# Patient Record
Sex: MALE | Race: BLACK OR AFRICAN AMERICAN | NOT HISPANIC OR LATINO | URBAN - METROPOLITAN AREA
[De-identification: names, ages, dates, MRNs, and addresses within clinical notes are randomized per-mention and may not be internally consistent; named-entity substitution may affect disease eponyms.]

---

## 2017-12-10 ENCOUNTER — EMERGENCY (EMERGENCY)
Facility: HOSPITAL | Age: 57
LOS: 1 days | Discharge: HOME | End: 2017-12-10
Attending: PERSONAL EMERGENCY RESPONSE ATTENDANT | Admitting: PERSONAL EMERGENCY RESPONSE ATTENDANT
Payer: COMMERCIAL

## 2017-12-10 VITALS
DIASTOLIC BLOOD PRESSURE: 89 MMHG | RESPIRATION RATE: 18 BRPM | OXYGEN SATURATION: 98 % | SYSTOLIC BLOOD PRESSURE: 147 MMHG | HEART RATE: 98 BPM

## 2017-12-10 VITALS
RESPIRATION RATE: 18 BRPM | DIASTOLIC BLOOD PRESSURE: 99 MMHG | TEMPERATURE: 100 F | HEART RATE: 116 BPM | SYSTOLIC BLOOD PRESSURE: 148 MMHG

## 2017-12-10 LAB
ALBUMIN SERPL ELPH-MCNC: 4.7 G/DL — SIGNIFICANT CHANGE UP (ref 3.3–5)
ALP SERPL-CCNC: 74 U/L — SIGNIFICANT CHANGE UP (ref 40–120)
ALT FLD-CCNC: 36 U/L RC — SIGNIFICANT CHANGE UP (ref 10–45)
ANION GAP SERPL CALC-SCNC: 16 MMOL/L — SIGNIFICANT CHANGE UP (ref 5–17)
AST SERPL-CCNC: 99 U/L — HIGH (ref 10–40)
BASE EXCESS BLDV CALC-SCNC: 3.5 MMOL/L — HIGH (ref -2–2)
BASOPHILS # BLD AUTO: 0.1 K/UL — SIGNIFICANT CHANGE UP (ref 0–0.2)
BASOPHILS NFR BLD AUTO: 1.6 % — SIGNIFICANT CHANGE UP (ref 0–2)
BILIRUB SERPL-MCNC: 2 MG/DL — HIGH (ref 0.2–1.2)
BUN SERPL-MCNC: 8 MG/DL — SIGNIFICANT CHANGE UP (ref 7–23)
CA-I SERPL-SCNC: 1.24 MMOL/L — SIGNIFICANT CHANGE UP (ref 1.12–1.3)
CALCIUM SERPL-MCNC: 10.4 MG/DL — SIGNIFICANT CHANGE UP (ref 8.4–10.5)
CHLORIDE BLDV-SCNC: 103 MMOL/L — SIGNIFICANT CHANGE UP (ref 96–108)
CHLORIDE SERPL-SCNC: 95 MMOL/L — LOW (ref 96–108)
CO2 BLDV-SCNC: 30 MMOL/L — SIGNIFICANT CHANGE UP (ref 22–30)
CO2 SERPL-SCNC: 24 MMOL/L — SIGNIFICANT CHANGE UP (ref 22–31)
CREAT SERPL-MCNC: 0.79 MG/DL — SIGNIFICANT CHANGE UP (ref 0.5–1.3)
EOSINOPHIL # BLD AUTO: 0.1 K/UL — SIGNIFICANT CHANGE UP (ref 0–0.5)
EOSINOPHIL NFR BLD AUTO: 2 % — SIGNIFICANT CHANGE UP (ref 0–6)
ETHANOL SERPL-MCNC: SIGNIFICANT CHANGE UP MG/DL (ref 0–10)
GAS PNL BLDV: 134 MMOL/L — LOW (ref 136–145)
GAS PNL BLDV: SIGNIFICANT CHANGE UP
GLUCOSE BLDV-MCNC: 124 MG/DL — HIGH (ref 70–99)
GLUCOSE SERPL-MCNC: 118 MG/DL — HIGH (ref 70–99)
HCO3 BLDV-SCNC: 28 MMOL/L — SIGNIFICANT CHANGE UP (ref 21–29)
HCT VFR BLD CALC: 42.8 % — SIGNIFICANT CHANGE UP (ref 39–50)
HCT VFR BLDA CALC: 46 % — SIGNIFICANT CHANGE UP (ref 39–50)
HGB BLD CALC-MCNC: 15.1 G/DL — SIGNIFICANT CHANGE UP (ref 13–17)
HGB BLD-MCNC: 15 G/DL — SIGNIFICANT CHANGE UP (ref 13–17)
LACTATE BLDV-MCNC: 2 MMOL/L — SIGNIFICANT CHANGE UP (ref 0.7–2)
LIDOCAIN IGE QN: 72 U/L — HIGH (ref 7–60)
LYMPHOCYTES # BLD AUTO: 1 K/UL — SIGNIFICANT CHANGE UP (ref 1–3.3)
LYMPHOCYTES # BLD AUTO: 27.8 % — SIGNIFICANT CHANGE UP (ref 13–44)
MCHC RBC-ENTMCNC: 33.6 PG — SIGNIFICANT CHANGE UP (ref 27–34)
MCHC RBC-ENTMCNC: 35 GM/DL — SIGNIFICANT CHANGE UP (ref 32–36)
MCV RBC AUTO: 96 FL — SIGNIFICANT CHANGE UP (ref 80–100)
MONOCYTES # BLD AUTO: 0.4 K/UL — SIGNIFICANT CHANGE UP (ref 0–0.9)
MONOCYTES NFR BLD AUTO: 9.9 % — SIGNIFICANT CHANGE UP (ref 2–14)
NEUTROPHILS # BLD AUTO: 2.1 K/UL — SIGNIFICANT CHANGE UP (ref 1.8–7.4)
NEUTROPHILS NFR BLD AUTO: 58.8 % — SIGNIFICANT CHANGE UP (ref 43–77)
PCO2 BLDV: 44 MMHG — SIGNIFICANT CHANGE UP (ref 35–50)
PH BLDV: 7.42 — SIGNIFICANT CHANGE UP (ref 7.35–7.45)
PLATELET # BLD AUTO: 163 K/UL — SIGNIFICANT CHANGE UP (ref 150–400)
PO2 BLDV: 25 MMHG — SIGNIFICANT CHANGE UP (ref 25–45)
POTASSIUM BLDV-SCNC: 3.1 MMOL/L — LOW (ref 3.5–5)
POTASSIUM SERPL-MCNC: 4.9 MMOL/L — SIGNIFICANT CHANGE UP (ref 3.5–5.3)
POTASSIUM SERPL-SCNC: 4.9 MMOL/L — SIGNIFICANT CHANGE UP (ref 3.5–5.3)
PROT SERPL-MCNC: 8.4 G/DL — HIGH (ref 6–8.3)
RBC # BLD: 4.46 M/UL — SIGNIFICANT CHANGE UP (ref 4.2–5.8)
RBC # FLD: 13.6 % — SIGNIFICANT CHANGE UP (ref 10.3–14.5)
SAO2 % BLDV: 36 % — LOW (ref 67–88)
SODIUM SERPL-SCNC: 135 MMOL/L — SIGNIFICANT CHANGE UP (ref 135–145)
WBC # BLD: 3.6 K/UL — LOW (ref 3.8–10.5)
WBC # FLD AUTO: 3.6 K/UL — LOW (ref 3.8–10.5)

## 2017-12-10 PROCEDURE — 96374 THER/PROPH/DIAG INJ IV PUSH: CPT

## 2017-12-10 PROCEDURE — 96376 TX/PRO/DX INJ SAME DRUG ADON: CPT

## 2017-12-10 PROCEDURE — 84132 ASSAY OF SERUM POTASSIUM: CPT

## 2017-12-10 PROCEDURE — 93005 ELECTROCARDIOGRAM TRACING: CPT

## 2017-12-10 PROCEDURE — 82962 GLUCOSE BLOOD TEST: CPT

## 2017-12-10 PROCEDURE — 80307 DRUG TEST PRSMV CHEM ANLYZR: CPT

## 2017-12-10 PROCEDURE — 99285 EMERGENCY DEPT VISIT HI MDM: CPT | Mod: 25

## 2017-12-10 PROCEDURE — 82947 ASSAY GLUCOSE BLOOD QUANT: CPT

## 2017-12-10 PROCEDURE — 84484 ASSAY OF TROPONIN QUANT: CPT

## 2017-12-10 PROCEDURE — 96375 TX/PRO/DX INJ NEW DRUG ADDON: CPT

## 2017-12-10 PROCEDURE — 76705 ECHO EXAM OF ABDOMEN: CPT

## 2017-12-10 PROCEDURE — 80053 COMPREHEN METABOLIC PANEL: CPT

## 2017-12-10 PROCEDURE — 71020: CPT | Mod: 26

## 2017-12-10 PROCEDURE — 85014 HEMATOCRIT: CPT

## 2017-12-10 PROCEDURE — 76705 ECHO EXAM OF ABDOMEN: CPT | Mod: 26,RT

## 2017-12-10 PROCEDURE — 99284 EMERGENCY DEPT VISIT MOD MDM: CPT | Mod: 25

## 2017-12-10 PROCEDURE — 82435 ASSAY OF BLOOD CHLORIDE: CPT

## 2017-12-10 PROCEDURE — 84295 ASSAY OF SERUM SODIUM: CPT

## 2017-12-10 PROCEDURE — 83690 ASSAY OF LIPASE: CPT

## 2017-12-10 PROCEDURE — 85027 COMPLETE CBC AUTOMATED: CPT

## 2017-12-10 PROCEDURE — 82330 ASSAY OF CALCIUM: CPT

## 2017-12-10 PROCEDURE — 71046 X-RAY EXAM CHEST 2 VIEWS: CPT

## 2017-12-10 PROCEDURE — 93010 ELECTROCARDIOGRAM REPORT: CPT

## 2017-12-10 PROCEDURE — 83605 ASSAY OF LACTIC ACID: CPT

## 2017-12-10 PROCEDURE — 82803 BLOOD GASES ANY COMBINATION: CPT

## 2017-12-10 RX ORDER — FAMOTIDINE 10 MG/ML
20 INJECTION INTRAVENOUS ONCE
Qty: 0 | Refills: 0 | Status: COMPLETED | OUTPATIENT
Start: 2017-12-10 | End: 2017-12-10

## 2017-12-10 RX ORDER — SODIUM CHLORIDE 9 MG/ML
2000 INJECTION INTRAMUSCULAR; INTRAVENOUS; SUBCUTANEOUS ONCE
Qty: 0 | Refills: 0 | Status: COMPLETED | OUTPATIENT
Start: 2017-12-10 | End: 2017-12-10

## 2017-12-10 RX ORDER — ONDANSETRON 8 MG/1
1 TABLET, FILM COATED ORAL
Qty: 9 | Refills: 0 | OUTPATIENT
Start: 2017-12-10 | End: 2017-12-12

## 2017-12-10 RX ORDER — ONDANSETRON 8 MG/1
4 TABLET, FILM COATED ORAL ONCE
Qty: 0 | Refills: 0 | Status: COMPLETED | OUTPATIENT
Start: 2017-12-10 | End: 2017-12-10

## 2017-12-10 RX ORDER — BENZOCAINE AND MENTHOL 5; 1 G/100ML; G/100ML
1 LIQUID ORAL ONCE
Qty: 0 | Refills: 0 | Status: COMPLETED | OUTPATIENT
Start: 2017-12-10 | End: 2017-12-10

## 2017-12-10 RX ADMIN — ONDANSETRON 4 MILLIGRAM(S): 8 TABLET, FILM COATED ORAL at 21:25

## 2017-12-10 RX ADMIN — SODIUM CHLORIDE 2000 MILLILITER(S): 9 INJECTION INTRAMUSCULAR; INTRAVENOUS; SUBCUTANEOUS at 16:48

## 2017-12-10 RX ADMIN — ONDANSETRON 4 MILLIGRAM(S): 8 TABLET, FILM COATED ORAL at 16:50

## 2017-12-10 RX ADMIN — FAMOTIDINE 20 MILLIGRAM(S): 10 INJECTION INTRAVENOUS at 18:17

## 2017-12-10 NOTE — ED PROVIDER NOTE - MEDICAL DECISION MAKING DETAILS
likely gastroenteritis. appears dehydrate. unlikely ACS without CP and HTN as only risk factor, will get trop x1. no concern for obstruction without h/o abd sx- normal stool caliber and no abd distension. could be related to hiatal hernia but unlikely as patient also has diarrhea/night sweats suggesting infectious etiology. will check labs

## 2017-12-10 NOTE — ED PROVIDER NOTE - NS ED ROS FT
ROS: no CP +SOB. no cough. no fever. +n/v/d. no abd pain. no rash. no bleeding. no urinary complaints. no weakness. no vision changes. no HA. no neck/back pain. no extremity swelling/deformity. No change in mental status.

## 2017-12-10 NOTE — ED PROVIDER NOTE - PLAN OF CARE
Take Zofran 1 tab dissolved in mouth every 8 hours as needed for nausea. Please follow-up with your Gastroenterologist within 1-2 days following discharge. Continue activity and diet as tolerated. If you becomes unable to tolerate liquids by mouth, uncontrollable pain, have new or worsening symptoms, or any other concern please return to the ED.

## 2017-12-10 NOTE — ED PROVIDER NOTE - PHYSICAL EXAMINATION
Gen: NAD, AOx3  Head: NCAT  HEENT: PERRL, oral mucosa dry, normal conjunctiva, neck supple  Lung: CTAB, no respiratory distress  CV: tachy, regular, no murmur, Normal perfusion  Abd: soft, NTND, obese, normal BS  MSK: No edema, no visible deformities  Neuro: No focal neurologic deficits  Skin: No rash   Psych: normal affect

## 2017-12-10 NOTE — ED PROVIDER NOTE - OBJECTIVE STATEMENT
58yo M with h/o hiatal hernia with vomiting x4 days, unable to tolerate any PO. +nausea. no abd pain. +diarrhea, nonbloody. has chronic BRBPR- told by PCP hemorrhoids, last episode was >8days ago. no recent travel/abx use/sick contacts. feels heart racing, +dry skin, +SOB. no CP. +night sweats. no fevers. no h/o abd sx     PCP-Rishi Bautista

## 2017-12-10 NOTE — ED ADULT NURSE NOTE - OBJECTIVE STATEMENT
56 y/o male a+ox3, pmhx acid reflux, hiatal hernia, HTN, ambulatory from home c/o N/V x 4 days. Pt reports N/V with anorexia; no fevers or chills. Pt reports 1 episode of bloody stool 1 week ago; no current bleeding via rectum as per pt. Pt denies hematuria, back or flank pain, painful or burning with urination. Pt states he sees a GI MD for acid reflux and monitoring of hiatal hernia. Pt denies abdominal pain, chest pain or discomfort, headache, feeling lightheaded, dizziness, difficulty breathing. VS documented, IV established, labs drawn. Pt left in position of comfort. Will reassess.

## 2017-12-10 NOTE — ED PROVIDER NOTE - CARE PLAN
Principal Discharge DX:	Epigastric pain  Instructions for follow-up, activity and diet:	Take Zofran 1 tab dissolved in mouth every 8 hours as needed for nausea. Please follow-up with your Gastroenterologist within 1-2 days following discharge. Continue activity and diet as tolerated. If you becomes unable to tolerate liquids by mouth, uncontrollable pain, have new or worsening symptoms, or any other concern please return to the ED. Principal Discharge DX:	Vomiting and diarrhea  Instructions for follow-up, activity and diet:	Take Zofran 1 tab dissolved in mouth every 8 hours as needed for nausea. Please follow-up with your Gastroenterologist within 1-2 days following discharge. Continue activity and diet as tolerated. If you becomes unable to tolerate liquids by mouth, uncontrollable pain, have new or worsening symptoms, or any other concern please return to the ED.  Secondary Diagnosis:	Nausea

## 2017-12-10 NOTE — ED ADULT NURSE REASSESSMENT NOTE - NS ED NURSE REASSESS COMMENT FT1
pt off unit for testing; will administer medications as per MD order upon return to unit.
Pt tolerating PO well, no c/o nausea or abd pain. VSS.

## 2017-12-10 NOTE — ED PROVIDER NOTE - ATTENDING CONTRIBUTION TO CARE
Attending MD Noriega.  Agree with above.  Pt is a 57 yr old male with hx of hypertension, hiatal hernia, chronic BRBPR told ‘hemorrhoids’ presenting with non-bilious, non-bloody vomiting with any PO intake for 4 days with ngith sweats and diarrhea.  No knonw sick contacts/recent travel.  Pt has never had abdominal surgeries.  Well appearing but tachycardia.  Normoactive bowel sounds.  Abdomen is soft, non-tender.  Pt is concerned his sxs are related to his hiatal hernia and was hoping it could be fixed today.  No epigastric/chest pain.  States he ‘cannot eat because he vomits’.  Does feel intermittently mildly SOB.

## 2017-12-10 NOTE — ED PROVIDER NOTE - PROGRESS NOTE DETAILS
[employer]: Pt's labs noted to have mildly elevated bilirubin/lipase/AST. Abdomen remains soft, nontender.  RUQ sono ordered. [employer]:  Pt reexamined and he is noted to be mildly tremulous.  He states that the abdominal discomfort, nausea, vomiting have been present for 1 yr.  He came today because he 'hasn't been sleeping or eating because of nausea, discomfort'.  Pt denies marijuana use, remote hx of tobacco use.  Endorses daily EtOH.  Noted to be tremulous and mildly tachycardic. remains without abd pain, no nausea, US no wall thickening, fluid or stones. will PO challenge. and reasses -Slowey DO Pt reevaluated: feels well. asking terrence eat. will po challenge and dispo home with gi fu if tolerating po. Pt and family agree with plan. - Maicol Vargas, Resident [employer]:  Pt's mild tremulousness has not progressed.  He remains calm, alert and oriented.  Abdominal discomfort improved.  Nausea/vomiting improved.  Pt PO challenged and able to tolerate.  He denied sig abdominal pain but endorsed mild discomfort in epigastrum LUQ x 1 yr.  He has no severe pain, no radiation to back.  Pt advised of all findings and comfortable with discharge with GI follow-up.  Return to ED for acutely new/worsening sxs.

## 2018-10-26 ENCOUNTER — EMERGENCY (EMERGENCY)
Facility: HOSPITAL | Age: 58
LOS: 1 days | Discharge: HOME | End: 2018-10-26
Attending: EMERGENCY MEDICINE
Payer: COMMERCIAL

## 2018-10-26 VITALS
SYSTOLIC BLOOD PRESSURE: 131 MMHG | OXYGEN SATURATION: 97 % | RESPIRATION RATE: 20 BRPM | TEMPERATURE: 98 F | HEIGHT: 69 IN | HEART RATE: 137 BPM | DIASTOLIC BLOOD PRESSURE: 93 MMHG | WEIGHT: 203.93 LBS

## 2018-10-26 PROBLEM — I10 ESSENTIAL (PRIMARY) HYPERTENSION: Chronic | Status: ACTIVE | Noted: 2017-12-10

## 2018-10-26 LAB
ALBUMIN SERPL ELPH-MCNC: 4.7 G/DL — SIGNIFICANT CHANGE UP (ref 3.3–5)
ALBUMIN SERPL ELPH-MCNC: 5.1 G/DL — HIGH (ref 3.3–5)
ALP SERPL-CCNC: 60 U/L — SIGNIFICANT CHANGE UP (ref 40–120)
ALP SERPL-CCNC: 69 U/L — SIGNIFICANT CHANGE UP (ref 40–120)
ALT FLD-CCNC: 29 U/L — SIGNIFICANT CHANGE UP (ref 10–45)
ALT FLD-CCNC: 35 U/L — SIGNIFICANT CHANGE UP (ref 10–45)
ANION GAP SERPL CALC-SCNC: 18 MMOL/L — HIGH (ref 5–17)
ANION GAP SERPL CALC-SCNC: 24 MMOL/L — HIGH (ref 5–17)
APPEARANCE UR: ABNORMAL
AST SERPL-CCNC: 59 U/L — HIGH (ref 10–40)
AST SERPL-CCNC: 71 U/L — HIGH (ref 10–40)
BASE EXCESS BLDV CALC-SCNC: -2.1 MMOL/L — LOW (ref -2–2)
BASOPHILS # BLD AUTO: 0 K/UL — SIGNIFICANT CHANGE UP (ref 0–0.2)
BASOPHILS NFR BLD AUTO: 0.4 % — SIGNIFICANT CHANGE UP (ref 0–2)
BILIRUB SERPL-MCNC: 2 MG/DL — HIGH (ref 0.2–1.2)
BILIRUB SERPL-MCNC: 2.5 MG/DL — HIGH (ref 0.2–1.2)
BILIRUB UR-MCNC: ABNORMAL
BUN SERPL-MCNC: 16 MG/DL — SIGNIFICANT CHANGE UP (ref 7–23)
BUN SERPL-MCNC: 21 MG/DL — SIGNIFICANT CHANGE UP (ref 7–23)
CA-I SERPL-SCNC: 1.22 MMOL/L — SIGNIFICANT CHANGE UP (ref 1.12–1.3)
CALCIUM SERPL-MCNC: 10.5 MG/DL — SIGNIFICANT CHANGE UP (ref 8.4–10.5)
CALCIUM SERPL-MCNC: 9.8 MG/DL — SIGNIFICANT CHANGE UP (ref 8.4–10.5)
CHLORIDE BLDV-SCNC: 99 MMOL/L — SIGNIFICANT CHANGE UP (ref 96–108)
CHLORIDE SERPL-SCNC: 93 MMOL/L — LOW (ref 96–108)
CHLORIDE SERPL-SCNC: 94 MMOL/L — LOW (ref 96–108)
CO2 BLDV-SCNC: 24 MMOL/L — SIGNIFICANT CHANGE UP (ref 22–30)
CO2 SERPL-SCNC: 16 MMOL/L — LOW (ref 22–31)
CO2 SERPL-SCNC: 20 MMOL/L — LOW (ref 22–31)
COLOR SPEC: ABNORMAL
CREAT SERPL-MCNC: 0.9 MG/DL — SIGNIFICANT CHANGE UP (ref 0.5–1.3)
CREAT SERPL-MCNC: 1.25 MG/DL — SIGNIFICANT CHANGE UP (ref 0.5–1.3)
DIFF PNL FLD: ABNORMAL
EOSINOPHIL # BLD AUTO: 0.1 K/UL — SIGNIFICANT CHANGE UP (ref 0–0.5)
EOSINOPHIL NFR BLD AUTO: 2 % — SIGNIFICANT CHANGE UP (ref 0–6)
ETHANOL SERPL-MCNC: SIGNIFICANT CHANGE UP MG/DL (ref 0–10)
GAS PNL BLDV: 131 MMOL/L — LOW (ref 136–145)
GAS PNL BLDV: SIGNIFICANT CHANGE UP
GAS PNL BLDV: SIGNIFICANT CHANGE UP
GLUCOSE BLDV-MCNC: 86 MG/DL — SIGNIFICANT CHANGE UP (ref 70–99)
GLUCOSE SERPL-MCNC: 124 MG/DL — HIGH (ref 70–99)
GLUCOSE SERPL-MCNC: 94 MG/DL — SIGNIFICANT CHANGE UP (ref 70–99)
GLUCOSE UR QL: NEGATIVE — SIGNIFICANT CHANGE UP
HCO3 BLDV-SCNC: 22 MMOL/L — SIGNIFICANT CHANGE UP (ref 21–29)
HCT VFR BLD CALC: 42.1 % — SIGNIFICANT CHANGE UP (ref 39–50)
HCT VFR BLDA CALC: 41 % — SIGNIFICANT CHANGE UP (ref 39–50)
HGB BLD CALC-MCNC: 13.4 G/DL — SIGNIFICANT CHANGE UP (ref 13–17)
HGB BLD-MCNC: 14.3 G/DL — SIGNIFICANT CHANGE UP (ref 13–17)
KETONES UR-MCNC: ABNORMAL
LACTATE BLDV-MCNC: 2.4 MMOL/L — HIGH (ref 0.7–2)
LEUKOCYTE ESTERASE UR-ACNC: NEGATIVE — SIGNIFICANT CHANGE UP
LIDOCAIN IGE QN: 89 U/L — HIGH (ref 7–60)
LYMPHOCYTES # BLD AUTO: 1 K/UL — SIGNIFICANT CHANGE UP (ref 1–3.3)
LYMPHOCYTES # BLD AUTO: 18 % — SIGNIFICANT CHANGE UP (ref 13–44)
MAGNESIUM SERPL-MCNC: 2 MG/DL — SIGNIFICANT CHANGE UP (ref 1.6–2.6)
MCHC RBC-ENTMCNC: 30.7 PG — SIGNIFICANT CHANGE UP (ref 27–34)
MCHC RBC-ENTMCNC: 34.1 GM/DL — SIGNIFICANT CHANGE UP (ref 32–36)
MCV RBC AUTO: 90 FL — SIGNIFICANT CHANGE UP (ref 80–100)
MONOCYTES # BLD AUTO: 0.6 K/UL — SIGNIFICANT CHANGE UP (ref 0–0.9)
MONOCYTES NFR BLD AUTO: 10.2 % — SIGNIFICANT CHANGE UP (ref 2–14)
NEUTROPHILS # BLD AUTO: 4 K/UL — SIGNIFICANT CHANGE UP (ref 1.8–7.4)
NEUTROPHILS NFR BLD AUTO: 69.3 % — SIGNIFICANT CHANGE UP (ref 43–77)
NITRITE UR-MCNC: NEGATIVE — SIGNIFICANT CHANGE UP
PCO2 BLDV: 40 MMHG — SIGNIFICANT CHANGE UP (ref 35–50)
PH BLDV: 7.37 — SIGNIFICANT CHANGE UP (ref 7.35–7.45)
PH UR: 6.5 — SIGNIFICANT CHANGE UP (ref 5–8)
PLATELET # BLD AUTO: 168 K/UL — SIGNIFICANT CHANGE UP (ref 150–400)
PO2 BLDV: 22 MMHG — LOW (ref 25–45)
POTASSIUM BLDV-SCNC: 3.4 MMOL/L — LOW (ref 3.5–5.3)
POTASSIUM SERPL-MCNC: 3.4 MMOL/L — LOW (ref 3.5–5.3)
POTASSIUM SERPL-MCNC: 3.4 MMOL/L — LOW (ref 3.5–5.3)
POTASSIUM SERPL-SCNC: 3.4 MMOL/L — LOW (ref 3.5–5.3)
POTASSIUM SERPL-SCNC: 3.4 MMOL/L — LOW (ref 3.5–5.3)
PROT SERPL-MCNC: 8.2 G/DL — SIGNIFICANT CHANGE UP (ref 6–8.3)
PROT SERPL-MCNC: 9.1 G/DL — HIGH (ref 6–8.3)
PROT UR-MCNC: ABNORMAL
RBC # BLD: 4.67 M/UL — SIGNIFICANT CHANGE UP (ref 4.2–5.8)
RBC # FLD: 14.3 % — SIGNIFICANT CHANGE UP (ref 10.3–14.5)
SAO2 % BLDV: 25 % — LOW (ref 67–88)
SODIUM SERPL-SCNC: 131 MMOL/L — LOW (ref 135–145)
SODIUM SERPL-SCNC: 134 MMOL/L — LOW (ref 135–145)
SP GR SPEC: 1.02 — SIGNIFICANT CHANGE UP (ref 1.01–1.02)
TSH SERPL-MCNC: 2.03 UIU/ML — SIGNIFICANT CHANGE UP (ref 0.27–4.2)
UROBILINOGEN FLD QL: ABNORMAL
WBC # BLD: 5.8 K/UL — SIGNIFICANT CHANGE UP (ref 3.8–10.5)
WBC # FLD AUTO: 5.8 K/UL — SIGNIFICANT CHANGE UP (ref 3.8–10.5)

## 2018-10-26 PROCEDURE — 93010 ELECTROCARDIOGRAM REPORT: CPT

## 2018-10-26 PROCEDURE — 74177 CT ABD & PELVIS W/CONTRAST: CPT | Mod: 26

## 2018-10-26 PROCEDURE — 76705 ECHO EXAM OF ABDOMEN: CPT | Mod: 26,RT

## 2018-10-26 PROCEDURE — 99218: CPT

## 2018-10-26 RX ORDER — AMLODIPINE BESYLATE 2.5 MG/1
0 TABLET ORAL
Qty: 0 | Refills: 0 | COMMUNITY

## 2018-10-26 RX ORDER — PANTOPRAZOLE SODIUM 20 MG/1
40 TABLET, DELAYED RELEASE ORAL
Qty: 0 | Refills: 0 | Status: DISCONTINUED | OUTPATIENT
Start: 2018-10-26 | End: 2018-10-30

## 2018-10-26 RX ORDER — OMEPRAZOLE 10 MG/1
0 CAPSULE, DELAYED RELEASE ORAL
Qty: 0 | Refills: 0 | COMMUNITY

## 2018-10-26 RX ORDER — POTASSIUM CHLORIDE 20 MEQ
40 PACKET (EA) ORAL ONCE
Qty: 0 | Refills: 0 | Status: COMPLETED | OUTPATIENT
Start: 2018-10-26 | End: 2018-10-26

## 2018-10-26 RX ORDER — SODIUM CHLORIDE 9 MG/ML
1000 INJECTION, SOLUTION INTRAVENOUS ONCE
Qty: 0 | Refills: 0 | Status: COMPLETED | OUTPATIENT
Start: 2018-10-26 | End: 2018-10-26

## 2018-10-26 RX ORDER — ONDANSETRON 8 MG/1
4 TABLET, FILM COATED ORAL ONCE
Qty: 0 | Refills: 0 | Status: COMPLETED | OUTPATIENT
Start: 2018-10-26 | End: 2018-10-26

## 2018-10-26 RX ORDER — SODIUM CHLORIDE 9 MG/ML
1000 INJECTION, SOLUTION INTRAVENOUS
Qty: 0 | Refills: 0 | Status: DISCONTINUED | OUTPATIENT
Start: 2018-10-26 | End: 2018-10-30

## 2018-10-26 RX ORDER — LOSARTAN POTASSIUM 100 MG/1
50 TABLET, FILM COATED ORAL DAILY
Qty: 0 | Refills: 0 | Status: DISCONTINUED | OUTPATIENT
Start: 2018-10-26 | End: 2018-10-30

## 2018-10-26 RX ORDER — FAMOTIDINE 10 MG/ML
20 INJECTION INTRAVENOUS
Qty: 0 | Refills: 0 | Status: DISCONTINUED | OUTPATIENT
Start: 2018-10-26 | End: 2018-10-30

## 2018-10-26 RX ADMIN — FAMOTIDINE 20 MILLIGRAM(S): 10 INJECTION INTRAVENOUS at 17:50

## 2018-10-26 RX ADMIN — Medication 40 MILLIEQUIVALENT(S): at 15:24

## 2018-10-26 RX ADMIN — SODIUM CHLORIDE 150 MILLILITER(S): 9 INJECTION, SOLUTION INTRAVENOUS at 19:08

## 2018-10-26 RX ADMIN — SODIUM CHLORIDE 4000 MILLILITER(S): 9 INJECTION, SOLUTION INTRAVENOUS at 11:41

## 2018-10-26 RX ADMIN — ONDANSETRON 4 MILLIGRAM(S): 8 TABLET, FILM COATED ORAL at 11:40

## 2018-10-26 RX ADMIN — SODIUM CHLORIDE 150 MILLILITER(S): 9 INJECTION, SOLUTION INTRAVENOUS at 15:25

## 2018-10-26 RX ADMIN — LOSARTAN POTASSIUM 50 MILLIGRAM(S): 100 TABLET, FILM COATED ORAL at 17:50

## 2018-10-26 NOTE — ED ADULT NURSE NOTE - CHPI ED NUR SYMPTOMS NEG
no dysuria/no abdominal distension/no fever/no hematuria/no blood in stool/no burning urination/no chills

## 2018-10-26 NOTE — ED PROVIDER NOTE - MEDICAL DECISION MAKING DETAILS
Intractable n/v with diarreha and mild epigastric pain.  possible gastroenteritis, will obtain lipase, tsh, electrolytes as well.  Will give IVF and antiemetic.

## 2018-10-26 NOTE — ED PROVIDER NOTE - PHYSICAL EXAMINATION
***GEN - NAD  ***HEAD - NC/AT  ***EYES/NOSE - PEERL, EOMI, dry mucous membranes  ***THROAT: Oral cavity and pharynx normal. No inflammation, swelling, exudate, or lesions.    ***NECK: supple, non-tender no lymphadenopathy  ***PULMONARY - CTA b/l, symmetric breath sounds.   ***CARDIAC- s1s2, RRR, no murmur  ***ABDOMEN - +BS, ND, tenderness to palpation epigastrum, soft, no guarding, no rebound, no organomegaly  ***BACK - no CVA tenderness, Normal  spine, no spinal TTP  ***EXTREMITIES - symmetric pulses, 2+ dp, capillary refill < 2 seconds, no clubbing, no cyanosis, no edema   ***SKIN - warm, dry, intact, no rash or bruising   ***NEUROLOGIC - a&o x3, CN 3-12 intact, sensation nl, motor 5/5 RUE/LUE/RLE/LLE gait nl,   ***PSYCH - insight and judgment nl, memory nl, affect nl, thought normal numerical 0-10

## 2018-10-26 NOTE — ED CDU PROVIDER SUBSEQUENT DAY NOTE - PROGRESS NOTE DETAILS
CDU NOTE WILDA ROB: Pt resting comfortably, feeling well without complaint. states he had another loose BM earlier, this one was soft, not watery. NAD, VSS. No events on telemetry. will continue monitoring. Pt resting comfortably. NAD. No complaints. VSS. no nausea/vomitng, diarrhea overnight, pt able to tolerate PO fluid, will  have breakfast and d/c with GI follow up. -Trini Paredes PA-C

## 2018-10-26 NOTE — ED CDU PROVIDER INITIAL DAY NOTE - ATTENDING CONTRIBUTION TO CARE
Attending MD Yanez:   I personally have seen and examined this patient.  Physician assistant note reviewed and agree on plan of care and except where noted.  See HPI, PE, and MDM for details.

## 2018-10-26 NOTE — ED CDU PROVIDER DISPOSITION NOTE - CLINICAL COURSE
58M with past medical history hiatal hernia, Hypertension, GERD, presents with 4d h/o nbnb n/v, watery diarrhea, decreased appetite and fatigue.  Able to drink water but having difficulty keeping anything else down. pt had similar symptoms 8 months ago, came to ED and sent home with anti emetic which helped.  Denies recent travel, recent antibiotics, fever, dysuria, chest pain, shortness of breath, lightheadedness, cough, sick contacts, testicle pain.  Has had 2 prior EGDs, last one was this year, which were normal.  In ED, Na 134, K 3.4, AG 24, lactate 2.4, CT a/p negative. pt sent to CDU for IVFs, antiemetics, PO challenge and repeat labs.   In CDU, vomiting resolved, pt tolerated PO intake. Repeat labs ____________. 58M with past medical history hiatal hernia, Hypertension, GERD, presents with 4d h/o nbnb n/v, watery diarrhea, decreased appetite and fatigue.  Able to drink water but having difficulty keeping anything else down. pt had similar symptoms 8 months ago, came to ED and sent home with anti emetic which helped.  Denies recent travel, recent antibiotics, fever, dysuria, chest pain, shortness of breath, lightheadedness, cough, sick contacts, testicle pain.  Has had 2 prior EGDs, last one was this year, which were normal.  In ED, Na 134, K 3.4, AG 24, lactate 2.4, CT a/p negative. pt sent to CDU for IVFs, antiemetics, PO challenge and repeat labs.   In CDU, vomiting resolved, pt tolerated PO intake. Repeat labs showed Na 135, K 3.5, AG 15, lactate 1.9  Pt resting comfortably. NAD. No complaints. VSS. no nausea/vomitng, diarrhea overnight, pt able to tolerate PO fluid, will  have breakfast and d/c with GI follow up, case d/w Dr. Harper. 58M with past medical history hiatal hernia, Hypertension, GERD, presents with 4d h/o nbnb n/v, watery diarrhea, decreased appetite and fatigue.  Able to drink water but having difficulty keeping anything else down. pt had similar symptoms 8 months ago, came to ED and sent home with anti emetic which helped.  Denies recent travel, recent antibiotics, fever, dysuria, chest pain, shortness of breath, lightheadedness, cough, sick contacts, testicle pain.  Has had 2 prior EGDs, last one was this year, which were normal.  In ED, Na 134, K 3.4, AG 24, lactate 2.4, CT a/p negative. pt sent to CDU for IVFs, antiemetics, PO challenge and repeat labs.   In CDU, vomiting resolved, pt tolerated PO intake. Repeat labs showed Na 135, K 3.5, AG 15, lactate 1.9  Pt resting comfortably. NAD. No complaints. VSS. no nausea/vomitng, diarrhea overnight, pt able to tolerate PO fluid, will  have breakfast and d/c with GI follow up, case d/w Dr. French.

## 2018-10-26 NOTE — ED ADULT NURSE NOTE - OBJECTIVE STATEMENT
59 yo M [id number]  arrived from triage c/o of nausea/ vomiting and diarrhea. Denies abd pain PMH HLD, hiatal hernia. Onset of symptoms 4 days ago. Reports having no appetite. No  distress. No CP dizziness weakness or SOB. BS sounds + All 4Q Abdomen soft, nontender, nondistended. Last BM today denies  Palpitations. Denies weight loss. IV line placed. Medication given as ordered. Labs drawn and sent. Pt awaiting Abd ct. Updated on plan of care.

## 2018-10-26 NOTE — ED PROVIDER NOTE - ATTENDING CONTRIBUTION TO CARE
Attending MD Yanez:  I personally have seen and examined this patient.  Resident note reviewed and agree on plan of care and except where noted.  See HPI, PE, and MDM for details.      58M presenting with 4 days of nausea/vomiting and diarrhea, reportedly patient states he has this happen to him multiple times in the past with uncertain etiology. Patient benign abdomen but is tachy, ddx includes infectious colitis, gastroenteritis, IBD/IBS. Plan for labs, ekg, IV fluids, antiemetics and reassessment      Attending MD Yanez: A & O x 3, NAD, EOMI b/l, PERRL b/l; lungs CTAB, heart tachy but reg rhythm without murmur; abdomen soft NTND; extremities with no edema; affect appropriate. neuro exam non focal with no motor or sensory deficits.

## 2018-10-26 NOTE — ED CDU PROVIDER SUBSEQUENT DAY NOTE - MUSCULOSKELETAL, MLM
independent/needs device
Spine appears normal, range of motion is not limited, no muscle or joint tenderness

## 2018-10-26 NOTE — ED PROVIDER NOTE - OBJECTIVE STATEMENT
89M with past medical history hiatal hernia, Hypertension, hl presents with 4d h/o nbnb n/v, watery diarrhea, decreased appetite and fatigue.  Able to drink water but having difficulty keeping anything else down.  Happened 8 mos ago with similar, came to ED and sent home with anti emetic which helped.  Denies recent travel, recent antibiotics, fever, dysuria, chest pain, shortness of breath, lightheadedness, cough, sick contacts, testicle pain.      Has had 2 prior EGDs, last one was this year, which were normal.      primary medical doctor/GI: Rishi Bautista 58M with past medical history hiatal hernia, Hypertension, hl presents with 4d h/o nbnb n/v, watery diarrhea, decreased appetite and fatigue.  Able to drink water but having difficulty keeping anything else down.  Happened 8 mos ago with similar, came to ED and sent home with anti emetic which helped.  Denies recent travel, recent antibiotics, fever, dysuria, chest pain, shortness of breath, lightheadedness, cough, sick contacts, testicle pain.      Has had 2 prior EGDs, last one was this year, which were normal.      primary medical doctor/GI: Rishi Bautista

## 2018-10-26 NOTE — ED CDU PROVIDER INITIAL DAY NOTE - OBJECTIVE STATEMENT
58M with past medical history hiatal hernia, Hypertension, hl presents with 4d h/o nbnb n/v, watery diarrhea, decreased appetite and fatigue.  Able to drink water but having difficulty keeping anything else down.  Happened 8 mos ago with similar, came to ED and sent home with anti emetic which helped.  Denies recent travel, recent antibiotics, fever, dysuria, chest pain, shortness of breath, lightheadedness, cough, sick contacts, testicle pain.      Has had 2 prior EGDs, last one was this year, which were normal.      primary medical doctor/GI: Rishi Bautista 58M with past medical history hiatal hernia, Hypertension, GERD, presents with 4d h/o nbnb n/v, watery diarrhea, decreased appetite and fatigue.  Able to drink water but having difficulty keeping anything else down.  Happened 8 mos ago with similar, came to ED and sent home with anti emetic which helped.  Denies recent travel, recent antibiotics, fever, dysuria, chest pain, shortness of breath, lightheadedness, cough, sick contacts, testicle pain.      Has had 2 prior EGDs, last one was this year, which were normal.      primary medical doctor/GI: Rishi Bautista 58M with past medical history hiatal hernia, Hypertension, GERD, presents with 4d h/o nbnb n/v, watery diarrhea, decreased appetite and fatigue.  Able to drink water but having difficulty keeping anything else down. pt had similar symptoms 8 months ago, came to ED and sent home with anti emetic which helped.  Denies recent travel, recent antibiotics, fever, dysuria, chest pain, shortness of breath, lightheadedness, cough, sick contacts, testicle pain.      Has had 2 prior EGDs, last one was this year, which were normal.      primary medical doctor/GI: Rishi Bautista

## 2018-10-26 NOTE — ED PROVIDER NOTE - NS ED ROS FT
Constitutional: no fever  Eyes: no conjunctivitis  Ears: no ear pain   Nose: no nasal congestion, Mouth/Throat: no throat pain, Neck: no stiffness  Cardiovascular: no chest pain  Chest: no cough  Gastrointestinal: As noted in hpi   MSK: no joint pain  : no dysuria  Skin: no rash  Neuro: no LOC  All other review of systems negative except as noted in HPI

## 2018-10-26 NOTE — ED CDU PROVIDER SUBSEQUENT DAY NOTE - ATTENDING CONTRIBUTION TO CARE
Private Physician Lily  58y male pmh Hypertension, comes to ed complains of nvd, not tolerating po 4d, No fever chills. no gi bleeding. No hx abd surg, Had similar episode 8m ago. Eithology unclear.Now in cdu pt reports feeling better. Tolerating po,.PE WDWN male nad normocephalic atraumatic chest clear anterior & posterior abd soft +bs lungs clear. cv no rubs, gallops or murmurs  Michael French MD, Facep

## 2018-10-26 NOTE — ED CDU PROVIDER DISPOSITION NOTE - PLAN OF CARE
1.  Drink plenty of fluids to stay hydrated. You can drink Gatorade/Powerade/coconut water to replenish your electrolytes and start with bland diet (Bananas, Rice, Applesauce, Toast), then advance as tolerated to work your system up to a healthy dietary routine.  2. Continue your home medications as directed.  3. Be sure to wash your hands often/thoroughly to prevent spread of infection  4. You will need to follow-up with your PMD and GI specialist in 1-2 days. A copy of your results were given to you to bring to your appt.  5. Return to ER for fever, chills, severe pain, unable to keep down fluids from vomiting, or any other concerns.

## 2018-10-26 NOTE — ED ADULT NURSE NOTE - NS ED NURSE DC INFO COMPLEXITY
Patient asked questions/Returned Demonstration/Straightforward: Basic instructions, no meds, no home treatment/Verbalized Understanding/Moderate: Comprehensive teaching

## 2018-10-26 NOTE — ED CDU PROVIDER SUBSEQUENT DAY NOTE - HISTORY
Pt feels well without complaint. nausea/vomiting resolved, no abd pain now, had 2 episodes of watery diarrhea after eating dinner. pt notes his urine was dark last time he urinated. denies any dysuria, frequency/urgency, flank pain. pt states he has had hematuria in the past, had a cystoscopy 5 years ago that he states was normal, does not currently f/u with a urologist. NAD, VSS. no events on tele. Abd: Flat/ND/NT/soft. case discussed with Dr. Claire, will get UC and pt should f/u outpt with urology for further workup. will continue monitoring. - WILDA Saab

## 2018-10-26 NOTE — ED CDU PROVIDER INITIAL DAY NOTE - PHYSICAL EXAMINATION
***GEN - NAD  ***HEAD - NC/AT  ***EYES/NOSE - PEERL, EOMI, dry mucous membranes  ***THROAT: Oral cavity and pharynx normal. No inflammation, swelling, exudate, or lesions.    ***NECK: supple, non-tender no lymphadenopathy  ***PULMONARY - CTA b/l, symmetric breath sounds.   ***CARDIAC- s1s2, RRR, no murmur  ***ABDOMEN - +BS, ND, tenderness to palpation epigastrum, soft, no guarding, no rebound, no organomegaly  ***BACK - no CVA tenderness, Normal  spine, no spinal TTP  ***EXTREMITIES - symmetric pulses, 2+ dp, capillary refill < 2 seconds, no clubbing, no cyanosis, no edema   ***SKIN - warm, dry, intact, no rash or bruising   ***NEUROLOGIC - a&o x3, CN 3-12 intact, sensation nl, motor 5/5 RUE/LUE/RLE/LLE gait nl,   ***PSYCH - insight and judgment nl, memory nl, affect nl, thought normal

## 2018-10-26 NOTE — ED CDU PROVIDER DISPOSITION NOTE - ATTENDING CONTRIBUTION TO CARE
I have personally performed a face to face diagnostic evaluation on this patient.  I have reviewed the ACP note and agree with the history, exam, and plan of care, except as noted.  History and Exam by me shows  See CDU Sub note  Michael French MD, Facep

## 2018-10-27 VITALS
HEART RATE: 76 BPM | SYSTOLIC BLOOD PRESSURE: 134 MMHG | OXYGEN SATURATION: 100 % | TEMPERATURE: 98 F | RESPIRATION RATE: 18 BRPM | DIASTOLIC BLOOD PRESSURE: 92 MMHG

## 2018-10-27 LAB
ALBUMIN SERPL ELPH-MCNC: 4.3 G/DL — SIGNIFICANT CHANGE UP (ref 3.3–5)
ALP SERPL-CCNC: 54 U/L — SIGNIFICANT CHANGE UP (ref 40–120)
ALT FLD-CCNC: 32 U/L — SIGNIFICANT CHANGE UP (ref 10–45)
ANION GAP SERPL CALC-SCNC: 15 MMOL/L — SIGNIFICANT CHANGE UP (ref 5–17)
AST SERPL-CCNC: 63 U/L — HIGH (ref 10–40)
BASE EXCESS BLDV CALC-SCNC: 2.1 MMOL/L — HIGH (ref -2–2)
BILIRUB SERPL-MCNC: 1.8 MG/DL — HIGH (ref 0.2–1.2)
BUN SERPL-MCNC: 12 MG/DL — SIGNIFICANT CHANGE UP (ref 7–23)
CA-I SERPL-SCNC: 1.21 MMOL/L — SIGNIFICANT CHANGE UP (ref 1.12–1.3)
CALCIUM SERPL-MCNC: 9.8 MG/DL — SIGNIFICANT CHANGE UP (ref 8.4–10.5)
CHLORIDE BLDV-SCNC: 100 MMOL/L — SIGNIFICANT CHANGE UP (ref 96–108)
CHLORIDE SERPL-SCNC: 97 MMOL/L — SIGNIFICANT CHANGE UP (ref 96–108)
CO2 BLDV-SCNC: 27 MMOL/L — SIGNIFICANT CHANGE UP (ref 22–30)
CO2 SERPL-SCNC: 23 MMOL/L — SIGNIFICANT CHANGE UP (ref 22–31)
CREAT SERPL-MCNC: 0.71 MG/DL — SIGNIFICANT CHANGE UP (ref 0.5–1.3)
GAS PNL BLDV: 132 MMOL/L — LOW (ref 136–145)
GAS PNL BLDV: SIGNIFICANT CHANGE UP
GAS PNL BLDV: SIGNIFICANT CHANGE UP
GLUCOSE BLDV-MCNC: 103 MG/DL — HIGH (ref 70–99)
GLUCOSE SERPL-MCNC: 107 MG/DL — HIGH (ref 70–99)
HCO3 BLDV-SCNC: 26 MMOL/L — SIGNIFICANT CHANGE UP (ref 21–29)
HCT VFR BLDA CALC: 38 % — LOW (ref 39–50)
HGB BLD CALC-MCNC: 12.4 G/DL — LOW (ref 13–17)
LACTATE BLDV-MCNC: 1.9 MMOL/L — SIGNIFICANT CHANGE UP (ref 0.7–2)
OTHER CELLS CSF MANUAL: 4 ML/DL — LOW (ref 18–22)
PCO2 BLDV: 39 MMHG — SIGNIFICANT CHANGE UP (ref 35–50)
PH BLDV: 7.44 — SIGNIFICANT CHANGE UP (ref 7.35–7.45)
PO2 BLDV: 19 MMHG — LOW (ref 25–45)
POTASSIUM BLDV-SCNC: 3.2 MMOL/L — LOW (ref 3.5–5.3)
POTASSIUM SERPL-MCNC: 3.5 MMOL/L — SIGNIFICANT CHANGE UP (ref 3.5–5.3)
POTASSIUM SERPL-SCNC: 3.5 MMOL/L — SIGNIFICANT CHANGE UP (ref 3.5–5.3)
PROT SERPL-MCNC: 7.4 G/DL — SIGNIFICANT CHANGE UP (ref 6–8.3)
SAO2 % BLDV: 25 % — LOW (ref 67–88)
SODIUM SERPL-SCNC: 135 MMOL/L — SIGNIFICANT CHANGE UP (ref 135–145)

## 2018-10-27 PROCEDURE — 99217: CPT

## 2018-10-27 PROCEDURE — 87086 URINE CULTURE/COLONY COUNT: CPT

## 2018-10-27 PROCEDURE — 84443 ASSAY THYROID STIM HORMONE: CPT

## 2018-10-27 PROCEDURE — 81001 URINALYSIS AUTO W/SCOPE: CPT

## 2018-10-27 PROCEDURE — 74177 CT ABD & PELVIS W/CONTRAST: CPT

## 2018-10-27 PROCEDURE — 96375 TX/PRO/DX INJ NEW DRUG ADDON: CPT

## 2018-10-27 PROCEDURE — 83735 ASSAY OF MAGNESIUM: CPT

## 2018-10-27 PROCEDURE — 93005 ELECTROCARDIOGRAM TRACING: CPT

## 2018-10-27 PROCEDURE — 82947 ASSAY GLUCOSE BLOOD QUANT: CPT

## 2018-10-27 PROCEDURE — 82330 ASSAY OF CALCIUM: CPT

## 2018-10-27 PROCEDURE — 82803 BLOOD GASES ANY COMBINATION: CPT

## 2018-10-27 PROCEDURE — 83605 ASSAY OF LACTIC ACID: CPT

## 2018-10-27 PROCEDURE — 82435 ASSAY OF BLOOD CHLORIDE: CPT

## 2018-10-27 PROCEDURE — 76705 ECHO EXAM OF ABDOMEN: CPT

## 2018-10-27 PROCEDURE — 80053 COMPREHEN METABOLIC PANEL: CPT

## 2018-10-27 PROCEDURE — 80307 DRUG TEST PRSMV CHEM ANLYZR: CPT

## 2018-10-27 PROCEDURE — 85027 COMPLETE CBC AUTOMATED: CPT

## 2018-10-27 PROCEDURE — 84132 ASSAY OF SERUM POTASSIUM: CPT

## 2018-10-27 PROCEDURE — G0378: CPT

## 2018-10-27 PROCEDURE — 96374 THER/PROPH/DIAG INJ IV PUSH: CPT | Mod: XU

## 2018-10-27 PROCEDURE — 85014 HEMATOCRIT: CPT

## 2018-10-27 PROCEDURE — 99284 EMERGENCY DEPT VISIT MOD MDM: CPT | Mod: 25

## 2018-10-27 PROCEDURE — 84295 ASSAY OF SERUM SODIUM: CPT

## 2018-10-27 PROCEDURE — 83690 ASSAY OF LIPASE: CPT

## 2018-10-27 RX ORDER — DIPHENHYDRAMINE HCL 50 MG
50 CAPSULE ORAL ONCE
Qty: 0 | Refills: 0 | Status: COMPLETED | OUTPATIENT
Start: 2018-10-27 | End: 2018-10-27

## 2018-10-27 RX ADMIN — FAMOTIDINE 20 MILLIGRAM(S): 10 INJECTION INTRAVENOUS at 05:26

## 2018-10-27 RX ADMIN — Medication 50 MILLIGRAM(S): at 03:20

## 2018-10-27 RX ADMIN — SODIUM CHLORIDE 150 MILLILITER(S): 9 INJECTION, SOLUTION INTRAVENOUS at 02:07

## 2018-10-27 NOTE — ED ADULT NURSE REASSESSMENT NOTE - NS ED NURSE REASSESS COMMENT FT1
Received pt from PARVE[facility] Erwin, received pt alert and responsive, oriented x4, denies any respiratory distress, SOB, or difficulty breathing. Pt transferred to CDU for nausea, vomiting, pt is asymptomatic at this time with no complaints of pain or nausea. IV in place, patent and free of signs of infiltration, ordered IV fluids infusing as ordered.  On continuous cardiac monitoring as ordered, sinus tachycardia hr low 100's, asymptomatic.   Pt denies chest pain or palpitations, V/S stable, pt afebrile, pt denies pain at this time. Pt educated on unit and unit rules, instructed patient to notify RN of any needed assistance, Pt verbalizes understanding, Call bell placed within reach. Safety maintained. Will continue to monitor. Meal provided.
07.00 Am Received Report from RN Samantha persaud  Pt is observed for N/V/D  08.00 Am Pt Reassessed  pt is alert and responsive, oriented x4, denies any respiratory distress, SOB, or difficulty breathing abdominal pain . N/V/D now has stable vitals     IV in place, patent and free of signs of infiltration, V/S stable, pt afebrile, Pt , instructed patient to notify RN of any needed assistance, Pt verbalizes understanding, Call bell placed within reach. Safety maintained. Will continue to monitor.  09.00 Pt was assessed by Dr Gillian Rodriguez  with all the results Pt feeling better  pt is discharged . ML out  WILDA Paredes   Explained the pt follow up care   & gave the discharge summary pt verbalized the understanding on follow up care pt stable to go home

## 2018-10-27 NOTE — ED ADULT NURSE REASSESSMENT NOTE - GENERAL PATIENT STATE
resting/sleeping/smiling/interactive/comfortable appearance/cooperative/improvement verbalized
comfortable appearance

## 2018-10-27 NOTE — ED ADULT NURSE REASSESSMENT NOTE - COMFORT CARE
darkened lights/meal provided/repositioned/warm blanket provided/plan of care explained/po fluids offered
ambulated to bathroom/plan of care explained

## 2018-10-28 LAB
CULTURE RESULTS: NO GROWTH — SIGNIFICANT CHANGE UP
SPECIMEN SOURCE: SIGNIFICANT CHANGE UP

## 2019-04-10 ENCOUNTER — INPATIENT (INPATIENT)
Facility: HOSPITAL | Age: 59
LOS: 2 days | Discharge: HOME | DRG: 391 | End: 2019-04-13
Attending: INTERNAL MEDICINE | Admitting: HOSPITALIST
Payer: COMMERCIAL

## 2019-04-10 VITALS
HEART RATE: 123 BPM | TEMPERATURE: 98 F | HEIGHT: 69 IN | SYSTOLIC BLOOD PRESSURE: 90 MMHG | RESPIRATION RATE: 22 BRPM | OXYGEN SATURATION: 99 % | WEIGHT: 220.02 LBS | DIASTOLIC BLOOD PRESSURE: 57 MMHG

## 2019-04-10 DIAGNOSIS — R11.2 NAUSEA WITH VOMITING, UNSPECIFIED: ICD-10-CM

## 2019-04-10 LAB
ALBUMIN SERPL ELPH-MCNC: 5 G/DL — SIGNIFICANT CHANGE UP (ref 3.3–5)
ALP SERPL-CCNC: 69 U/L — SIGNIFICANT CHANGE UP (ref 40–120)
ALT FLD-CCNC: 42 U/L — SIGNIFICANT CHANGE UP (ref 10–45)
ANION GAP SERPL CALC-SCNC: 22 MMOL/L — HIGH (ref 5–17)
ANION GAP SERPL CALC-SCNC: 24 MMOL/L — HIGH (ref 5–17)
APTT BLD: 26.3 SEC — LOW (ref 27.5–36.3)
AST SERPL-CCNC: 50 U/L — HIGH (ref 10–40)
BASE EXCESS BLDV CALC-SCNC: 10.2 MMOL/L — HIGH (ref -2–2)
BASOPHILS # BLD AUTO: 0.1 K/UL — SIGNIFICANT CHANGE UP (ref 0–0.2)
BASOPHILS NFR BLD AUTO: 0.6 % — SIGNIFICANT CHANGE UP (ref 0–2)
BILIRUB SERPL-MCNC: 1.8 MG/DL — HIGH (ref 0.2–1.2)
BUN SERPL-MCNC: 52 MG/DL — HIGH (ref 7–23)
BUN SERPL-MCNC: 54 MG/DL — HIGH (ref 7–23)
CA-I SERPL-SCNC: 1.06 MMOL/L — LOW (ref 1.12–1.3)
CALCIUM SERPL-MCNC: 10.2 MG/DL — SIGNIFICANT CHANGE UP (ref 8.4–10.5)
CALCIUM SERPL-MCNC: 9.2 MG/DL — SIGNIFICANT CHANGE UP (ref 8.4–10.5)
CHLORIDE BLDV-SCNC: 79 MMOL/L — LOW (ref 96–108)
CHLORIDE SERPL-SCNC: 72 MMOL/L — LOW (ref 96–108)
CHLORIDE SERPL-SCNC: 77 MMOL/L — LOW (ref 96–108)
CO2 BLDV-SCNC: 39 MMOL/L — HIGH (ref 22–30)
CO2 SERPL-SCNC: 27 MMOL/L — SIGNIFICANT CHANGE UP (ref 22–31)
CO2 SERPL-SCNC: 28 MMOL/L — SIGNIFICANT CHANGE UP (ref 22–31)
CREAT SERPL-MCNC: 2.7 MG/DL — HIGH (ref 0.5–1.3)
CREAT SERPL-MCNC: 3.24 MG/DL — HIGH (ref 0.5–1.3)
EOSINOPHIL # BLD AUTO: 0 K/UL — SIGNIFICANT CHANGE UP (ref 0–0.5)
EOSINOPHIL NFR BLD AUTO: 0.4 % — SIGNIFICANT CHANGE UP (ref 0–6)
GAS PNL BLDV: 122 MMOL/L — LOW (ref 136–145)
GAS PNL BLDV: SIGNIFICANT CHANGE UP
GLUCOSE BLDV-MCNC: 162 MG/DL — HIGH (ref 70–99)
GLUCOSE SERPL-MCNC: 108 MG/DL — HIGH (ref 70–99)
GLUCOSE SERPL-MCNC: 158 MG/DL — HIGH (ref 70–99)
HCO3 BLDV-SCNC: 37 MMOL/L — HIGH (ref 21–29)
HCT VFR BLD CALC: 48.4 % — SIGNIFICANT CHANGE UP (ref 39–50)
HCT VFR BLDA CALC: 50 % — SIGNIFICANT CHANGE UP (ref 39–50)
HGB BLD CALC-MCNC: 16.3 G/DL — SIGNIFICANT CHANGE UP (ref 13–17)
HGB BLD-MCNC: 15.6 G/DL — SIGNIFICANT CHANGE UP (ref 13–17)
HOROWITZ INDEX BLDV+IHG-RTO: SIGNIFICANT CHANGE UP
INR BLD: 1.09 RATIO — SIGNIFICANT CHANGE UP (ref 0.88–1.16)
LACTATE BLDV-MCNC: 3.6 MMOL/L — HIGH (ref 0.7–2)
LIDOCAIN IGE QN: 63 U/L — HIGH (ref 7–60)
LYMPHOCYTES # BLD AUTO: 1.3 K/UL — SIGNIFICANT CHANGE UP (ref 1–3.3)
LYMPHOCYTES # BLD AUTO: 14.6 % — SIGNIFICANT CHANGE UP (ref 13–44)
MAGNESIUM SERPL-MCNC: 1.4 MG/DL — LOW (ref 1.6–2.6)
MCHC RBC-ENTMCNC: 28.3 PG — SIGNIFICANT CHANGE UP (ref 27–34)
MCHC RBC-ENTMCNC: 32.1 GM/DL — SIGNIFICANT CHANGE UP (ref 32–36)
MCV RBC AUTO: 88.1 FL — SIGNIFICANT CHANGE UP (ref 80–100)
MONOCYTES # BLD AUTO: 1.1 K/UL — HIGH (ref 0–0.9)
MONOCYTES NFR BLD AUTO: 13.1 % — SIGNIFICANT CHANGE UP (ref 2–14)
NEUTROPHILS # BLD AUTO: 6.2 K/UL — SIGNIFICANT CHANGE UP (ref 1.8–7.4)
NEUTROPHILS NFR BLD AUTO: 71.2 % — SIGNIFICANT CHANGE UP (ref 43–77)
PCO2 BLDV: 58 MMHG — HIGH (ref 35–50)
PH BLDV: 7.42 — SIGNIFICANT CHANGE UP (ref 7.35–7.45)
PHOSPHATE SERPL-MCNC: 3.2 MG/DL — SIGNIFICANT CHANGE UP (ref 2.5–4.5)
PLATELET # BLD AUTO: 224 K/UL — SIGNIFICANT CHANGE UP (ref 150–400)
PO2 BLDV: 22 MMHG — LOW (ref 25–45)
POTASSIUM BLDV-SCNC: 2.4 MMOL/L — CRITICAL LOW (ref 3.5–5.3)
POTASSIUM SERPL-MCNC: 2.5 MMOL/L — CRITICAL LOW (ref 3.5–5.3)
POTASSIUM SERPL-MCNC: 2.7 MMOL/L — CRITICAL LOW (ref 3.5–5.3)
POTASSIUM SERPL-SCNC: 2.5 MMOL/L — CRITICAL LOW (ref 3.5–5.3)
POTASSIUM SERPL-SCNC: 2.7 MMOL/L — CRITICAL LOW (ref 3.5–5.3)
PROT SERPL-MCNC: 9 G/DL — HIGH (ref 6–8.3)
PROTHROM AB SERPL-ACNC: 12.6 SEC — SIGNIFICANT CHANGE UP (ref 10–12.9)
RBC # BLD: 5.5 M/UL — SIGNIFICANT CHANGE UP (ref 4.2–5.8)
RBC # FLD: 13.1 % — SIGNIFICANT CHANGE UP (ref 10.3–14.5)
SAO2 % BLDV: 26 % — LOW (ref 67–88)
SODIUM SERPL-SCNC: 124 MMOL/L — LOW (ref 135–145)
SODIUM SERPL-SCNC: 126 MMOL/L — LOW (ref 135–145)
TROPONIN T, HIGH SENSITIVITY RESULT: 28 NG/L — SIGNIFICANT CHANGE UP (ref 0–51)
WBC # BLD: 8.7 K/UL — SIGNIFICANT CHANGE UP (ref 3.8–10.5)
WBC # FLD AUTO: 8.7 K/UL — SIGNIFICANT CHANGE UP (ref 3.8–10.5)

## 2019-04-10 PROCEDURE — 74176 CT ABD & PELVIS W/O CONTRAST: CPT | Mod: 26

## 2019-04-10 PROCEDURE — 93010 ELECTROCARDIOGRAM REPORT: CPT

## 2019-04-10 PROCEDURE — 99291 CRITICAL CARE FIRST HOUR: CPT

## 2019-04-10 RX ORDER — SODIUM CHLORIDE 9 MG/ML
1000 INJECTION, SOLUTION INTRAVENOUS ONCE
Qty: 0 | Refills: 0 | Status: COMPLETED | OUTPATIENT
Start: 2019-04-10 | End: 2019-04-11

## 2019-04-10 RX ORDER — FAMOTIDINE 10 MG/ML
20 INJECTION INTRAVENOUS ONCE
Qty: 0 | Refills: 0 | Status: COMPLETED | OUTPATIENT
Start: 2019-04-10 | End: 2019-04-10

## 2019-04-10 RX ORDER — POTASSIUM CHLORIDE 20 MEQ
40 PACKET (EA) ORAL ONCE
Qty: 0 | Refills: 0 | Status: COMPLETED | OUTPATIENT
Start: 2019-04-10 | End: 2019-04-11

## 2019-04-10 RX ORDER — SODIUM CHLORIDE 9 MG/ML
1000 INJECTION, SOLUTION INTRAVENOUS
Qty: 0 | Refills: 0 | Status: DISCONTINUED | OUTPATIENT
Start: 2019-04-10 | End: 2019-04-11

## 2019-04-10 RX ORDER — ONDANSETRON 8 MG/1
4 TABLET, FILM COATED ORAL ONCE
Qty: 0 | Refills: 0 | Status: COMPLETED | OUTPATIENT
Start: 2019-04-10 | End: 2019-04-10

## 2019-04-10 RX ORDER — MAGNESIUM SULFATE 500 MG/ML
2 VIAL (ML) INJECTION ONCE
Qty: 0 | Refills: 0 | Status: COMPLETED | OUTPATIENT
Start: 2019-04-10 | End: 2019-04-11

## 2019-04-10 RX ORDER — SODIUM CHLORIDE 9 MG/ML
2000 INJECTION, SOLUTION INTRAVENOUS ONCE
Qty: 0 | Refills: 0 | Status: COMPLETED | OUTPATIENT
Start: 2019-04-10 | End: 2019-04-10

## 2019-04-10 RX ORDER — POTASSIUM CHLORIDE 20 MEQ
10 PACKET (EA) ORAL
Qty: 0 | Refills: 0 | Status: COMPLETED | OUTPATIENT
Start: 2019-04-10 | End: 2019-04-10

## 2019-04-10 RX ADMIN — SODIUM CHLORIDE 2000 MILLILITER(S): 9 INJECTION, SOLUTION INTRAVENOUS at 19:02

## 2019-04-10 RX ADMIN — Medication 100 MILLIEQUIVALENT(S): at 22:03

## 2019-04-10 RX ADMIN — Medication 100 MILLIEQUIVALENT(S): at 20:25

## 2019-04-10 RX ADMIN — ONDANSETRON 4 MILLIGRAM(S): 8 TABLET, FILM COATED ORAL at 19:03

## 2019-04-10 RX ADMIN — FAMOTIDINE 20 MILLIGRAM(S): 10 INJECTION INTRAVENOUS at 19:03

## 2019-04-10 RX ADMIN — Medication 100 MILLIEQUIVALENT(S): at 23:55

## 2019-04-10 NOTE — ED PROVIDER NOTE - CARE PLAN
Principal Discharge DX:	Intractable vomiting with nausea  Secondary Diagnosis:	OFELIA (acute kidney injury) Principal Discharge DX:	Intractable vomiting with nausea  Secondary Diagnosis:	OFELIA (acute kidney injury)  Secondary Diagnosis:	Hypokalemia

## 2019-04-10 NOTE — ED ADULT TRIAGE NOTE - CHIEF COMPLAINT QUOTE
"no sleep..no eating for 6 days", diarrhea x 4 days, c/o weakness, dizzy, vomiting, hx hiatal hernia

## 2019-04-10 NOTE — ED PROVIDER NOTE - OBJECTIVE STATEMENT
Dr. Hernadez Note: 58M here alone presents with 6 days of intractable vomiting assoc with epigastric pain and intolerance to oral intake.  No fever, chest pain, dyspnea reported.  Last BM few days ago.  No prior surgical history.  Reports prior episodes like this but only for 2-3 days, not ever this severe.  Now feels lightheaded and did fall once (no head injury, noted tachycardic and hypotensive on initial exam).

## 2019-04-10 NOTE — ED ADULT NURSE NOTE - OBJECTIVE STATEMENT
Pt is C/O N/V/ unable to sleep  X 1 week pt is dehydrated  pt Noemy CP SOB fever chills  S/B Dr calzada   Awaiting evaluation

## 2019-04-10 NOTE — ED ADULT NURSE NOTE - NSIMPLEMENTINTERV_GEN_ALL_ED
Implemented All Universal Safety Interventions:  [place] to call system. Call bell, personal items and telephone within reach. Instruct patient to call for assistance. Room bathroom lighting operational. Non-slip footwear when patient is off stretcher. Physically safe environment: no spills, clutter or unnecessary equipment. Stretcher in lowest position, wheels locked, appropriate side rails in place.

## 2019-04-10 NOTE — ED PROVIDER NOTE - CLINICAL SUMMARY MEDICAL DECISION MAKING FREE TEXT BOX
Dr. Hernadez Note: hypotensive, tachycardic with intractable vomiting and epigastric pain...eval for SBO (but no prior surgical hx) vs gstric volvulus vs cholecystitis vs nonsurgical causes.  Fluid resusc.

## 2019-04-10 NOTE — ED ADULT NURSE NOTE - ED STAT RN HANDOFF DETAILS
Handoff report provided to holding nurse Marianna SAINI. Understands pmh, medications given and plan of care for patient. Patient in stable condition, vital signs updated, has no complaints at this time and has been updated on care plan. Explained to patient that it is change of shift and new nurse is taking over, pt verbalized understanding.

## 2019-04-10 NOTE — ED PROVIDER NOTE - PROGRESS NOTE DETAILS
Dr. Hernadez Note: vitals improved after fluids, HR 90s, SBP 120s, pt feels better, ct scan nonsurgical, pt with OFELIA with intractable vomiting, will need admission for iv fluids, OFELIA fix, and possible endoscopy if not improved.

## 2019-04-11 DIAGNOSIS — N17.9 ACUTE KIDNEY FAILURE, UNSPECIFIED: ICD-10-CM

## 2019-04-11 DIAGNOSIS — R65.10 SYSTEMIC INFLAMMATORY RESPONSE SYNDROME (SIRS) OF NON-INFECTIOUS ORIGIN WITHOUT ACUTE ORGAN DYSFUNCTION: ICD-10-CM

## 2019-04-11 DIAGNOSIS — Z29.9 ENCOUNTER FOR PROPHYLACTIC MEASURES, UNSPECIFIED: ICD-10-CM

## 2019-04-11 DIAGNOSIS — R11.2 NAUSEA WITH VOMITING, UNSPECIFIED: ICD-10-CM

## 2019-04-11 DIAGNOSIS — E87.8 OTHER DISORDERS OF ELECTROLYTE AND FLUID BALANCE, NOT ELSEWHERE CLASSIFIED: ICD-10-CM

## 2019-04-11 DIAGNOSIS — R19.7 DIARRHEA, UNSPECIFIED: ICD-10-CM

## 2019-04-11 DIAGNOSIS — I10 ESSENTIAL (PRIMARY) HYPERTENSION: ICD-10-CM

## 2019-04-11 DIAGNOSIS — E87.6 HYPOKALEMIA: ICD-10-CM

## 2019-04-11 DIAGNOSIS — E87.1 HYPO-OSMOLALITY AND HYPONATREMIA: ICD-10-CM

## 2019-04-11 DIAGNOSIS — R10.13 EPIGASTRIC PAIN: ICD-10-CM

## 2019-04-11 LAB
ALBUMIN SERPL ELPH-MCNC: 1.5 G/DL — LOW (ref 3.3–5)
ALBUMIN SERPL ELPH-MCNC: 4.3 G/DL — SIGNIFICANT CHANGE UP (ref 3.3–5)
ALP SERPL-CCNC: 19 U/L — LOW (ref 40–120)
ALP SERPL-CCNC: 53 U/L — SIGNIFICANT CHANGE UP (ref 40–120)
ALT FLD-CCNC: 11 U/L — SIGNIFICANT CHANGE UP (ref 10–45)
ALT FLD-CCNC: 33 U/L — SIGNIFICANT CHANGE UP (ref 10–45)
ANION GAP SERPL CALC-SCNC: 15 MMOL/L — SIGNIFICANT CHANGE UP (ref 5–17)
ANION GAP SERPL CALC-SCNC: 17 MMOL/L — SIGNIFICANT CHANGE UP (ref 5–17)
ANION GAP SERPL CALC-SCNC: 17 MMOL/L — SIGNIFICANT CHANGE UP (ref 5–17)
ANION GAP SERPL CALC-SCNC: 3 MMOL/L — LOW (ref 5–17)
APPEARANCE UR: CLEAR — SIGNIFICANT CHANGE UP
AST SERPL-CCNC: 15 U/L — SIGNIFICANT CHANGE UP (ref 10–40)
AST SERPL-CCNC: 44 U/L — HIGH (ref 10–40)
BACTERIA # UR AUTO: 0 — SIGNIFICANT CHANGE UP
BILIRUB SERPL-MCNC: 0.5 MG/DL — SIGNIFICANT CHANGE UP (ref 0.2–1.2)
BILIRUB SERPL-MCNC: 1.5 MG/DL — HIGH (ref 0.2–1.2)
BILIRUB UR-MCNC: NEGATIVE — SIGNIFICANT CHANGE UP
BUN SERPL-MCNC: 22 MG/DL — SIGNIFICANT CHANGE UP (ref 7–23)
BUN SERPL-MCNC: 35 MG/DL — HIGH (ref 7–23)
BUN SERPL-MCNC: 42 MG/DL — HIGH (ref 7–23)
BUN SERPL-MCNC: 46 MG/DL — HIGH (ref 7–23)
CALCIUM SERPL-MCNC: 3.3 MG/DL — CRITICAL LOW (ref 8.4–10.5)
CALCIUM SERPL-MCNC: 9 MG/DL — SIGNIFICANT CHANGE UP (ref 8.4–10.5)
CALCIUM SERPL-MCNC: 9.1 MG/DL — SIGNIFICANT CHANGE UP (ref 8.4–10.5)
CALCIUM SERPL-MCNC: 9.2 MG/DL — SIGNIFICANT CHANGE UP (ref 8.4–10.5)
CHLORIDE SERPL-SCNC: 118 MMOL/L — HIGH (ref 96–108)
CHLORIDE SERPL-SCNC: 82 MMOL/L — LOW (ref 96–108)
CHLORIDE SERPL-SCNC: 89 MMOL/L — LOW (ref 96–108)
CHLORIDE SERPL-SCNC: 91 MMOL/L — LOW (ref 96–108)
CO2 SERPL-SCNC: 12 MMOL/L — LOW (ref 22–31)
CO2 SERPL-SCNC: 27 MMOL/L — SIGNIFICANT CHANGE UP (ref 22–31)
CO2 SERPL-SCNC: 27 MMOL/L — SIGNIFICANT CHANGE UP (ref 22–31)
CO2 SERPL-SCNC: 28 MMOL/L — SIGNIFICANT CHANGE UP (ref 22–31)
COLOR SPEC: SIGNIFICANT CHANGE UP
CREAT ?TM UR-MCNC: 84 MG/DL — SIGNIFICANT CHANGE UP
CREAT SERPL-MCNC: 0.94 MG/DL — SIGNIFICANT CHANGE UP (ref 0.5–1.3)
CREAT SERPL-MCNC: 1.88 MG/DL — HIGH (ref 0.5–1.3)
CREAT SERPL-MCNC: 2.3 MG/DL — HIGH (ref 0.5–1.3)
CREAT SERPL-MCNC: 2.35 MG/DL — HIGH (ref 0.5–1.3)
DIFF PNL FLD: ABNORMAL
EPI CELLS # UR: 1 /HPF — SIGNIFICANT CHANGE UP
GLUCOSE SERPL-MCNC: 104 MG/DL — HIGH (ref 70–99)
GLUCOSE SERPL-MCNC: 111 MG/DL — HIGH (ref 70–99)
GLUCOSE SERPL-MCNC: 119 MG/DL — HIGH (ref 70–99)
GLUCOSE SERPL-MCNC: 52 MG/DL — LOW (ref 70–99)
GLUCOSE UR QL: NEGATIVE — SIGNIFICANT CHANGE UP
HCT VFR BLD CALC: 39.3 % — SIGNIFICANT CHANGE UP (ref 39–50)
HCV AB S/CO SERPL IA: 0.06 S/CO — SIGNIFICANT CHANGE UP (ref 0–0.99)
HCV AB SERPL-IMP: SIGNIFICANT CHANGE UP
HGB BLD-MCNC: 13.8 G/DL — SIGNIFICANT CHANGE UP (ref 13–17)
HYALINE CASTS # UR AUTO: 0 /LPF — SIGNIFICANT CHANGE UP (ref 0–2)
KETONES UR-MCNC: SIGNIFICANT CHANGE UP
LEUKOCYTE ESTERASE UR-ACNC: NEGATIVE — SIGNIFICANT CHANGE UP
MAGNESIUM SERPL-MCNC: 0.8 MG/DL — CRITICAL LOW (ref 1.6–2.6)
MAGNESIUM SERPL-MCNC: 2.6 MG/DL — SIGNIFICANT CHANGE UP (ref 1.6–2.6)
MAGNESIUM SERPL-MCNC: 2.9 MG/DL — HIGH (ref 1.6–2.6)
MCHC RBC-ENTMCNC: 31 PG — SIGNIFICANT CHANGE UP (ref 27–34)
MCHC RBC-ENTMCNC: 35 GM/DL — SIGNIFICANT CHANGE UP (ref 32–36)
MCV RBC AUTO: 88.7 FL — SIGNIFICANT CHANGE UP (ref 80–100)
NITRITE UR-MCNC: NEGATIVE — SIGNIFICANT CHANGE UP
OSMOLALITY UR: 302 MOS/KG — SIGNIFICANT CHANGE UP (ref 300–900)
PH UR: 6 — SIGNIFICANT CHANGE UP (ref 5–8)
PLATELET # BLD AUTO: 184 K/UL — SIGNIFICANT CHANGE UP (ref 150–400)
POTASSIUM SERPL-MCNC: 2.7 MMOL/L — CRITICAL LOW (ref 3.5–5.3)
POTASSIUM SERPL-MCNC: 3 MMOL/L — LOW (ref 3.5–5.3)
POTASSIUM SERPL-MCNC: 3 MMOL/L — LOW (ref 3.5–5.3)
POTASSIUM SERPL-MCNC: 8.2 MMOL/L — CRITICAL HIGH (ref 3.5–5.3)
POTASSIUM SERPL-SCNC: 2.7 MMOL/L — CRITICAL LOW (ref 3.5–5.3)
POTASSIUM SERPL-SCNC: 3 MMOL/L — LOW (ref 3.5–5.3)
POTASSIUM SERPL-SCNC: 3 MMOL/L — LOW (ref 3.5–5.3)
POTASSIUM SERPL-SCNC: 8.2 MMOL/L — CRITICAL HIGH (ref 3.5–5.3)
PROT SERPL-MCNC: 2.6 G/DL — LOW (ref 6–8.3)
PROT SERPL-MCNC: 7 G/DL — SIGNIFICANT CHANGE UP (ref 6–8.3)
PROT UR-MCNC: ABNORMAL
RBC # BLD: 4.43 M/UL — SIGNIFICANT CHANGE UP (ref 4.2–5.8)
RBC # FLD: 12.9 % — SIGNIFICANT CHANGE UP (ref 10.3–14.5)
RBC CASTS # UR COMP ASSIST: 4 /HPF — SIGNIFICANT CHANGE UP (ref 0–4)
SODIUM SERPL-SCNC: 127 MMOL/L — LOW (ref 135–145)
SODIUM SERPL-SCNC: 133 MMOL/L — LOW (ref 135–145)
SODIUM UR-SCNC: 21 MMOL/L — SIGNIFICANT CHANGE UP
SP GR SPEC: 1.01 — SIGNIFICANT CHANGE UP (ref 1.01–1.02)
TSH SERPL-MCNC: 0.56 UIU/ML — SIGNIFICANT CHANGE UP (ref 0.27–4.2)
UROBILINOGEN FLD QL: NEGATIVE — SIGNIFICANT CHANGE UP
WBC # BLD: 6.1 K/UL — SIGNIFICANT CHANGE UP (ref 3.8–10.5)
WBC # FLD AUTO: 6.1 K/UL — SIGNIFICANT CHANGE UP (ref 3.8–10.5)
WBC UR QL: 3 /HPF — SIGNIFICANT CHANGE UP (ref 0–5)

## 2019-04-11 PROCEDURE — 99223 1ST HOSP IP/OBS HIGH 75: CPT | Mod: GC

## 2019-04-11 PROCEDURE — 12345: CPT | Mod: NC,GC

## 2019-04-11 PROCEDURE — 99254 IP/OBS CNSLTJ NEW/EST MOD 60: CPT | Mod: GC

## 2019-04-11 RX ORDER — SODIUM,POTASSIUM PHOSPHATES 278-250MG
1 POWDER IN PACKET (EA) ORAL
Qty: 0 | Refills: 0 | Status: DISCONTINUED | OUTPATIENT
Start: 2019-04-11 | End: 2019-04-11

## 2019-04-11 RX ORDER — AMLODIPINE BESYLATE 2.5 MG/1
1 TABLET ORAL
Qty: 0 | Refills: 0 | COMMUNITY

## 2019-04-11 RX ORDER — OMEPRAZOLE 10 MG/1
1 CAPSULE, DELAYED RELEASE ORAL
Qty: 0 | Refills: 0 | COMMUNITY

## 2019-04-11 RX ORDER — POTASSIUM CHLORIDE 20 MEQ
20 PACKET (EA) ORAL
Qty: 0 | Refills: 0 | Status: DISCONTINUED | OUTPATIENT
Start: 2019-04-11 | End: 2019-04-11

## 2019-04-11 RX ORDER — POTASSIUM CHLORIDE 20 MEQ
10 PACKET (EA) ORAL
Qty: 0 | Refills: 0 | Status: DISCONTINUED | OUTPATIENT
Start: 2019-04-11 | End: 2019-04-11

## 2019-04-11 RX ORDER — POTASSIUM CHLORIDE 20 MEQ
20 PACKET (EA) ORAL ONCE
Qty: 0 | Refills: 0 | Status: COMPLETED | OUTPATIENT
Start: 2019-04-11 | End: 2019-04-11

## 2019-04-11 RX ORDER — MAGNESIUM SULFATE 500 MG/ML
4 VIAL (ML) INJECTION ONCE
Qty: 0 | Refills: 0 | Status: COMPLETED | OUTPATIENT
Start: 2019-04-11 | End: 2019-04-11

## 2019-04-11 RX ORDER — SODIUM CHLORIDE 9 MG/ML
1000 INJECTION INTRAMUSCULAR; INTRAVENOUS; SUBCUTANEOUS
Qty: 0 | Refills: 0 | Status: DISCONTINUED | OUTPATIENT
Start: 2019-04-11 | End: 2019-04-12

## 2019-04-11 RX ORDER — FAMOTIDINE 10 MG/ML
0 INJECTION INTRAVENOUS
Qty: 0 | Refills: 0 | COMMUNITY

## 2019-04-11 RX ORDER — POTASSIUM CHLORIDE 20 MEQ
40 PACKET (EA) ORAL EVERY 4 HOURS
Qty: 0 | Refills: 0 | Status: COMPLETED | OUTPATIENT
Start: 2019-04-11 | End: 2019-04-11

## 2019-04-11 RX ORDER — PANTOPRAZOLE SODIUM 20 MG/1
40 TABLET, DELAYED RELEASE ORAL EVERY 12 HOURS
Qty: 0 | Refills: 0 | Status: DISCONTINUED | OUTPATIENT
Start: 2019-04-11 | End: 2019-04-13

## 2019-04-11 RX ADMIN — Medication 62.5 MILLIMOLE(S): at 11:46

## 2019-04-11 RX ADMIN — Medication 1 TABLET(S): at 18:38

## 2019-04-11 RX ADMIN — Medication 300 MILLIGRAM(S): at 20:22

## 2019-04-11 RX ADMIN — Medication 50 GRAM(S): at 00:35

## 2019-04-11 RX ADMIN — Medication 40 MILLIEQUIVALENT(S): at 03:43

## 2019-04-11 RX ADMIN — Medication 20 MILLIEQUIVALENT(S): at 21:08

## 2019-04-11 RX ADMIN — Medication 10 MILLIEQUIVALENT(S): at 00:00

## 2019-04-11 RX ADMIN — PANTOPRAZOLE SODIUM 40 MILLIGRAM(S): 20 TABLET, DELAYED RELEASE ORAL at 05:20

## 2019-04-11 RX ADMIN — SODIUM CHLORIDE 125 MILLILITER(S): 9 INJECTION, SOLUTION INTRAVENOUS at 03:12

## 2019-04-11 RX ADMIN — Medication 40 MILLIEQUIVALENT(S): at 10:29

## 2019-04-11 RX ADMIN — Medication 40 MILLIEQUIVALENT(S): at 00:35

## 2019-04-11 RX ADMIN — SODIUM CHLORIDE 100 MILLILITER(S): 9 INJECTION INTRAMUSCULAR; INTRAVENOUS; SUBCUTANEOUS at 05:01

## 2019-04-11 RX ADMIN — PANTOPRAZOLE SODIUM 40 MILLIGRAM(S): 20 TABLET, DELAYED RELEASE ORAL at 17:51

## 2019-04-11 RX ADMIN — Medication 100 GRAM(S): at 06:01

## 2019-04-11 RX ADMIN — Medication 40 MILLIEQUIVALENT(S): at 15:39

## 2019-04-11 RX ADMIN — Medication 100 MILLIEQUIVALENT(S): at 03:13

## 2019-04-11 RX ADMIN — SODIUM CHLORIDE 2000 MILLILITER(S): 9 INJECTION, SOLUTION INTRAVENOUS at 00:00

## 2019-04-11 RX ADMIN — SODIUM CHLORIDE 1000 MILLILITER(S): 9 INJECTION, SOLUTION INTRAVENOUS at 00:00

## 2019-04-11 RX ADMIN — Medication 50 MILLIEQUIVALENT(S): at 23:36

## 2019-04-11 NOTE — PROGRESS NOTE ADULT - PROBLEM SELECTOR PLAN 4
- acute on chronic vomiting, occurs every couple weeks   ms, unclear etiology differential includes hyponatremia vs transient SBO vs volvulus vs pancreatitis vs infectious process  CTAP with multiple nonobstructive calculi, largest 8 mm  T. Bili mildly elevated at 1.4, no transaminitis, no RUQ pain, no evidence of infection - also has poss Gilbert syndrome   No evidence of hydronephrosis   Plan as above  F/u GI consult, and poss EGD -Patient with OFELIA, SCR baseline 1.0   -CT negative for hydronephrosis or other kidney pathology  -FeNa demosntrating pre-renal (2/2 to N/V/diarrhea)- significantly improving with IVF  -Continue NS 100cc/hr   -Hold Losartan   -Hold home famotidine

## 2019-04-11 NOTE — PROVIDER CONTACT NOTE (CRITICAL VALUE NOTIFICATION) - ASSESSMENT
Pt AxOx4. Asymptomatic, no complaints of pain or distress at this moment. NSR on tele. will continue to monitor patient and maintain patient safety.

## 2019-04-11 NOTE — PROGRESS NOTE ADULT - PROBLEM SELECTOR PLAN 8
Holding home meds  BP currently at goal  Restart as BP tolerates DVT PPX: ambulate  Diet: Regular (patient tolerating)   DIspo: pending

## 2019-04-11 NOTE — PROGRESS NOTE ADULT - PROBLEM SELECTOR PLAN 5
Hypotensive and tachycardia likely 2/2 severe dehydration   lactate 2.5  F/u stool studies -Hypotensive and tachycardia likely 2/2 severe dehydration   -lactate 2.5  -F/u stool studies

## 2019-04-11 NOTE — PROGRESS NOTE ADULT - PROBLEM SELECTOR PLAN 2
CT negative for hydronephrosis or other kidney pathology  Patient with OFELIA, SCR baseline 1.0   Likely prerenal/intrarenal, improving significantly with IVF  Continue NS 100cc/hr   May be 2/2 ATN given hypotension and Losartan use, specific gravity and Karen not that impressive for severe dehydration  Hold Losartan   Hold home famotidine -acute on chronic vomiting, occurs every couple weeks for many years; has been worked up OP with no overt causes; Endoscopy and Colonoscopy 5 years ago without findings (OP GI is Dr. Swift)   -CTAP with multiple nonobstructive calculi, largest 8 mm; grossly unchanged from prior admission  -T. Bili mildly elevated at 1.4, no transaminitis, no RUQ pain, no evidence of infection; also has poss Gilbert syndrome   -GI recs appreciated- NPO for EGD tomorrow

## 2019-04-11 NOTE — H&P ADULT - PROBLEM SELECTOR PLAN 3
Send for stool cx, GI PCR, unlikely to be c.diff given hx and no risk factors  CTAP no evidence of colitis or SBO/ileus  F/u GI consult Improving with IVF likely 2/2 hypovolemia hyponatremia   Continue to trend   At baseline mental status  sp NS and LR  check urine lytes

## 2019-04-11 NOTE — H&P ADULT - NSHPLABSRESULTS_GEN_ALL_CORE
T(C): 37.1 (04-11-19 @ 02:01), Max: 37.1 (04-11-19 @ 02:01)  T(F): 98.7 (04-11-19 @ 02:01), Max: 98.7 (04-11-19 @ 02:01)  HR: 90 (04-11-19 @ 02:01) (90 - 123)  BP: 124/87 (04-11-19 @ 02:01) (90/57 - 135/83)  RR: 16 (04-11-19 @ 02:01) (16 - 22)  SpO2: 98% (04-11-19 @ 02:01) (95% - 100%)                          15.6   8.7   )-----------( 224      ( 10 Apr 2019 19:02 )             48.4       04-10    126<L>  |  77<L>  |  52<H>  ----------------------------<  108<H>  2.5<LL>   |  27  |  2.70<H>    Ca    9.2      10 Apr 2019 22:51  Phos  3.2     04-10  Mg     1.4     04-10    TPro  x   /  Alb  x   /  TBili  1.4<H>  /  DBili  0.3<H>  /  AST  x   /  ALT  x   /  AlkPhos  x   04-10      Blood Gas Venous - Lactate (04.10.19 @ 22:51)    Blood Gas Venous - Lactate: 2.5 mmoL/L T(C): 37.1 (04-11-19 @ 02:01), Max: 37.1 (04-11-19 @ 02:01)  T(F): 98.7 (04-11-19 @ 02:01), Max: 98.7 (04-11-19 @ 02:01)  HR: 90 (04-11-19 @ 02:01) (90 - 123)  BP: 124/87 (04-11-19 @ 02:01) (90/57 - 135/83)  RR: 16 (04-11-19 @ 02:01) (16 - 22)  SpO2: 98% (04-11-19 @ 02:01) (95% - 100%)                          15.6   8.7   )-----------( 224      ( 10 Apr 2019 19:02 )             48.4       04-10    126<L>  |  77<L>  |  52<H>  ----------------------------<  108<H>  2.5<LL>   |  27  |  2.70<H>    Ca    9.2      10 Apr 2019 22:51  Phos  3.2     04-10  Mg     1.4     04-10    TPro  x   /  Alb  x   /  TBili  1.4<H>  /  DBili  0.3<H>  /  AST  x   /  ALT  x   /  AlkPhos  x   04-10      Blood Gas Venous - Lactate (04.10.19 @ 22:51)    Blood Gas Venous - Lactate: 2.5 mmoL/L    < from: CT Abdomen and Pelvis w/ Oral Cont (04.10.19 @ 20:39) >    PROCEDURE:   CT of the Abdomen and Pelvis was performed without intravenous contrast.   Intravenous contrast: None.  Oral contrast: positive contrast was administered.  Sagittal and coronal reformats were performed.    FINDINGS:    LOWER CHEST:Clear lungs..    LIVER: Within normal limits. A 1.6 cm segment 6 cyst.  BILE DUCTS: Normal caliber.  GALLBLADDER: Within normal limits.  SPLEEN: Within normal limits.  PANCREAS: Within normal limits.  ADRENALS: Within normal limits.  KIDNEYS/URETERS:No hydronephrosis. Multiple nonobstructing left renal   calculi with the largest measuring 8 mm within the left lower pole.    BLADDER: Within normal limits.  REPRODUCTIVE ORGANS: Prostate within normal limits.    BOWEL: Small hiatal hernia. No bowelobstruction. Appendix is normal.  PERITONEUM: No ascites.  VESSELS:  Within normal limits.  RETROPERITONEUM: No lymphadenopathy.    ABDOMINAL WALL: Small fat-containing umbilical hernia.  BONES: Mild degenerative changes.    IMPRESSION:   No acute intra-abdominal pathology.  No gastric volvulus. Personally reviewed imaging, labs, ekg.    T(C): 37.1 (04-11-19 @ 02:01), Max: 37.1 (04-11-19 @ 02:01)  T(F): 98.7 (04-11-19 @ 02:01), Max: 98.7 (04-11-19 @ 02:01)  HR: 90 (04-11-19 @ 02:01) (90 - 123)  BP: 124/87 (04-11-19 @ 02:01) (90/57 - 135/83)  RR: 16 (04-11-19 @ 02:01) (16 - 22)  SpO2: 98% (04-11-19 @ 02:01) (95% - 100%)                          15.6   8.7   )-----------( 224      ( 10 Apr 2019 19:02 )             48.4       04-10    126<L>  |  77<L>  |  52<H>  ----------------------------<  108<H>  2.5<LL>   |  27  |  2.70<H>    Ca    9.2      10 Apr 2019 22:51  Phos  3.2     04-10  Mg     1.4     04-10    TPro  x   /  Alb  x   /  TBili  1.4<H>  /  DBili  0.3<H>  /  AST  x   /  ALT  x   /  AlkPhos  x   04-10      Blood Gas Venous - Lactate (04.10.19 @ 22:51)    Blood Gas Venous - Lactate: 2.5 mmoL/L    < from: CT Abdomen and Pelvis w/ Oral Cont (04.10.19 @ 20:39) >    PROCEDURE:   CT of the Abdomen and Pelvis was performed without intravenous contrast.   Intravenous contrast: None.  Oral contrast: positive contrast was administered.  Sagittal and coronal reformats were performed.    FINDINGS:    LOWER CHEST:Clear lungs..    LIVER: Within normal limits. A 1.6 cm segment 6 cyst.  BILE DUCTS: Normal caliber.  GALLBLADDER: Within normal limits.  SPLEEN: Within normal limits.  PANCREAS: Within normal limits.  ADRENALS: Within normal limits.  KIDNEYS/URETERS:No hydronephrosis. Multiple nonobstructing left renal   calculi with the largest measuring 8 mm within the left lower pole.    BLADDER: Within normal limits.  REPRODUCTIVE ORGANS: Prostate within normal limits.    BOWEL: Small hiatal hernia. No bowelobstruction. Appendix is normal.  PERITONEUM: No ascites.  VESSELS:  Within normal limits.  RETROPERITONEUM: No lymphadenopathy.    ABDOMINAL WALL: Small fat-containing umbilical hernia.  BONES: Mild degenerative changes.    IMPRESSION:   No acute intra-abdominal pathology.  No gastric volvulus.

## 2019-04-11 NOTE — H&P ADULT - NSHPPHYSICALEXAM_GEN_ALL_CORE
· Constitutional        Lying in bed comfortably. No acute distress  · HEENT	               PERRL; EOMI; conjunctiva clear  · Neck	               Supple; no JVD  · Back	                ROM intact  · Respiratory             good air movement; no rales; no rhonchi; no wheezes  · Cardiovascular       S1 & S2 with RRR; no murmurs, rales or JVD  · Gastrointestinal     soft; no distention; bowel sounds normal; no rigidity  · Extremities             no pedal edema  · Vascular	               Radial Pulse: right normal; left normal  · Neurological           alert and oriented x 3; responds to verbal commands; sensation intact; cranial nerves intact; strength normal  · Skin	               warm and dry  · Musculoskeletal    no calf tenderness; diminished strength  · Psychiatric              normal mood and affect · Constitutional        middle aged male, Lying in bed comfortably. No acute distress, hiccuping   · HEENT	               PERRL; EOMI; conjunctiva clear  · Respiratory             good air movement; no rales; no rhonchi; no wheezes  · Cardiovascular       S1 & S2 with RRR; no murmurs, rales or JVD  · Gastrointestinal     soft; no distention; bowel sounds normal; no rigidity, mild epigastric pain   · Extremities             no pedal edema  · Vascular	               Radial Pulse: right normal; left normal  · Neurological           alert and oriented x 3; responds to verbal commands; sensation intact; cranial nerves intact; strength normal  · Skin	               warm and dry  · Musculoskeletal    no calf tenderness; diminished strength  · Psychiatric              normal mood and affect

## 2019-04-11 NOTE — H&P ADULT - NSICDXFAMILYHX_GEN_ALL_CORE_FT
FAMILY HISTORY:  No pertinent family history in first degree relatives FAMILY HISTORY:  FH: hypertension

## 2019-04-11 NOTE — PROGRESS NOTE ADULT - PROBLEM SELECTOR PLAN 1
Persistently hypokalemic likely 2/2 GI losses  EKG - no U waves, prolonged QTc 530 ms   Aggressively replete electrolytes  BMP + Mg q8, goal K>4, Mg>2   Admit to tele  Denies chest pain, palpitations -hypokalemia, hyponatreima 2/2 to 6 days of N/V/diarrhea  -EKG without U waves, prolonged QTc 530 ms   -continue IVF with NS for hyponatremia  -Urine lytes demonstrating pre-renal FeNa  -Aggressively replete electrolytes  -repeat BMP + Mg q8, goal K>4, Mg>2   -Admit to tele  -patient without CP, palpitations

## 2019-04-11 NOTE — PROGRESS NOTE ADULT - ASSESSMENT
58M with PMHx of HTN and hiatal hernia presenting with intractable nausea and vomiting x 6 days, found to be hypotensive and tachycardic admitted for severe dehydration, unable to tolerate PO, electrolyte abnormalities. 58M with PMHx of HTN and hiatal hernia presenting with intractable nausea and vomiting x 6 days, found to be hypotensive and tachycardic admitted for severe dehydration and electrolyte abnormalities, now pending EGD tomorrow.

## 2019-04-11 NOTE — CONSULT NOTE ADULT - SUBJECTIVE AND OBJECTIVE BOX
Chief Complaint:  Patient is a 58y old  Male who presents with a chief complaint of nausea/vomiting (2019 07:40)    HPI:  58 year old man with hx of Hypertension, hiatal hernia and chronic episodes of nausea/vomiting/diarrhea presents with one week of nausea, vomiting and diarrhea. Patient reports for over 10 years he has been having episodes of nausea and vomiting that usually last 3-4 days and resolve upon the onset of diarrhea. He has been seen by his GI, Dr. Rishi Bautista, but states he has never been given a diagnosis. Previously these episodes would occur roughly every month, but notes over the past few months it has been occurring roughly every 8 days. He came to the hospital this time because his current episode lasted longer than others. He reports inability to tolerate solids/liquids without vomiting. He reports the end of his episodes are always associated with black stools and foamy urine. He denies fevers, chills, chest pain, shortness of breath, constipation, headache, hematochezia, hematemesis, cough, sick contacts and recent travel. He notes a decreased appetite and a 15 lb weight loss over the past year. His last endoscopy was 5 years ago, which he states was normal. His last colonoscopy was 5 years ago, but states he was told to follow up for a repeat in 5 years. He endorses alcohol use 2-3 times per week (3 drinks each session).     Allergies:  No Known Allergies    Home Medications:  Losartan  Omeprazole  Famotidine    Hospital Medications:  pantoprazole  Injectable 40 milliGRAM(s) IV Push every 12 hours  potassium chloride    Tablet ER 40 milliEquivalent(s) Oral every 4 hours  potassium chloride  10 mEq/100 mL IVPB 10 milliEquivalent(s) IV Intermittent every 1 hour  sodium chloride 0.9%. 1000 milliLiter(s) IV Continuous <Continuous>    PMHX/PSHX:  Acid reflux  HTN (hypertension)  No significant past surgical history    Family history:  FH: hypertension  No pertinent family history in first degree relatives  Denies family history of PUD, IBD, colon cancer/polyps, stomach cancer/polyps, pancreatic cancer/masses, liver cancer/disease, breast/ovarian cancer and endometrial cancer.     Social History:   No smoking or drug use  + EtOH use    ROS:   General:  No fevers, chills  ENT:  No sore throat or dysphagia  CV:  No pain or palpitations  Resp:  No dyspnea, cough, wheezing  GI:  + pain, + nausea, + vomiting, + diarrhea, + dark stools, + weight loss  Skin:  No rash or edema    PHYSICAL EXAM:   GENERAL:  NAD, Appears stated age  HEENT:  NC/AT,  conjunctivae clear and pink, sclera -anicteric  CHEST:  CTA B/L, Normal effort  HEART:  RRR S1/S2, No murmurs  ABDOMEN:  Soft, non-tender, non-distended, BS+  EXTREMITIES  No cyanosis or Edema  SKIN:  Warm & Dry.  NEURO:  Alert, oriented    Vital Signs:  Vital Signs Last 24 Hrs  T(C): 36.4 (2019 07:46), Max: 37.1 (2019 02:01)  T(F): 97.6 (2019 07:46), Max: 98.8 (2019 05:12)  HR: 89 (2019 07:46) (89 - 123)  BP: 132/91 (2019 07:46) (90/57 - 136/87)  BP(mean): 100 (2019 00:40) (94 - 100)  RR: 18 (2019 07:46) (16 - 22)  SpO2: 100% (2019 07:46) (95% - 100%)  Daily Height in cm: 175.26 (10 Apr 2019 16:55)    Daily     LABS:                        13.8   6.1   )-----------( 184      ( 2019 04:37 )             39.3     Mean Cell Volume: 88.7 fl (- @ 04:37)        133<L>  |  118<H>  |  22  ----------------------------<  52<L>  8.2<HH>   |  12<L>  |  0.94    Ca    3.3<LL>      2019 05:48  Phos  3.2     04-10  Mg     .8         TPro  2.6<L>  /  Alb  1.5<L>  /  TBili  0.5  /  DBili  x   /  AST  15  /  ALT  11  /  AlkPhos  19<L>  -11    LIVER FUNCTIONS - ( 2019 05:48 )  Alb: 1.5 g/dL / Pro: 2.6 g/dL / ALK PHOS: 19 U/L / ALT: 11 U/L / AST: 15 U/L / GGT: x           PT/INR - ( 10 Apr 2019 19:02 )   PT: 12.6 sec;   INR: 1.09 ratio         PTT - ( 10 Apr 2019 19:02 )  PTT:26.3 sec  Urinalysis Basic - ( 2019 00:17 )    Color: Light Yellow / Appearance: Clear / S.012 / pH: x  Gluc: x / Ketone: Trace  / Bili: Negative / Urobili: Negative   Blood: x / Protein: Trace / Nitrite: Negative   Leuk Esterase: Negative / RBC: 4 /hpf / WBC 3 /HPF   Sq Epi: x / Non Sq Epi: 1 /hpf / Bacteria: 0.0     Lipase serum63     Imaging:    < from: CT Abdomen and Pelvis w/ Oral Cont (04.10.19 @ 20:39) >  IMPRESSION:   No acute intra-abdominal pathology.  No gastric volvulus.    < end of copied text >

## 2019-04-11 NOTE — H&P ADULT - PROBLEM SELECTOR PLAN 2
Patient with OFELIA, SCR baseline 1.0   Likely prerenal/intrarenal, improving significantly with IVF  Continue LR 100cc/hr   May be 2/2 ATN given hypotension and Losartan use   Hold Losartan   Hold home famotidine Patient with OFELIA, SCR baseline 1.0   Likely prerenal/intrarenal, improving significantly with IVF  Continue NS 100cc/hr   May be 2/2 ATN given hypotension and Losartan use, specific gravity and Karen not that impressive for severe dehydration  Hold Losartan   Hold home famotidine CT negative for hydronephrosis or other kidney pathology  Patient with OFELIA, SCR baseline 1.0   Likely prerenal/intrarenal, improving significantly with IVF  Continue NS 100cc/hr   May be 2/2 ATN given hypotension and Losartan use, specific gravity and Karen not that impressive for severe dehydration  Hold Losartan   Hold home famotidine

## 2019-04-11 NOTE — PROGRESS NOTE ADULT - PROBLEM SELECTOR PLAN 6
Send for stool cx, GI PCR, unlikely to be c.diff given hx and no risk factors  CTAP no evidence of colitis or SBO/ileus  F/u GI consult -Lipase 63, no evidence of pancreatitis on imagining, however patient does reports band like distribution of pain and correlation of symptoms with ETOH use   NS 100c/hr

## 2019-04-11 NOTE — H&P ADULT - PROBLEM SELECTOR PLAN 1
Persistently hypokalemic likely 2/2 GI losses  EKG - no U waves, prolonged QTc 530 ms   Aggressively replete electrolytes  BMP + Mg q8, goal K>4, Mg>2   Admit to tele  Denies chest pain, palpitations

## 2019-04-11 NOTE — H&P ADULT - PROBLEM SELECTOR PLAN 7
Lipase 63, no evidence of pancreatitis on imagining, however patient does reports band like distribution of pain and correlation of symptoms with ETOH use   LR 100cc/hr Lipase 63, no evidence of pancreatitis on imagining, however patient does reports band like distribution of pain and correlation of symptoms with ETOH use   NS 100c/hr

## 2019-04-11 NOTE — CONSULT NOTE ADULT - ASSESSMENT
Impression:  # Nausea/Vomiting/Diarrhea: Unclear etiology. Less likely gastroenteritis given episodic nature of symptoms. Less likely pancreatitis given normal CT and only mildly elevated lipase. Differential includes gastroparesis and cyclic vomiting syndrome.   # Dark stools: Differential includes PUD, angioectasias, esophagitis,  # Hypertension    Recommendation:  - Diet as tolerated  - Correct electrolytes  - Zofran/Reglan PRN  - Check Stool PCR if diarrhea recurs  - Will plan for EGD once optimized  - PPI BID  - Supportive  per primary team    Yesi Brock MD  Gastroenterology Fellow  [phone]  Please page on call fellow on weekends and after 5pm on weekdays Impression:  # Nausea/Vomiting/Diarrhea: Unclear etiology. Less likely gastroenteritis given episodic nature of symptoms. Less likely pancreatitis given normal CT and only mildly elevated lipase. Differential includes gastroparesis and cyclic vomiting syndrome.   # Dark stools: Unclear if active GI bleed given intermittent nature of symptoms. Hb drop likely related to previous hemoconcentration and fluid resuscitation given decrease in all cell lines. Differential includes PUD, angioectasias, esophagitis,  # Hypertension    Recommendation:  - Diet as tolerated  - Correct electrolytes  - Zofran/Reglan PRN  - Check Stool PCR if diarrhea recurs  - Will plan for EGD once optimized  - PPI BID  - Supportive  per primary team    Yesi Brock MD  Gastroenterology Fellow  [phone]  Please page on call fellow on weekends and after 5pm on weekdays

## 2019-04-11 NOTE — PROGRESS NOTE ADULT - PROBLEM SELECTOR PLAN 7
Lipase 63, no evidence of pancreatitis on imagining, however patient does reports band like distribution of pain and correlation of symptoms with ETOH use   NS 100c/hr -Holding home meds  -BP currently at goal  -Restart as BP tolerates

## 2019-04-11 NOTE — H&P ADULT - HISTORY OF PRESENT ILLNESS
58M here alone presents with 6 days of intractable vomiting assoc with epigastric pain and intolerance to oral intake.  No fever, chest pain, dyspnea reported.  Last BM few days ago.  No prior surgical history.  Reports prior episodes like this but only for 2-3 days, not ever this severe.  Now feels lightheaded and did fall once (no head injury, noted tachycardic and hypotensive on initial exam).    In ED: BP90/57  RR22 T98.1   Patient received LR IV x 3L, famotidine 20 mg IV x 1, Magnesium 2 mg IV x 1, Zofran 4 mg IV x 2, KCL 70 meq 58M with PMHx of HTN and hiatal hernia presenting with intractable nausea and vomiting x 6 days. Patient endorses since Thursday he has had 10-15 episodes/day of vomiting, NBNB. Unable to tolerate PO solids and liquids. He reports vomiting with solids and liquids. He also reports since Saturday he has associated loose BMs, 5-6x total, described as "oatmeal". He reports black, denies bright blood. He denies fevers, shakes, chills. HE reports abdominal pain, epigastric intermittent, band like distribution, no radiation to the back, but endorses upward. He also endorses an episode of presyncope in the bathroom, no LOC, no head trauma. He also endorses "the feeling as if something is stuck in my throat". Otherwise, denies hx of dysphagia with solids or liquids at baseline. Patient does endorse multiple previous similar episodes. He states for the past year it was every month, with now the frequency increasing and now these episodes occur every 10 days, but usually are self-limiting and last 2-3 days. Patient also endorses this particular episode occurred after ETOH use on Wednesday, and is unsure of a correlation with ETOH use. He reports he drinks ETOH 2-3x/week, beer and liquor, never had withdrawal like symptoms. He has had an endoscopy in the past year that was reportedly normal. He has been adherent with his medications. He denies fevers, shakes, chills, chest pain, SOB. He notes limited appetite, with 15-20 lb weight loss in the past year. No travel hx. No sick contacts.     In ED: BP90/57  RR22 T98.1   Patient received LR IV x 3L, famotidine 20 mg IV x 1, Magnesium 2 mg IV x 1, Zofran 4 mg IV x 2, KCL 70 meq 58M with PMHx of HTN and hiatal hernia presenting with intractable nausea and vomiting x 6 days. Patient endorses since Thursday he has had 10-15 episodes/day of vomiting, NBNB. Unable to tolerate PO solids and liquids. He reports vomiting with solids and liquids. He also reports since Saturday he has associated loose BMs, 5-6x total, described as "oatmeal". He reports black, denies bright blood. He denies fevers, shakes, chills. HE reports abdominal pain, epigastric intermittent, band like distribution, no radiation to the back, but endorses upward. He also endorses an episode of presyncope in the bathroom, no LOC, no head trauma. He also endorses "the feeling as if something is stuck in my throat". He then reports vomiting within 30 seconds. Otherwise, denies hx of dysphagia with solids or liquids at baseline. Patient does endorse multiple previous similar episodes. He states for the past year it was every month, with now the frequency increasing and now these episodes occur every 10 days, but usually are self-limiting and last 2-3 days. Patient also endorses this particular episode occurred after ETOH use on Wednesday, and is unsure of a correlation with ETOH use. He reports he drinks ETOH 2-3x/week, beer and liquor, never had withdrawal like symptoms. He has had an endoscopy in the past year that was reportedly normal. He has been adherent with his medications. He denies fevers, shakes, chills, chest pain, SOB. He notes limited appetite, with 15-20 lb weight loss in the past year. No travel hx. No sick contacts.     In ED: BP90/57  RR22 T98.1   Patient received LR IV x 3L, famotidine 20 mg IV x 1, Magnesium 2 mg IV x 1, Zofran 4 mg IV x 2, KCL 70 meq 58M with PMHx of HTN, hiatal hernia, chronic intermittent vomiting of unclear etiology, presenting with intractable nausea, vomiting, diarrhea for x 6 days. Patient endorses since Thursday he has had 10-15 episodes/day of vomiting, NBNB. Unable to tolerate PO solids and liquids. He reports vomiting with solids and liquids. He also reports since Saturday he has associated loose BMs, 5-6x total, described as "oatmeal". He reports black, denies bright blood. He denies fevers, shakes, chills. HE reports abdominal pain, epigastric intermittent, band like distribution, no radiation to the back, but endorses upward. He also endorses an episode of presyncope in the bathroom, no LOC, no head trauma. He also endorses "the feeling as if something is stuck in my throat". He then reports vomiting within 30 seconds. Otherwise, denies hx of dysphagia with solids or liquids at baseline. Patient does endorse multiple previous similar episodes. He states for the past year it was every month, with now the frequency increasing and now these episodes occur every 10 days, but usually are self-limiting and last 2-3 days. Patient also endorses this particular episode occurred after ETOH use on Wednesday, and is unsure of a correlation with ETOH use. He reports he drinks ETOH 2-3x/week, beer and liquor, never had withdrawal like symptoms. He has had an endoscopy in the past year that was reportedly normal. He has been adherent with his medications. He denies fevers, shakes, chills, chest pain, SOB. He notes limited appetite, with 15-20 lb weight loss in the past year. No travel hx. No sick contacts.     In ED: BP90/57  RR22 T98.1   Patient received LR IV x 3L, famotidine 20 mg IV x 1, Magnesium 2 mg IV x 1, Zofran 4 mg IV x 2, KCL 70 meq

## 2019-04-11 NOTE — H&P ADULT - PROBLEM SELECTOR PROBLEM 5
Intractable vomiting with nausea, unspecified vomiting type SIRS (systemic inflammatory response syndrome)

## 2019-04-11 NOTE — CONSULT NOTE ADULT - ATTENDING COMMENTS
Episodic nausea and vomiting  suspect cyclic vomiting syndrome  ddx gastroparesis  Outlet obstruction  EGD tomorrow

## 2019-04-11 NOTE — H&P ADULT - ASSESSMENT
58M with PMHx of HTN and hiatal hernia presenting with intractable nausea and vomiting x 6 days, found to be hypotensive and tachycardic admitted for severe dehydration, unable to tolerate PO, electrolyte abnormalities differential includes transient SBO vs volvulus vs pancreatitis vs infectious process 58M with PMHx of HTN and hiatal hernia presenting with intractable nausea and vomiting x 6 days, found to be hypotensive and tachycardic admitted for severe dehydration, unable to tolerate PO, electrolyte abnormalities.

## 2019-04-11 NOTE — H&P ADULT - PROBLEM SELECTOR PLAN 5
ms, unclear etiology differential includes transient SBO vs volvulus vs pancreatitis vs infectious process  CTAP with multiple nonobstructive calculi, largest 8 mm  No evidence of hydronephrosis   Plan as above  F/u GI consult  ms, unclear etiology differential includes transient SBO vs volvulus vs pancreatitis vs infectious process  CTAP with multiple nonobstructive calculi, largest 8 mm  T. Bili mildly elevated at 1.4, no transaminitis, no RUQ pain, no evidence of infection on CTAP   No evidence of hydronephrosis   Plan as above  F/u GI consult  ms, unclear etiology differential includes transient SBO vs volvulus vs pancreatitis vs infectious process  CTAP with multiple nonobstructive calculi, largest 8 mm  T. Bili mildly elevated at 1.4, no transaminitis, no RUQ pain, no evidence of infection - possibly ?Gilbert syndrome   No evidence of hydronephrosis   Plan as above  F/u GI consult Hypotensive and tachycardia likely 2/2 severe dehydration   lactate 2.5  F/u stool studies

## 2019-04-11 NOTE — H&P ADULT - PROBLEM SELECTOR PLAN 6
Hypotensive and tachycardia likely 2/2 severe dehydration   lactate 2.5  F/u stool studies Send for stool cx, GI PCR, unlikely to be c.diff given hx and no risk factors  CTAP no evidence of colitis or SBO/ileus  F/u GI consult

## 2019-04-11 NOTE — ED ADULT NURSE REASSESSMENT NOTE - NS ED NURSE REASSESS COMMENT FT1
Repeat BMP and VBG obtained after receiving partial dosage of first IVPB potassium. BMP reveals low potasium of 2.5. AT handoff at 0105, PT receiving third of IVPB potassium. AS per MD Hernadez, pt to have repeat BMP one hour after PO potassium and bolus of IV LR. IVPB magnesium to be provided to patient prior to second 3 dose of IVPB potassium, as per MD Mayorga.

## 2019-04-11 NOTE — PROGRESS NOTE ADULT - SUBJECTIVE AND OBJECTIVE BOX
Gladys Whitehead, PGY1   Contact/Pager - [phone] / [phone]    SUBJECTIVE / OVERNIGHT EVENTS:  -no acute events overnight  -denies any complaints including CP, SOB, abdominal pain, N/V, diarrhea, constipation, headache, confusion, fever/chills      MEDICATIONS  (STANDING):  pantoprazole  Injectable 40 milliGRAM(s) IV Push every 12 hours  potassium chloride    Tablet ER 40 milliEquivalent(s) Oral every 4 hours  potassium chloride  10 mEq/100 mL IVPB 10 milliEquivalent(s) IV Intermittent every 1 hour  sodium chloride 0.9%. 1000 milliLiter(s) (100 mL/Hr) IV Continuous <Continuous>    MEDICATIONS  (PRN):      Allergies    No Known Allergies    Intolerances          Vital Signs Last 24 Hrs  T(C): 37.1 (2019 05:12), Max: 37.1 (2019 02:01)  T(F): 98.8 (2019 05:12), Max: 98.8 (2019 05:12)  HR: 93 (2019 05:12) (90 - 123)  BP: 136/87 (2019 05:12) (90/57 - 136/87)  BP(mean): 100 (2019 00:40) (94 - 100)  RR: 18 (2019 05:12) (16 - 22)  SpO2: 100% (2019 05:12) (95% - 100%)  CAPILLARY BLOOD GLUCOSE        I&O's Summary        PHYSICAL EXAM:  GENERAL: NAD, well-developed  HEAD:  AT, NC  EYES: EOMI, PERRLA, conjunctiva and sclera clear  ENMT: Airway patent. MMM. Good dentition, no lesions.  NECK: Supple, No JVD  CHEST/LUNG: CTABL; No wheezing, rhonci, or rales  HEART: RRR; Normal S1, S2. No murmurs, rubs, or gallops  ABDOMEN: Soft, NT, ND; Bowel sounds present. No organomegaly  EXTREMITIES:  2+ Peripheral Pulses, No clubbing, cyanosis, or edema  PSYCH: AAOx3  NEUROLOGY: non-focal  SKIN: Warm, dry, intact; No rashes or lesions      LABS:                        13.8   6.1   )-----------( 184      ( 2019 04:37 )             39.3     04-11    133<L>  |  118<H>  |  22  ----------------------------<  52<L>  8.2<HH>   |  12<L>  |  0.94    Ca    3.3<LL>      2019 05:48  Phos  3.2     04-10  Mg     .8         TPro  2.6<L>  /  Alb  1.5<L>  /  TBili  0.5  /  DBili  x   /  AST  15  /  ALT  11  /  AlkPhos  19<L>  04-11    LIVER FUNCTIONS - ( 2019 05:48 )  Alb: 1.5 g/dL / Pro: 2.6 g/dL / ALK PHOS: 19 U/L / ALT: 11 U/L / AST: 15 U/L / GGT: x           PT/INR - ( 10 Apr 2019 19:02 )   PT: 12.6 sec;   INR: 1.09 ratio         PTT - ( 10 Apr 2019 19:02 )  PTT:26.3 sec      Urinalysis Basic - ( 2019 00:17 )    Color: Light Yellow / Appearance: Clear / S.012 / pH: x  Gluc: x / Ketone: Trace  / Bili: Negative / Urobili: Negative   Blood: x / Protein: Trace / Nitrite: Negative   Leuk Esterase: Negative / RBC: 4 /hpf / WBC 3 /HPF   Sq Epi: x / Non Sq Epi: 1 /hpf / Bacteria: 0.0              RADIOLOGY & ADDITIONAL TESTS:    Imaging Personally Reviewed: YES    Consultant(s) Notes Reviewed: YES    Care Discussed with Consultants/Other Providers: YES Gladys Ventura, PGY1   Contact/Pager - [phone] / [phone]    SUBJECTIVE / OVERNIGHT EVENTS:  -no acute events overnight  -patient states he is having diarrhea still, but no longer having nausea and vomiting; he feels dehydrated; he would like to eat       MEDICATIONS  (STANDING):  pantoprazole  Injectable 40 milliGRAM(s) IV Push every 12 hours  potassium chloride    Tablet ER 40 milliEquivalent(s) Oral every 4 hours  potassium chloride  10 mEq/100 mL IVPB 10 milliEquivalent(s) IV Intermittent every 1 hour  sodium chloride 0.9%. 1000 milliLiter(s) (100 mL/Hr) IV Continuous <Continuous>    MEDICATIONS  (PRN):      Allergies    No Known Allergies    Intolerances          Vital Signs Last 24 Hrs  T(C): 37.1 (2019 05:12), Max: 37.1 (2019 02:01)  T(F): 98.8 (2019 05:12), Max: 98.8 (2019 05:12)  HR: 93 (2019 05:12) (90 - 123)  BP: 136/87 (2019 05:12) (90/57 - 136/87)  BP(mean): 100 (2019 00:40) (94 - 100)  RR: 18 (2019 05:12) (16 - 22)  SpO2: 100% (2019 05:12) (95% - 100%)  CAPILLARY BLOOD GLUCOSE        I&O's Summary        PHYSICAL EXAM:  GENERAL: NAD, well-developed  HEAD:  AT, NC  EYES: EOMI, PERRLA, conjunctiva and sclera clear  ENMT: Airway patent. MMM. Good dentition, no lesions.  NECK: Supple, No JVD  CHEST/LUNG: CTABL; No wheezing, rhonci, or rales  HEART: RRR; Normal S1, S2. No murmurs, rubs, or gallops  ABDOMEN: Soft, NT, ND; Bowel sounds present. No organomegaly  EXTREMITIES:  2+ Peripheral Pulses, No clubbing, cyanosis, or edema  PSYCH: AAOx3  NEUROLOGY: non-focal  SKIN: Warm, dry, intact; No rashes or lesions      LABS:                        13.8   6.1   )-----------( 184      ( 2019 04:37 )             39.3     04-11    133<L>  |  118<H>  |  22  ----------------------------<  52<L>  8.2<HH>   |  12<L>  |  0.94    Ca    3.3<LL>      2019 05:48  Phos  3.2     04-10  Mg     .8     -11    TPro  2.6<L>  /  Alb  1.5<L>  /  TBili  0.5  /  DBili  x   /  AST  15  /  ALT  11  /  AlkPhos  19<L>  04-11    LIVER FUNCTIONS - ( 2019 05:48 )  Alb: 1.5 g/dL / Pro: 2.6 g/dL / ALK PHOS: 19 U/L / ALT: 11 U/L / AST: 15 U/L / GGT: x           PT/INR - ( 10 Apr 2019 19:02 )   PT: 12.6 sec;   INR: 1.09 ratio         PTT - ( 10 Apr 2019 19:02 )  PTT:26.3 sec      Urinalysis Basic - ( 2019 00:17 )    Color: Light Yellow / Appearance: Clear / S.012 / pH: x  Gluc: x / Ketone: Trace  / Bili: Negative / Urobili: Negative   Blood: x / Protein: Trace / Nitrite: Negative   Leuk Esterase: Negative / RBC: 4 /hpf / WBC 3 /HPF   Sq Epi: x / Non Sq Epi: 1 /hpf / Bacteria: 0.0              RADIOLOGY & ADDITIONAL TESTS:    Imaging Personally Reviewed: YES  < from: CT Abdomen and Pelvis w/ Oral Cont (04.10.19 @ 20:39) >    IMPRESSION:   No acute intra-abdominal pathology.  No gastric volvulus.    < end of copied text >        Consultant(s) Notes Reviewed: YES    Care Discussed with Consultants/Other Providers: YES

## 2019-04-11 NOTE — H&P ADULT - PROBLEM SELECTOR PLAN 4
Improving with IVF likely 2/2 hypovolemia hyponatremia   Continue to trend   At baseline mental status - acute on chronic vomiting, occurs every couple weeks   ms, unclear etiology differential includes hyponatremia vs transient SBO vs volvulus vs pancreatitis vs infectious process  CTAP with multiple nonobstructive calculi, largest 8 mm  T. Bili mildly elevated at 1.4, no transaminitis, no RUQ pain, no evidence of infection - also has poss Gilbert syndrome   No evidence of hydronephrosis   Plan as above  F/u GI consult, and poss EGD

## 2019-04-12 ENCOUNTER — RESULT REVIEW (OUTPATIENT)
Age: 59
End: 2019-04-12

## 2019-04-12 LAB
ALBUMIN SERPL ELPH-MCNC: 4.2 G/DL — SIGNIFICANT CHANGE UP (ref 3.3–5)
ALBUMIN SERPL ELPH-MCNC: 4.2 G/DL — SIGNIFICANT CHANGE UP (ref 3.3–5)
ALBUMIN SERPL ELPH-MCNC: 4.5 G/DL — SIGNIFICANT CHANGE UP (ref 3.3–5)
ALP SERPL-CCNC: 50 U/L — SIGNIFICANT CHANGE UP (ref 40–120)
ALP SERPL-CCNC: 53 U/L — SIGNIFICANT CHANGE UP (ref 40–120)
ALP SERPL-CCNC: 58 U/L — SIGNIFICANT CHANGE UP (ref 40–120)
ALT FLD-CCNC: 42 U/L — SIGNIFICANT CHANGE UP (ref 10–45)
ALT FLD-CCNC: 43 U/L — SIGNIFICANT CHANGE UP (ref 10–45)
ALT FLD-CCNC: 68 U/L — HIGH (ref 10–45)
ANION GAP SERPL CALC-SCNC: 12 MMOL/L — SIGNIFICANT CHANGE UP (ref 5–17)
ANION GAP SERPL CALC-SCNC: 13 MMOL/L — SIGNIFICANT CHANGE UP (ref 5–17)
ANION GAP SERPL CALC-SCNC: 15 MMOL/L — SIGNIFICANT CHANGE UP (ref 5–17)
APTT BLD: 25.4 SEC — LOW (ref 27.5–36.3)
AST SERPL-CCNC: 56 U/L — HIGH (ref 10–40)
AST SERPL-CCNC: 59 U/L — HIGH (ref 10–40)
AST SERPL-CCNC: 97 U/L — HIGH (ref 10–40)
BASOPHILS # BLD AUTO: 0 K/UL — SIGNIFICANT CHANGE UP (ref 0–0.2)
BASOPHILS NFR BLD AUTO: 0.6 % — SIGNIFICANT CHANGE UP (ref 0–2)
BILIRUB SERPL-MCNC: 0.5 MG/DL — SIGNIFICANT CHANGE UP (ref 0.2–1.2)
BILIRUB SERPL-MCNC: 0.7 MG/DL — SIGNIFICANT CHANGE UP (ref 0.2–1.2)
BILIRUB SERPL-MCNC: 0.9 MG/DL — SIGNIFICANT CHANGE UP (ref 0.2–1.2)
BUN SERPL-MCNC: 22 MG/DL — SIGNIFICANT CHANGE UP (ref 7–23)
BUN SERPL-MCNC: 29 MG/DL — HIGH (ref 7–23)
BUN SERPL-MCNC: 36 MG/DL — HIGH (ref 7–23)
CALCIUM SERPL-MCNC: 8.5 MG/DL — SIGNIFICANT CHANGE UP (ref 8.4–10.5)
CALCIUM SERPL-MCNC: 8.7 MG/DL — SIGNIFICANT CHANGE UP (ref 8.4–10.5)
CALCIUM SERPL-MCNC: 9 MG/DL — SIGNIFICANT CHANGE UP (ref 8.4–10.5)
CHLORIDE SERPL-SCNC: 94 MMOL/L — LOW (ref 96–108)
CHLORIDE SERPL-SCNC: 99 MMOL/L — SIGNIFICANT CHANGE UP (ref 96–108)
CHLORIDE SERPL-SCNC: 99 MMOL/L — SIGNIFICANT CHANGE UP (ref 96–108)
CO2 SERPL-SCNC: 25 MMOL/L — SIGNIFICANT CHANGE UP (ref 22–31)
CO2 SERPL-SCNC: 26 MMOL/L — SIGNIFICANT CHANGE UP (ref 22–31)
CO2 SERPL-SCNC: 26 MMOL/L — SIGNIFICANT CHANGE UP (ref 22–31)
CREAT SERPL-MCNC: 1.35 MG/DL — HIGH (ref 0.5–1.3)
CREAT SERPL-MCNC: 1.54 MG/DL — HIGH (ref 0.5–1.3)
CREAT SERPL-MCNC: 1.84 MG/DL — HIGH (ref 0.5–1.3)
CULTURE RESULTS: SIGNIFICANT CHANGE UP
EOSINOPHIL # BLD AUTO: 0 K/UL — SIGNIFICANT CHANGE UP (ref 0–0.5)
EOSINOPHIL NFR BLD AUTO: 0 % — SIGNIFICANT CHANGE UP (ref 0–6)
GLUCOSE SERPL-MCNC: 104 MG/DL — HIGH (ref 70–99)
GLUCOSE SERPL-MCNC: 94 MG/DL — SIGNIFICANT CHANGE UP (ref 70–99)
GLUCOSE SERPL-MCNC: 96 MG/DL — SIGNIFICANT CHANGE UP (ref 70–99)
HCT VFR BLD CALC: 36.8 % — LOW (ref 39–50)
HGB BLD-MCNC: 12.8 G/DL — LOW (ref 13–17)
INR BLD: 1.13 RATIO — SIGNIFICANT CHANGE UP (ref 0.88–1.16)
LYMPHOCYTES # BLD AUTO: 0.8 K/UL — LOW (ref 1–3.3)
LYMPHOCYTES # BLD AUTO: 20.5 % — SIGNIFICANT CHANGE UP (ref 13–44)
MAGNESIUM SERPL-MCNC: 1.8 MG/DL — SIGNIFICANT CHANGE UP (ref 1.6–2.6)
MAGNESIUM SERPL-MCNC: 2.3 MG/DL — SIGNIFICANT CHANGE UP (ref 1.6–2.6)
MCHC RBC-ENTMCNC: 31.5 PG — SIGNIFICANT CHANGE UP (ref 27–34)
MCHC RBC-ENTMCNC: 34.8 GM/DL — SIGNIFICANT CHANGE UP (ref 32–36)
MCV RBC AUTO: 90.6 FL — SIGNIFICANT CHANGE UP (ref 80–100)
MONOCYTES # BLD AUTO: 0.8 K/UL — SIGNIFICANT CHANGE UP (ref 0–0.9)
MONOCYTES NFR BLD AUTO: 20.4 % — HIGH (ref 2–14)
NEUTROPHILS # BLD AUTO: 2.2 K/UL — SIGNIFICANT CHANGE UP (ref 1.8–7.4)
NEUTROPHILS NFR BLD AUTO: 58.4 % — SIGNIFICANT CHANGE UP (ref 43–77)
PHOSPHATE SERPL-MCNC: 2.1 MG/DL — LOW (ref 2.5–4.5)
PHOSPHATE SERPL-MCNC: 2.5 MG/DL — SIGNIFICANT CHANGE UP (ref 2.5–4.5)
PHOSPHATE SERPL-MCNC: 3.1 MG/DL — SIGNIFICANT CHANGE UP (ref 2.5–4.5)
PLATELET # BLD AUTO: 154 K/UL — SIGNIFICANT CHANGE UP (ref 150–400)
POTASSIUM SERPL-MCNC: 3.1 MMOL/L — LOW (ref 3.5–5.3)
POTASSIUM SERPL-MCNC: 3.2 MMOL/L — LOW (ref 3.5–5.3)
POTASSIUM SERPL-MCNC: 4 MMOL/L — SIGNIFICANT CHANGE UP (ref 3.5–5.3)
POTASSIUM SERPL-SCNC: 3.1 MMOL/L — LOW (ref 3.5–5.3)
POTASSIUM SERPL-SCNC: 3.2 MMOL/L — LOW (ref 3.5–5.3)
POTASSIUM SERPL-SCNC: 4 MMOL/L — SIGNIFICANT CHANGE UP (ref 3.5–5.3)
PROT SERPL-MCNC: 7.1 G/DL — SIGNIFICANT CHANGE UP (ref 6–8.3)
PROT SERPL-MCNC: 7.2 G/DL — SIGNIFICANT CHANGE UP (ref 6–8.3)
PROT SERPL-MCNC: 7.6 G/DL — SIGNIFICANT CHANGE UP (ref 6–8.3)
PROTHROM AB SERPL-ACNC: 12.9 SEC — SIGNIFICANT CHANGE UP (ref 10–12.9)
RBC # BLD: 4.06 M/UL — LOW (ref 4.2–5.8)
RBC # FLD: 13.2 % — SIGNIFICANT CHANGE UP (ref 10.3–14.5)
SODIUM SERPL-SCNC: 135 MMOL/L — SIGNIFICANT CHANGE UP (ref 135–145)
SODIUM SERPL-SCNC: 136 MMOL/L — SIGNIFICANT CHANGE UP (ref 135–145)
SODIUM SERPL-SCNC: 138 MMOL/L — SIGNIFICANT CHANGE UP (ref 135–145)
SPECIMEN SOURCE: SIGNIFICANT CHANGE UP
WBC # BLD: 3.7 K/UL — LOW (ref 3.8–10.5)
WBC # FLD AUTO: 3.7 K/UL — LOW (ref 3.8–10.5)

## 2019-04-12 PROCEDURE — 88305 TISSUE EXAM BY PATHOLOGIST: CPT | Mod: 26

## 2019-04-12 PROCEDURE — 43239 EGD BIOPSY SINGLE/MULTIPLE: CPT | Mod: GC

## 2019-04-12 PROCEDURE — 99233 SBSQ HOSP IP/OBS HIGH 50: CPT | Mod: GC

## 2019-04-12 PROCEDURE — 88312 SPECIAL STAINS GROUP 1: CPT | Mod: 26

## 2019-04-12 RX ORDER — POTASSIUM CHLORIDE 20 MEQ
10 PACKET (EA) ORAL
Qty: 0 | Refills: 0 | Status: DISCONTINUED | OUTPATIENT
Start: 2019-04-12 | End: 2019-04-12

## 2019-04-12 RX ORDER — POTASSIUM PHOSPHATE, MONOBASIC POTASSIUM PHOSPHATE, DIBASIC 236; 224 MG/ML; MG/ML
30 INJECTION, SOLUTION INTRAVENOUS ONCE
Qty: 0 | Refills: 0 | Status: COMPLETED | OUTPATIENT
Start: 2019-04-12 | End: 2019-04-12

## 2019-04-12 RX ORDER — SODIUM CHLORIDE 9 MG/ML
1000 INJECTION INTRAMUSCULAR; INTRAVENOUS; SUBCUTANEOUS
Qty: 0 | Refills: 0 | Status: DISCONTINUED | OUTPATIENT
Start: 2019-04-12 | End: 2019-04-13

## 2019-04-12 RX ORDER — POTASSIUM CHLORIDE 20 MEQ
20 PACKET (EA) ORAL ONCE
Qty: 0 | Refills: 0 | Status: COMPLETED | OUTPATIENT
Start: 2019-04-12 | End: 2019-04-12

## 2019-04-12 RX ORDER — UBIDECARENONE 100 MG
200 CAPSULE ORAL
Qty: 0 | Refills: 0 | Status: DISCONTINUED | OUTPATIENT
Start: 2019-04-12 | End: 2019-04-13

## 2019-04-12 RX ORDER — POTASSIUM CHLORIDE 20 MEQ
40 PACKET (EA) ORAL ONCE
Qty: 0 | Refills: 0 | Status: COMPLETED | OUTPATIENT
Start: 2019-04-12 | End: 2019-04-12

## 2019-04-12 RX ORDER — POTASSIUM PHOSPHATE, MONOBASIC POTASSIUM PHOSPHATE, DIBASIC 236; 224 MG/ML; MG/ML
40 INJECTION, SOLUTION INTRAVENOUS ONCE
Qty: 0 | Refills: 0 | Status: DISCONTINUED | OUTPATIENT
Start: 2019-04-12 | End: 2019-04-12

## 2019-04-12 RX ADMIN — Medication 40 MILLIEQUIVALENT(S): at 14:33

## 2019-04-12 RX ADMIN — PANTOPRAZOLE SODIUM 40 MILLIGRAM(S): 20 TABLET, DELAYED RELEASE ORAL at 06:05

## 2019-04-12 RX ADMIN — Medication 200 MILLIGRAM(S): at 17:01

## 2019-04-12 RX ADMIN — Medication 40 MILLIEQUIVALENT(S): at 12:29

## 2019-04-12 RX ADMIN — PANTOPRAZOLE SODIUM 40 MILLIGRAM(S): 20 TABLET, DELAYED RELEASE ORAL at 17:02

## 2019-04-12 RX ADMIN — SODIUM CHLORIDE 100 MILLILITER(S): 9 INJECTION INTRAMUSCULAR; INTRAVENOUS; SUBCUTANEOUS at 17:00

## 2019-04-12 RX ADMIN — POTASSIUM PHOSPHATE, MONOBASIC POTASSIUM PHOSPHATE, DIBASIC 83.33 MILLIMOLE(S): 236; 224 INJECTION, SOLUTION INTRAVENOUS at 02:32

## 2019-04-12 NOTE — PROGRESS NOTE ADULT - ASSESSMENT
58M with PMHx of HTN and hiatal hernia presenting with intractable nausea and vomiting x 6 days, found to be hypotensive and tachycardic admitted for severe dehydration and electrolyte abnormalities, now pending EGD tomorrow.

## 2019-04-12 NOTE — PROGRESS NOTE ADULT - SUBJECTIVE AND OBJECTIVE BOX
Pre-Endoscopy Evaluation      Referring Physician: dr. carter                                 Procedure:  upper gastrointestinal endoscopy     Indication for Procedure: nausea/vomiting    Pertinent History: 58 year old man with hx of Hypertension, hiatal hernia and chronic episodes of nausea/vomiting/diarrhea presents with one week of nausea, vomiting and diarrhea.    Sedation by Anesthesia [X]    PAST MEDICAL & SURGICAL HISTORY:  Acid reflux  HTN (hypertension)  No significant past surgical history      PMH of Gastroparesis [ ]  Gastric Surgery [ ]  Gastric Outlet Obstruction [ ]    Allergies    No Known Allergies    Intolerances:    Latex allergy: [ ] yes [X] no    Medications:MEDICATIONS  (STANDING):  pantoprazole  Injectable 40 milliGRAM(s) IV Push every 12 hours  potassium chloride    Tablet ER 40 milliEquivalent(s) Oral once  potassium chloride  20 mEq/100 mL IVPB 20 milliEquivalent(s) IV Intermittent once  sodium chloride 0.9%. 1000 milliLiter(s) (100 mL/Hr) IV Continuous <Continuous>    MEDICATIONS  (PRN):  trimethobenzamide 300 milliGRAM(s) Oral every 8 hours PRN Nausea and/or Vomiting      Smoking: [ ] yes  [X] no    AICD/PPM: [ ] yes   [X] no    Pertinent lab data:                        12.8   3.7   )-----------( 154      ( 12 Apr 2019 06:12 )             36.8     04-12    138  |  99  |  29<H>  ----------------------------<  104<H>  3.2<L>   |  26  |  1.54<H>    Ca    8.7      12 Apr 2019 06:11  Phos  3.1     04-12  Mg     2.3     04-12    TPro  7.1  /  Alb  4.2  /  TBili  0.9  /  DBili  x   /  AST  56<H>  /  ALT  42  /  AlkPhos  50  04-12    PT/INR - ( 12 Apr 2019 06:12 )   PT: 12.9 sec;   INR: 1.13 ratio      PTT - ( 12 Apr 2019 06:12 )  PTT:25.4 sec          Physical Examination:    Daily   Vital Signs Last 24 Hrs  T(C): 36.9 (12 Apr 2019 07:58), Max: 37 (11 Apr 2019 16:57)  T(F): 98.5 (12 Apr 2019 07:58), Max: 98.6 (11 Apr 2019 16:57)  HR: 84 (12 Apr 2019 07:58) (77 - 91)  BP: 145/98 (12 Apr 2019 07:58) (134/82 - 152/100)  BP(mean): --  RR: 18 (12 Apr 2019 07:58) (18 - 18)  SpO2: 100% (12 Apr 2019 07:58) (98% - 100%)    Drug Dosing Weight  Height (cm): 175.26 (10 Apr 2019 16:55)  Weight (kg): 91 (11 Apr 2019 00:10)  BMI (kg/m2): 29.6 (11 Apr 2019 00:10)  BSA (m2): 2.07 (11 Apr 2019 00:10)    Constitutional: NAD     Neck:  No JVD    Respiratory: CTAB/L    Cardiovascular: S1 and S2    Gastrointestinal: BS+, soft, NT/ND    Extremities: No peripheral edema    Neurological: A/O x 3    : No Juarez    Skin: No rashes    Comments:    ASA Class: I [ ]  II [x]  III [ ]  IV [ ]    The patient is a suitable candidate for the planned procedure unless box checked [ ]  No, explain:

## 2019-04-12 NOTE — PROGRESS NOTE ADULT - PROBLEM SELECTOR PLAN 5
-Hypotensive and tachycardia likely 2/2 severe dehydration   -lactate 2.5  -F/u stool studies -Lipase 63, no evidence of pancreatitis on imagining, however patient does reports band like distribution of pain and correlation of symptoms with ETOH use   -NS 100c/hr  -monitor, now improved

## 2019-04-12 NOTE — PROGRESS NOTE ADULT - PROBLEM SELECTOR PLAN 4
-Patient with OFELIA, SCR baseline 1.0   -CT negative for hydronephrosis or other kidney pathology  -FeNa demosntrating pre-renal (2/2 to N/V/diarrhea)- significantly improving with IVF  -Continue NS 100cc/hr   -Hold Losartan   -Hold home famotidine -pre-renal OFELIA, FeNa prerenal and improving with fluids  -continue hydration (baseline 1)

## 2019-04-12 NOTE — PROGRESS NOTE ADULT - PROBLEM SELECTOR PLAN 2
-acute on chronic vomiting, occurs every couple weeks for many years; has been worked up OP with no overt causes; Endoscopy and Colonoscopy 5 years ago without findings (OP GI is Dr. Swift)   -CTAP with multiple nonobstructive calculi, largest 8 mm; grossly unchanged from prior admission  -T. Bili mildly elevated at 1.4, no transaminitis, no RUQ pain, no evidence of infection; also has poss Gilbert syndrome   -GI recs appreciated- NPO for EGD tomorrow -acute on chronic vomiting, occurs every couple weeks for many years; has been worked up OP with no overt causes; Endoscopy and Colonoscopy 5 years ago without findings (OP GI is Dr. Swift)   -CTAP with multiple nonobstructive calculi, largest 8 mm; grossly unchanged from prior admission  -T. Bili mildly elevated at 1.4, no transaminitis, no RUQ pain, no evidence of infection; also has poss Gilbert syndrome   -GI recs appreciated- NPO for EGD today; ddx includes gastroparesis vs. outlet obstruction vs. cyclical vomiting syndrome -numerous episodes in the past, OP work up negative  -CT A/P unremarkable, labs with slightly elevated Tbili  -gastroparesis vs. cyclical vomiting  -GI recs appreciated- EGD today unremarkable, biopsies taken; ready for d/c with OP follow up

## 2019-04-12 NOTE — PROGRESS NOTE ADULT - PROBLEM SELECTOR PLAN 3
-Send for stool cx, GI PCR, unlikely to be c.diff given hx and no risk factors  -CTAP no evidence of colitis or SBO/ileus  -patient states stools are black to green; hgb steady  -GI to do EGD in the AM -Send for stool cx, GI PCR, unlikely to be c.diff given hx and no risk factors  -CTAP no evidence of colitis or SBO/ileus  -patient states stools are black to green; hgb steady  -GI to do EGD -GI PCR negative, now no longer having diarrhea  -patient states diarrhea was black to green; hgb steady  -outpatient follow up

## 2019-04-12 NOTE — PROGRESS NOTE ADULT - PROBLEM SELECTOR PLAN 7
-Holding home meds  -BP currently at goal  -Restart as BP tolerates -Holding home meds  -BP currently at goal  -Restart as BP tolerates - after EGD DVT PPX: ambulate  Diet: Regular (patient tolerating)  DIspo: home

## 2019-04-12 NOTE — PROGRESS NOTE ADULT - PROBLEM SELECTOR PLAN 8
DVT PPX: ambulate  Diet: Regular (patient tolerating)   DIspo: pending DVT PPX: ambulate  Diet: Regular (patient tolerating) - NPO for procedure today  DIspo: pending

## 2019-04-12 NOTE — PROGRESS NOTE ADULT - PROBLEM SELECTOR PLAN 6
-Lipase 63, no evidence of pancreatitis on imagining, however patient does reports band like distribution of pain and correlation of symptoms with ETOH use   NS 100c/hr -Lipase 63, no evidence of pancreatitis on imagining, however patient does reports band like distribution of pain and correlation of symptoms with ETOH use   -NS 100c/hr  -monitor, EGD today -Holding home meds  -BP currently at goal

## 2019-04-13 ENCOUNTER — TRANSCRIPTION ENCOUNTER (OUTPATIENT)
Age: 59
End: 2019-04-13

## 2019-04-13 VITALS
RESPIRATION RATE: 18 BRPM | SYSTOLIC BLOOD PRESSURE: 130 MMHG | OXYGEN SATURATION: 100 % | TEMPERATURE: 98 F | DIASTOLIC BLOOD PRESSURE: 80 MMHG | HEART RATE: 78 BPM

## 2019-04-13 LAB
ALBUMIN SERPL ELPH-MCNC: 3.9 G/DL — SIGNIFICANT CHANGE UP (ref 3.3–5)
ALP SERPL-CCNC: 51 U/L — SIGNIFICANT CHANGE UP (ref 40–120)
ALT FLD-CCNC: 67 U/L — HIGH (ref 10–45)
ANION GAP SERPL CALC-SCNC: 16 MMOL/L — SIGNIFICANT CHANGE UP (ref 5–17)
APTT BLD: 27.4 SEC — LOW (ref 27.5–36.3)
AST SERPL-CCNC: 83 U/L — HIGH (ref 10–40)
BILIRUB SERPL-MCNC: 0.5 MG/DL — SIGNIFICANT CHANGE UP (ref 0.2–1.2)
BUN SERPL-MCNC: 18 MG/DL — SIGNIFICANT CHANGE UP (ref 7–23)
CALCIUM SERPL-MCNC: 8.3 MG/DL — LOW (ref 8.4–10.5)
CHLORIDE SERPL-SCNC: 102 MMOL/L — SIGNIFICANT CHANGE UP (ref 96–108)
CO2 SERPL-SCNC: 23 MMOL/L — SIGNIFICANT CHANGE UP (ref 22–31)
CREAT SERPL-MCNC: 1.11 MG/DL — SIGNIFICANT CHANGE UP (ref 0.5–1.3)
CULTURE RESULTS: SIGNIFICANT CHANGE UP
GLUCOSE SERPL-MCNC: 96 MG/DL — SIGNIFICANT CHANGE UP (ref 70–99)
HCT VFR BLD CALC: 38.1 % — LOW (ref 39–50)
HGB BLD-MCNC: 12.8 G/DL — LOW (ref 13–17)
INR BLD: 1.15 RATIO — SIGNIFICANT CHANGE UP (ref 0.88–1.16)
MCHC RBC-ENTMCNC: 30.7 PG — SIGNIFICANT CHANGE UP (ref 27–34)
MCHC RBC-ENTMCNC: 33.6 GM/DL — SIGNIFICANT CHANGE UP (ref 32–36)
MCV RBC AUTO: 91.5 FL — SIGNIFICANT CHANGE UP (ref 80–100)
PHOSPHATE SERPL-MCNC: 1.9 MG/DL — LOW (ref 2.5–4.5)
PLATELET # BLD AUTO: 183 K/UL — SIGNIFICANT CHANGE UP (ref 150–400)
POTASSIUM SERPL-MCNC: 3.7 MMOL/L — SIGNIFICANT CHANGE UP (ref 3.5–5.3)
POTASSIUM SERPL-SCNC: 3.7 MMOL/L — SIGNIFICANT CHANGE UP (ref 3.5–5.3)
PROT SERPL-MCNC: 6.9 G/DL — SIGNIFICANT CHANGE UP (ref 6–8.3)
PROTHROM AB SERPL-ACNC: 13.3 SEC — HIGH (ref 10–12.9)
RBC # BLD: 4.16 M/UL — LOW (ref 4.2–5.8)
RBC # FLD: 13.6 % — SIGNIFICANT CHANGE UP (ref 10.3–14.5)
SODIUM SERPL-SCNC: 141 MMOL/L — SIGNIFICANT CHANGE UP (ref 135–145)
SPECIMEN SOURCE: SIGNIFICANT CHANGE UP
WBC # BLD: 4.1 K/UL — SIGNIFICANT CHANGE UP (ref 3.8–10.5)
WBC # FLD AUTO: 4.1 K/UL — SIGNIFICANT CHANGE UP (ref 3.8–10.5)

## 2019-04-13 PROCEDURE — 83690 ASSAY OF LIPASE: CPT

## 2019-04-13 PROCEDURE — 85014 HEMATOCRIT: CPT

## 2019-04-13 PROCEDURE — 82570 ASSAY OF URINE CREATININE: CPT

## 2019-04-13 PROCEDURE — 87046 STOOL CULTR AEROBIC BACT EA: CPT

## 2019-04-13 PROCEDURE — 80048 BASIC METABOLIC PNL TOTAL CA: CPT

## 2019-04-13 PROCEDURE — 74176 CT ABD & PELVIS W/O CONTRAST: CPT

## 2019-04-13 PROCEDURE — 82330 ASSAY OF CALCIUM: CPT

## 2019-04-13 PROCEDURE — 84295 ASSAY OF SERUM SODIUM: CPT

## 2019-04-13 PROCEDURE — 96374 THER/PROPH/DIAG INJ IV PUSH: CPT

## 2019-04-13 PROCEDURE — 83935 ASSAY OF URINE OSMOLALITY: CPT

## 2019-04-13 PROCEDURE — 81001 URINALYSIS AUTO W/SCOPE: CPT

## 2019-04-13 PROCEDURE — 84100 ASSAY OF PHOSPHORUS: CPT

## 2019-04-13 PROCEDURE — 84132 ASSAY OF SERUM POTASSIUM: CPT

## 2019-04-13 PROCEDURE — 85610 PROTHROMBIN TIME: CPT

## 2019-04-13 PROCEDURE — 96375 TX/PRO/DX INJ NEW DRUG ADDON: CPT

## 2019-04-13 PROCEDURE — 82247 BILIRUBIN TOTAL: CPT

## 2019-04-13 PROCEDURE — 83735 ASSAY OF MAGNESIUM: CPT

## 2019-04-13 PROCEDURE — 93005 ELECTROCARDIOGRAM TRACING: CPT

## 2019-04-13 PROCEDURE — 88312 SPECIAL STAINS GROUP 1: CPT

## 2019-04-13 PROCEDURE — 83605 ASSAY OF LACTIC ACID: CPT

## 2019-04-13 PROCEDURE — 99239 HOSP IP/OBS DSCHRG MGMT >30: CPT

## 2019-04-13 PROCEDURE — 84443 ASSAY THYROID STIM HORMONE: CPT

## 2019-04-13 PROCEDURE — 87045 FECES CULTURE AEROBIC BACT: CPT

## 2019-04-13 PROCEDURE — 99285 EMERGENCY DEPT VISIT HI MDM: CPT | Mod: 25

## 2019-04-13 PROCEDURE — 80053 COMPREHEN METABOLIC PANEL: CPT

## 2019-04-13 PROCEDURE — 88305 TISSUE EXAM BY PATHOLOGIST: CPT

## 2019-04-13 PROCEDURE — 85730 THROMBOPLASTIN TIME PARTIAL: CPT

## 2019-04-13 PROCEDURE — 82803 BLOOD GASES ANY COMBINATION: CPT

## 2019-04-13 PROCEDURE — 82435 ASSAY OF BLOOD CHLORIDE: CPT

## 2019-04-13 PROCEDURE — 87507 IADNA-DNA/RNA PROBE TQ 12-25: CPT

## 2019-04-13 PROCEDURE — 85027 COMPLETE CBC AUTOMATED: CPT

## 2019-04-13 PROCEDURE — 96376 TX/PRO/DX INJ SAME DRUG ADON: CPT

## 2019-04-13 PROCEDURE — 86803 HEPATITIS C AB TEST: CPT

## 2019-04-13 PROCEDURE — 84300 ASSAY OF URINE SODIUM: CPT

## 2019-04-13 PROCEDURE — 82947 ASSAY GLUCOSE BLOOD QUANT: CPT

## 2019-04-13 PROCEDURE — 82248 BILIRUBIN DIRECT: CPT

## 2019-04-13 PROCEDURE — 84484 ASSAY OF TROPONIN QUANT: CPT

## 2019-04-13 RX ORDER — UBIDECARENONE 100 MG
1 CAPSULE ORAL
Qty: 60 | Refills: 0
Start: 2019-04-13 | End: 2019-05-12

## 2019-04-13 RX ADMIN — PANTOPRAZOLE SODIUM 40 MILLIGRAM(S): 20 TABLET, DELAYED RELEASE ORAL at 05:11

## 2019-04-13 RX ADMIN — SODIUM CHLORIDE 100 MILLILITER(S): 9 INJECTION INTRAMUSCULAR; INTRAVENOUS; SUBCUTANEOUS at 05:11

## 2019-04-13 RX ADMIN — Medication 200 MILLIGRAM(S): at 05:10

## 2019-04-13 NOTE — PROGRESS NOTE ADULT - PROBLEM SELECTOR PLAN 5
-Lipase 63, no evidence of pancreatitis on imagining, however patient does reports band like distribution of pain and correlation of symptoms with ETOH use   -NS 100c/hr  -monitor, now improved

## 2019-04-13 NOTE — PROGRESS NOTE ADULT - PROBLEM SELECTOR PLAN 2
-numerous episodes in the past, OP work up negative  -CT A/P unremarkable, labs with slightly elevated Tbili  -gastroparesis vs. cyclical vomiting  -GI recs appreciated- EGD unremarkable, biopsies taken; ready for d/c with OP follow up

## 2019-04-13 NOTE — DISCHARGE NOTE PROVIDER - NSDCCPCAREPLAN_GEN_ALL_CORE_FT
PRINCIPAL DISCHARGE DIAGNOSIS  Diagnosis: Intractable vomiting with nausea  Assessment and Plan of Treatment: This was probably due to gastroparesis or cyclical vomiting. You EGD did not show any causes of this nausea or vomiting. Please follow up with Dr. Swift as an outpatient.      SECONDARY DISCHARGE DIAGNOSES  Diagnosis: Hypokalemia  Assessment and Plan of Treatment: You potassium was very low because of your Nausea, vomiting, and diarrhea. Your potassium was repleted and it normalized    Diagnosis: OFELIA (acute kidney injury)  Assessment and Plan of Treatment: Your kidneys were injured temporarily from lack of fluid hydration. With the IV fluids and you restarting your diet, your kidney function improved to normal.

## 2019-04-13 NOTE — DISCHARGE NOTE NURSING/CASE MANAGEMENT/SOCIAL WORK - NSDCDPATPORTLINK_GEN_ALL_CORE
You can access the [facility] Patient Portal, offered by [facility], by registering with the following website: [email or web address]

## 2019-04-13 NOTE — DISCHARGE NOTE PROVIDER - PROVIDER TOKENS
FREE:[LAST:[Lily],FIRST:[Rishi],PHONE:[phone]],FAX:[phone]   [phone]    -],ADDRESS:[[address] [place], [place] [address]  [place] [address]]]

## 2019-04-13 NOTE — PROGRESS NOTE ADULT - SUBJECTIVE AND OBJECTIVE BOX
Gladys Whitehead, PGY1   Contact/Pager - [phone] / [phone]    SUBJECTIVE / OVERNIGHT EVENTS:  -no acute events overnight  -denies any complaints including CP, SOB, abdominal pain, N/V, diarrhea, constipation, headache, confusion, fever/chills      MEDICATIONS  (STANDING):  pantoprazole  Injectable 40 milliGRAM(s) IV Push every 12 hours  sodium chloride 0.9%. 1000 milliLiter(s) (100 mL/Hr) IV Continuous <Continuous>  ubiquinone 200 milliGRAM(s) Oral two times a day    MEDICATIONS  (PRN):  trimethobenzamide 300 milliGRAM(s) Oral every 8 hours PRN Nausea and/or Vomiting      Allergies    No Known Allergies    Intolerances          Vital Signs Last 24 Hrs  T(C): 36.6 (13 Apr 2019 04:19), Max: 36.9 (12 Apr 2019 07:58)  T(F): 97.9 (13 Apr 2019 04:19), Max: 98.5 (12 Apr 2019 07:58)  HR: 78 (13 Apr 2019 04:19) (78 - 85)  BP: 133/82 (13 Apr 2019 04:19) (132/87 - 157/95)  BP(mean): --  RR: 18 (13 Apr 2019 04:19) (18 - 18)  SpO2: 100% (13 Apr 2019 04:19) (98% - 100%)  CAPILLARY BLOOD GLUCOSE        I&O's Summary    12 Apr 2019 07:01  -  13 Apr 2019 07:00  --------------------------------------------------------  IN: 3190 mL / OUT: 0 mL / NET: 3190 mL          PHYSICAL EXAM:  GENERAL: NAD, well-developed  HEAD:  AT, NC  EYES: EOMI, PERRLA, conjunctiva and sclera clear  ENMT: Airway patent. MMM. Good dentition, no lesions.  NECK: Supple, No JVD  CHEST/LUNG: CTABL; No wheezing, rhonci, or rales  HEART: RRR; Normal S1, S2. No murmurs, rubs, or gallops  ABDOMEN: Soft, NT, ND; Bowel sounds present. No organomegaly  EXTREMITIES:  2+ Peripheral Pulses, No clubbing, cyanosis, or edema  PSYCH: AAOx3  NEUROLOGY: non-focal  SKIN: Warm, dry, intact; No rashes or lesions      LABS:                        12.8   3.7   )-----------( 154      ( 12 Apr 2019 06:12 )             36.8     04-12    136  |  99  |  22  ----------------------------<  94  4.0   |  25  |  1.35<H>    Ca    8.5      12 Apr 2019 16:44  Phos  2.5     04-12  Mg     1.8     04-12    TPro  7.2  /  Alb  4.2  /  TBili  0.5  /  DBili  x   /  AST  97<H>  /  ALT  68<H>  /  AlkPhos  53  04-12    LIVER FUNCTIONS - ( 12 Apr 2019 16:44 )  Alb: 4.2 g/dL / Pro: 7.2 g/dL / ALK PHOS: 53 U/L / ALT: 68 U/L / AST: 97 U/L / GGT: x           PT/INR - ( 12 Apr 2019 06:12 )   PT: 12.9 sec;   INR: 1.13 ratio         PTT - ( 12 Apr 2019 06:12 )  PTT:25.4 sec            GI PCR Panel, Stool (collected 12 Apr 2019 07:20)  Source: .Stool  Final Report (12 Apr 2019 14:05):    GI PCR Results: NOT detected    *******Please Note:*******    GI panel PCR evaluates for:    Campylobacter, Plesiomonas shigelloides, Salmonella,    Vibrio, Yersinia enterocolitica, Enteroaggregative    Escherichia coli (EAEC), Enteropathogenic E.coli (EPEC),    Enterotoxigenic E. coli (ETEC) lt/st, Shiga-like    toxin-producing E. coli (STEC) stx1/stx2,    Shigella/ Enteroinvasive E. coli (EIEC), Cryptosporidium,    Cyclospora cayetanensis, Entamoeba histolytica,    Giardia lamblia, Adenovirus F 40/41, Astrovirus,    Norovirus GI/GII, Rotavirus A, Sapovirus    Culture - Stool (collected 11 Apr 2019 18:58)  Source: .Stool  Preliminary Report (12 Apr 2019 17:42):    No enteric pathogens to date: Final culture pending          RADIOLOGY & ADDITIONAL TESTS:    Imaging Personally Reviewed: YES    Consultant(s) Notes Reviewed: YES    Care Discussed with Consultants/Other Providers: YES

## 2019-04-13 NOTE — DISCHARGE NOTE PROVIDER - CARE PROVIDER_API CALL
Rishi Bautista  [address]  [place] [address]  Phone: [phone]  Fax: (   )    -  Follow Up Time:

## 2019-04-13 NOTE — PROGRESS NOTE ADULT - PROBLEM SELECTOR PLAN 3
-GI PCR negative, now no longer having diarrhea  -patient states diarrhea was black to green; hgb steady  -outpatient follow up

## 2019-04-13 NOTE — PROGRESS NOTE ADULT - ASSESSMENT
58M with PMHx of HTN and hiatal hernia presenting with intractable nausea and vomiting x 6 days, found to be hypotensive and tachycardic admitted for severe dehydration and electrolyte abnormalities, now s/p EGD without findings and biopsies taken.

## 2019-04-13 NOTE — DISCHARGE NOTE PROVIDER - HOSPITAL COURSE
58M with PMHx of HTN and hiatal hernia presenting with intractable nausea, vomiting, and diarrhea, found to be hypotensive and tachycardic admitted for severe dehydration and electrolyte abnormalities and OFELIA to 4. Patient has had numerous episodes similar to this in the past, with extensive OP workup without discernable etiology.         This admission, patient was given fluids with excessive electrolyte repletion, with resolution in electrolyte abnormalities and OFELIA back to baseline of 1. Patient also had an EGD with unremarkable findings and biopsies were taken. The patient was stable to discharged with outpatient follow up with his GI doctor Dr. Rishi Swift 58M with PMHx of HTN, hiatal hernia, chronic intermittent vomiting of unclear etiology pw intractable nausea, vomiting, diarrhea for x 6 days. Unable to tolerate PO solids and liquids. Loose BMs, 5-6x total, described as "oatmeal", abdominal pain, epigastric intermittent, band like distribution. Episode of presyncope in the bathroom.         Multiple previous similar episodes, for the past year it was every month, now occur every 10 days, but usually are self-limiting and last 2-3 days. This particular episode occurred after ETOH use on Wednesday, drinks ETOH 2-3x/week, beer and liquor, never had withdrawal lsymptoms. Limited appetite, with 15-20 lb weight loss in the past year.         Pt found to be severely hypokalemic, hyponatremic and in OFELIA, cr 3.2. Started on aggressive hydration and electrolyte replacement. OFELIA gradually improved.         EGD revealed reflux esophagitis, biopsies were taken. Symptoms resolved, able to tolerate diet. Electrolytes normalized.        Discharged w f/u.         Diagnoses: OFELIA; Intractable vomiting; Esophagitis; Hypokalemia; Hypophosphatemia; Hypomagnesemia; Hypotension; Hyponatremia; Leukopenia; Lactic acidosis; Abdominal pain; Diarrhea

## 2019-04-13 NOTE — PROGRESS NOTE ADULT - ATTENDING COMMENTS
D/c time 35 mins.
EGD tomorrow.
EGD with reflux esophagitis, otherwise unremarkable. OFELIA improving w hydration.     D/c planning for tomorrow.

## 2019-04-13 NOTE — PROGRESS NOTE ADULT - PROBLEM SELECTOR PLAN 1
-all 2/2 to dehydration from N/V/Diarrhea  -now resolved  -continue IVF with NS  -BMP goal K>4, Mg>2   -tele with NSR, however episodes to 120s

## 2019-04-16 LAB — SURGICAL PATHOLOGY STUDY: SIGNIFICANT CHANGE UP

## 2019-11-09 NOTE — ED PROVIDER NOTE - DATE/TIME 4
Pt returned from IR. Placed pt on ventilator at documented settings. Pt ETT is secure and patent. No respiratory distress noted. Will continue to monitor.    10-Dec-2017 20:36

## 2021-05-16 NOTE — PROVIDER CONTACT NOTE (CRITICAL VALUE NOTIFICATION) - PERSON GIVING RESULT:
David Desai
David Urrutia- Sondra
Willie Dueñas)  Pediatric Surgery; Surgery  [address] [facility], [address]  [place] [address]  Phone: [phone]  Fax: [phone]  Follow Up Time: 2 weeks

## 2022-01-25 ENCOUNTER — INPATIENT (INPATIENT)
Facility: HOSPITAL | Age: 62
LOS: 2 days | Discharge: HOME | DRG: 683 | End: 2022-01-28
Attending: STUDENT IN AN ORGANIZED HEALTH CARE EDUCATION/TRAINING PROGRAM | Admitting: HOSPITALIST
Payer: COMMERCIAL

## 2022-01-25 VITALS
TEMPERATURE: 98 F | SYSTOLIC BLOOD PRESSURE: 109 MMHG | RESPIRATION RATE: 18 BRPM | HEIGHT: 69 IN | WEIGHT: 194.01 LBS | HEART RATE: 80 BPM | DIASTOLIC BLOOD PRESSURE: 68 MMHG | OXYGEN SATURATION: 98 %

## 2022-01-25 DIAGNOSIS — N17.9 ACUTE KIDNEY FAILURE, UNSPECIFIED: ICD-10-CM

## 2022-01-25 LAB
ALBUMIN SERPL ELPH-MCNC: 4.9 G/DL — SIGNIFICANT CHANGE UP (ref 3.3–5)
ALP SERPL-CCNC: 75 U/L — SIGNIFICANT CHANGE UP (ref 40–120)
ALT FLD-CCNC: 127 U/L — HIGH (ref 10–45)
ANION GAP SERPL CALC-SCNC: 20 MMOL/L — HIGH (ref 5–17)
APPEARANCE UR: ABNORMAL
APTT BLD: 26.9 SEC — LOW (ref 27.5–35.5)
AST SERPL-CCNC: 154 U/L — HIGH (ref 10–40)
B-OH-BUTYR SERPL-SCNC: 0.6 MMOL/L — HIGH
BACTERIA # UR AUTO: NEGATIVE — SIGNIFICANT CHANGE UP
BASE EXCESS BLDV CALC-SCNC: 0.3 MMOL/L — SIGNIFICANT CHANGE UP (ref -2–2)
BASOPHILS # BLD AUTO: 0.03 K/UL — SIGNIFICANT CHANGE UP (ref 0–0.2)
BASOPHILS NFR BLD AUTO: 0.5 % — SIGNIFICANT CHANGE UP (ref 0–2)
BILIRUB SERPL-MCNC: 1.4 MG/DL — HIGH (ref 0.2–1.2)
BILIRUB UR-MCNC: NEGATIVE — SIGNIFICANT CHANGE UP
BLD GP AB SCN SERPL QL: NEGATIVE — SIGNIFICANT CHANGE UP
BUN SERPL-MCNC: 51 MG/DL — HIGH (ref 7–23)
CA-I SERPL-SCNC: 1.25 MMOL/L — SIGNIFICANT CHANGE UP (ref 1.15–1.33)
CALCIUM SERPL-MCNC: 10.3 MG/DL — SIGNIFICANT CHANGE UP (ref 8.4–10.5)
CHLORIDE BLDV-SCNC: 90 MMOL/L — LOW (ref 96–108)
CHLORIDE SERPL-SCNC: 88 MMOL/L — LOW (ref 96–108)
CO2 BLDV-SCNC: 27 MMOL/L — HIGH (ref 22–26)
CO2 SERPL-SCNC: 22 MMOL/L — SIGNIFICANT CHANGE UP (ref 22–31)
COLOR SPEC: YELLOW — SIGNIFICANT CHANGE UP
CREAT SERPL-MCNC: 3.22 MG/DL — HIGH (ref 0.5–1.3)
DIFF PNL FLD: ABNORMAL
EOSINOPHIL # BLD AUTO: 0.11 K/UL — SIGNIFICANT CHANGE UP (ref 0–0.5)
EOSINOPHIL NFR BLD AUTO: 2 % — SIGNIFICANT CHANGE UP (ref 0–6)
EPI CELLS # UR: 2 /HPF — SIGNIFICANT CHANGE UP
GAS PNL BLDV: 128 MMOL/L — LOW (ref 136–145)
GAS PNL BLDV: SIGNIFICANT CHANGE UP
GAS PNL BLDV: SIGNIFICANT CHANGE UP
GLUCOSE BLDV-MCNC: 114 MG/DL — HIGH (ref 70–99)
GLUCOSE SERPL-MCNC: 112 MG/DL — HIGH (ref 70–99)
GLUCOSE UR QL: NEGATIVE — SIGNIFICANT CHANGE UP
HCO3 BLDV-SCNC: 26 MMOL/L — SIGNIFICANT CHANGE UP (ref 22–29)
HCT VFR BLD CALC: 42.8 % — SIGNIFICANT CHANGE UP (ref 39–50)
HCT VFR BLDA CALC: 41 % — SIGNIFICANT CHANGE UP (ref 39–51)
HGB BLD CALC-MCNC: 13.6 G/DL — SIGNIFICANT CHANGE UP (ref 12.6–17.4)
HGB BLD-MCNC: 14.7 G/DL — SIGNIFICANT CHANGE UP (ref 13–17)
HYALINE CASTS # UR AUTO: 4 /LPF — HIGH (ref 0–2)
IMM GRANULOCYTES NFR BLD AUTO: 0.4 % — SIGNIFICANT CHANGE UP (ref 0–1.5)
INR BLD: 1.06 RATIO — SIGNIFICANT CHANGE UP (ref 0.88–1.16)
KETONES UR-MCNC: NEGATIVE — SIGNIFICANT CHANGE UP
LACTATE BLDV-MCNC: 1.9 MMOL/L — SIGNIFICANT CHANGE UP (ref 0.7–2)
LEUKOCYTE ESTERASE UR-ACNC: ABNORMAL
LIDOCAIN IGE QN: 109 U/L — HIGH (ref 7–60)
LYMPHOCYTES # BLD AUTO: 0.98 K/UL — LOW (ref 1–3.3)
LYMPHOCYTES # BLD AUTO: 17.8 % — SIGNIFICANT CHANGE UP (ref 13–44)
MAGNESIUM SERPL-MCNC: 2 MG/DL — SIGNIFICANT CHANGE UP (ref 1.6–2.6)
MCHC RBC-ENTMCNC: 31.1 PG — SIGNIFICANT CHANGE UP (ref 27–34)
MCHC RBC-ENTMCNC: 34.3 GM/DL — SIGNIFICANT CHANGE UP (ref 32–36)
MCV RBC AUTO: 90.7 FL — SIGNIFICANT CHANGE UP (ref 80–100)
MONOCYTES # BLD AUTO: 0.9 K/UL — SIGNIFICANT CHANGE UP (ref 0–0.9)
MONOCYTES NFR BLD AUTO: 16.4 % — HIGH (ref 2–14)
NEUTROPHILS # BLD AUTO: 3.46 K/UL — SIGNIFICANT CHANGE UP (ref 1.8–7.4)
NEUTROPHILS NFR BLD AUTO: 62.9 % — SIGNIFICANT CHANGE UP (ref 43–77)
NITRITE UR-MCNC: NEGATIVE — SIGNIFICANT CHANGE UP
NRBC # BLD: 0 /100 WBCS — SIGNIFICANT CHANGE UP (ref 0–0)
OSMOLALITY SERPL: 283 MOSMOL/KG — SIGNIFICANT CHANGE UP (ref 280–301)
PCO2 BLDV: 45 MMHG — SIGNIFICANT CHANGE UP (ref 42–55)
PH BLDV: 7.37 — SIGNIFICANT CHANGE UP (ref 7.32–7.43)
PH UR: 6 — SIGNIFICANT CHANGE UP (ref 5–8)
PHOSPHATE SERPL-MCNC: 2.7 MG/DL — SIGNIFICANT CHANGE UP (ref 2.5–4.5)
PLATELET # BLD AUTO: 186 K/UL — SIGNIFICANT CHANGE UP (ref 150–400)
PO2 BLDV: 27 MMHG — SIGNIFICANT CHANGE UP (ref 25–45)
POTASSIUM BLDV-SCNC: 2.6 MMOL/L — CRITICAL LOW (ref 3.5–5.1)
POTASSIUM SERPL-MCNC: 2.9 MMOL/L — CRITICAL LOW (ref 3.5–5.3)
POTASSIUM SERPL-SCNC: 2.9 MMOL/L — CRITICAL LOW (ref 3.5–5.3)
PROT SERPL-MCNC: 8.9 G/DL — HIGH (ref 6–8.3)
PROT UR-MCNC: 100 — SIGNIFICANT CHANGE UP
PROTHROM AB SERPL-ACNC: 12.7 SEC — SIGNIFICANT CHANGE UP (ref 10.6–13.6)
RBC # BLD: 4.72 M/UL — SIGNIFICANT CHANGE UP (ref 4.2–5.8)
RBC # FLD: 13.9 % — SIGNIFICANT CHANGE UP (ref 10.3–14.5)
RBC CASTS # UR COMP ASSIST: 72 /HPF — HIGH (ref 0–4)
RH IG SCN BLD-IMP: POSITIVE — SIGNIFICANT CHANGE UP
SAO2 % BLDV: 32.8 % — LOW (ref 67–88)
SARS-COV-2 RNA SPEC QL NAA+PROBE: SIGNIFICANT CHANGE UP
SODIUM SERPL-SCNC: 130 MMOL/L — LOW (ref 135–145)
SP GR SPEC: 1.02 — SIGNIFICANT CHANGE UP (ref 1.01–1.02)
TROPONIN T, HIGH SENSITIVITY RESULT: 20 NG/L — SIGNIFICANT CHANGE UP (ref 0–51)
UROBILINOGEN FLD QL: NEGATIVE — SIGNIFICANT CHANGE UP
WBC # BLD: 5.5 K/UL — SIGNIFICANT CHANGE UP (ref 3.8–10.5)
WBC # FLD AUTO: 5.5 K/UL — SIGNIFICANT CHANGE UP (ref 3.8–10.5)
WBC UR QL: 23 /HPF — HIGH (ref 0–5)

## 2022-01-25 PROCEDURE — 93010 ELECTROCARDIOGRAM REPORT: CPT

## 2022-01-25 PROCEDURE — 99285 EMERGENCY DEPT VISIT HI MDM: CPT | Mod: 25

## 2022-01-25 PROCEDURE — 71250 CT THORAX DX C-: CPT | Mod: 26,MA

## 2022-01-25 PROCEDURE — 74176 CT ABD & PELVIS W/O CONTRAST: CPT | Mod: 26,MA

## 2022-01-25 PROCEDURE — 99223 1ST HOSP IP/OBS HIGH 75: CPT

## 2022-01-25 RX ORDER — SODIUM CHLORIDE 9 MG/ML
1000 INJECTION, SOLUTION INTRAVENOUS
Refills: 0 | Status: DISCONTINUED | OUTPATIENT
Start: 2022-01-25 | End: 2022-01-26

## 2022-01-25 RX ORDER — POTASSIUM CHLORIDE 20 MEQ
40 PACKET (EA) ORAL DAILY
Refills: 0 | Status: DISCONTINUED | OUTPATIENT
Start: 2022-01-25 | End: 2022-01-27

## 2022-01-25 RX ORDER — PANTOPRAZOLE SODIUM 20 MG/1
40 TABLET, DELAYED RELEASE ORAL ONCE
Refills: 0 | Status: COMPLETED | OUTPATIENT
Start: 2022-01-25 | End: 2022-01-25

## 2022-01-25 RX ORDER — FAMOTIDINE 10 MG/ML
20 INJECTION INTRAVENOUS ONCE
Refills: 0 | Status: COMPLETED | OUTPATIENT
Start: 2022-01-25 | End: 2022-01-25

## 2022-01-25 RX ORDER — POTASSIUM CHLORIDE 20 MEQ
10 PACKET (EA) ORAL
Refills: 0 | Status: COMPLETED | OUTPATIENT
Start: 2022-01-25 | End: 2022-01-25

## 2022-01-25 RX ORDER — CEFTRIAXONE 500 MG/1
1000 INJECTION, POWDER, FOR SOLUTION INTRAMUSCULAR; INTRAVENOUS ONCE
Refills: 0 | Status: COMPLETED | OUTPATIENT
Start: 2022-01-25 | End: 2022-01-25

## 2022-01-25 RX ORDER — ONDANSETRON 8 MG/1
4 TABLET, FILM COATED ORAL ONCE
Refills: 0 | Status: COMPLETED | OUTPATIENT
Start: 2022-01-25 | End: 2022-01-25

## 2022-01-25 RX ORDER — SODIUM CHLORIDE 9 MG/ML
1000 INJECTION INTRAMUSCULAR; INTRAVENOUS; SUBCUTANEOUS ONCE
Refills: 0 | Status: COMPLETED | OUTPATIENT
Start: 2022-01-25 | End: 2022-01-25

## 2022-01-25 RX ADMIN — SODIUM CHLORIDE 1000 MILLILITER(S): 9 INJECTION INTRAMUSCULAR; INTRAVENOUS; SUBCUTANEOUS at 19:53

## 2022-01-25 RX ADMIN — Medication 40 MILLIEQUIVALENT(S): at 23:09

## 2022-01-25 RX ADMIN — Medication 100 MILLIEQUIVALENT(S): at 20:57

## 2022-01-25 RX ADMIN — Medication 100 MILLIEQUIVALENT(S): at 23:09

## 2022-01-25 RX ADMIN — Medication 100 MILLIEQUIVALENT(S): at 19:52

## 2022-01-25 RX ADMIN — Medication 30 MILLILITER(S): at 16:11

## 2022-01-25 RX ADMIN — ONDANSETRON 4 MILLIGRAM(S): 8 TABLET, FILM COATED ORAL at 16:11

## 2022-01-25 RX ADMIN — FAMOTIDINE 20 MILLIGRAM(S): 10 INJECTION INTRAVENOUS at 16:11

## 2022-01-25 NOTE — H&P ADULT - ASSESSMENT
61M w/ hx of HTN, fatty liver, hiatal hernia, hx of possible cyclical vomiting p/w nausea, vomiting, diarrhea and dizziness found to have tachycardia, OFELIA, hypokalemia and possible UTI

## 2022-01-25 NOTE — ED PROVIDER NOTE - OBJECTIVE STATEMENT
60y/o male PMHx HTN, hiatal hernia, fatty liver, chronic intermittent vomiting unclear etiology (obtained per chart review) presents to the ED N/V/D, urinary frequency, polydipsia, lightheadedness x4-5 days, epigastric discomfort, mild BRBPR earlier today. Endorses feeling warm and cool denies known fever. Denies sick contact, lives alone at home. Is fully vaccinated for covid x3. Endorses intermittent ETOH use 3-4x/week, 3-4 drinks 1.5 shots fo liquor and gingerale. Of note, pt had a similar episode in 2019, where pt had hypokalemia, hyponatremia, OFELIA, creatinine of 3.25 (Creatinine of 1.11 at time of DC), endoscopy at that time that showed esophagitis. Pt also notes dizziness, denies SOB but has a note from his PCP office from today noting dyspnea and pt was referred to ED. Pt also reports that he had one episode of BRBPR this morning, had dark stool yesterday and one episode of emesis yesterday that appeared dark possibly like coffee grounds. Pt denies abdominal surgeries, h/o gallstone. Pt denies CP.

## 2022-01-25 NOTE — ED PROVIDER NOTE - PHYSICAL EXAMINATION
GEN: Pt in NAD, A&O x3.  PSYCH: Anxious.  EYES: Sclera white w/o injection.   ENT: Head NCAT. Mucous membranes dry. Neck supple FROM.  RESP: CTA b/l, no wheezes, rales, or rhonchi.   CARDIAC: RRR, clear distinct S1, S2, no appreciable murmurs.  ABD: Abdomen soft, epigastric ttp. No CVAT b/l.  VASC: 2+ radial and dorsalis pedis pulses b/l. No edema or calf tenderness.  SKIN: No rashes on the trunk.

## 2022-01-25 NOTE — H&P ADULT - NSHPSOCIALHISTORY_GEN_ALL_CORE
Denies smoking. Drinks 4-5 days ago week varying amounts each day. Maybe 1.5 shots of liquor a day.  but in process of getting

## 2022-01-25 NOTE — H&P ADULT - HISTORY OF PRESENT ILLNESS
61M w/ hx of HTN, fatty liver, hiatal hernia, hx of possible cyclical vomiting p/w nausea, vomiting, diarrhea and dizziness. Pt has history of similar incidents in the past. Starting 4-5 days ago pt started to develop nausea, vomiting and some episodes of diarrhea. When patient started to get lightheaded he went to see his PMD who recommended he come in for further evaluation. Pt denies any rafaela fevers, chest pain or palpitations. Not clear if he has had any BRBPR or melena.     In ER: Given IV ceftriaxone, NS 1L, pepcid 20mg IV, maalox 30m, Zofran 4mg IV, ppi 40 IV, KCl 40 PO 10IV x3

## 2022-01-25 NOTE — ED ADULT NURSE REASSESSMENT NOTE - NS ED NURSE REASSESS COMMENT FT1
per MD Michelle clement for patient to eat and drink. patient provided with turkey sandwich and ginger ale at this time

## 2022-01-25 NOTE — H&P ADULT - PROBLEM SELECTOR PLAN 4
Note AST/ALT in 100s. Note AST/ALT in 100s. Possible history of fatty liver?  -Trend CMPs  -Fu RUQ US UA could be considered positive  -Cont. IV ceftriaxone  -F/u UCx

## 2022-01-25 NOTE — ED PROVIDER NOTE - PROGRESS NOTE DETAILS
pt with hyponatremia, hypokalemia, QTc not prolonged no U waves, OFELIA compared with prior labs likely prerenal, additional labs added. pt A&Ox3 hemodynamically stable, will obtain RUQ US given transaminitis and elected lipase. Pt TBA. -Maxx Alberto PA-C pt with hyponatremia, hypokalemia, QTc not prolonged no U waves, OFELIA compared with prior labs likely prerenal, additional labs added. pt A&Ox3 hemodynamically stable, will obtain RUQ US given transaminitis and elevated lipase. Pt TBA. -Maxx Alberto PA-C Pt reports urinary sxs- frequency and occasional dysuria, dark urine with occasional sediment, CT ordered to eval for post renal process which shows bladder wall thickening c/w UTI, pt treated. will cancel order for US given negative CT, no RUQ ttp on exam. pt pain controlled understands plan, asking for food. -Maxx Alberto PA-C

## 2022-01-25 NOTE — H&P ADULT - PROBLEM SELECTOR PLAN 1
Unclear etiology. Pt with hx of cyclical vomiting in the past. Lipase elevated but difficult to interpret in current setting. Advised greater alcohol moderation. Pt concerned female friend is contributing to symptoms... Seen by GI in past with endoscopy as well.  -Diet as tolerated  -IVF  -Cont. anti-emetics and GI prophylaxis PRN  -GI consult in AM (Tila?)  -Supportive care  -F/u RUQ US

## 2022-01-25 NOTE — ED PROVIDER NOTE - ATTENDING CONTRIBUTION TO CARE
60y/o male PMHx HTN, hiatal hernia, chronic intermittent vomiting unclear etiology (obtained per chart review) presents to the ED N/V/D, urinary frequency, lightheadedness x4-5 days sent from pmd with mild transaminitis and lipase, admits to etoh use, abd soft, no r/g, check labs, ivf, replace k, kirt likely admission.

## 2022-01-25 NOTE — H&P ADULT - PROBLEM SELECTOR PLAN 2
Suspect pre-renal given GI symptoms and normal bladder US  -Cont. gentle IVF overnight  -Hold losartan  -Renal dose medications  -Limit nephrotoxic meds

## 2022-01-25 NOTE — H&P ADULT - NSHPPHYSICALEXAM_GEN_ALL_CORE
Vital Signs Last 24 Hrs  T(C): 37 (01-25-22 @ 22:55), Max: 37 (01-25-22 @ 22:55)  T(F): 98.6 (01-25-22 @ 22:55), Max: 98.6 (01-25-22 @ 22:55)  HR: 71 (01-25-22 @ 22:55) (71 - 80)  BP: 121/74 (01-25-22 @ 22:55) (109/68 - 121/74)  BP(mean): --  RR: 16 (01-25-22 @ 22:55) (16 - 18)  SpO2: 97% (01-25-22 @ 22:55) (97% - 98%)

## 2022-01-25 NOTE — H&P ADULT - PROBLEM SELECTOR PLAN 5
-Trend vital signs  -Holding losartan given OFELIA Note AST/ALT in 100s. Possible history of fatty liver?  -Trend CMPs  -Fu RUQ US

## 2022-01-25 NOTE — ED PROVIDER NOTE - RAPID ASSESSMENT
61y presents to the ED N/V/D, urinary frequency, lightheadedness x4-5 days, epigastric discomfort, mild BRBPR earlier today. Endorses feeling warm. Denies sick contact, lives alone at home. Is fully vaccinated for covid x3. Endorses intermittent ETOH use (2-3 times weekly). Of note, pt had a similar ep in 2019, where pt had hypokalemia, hyponatremia, OFELIA, creatinine of 3.2, endoscopy at that time that showed esophagitis.    Milton WEAVER (Scribe) have documented this rapid assessment note under the dictation of Rosibel Morales) which has been reviewed and affirmed to be accurate. Patient was seen as a QPA patient. The patient will be seen and further worked up in the main emergency department and their care will be completed by the main emergency department team along with a thorough physical exam. Receiving team will follow up on labs, analgesia, any clinical imaging, reassess and disposition as clinically indicated, all decisions regarding the progression of care will be made at their discretion. 60y/o male PMHx HTN, hiatal hernia, chronic intermittent vomiting unclear etiology (obtained per chart review) presents to the ED N/V/D, urinary frequency, lightheadedness x4-5 days, epigastric discomfort, mild BRBPR earlier today. Endorses feeling warm. Denies sick contact, lives alone at home. Is fully vaccinated for covid x3. Endorses intermittent ETOH use (2-3 times weekly). Of note, pt had a similar episode in 2019, where pt had hypokalemia, hyponatremia, OFELIA, creatinine of 3.2, endoscopy at that time that showed esophagitis.    Milton WEAVER (Mannie) have documented this rapid assessment note under the dictation of Rosibel Morales) which has been reviewed and affirmed to be accurate. Patient was seen as a QPA patient. The patient will be seen and further worked up in the main emergency department and their care will be completed by the main emergency department team along with a thorough physical exam. Receiving team will follow up on labs, analgesia, any clinical imaging, reassess and disposition as clinically indicated, all decisions regarding the progression of care will be made at their discretion.    WILDA WEAVER, personally performed the service described in the documentation recorded by the scribe in my presence, and it accurately and completely records my words and actions.

## 2022-01-25 NOTE — ED PROCEDURE NOTE - PROCEDURE ADDITIONAL DETAILS
Emergency Department Focused Ultrasound performed at patient's bedside for educational purposes. An appropriate follow up study is ordered. bladder wall thickening, enlarged prostate. -Maxx Alberto PA-C

## 2022-01-25 NOTE — ED PROVIDER NOTE - CARE PLAN
Principal Discharge DX:	OFELIA (acute kidney injury)  Secondary Diagnosis:	Pancreatitis  Secondary Diagnosis:	Hypokalemia  Secondary Diagnosis:	Hyponatremia   1

## 2022-01-26 DIAGNOSIS — N39.0 URINARY TRACT INFECTION, SITE NOT SPECIFIED: ICD-10-CM

## 2022-01-26 DIAGNOSIS — I10 ESSENTIAL (PRIMARY) HYPERTENSION: ICD-10-CM

## 2022-01-26 DIAGNOSIS — R09.89 OTHER SPECIFIED SYMPTOMS AND SIGNS INVOLVING THE CIRCULATORY AND RESPIRATORY SYSTEMS: ICD-10-CM

## 2022-01-26 DIAGNOSIS — R11.2 NAUSEA WITH VOMITING, UNSPECIFIED: ICD-10-CM

## 2022-01-26 DIAGNOSIS — E87.6 HYPOKALEMIA: ICD-10-CM

## 2022-01-26 DIAGNOSIS — Z29.9 ENCOUNTER FOR PROPHYLACTIC MEASURES, UNSPECIFIED: ICD-10-CM

## 2022-01-26 DIAGNOSIS — R74.01 ELEVATION OF LEVELS OF LIVER TRANSAMINASE LEVELS: ICD-10-CM

## 2022-01-26 DIAGNOSIS — N17.9 ACUTE KIDNEY FAILURE, UNSPECIFIED: ICD-10-CM

## 2022-01-26 LAB
A1C WITH ESTIMATED AVERAGE GLUCOSE RESULT: 5.3 % — SIGNIFICANT CHANGE UP (ref 4–5.6)
ALBUMIN SERPL ELPH-MCNC: 4.1 G/DL — SIGNIFICANT CHANGE UP (ref 3.3–5)
ALP SERPL-CCNC: 56 U/L — SIGNIFICANT CHANGE UP (ref 40–120)
ALT FLD-CCNC: 108 U/L — HIGH (ref 10–45)
ANION GAP SERPL CALC-SCNC: 15 MMOL/L — SIGNIFICANT CHANGE UP (ref 5–17)
ANION GAP SERPL CALC-SCNC: 15 MMOL/L — SIGNIFICANT CHANGE UP (ref 5–17)
AST SERPL-CCNC: 138 U/L — HIGH (ref 10–40)
BASOPHILS # BLD AUTO: 0.03 K/UL — SIGNIFICANT CHANGE UP (ref 0–0.2)
BASOPHILS NFR BLD AUTO: 0.7 % — SIGNIFICANT CHANGE UP (ref 0–2)
BILIRUB DIRECT SERPL-MCNC: 0.2 MG/DL — SIGNIFICANT CHANGE UP (ref 0–0.3)
BILIRUB INDIRECT FLD-MCNC: 0.9 MG/DL — SIGNIFICANT CHANGE UP (ref 0.2–1)
BILIRUB SERPL-MCNC: 1.1 MG/DL — SIGNIFICANT CHANGE UP (ref 0.2–1.2)
BUN SERPL-MCNC: 40 MG/DL — HIGH (ref 7–23)
BUN SERPL-MCNC: 50 MG/DL — HIGH (ref 7–23)
CALCIUM SERPL-MCNC: 9.1 MG/DL — SIGNIFICANT CHANGE UP (ref 8.4–10.5)
CALCIUM SERPL-MCNC: 9.8 MG/DL — SIGNIFICANT CHANGE UP (ref 8.4–10.5)
CHLORIDE SERPL-SCNC: 97 MMOL/L — SIGNIFICANT CHANGE UP (ref 96–108)
CHLORIDE SERPL-SCNC: 98 MMOL/L — SIGNIFICANT CHANGE UP (ref 96–108)
CO2 SERPL-SCNC: 17 MMOL/L — LOW (ref 22–31)
CO2 SERPL-SCNC: 20 MMOL/L — LOW (ref 22–31)
CREAT SERPL-MCNC: 1.89 MG/DL — HIGH (ref 0.5–1.3)
CREAT SERPL-MCNC: 2.6 MG/DL — HIGH (ref 0.5–1.3)
CULTURE RESULTS: SIGNIFICANT CHANGE UP
EOSINOPHIL # BLD AUTO: 0.15 K/UL — SIGNIFICANT CHANGE UP (ref 0–0.5)
EOSINOPHIL NFR BLD AUTO: 3.5 % — SIGNIFICANT CHANGE UP (ref 0–6)
ESTIMATED AVERAGE GLUCOSE: 105 MG/DL — SIGNIFICANT CHANGE UP (ref 68–114)
GLUCOSE SERPL-MCNC: 112 MG/DL — HIGH (ref 70–99)
GLUCOSE SERPL-MCNC: 119 MG/DL — HIGH (ref 70–99)
HCT VFR BLD CALC: 38.7 % — LOW (ref 39–50)
HGB BLD-MCNC: 13.2 G/DL — SIGNIFICANT CHANGE UP (ref 13–17)
IMM GRANULOCYTES NFR BLD AUTO: 0.5 % — SIGNIFICANT CHANGE UP (ref 0–1.5)
LYMPHOCYTES # BLD AUTO: 0.73 K/UL — LOW (ref 1–3.3)
LYMPHOCYTES # BLD AUTO: 16.9 % — SIGNIFICANT CHANGE UP (ref 13–44)
MAGNESIUM SERPL-MCNC: 1.7 MG/DL — SIGNIFICANT CHANGE UP (ref 1.6–2.6)
MAGNESIUM SERPL-MCNC: 1.9 MG/DL — SIGNIFICANT CHANGE UP (ref 1.6–2.6)
MCHC RBC-ENTMCNC: 31.4 PG — SIGNIFICANT CHANGE UP (ref 27–34)
MCHC RBC-ENTMCNC: 34.1 GM/DL — SIGNIFICANT CHANGE UP (ref 32–36)
MCV RBC AUTO: 92.1 FL — SIGNIFICANT CHANGE UP (ref 80–100)
MONOCYTES # BLD AUTO: 0.8 K/UL — SIGNIFICANT CHANGE UP (ref 0–0.9)
MONOCYTES NFR BLD AUTO: 18.6 % — HIGH (ref 2–14)
NEUTROPHILS # BLD AUTO: 2.58 K/UL — SIGNIFICANT CHANGE UP (ref 1.8–7.4)
NEUTROPHILS NFR BLD AUTO: 59.8 % — SIGNIFICANT CHANGE UP (ref 43–77)
NRBC # BLD: 0 /100 WBCS — SIGNIFICANT CHANGE UP (ref 0–0)
PHOSPHATE SERPL-MCNC: 1.7 MG/DL — LOW (ref 2.5–4.5)
PHOSPHATE SERPL-MCNC: 2.4 MG/DL — LOW (ref 2.5–4.5)
PLATELET # BLD AUTO: 145 K/UL — LOW (ref 150–400)
POTASSIUM SERPL-MCNC: 3.4 MMOL/L — LOW (ref 3.5–5.3)
POTASSIUM SERPL-MCNC: 4.2 MMOL/L — SIGNIFICANT CHANGE UP (ref 3.5–5.3)
POTASSIUM SERPL-SCNC: 3.4 MMOL/L — LOW (ref 3.5–5.3)
POTASSIUM SERPL-SCNC: 4.2 MMOL/L — SIGNIFICANT CHANGE UP (ref 3.5–5.3)
PROT SERPL-MCNC: 7.4 G/DL — SIGNIFICANT CHANGE UP (ref 6–8.3)
RBC # BLD: 4.2 M/UL — SIGNIFICANT CHANGE UP (ref 4.2–5.8)
RBC # FLD: 14 % — SIGNIFICANT CHANGE UP (ref 10.3–14.5)
SODIUM SERPL-SCNC: 130 MMOL/L — LOW (ref 135–145)
SODIUM SERPL-SCNC: 132 MMOL/L — LOW (ref 135–145)
SPECIMEN SOURCE: SIGNIFICANT CHANGE UP
WBC # BLD: 4.31 K/UL — SIGNIFICANT CHANGE UP (ref 3.8–10.5)
WBC # FLD AUTO: 4.31 K/UL — SIGNIFICANT CHANGE UP (ref 3.8–10.5)

## 2022-01-26 PROCEDURE — 99233 SBSQ HOSP IP/OBS HIGH 50: CPT

## 2022-01-26 PROCEDURE — 76700 US EXAM ABDOM COMPLETE: CPT | Mod: 26

## 2022-01-26 RX ORDER — LANOLIN ALCOHOL/MO/W.PET/CERES
2 CREAM (GRAM) TOPICAL ONCE
Refills: 0 | Status: COMPLETED | OUTPATIENT
Start: 2022-01-26 | End: 2022-01-26

## 2022-01-26 RX ORDER — PANTOPRAZOLE SODIUM 20 MG/1
40 TABLET, DELAYED RELEASE ORAL
Refills: 0 | Status: DISCONTINUED | OUTPATIENT
Start: 2022-01-26 | End: 2022-01-28

## 2022-01-26 RX ORDER — POTASSIUM PHOSPHATE, MONOBASIC POTASSIUM PHOSPHATE, DIBASIC 236; 224 MG/ML; MG/ML
15 INJECTION, SOLUTION INTRAVENOUS ONCE
Refills: 0 | Status: COMPLETED | OUTPATIENT
Start: 2022-01-26 | End: 2022-01-26

## 2022-01-26 RX ORDER — LANOLIN ALCOHOL/MO/W.PET/CERES
3 CREAM (GRAM) TOPICAL AT BEDTIME
Refills: 0 | Status: DISCONTINUED | OUTPATIENT
Start: 2022-01-26 | End: 2022-01-28

## 2022-01-26 RX ORDER — ONDANSETRON 8 MG/1
4 TABLET, FILM COATED ORAL EVERY 8 HOURS
Refills: 0 | Status: DISCONTINUED | OUTPATIENT
Start: 2022-01-26 | End: 2022-01-28

## 2022-01-26 RX ORDER — SODIUM CHLORIDE 9 MG/ML
1000 INJECTION, SOLUTION INTRAVENOUS
Refills: 0 | Status: DISCONTINUED | OUTPATIENT
Start: 2022-01-26 | End: 2022-01-28

## 2022-01-26 RX ORDER — ACETAMINOPHEN 500 MG
650 TABLET ORAL EVERY 6 HOURS
Refills: 0 | Status: DISCONTINUED | OUTPATIENT
Start: 2022-01-26 | End: 2022-01-28

## 2022-01-26 RX ORDER — CEFTRIAXONE 500 MG/1
1000 INJECTION, POWDER, FOR SOLUTION INTRAMUSCULAR; INTRAVENOUS EVERY 24 HOURS
Refills: 0 | Status: DISCONTINUED | OUTPATIENT
Start: 2022-01-26 | End: 2022-01-27

## 2022-01-26 RX ADMIN — SODIUM CHLORIDE 100 MILLILITER(S): 9 INJECTION, SOLUTION INTRAVENOUS at 05:46

## 2022-01-26 RX ADMIN — PANTOPRAZOLE SODIUM 40 MILLIGRAM(S): 20 TABLET, DELAYED RELEASE ORAL at 05:46

## 2022-01-26 RX ADMIN — PANTOPRAZOLE SODIUM 40 MILLIGRAM(S): 20 TABLET, DELAYED RELEASE ORAL at 01:08

## 2022-01-26 RX ADMIN — PANTOPRAZOLE SODIUM 40 MILLIGRAM(S): 20 TABLET, DELAYED RELEASE ORAL at 18:23

## 2022-01-26 RX ADMIN — POTASSIUM PHOSPHATE, MONOBASIC POTASSIUM PHOSPHATE, DIBASIC 62.5 MILLIMOLE(S): 236; 224 INJECTION, SOLUTION INTRAVENOUS at 23:31

## 2022-01-26 RX ADMIN — CEFTRIAXONE 100 MILLIGRAM(S): 500 INJECTION, POWDER, FOR SOLUTION INTRAMUSCULAR; INTRAVENOUS at 01:08

## 2022-01-26 RX ADMIN — CEFTRIAXONE 100 MILLIGRAM(S): 500 INJECTION, POWDER, FOR SOLUTION INTRAMUSCULAR; INTRAVENOUS at 22:09

## 2022-01-26 RX ADMIN — Medication 2 MILLIGRAM(S): at 23:31

## 2022-01-26 RX ADMIN — Medication 40 MILLIEQUIVALENT(S): at 17:15

## 2022-01-26 RX ADMIN — Medication 3 MILLIGRAM(S): at 23:31

## 2022-01-26 NOTE — ED ADULT NURSE NOTE - ALCOHOL PRE SCREEN (AUDIT - C)
LOV 01/03/2020    LAST LAB 10/21/2020    LAST RX 06/28/2021 30 tablets 0 refills    Next OV   Future Appointments   Date Time Provider Roselyn Vasques   8/31/2021  8:00 AM Adriana PENNINGTON, DO [facility] [facility]   8/31/2021  8:45 AM REF [facility] Statement Selected

## 2022-01-26 NOTE — ED ADULT NURSE NOTE - OBJECTIVE STATEMENT
62y/o male PMHx HTN, hiatal hernia, fatty liver, chronic intermittent vomiting, presents to the ED N/V/D, urinary frequency, polydipsia, lightheadedness x4-5 days, epigastric discomfort, mild BRBPR earlier today. Endorses feeling warm and cool denies known fever. Denies sick contact, lives alone at home. Is fully vaccinated for covid x3. Endorses intermittent ETOH use 3-4x/week, 3-4 drinks 1.5 shots fo liquor and gingerale. Of note, pt had a similar episode in 2019, where pt had hypokalemia, hyponatremia, OFELIA, creatinine of 3.25 (Creatinine of 1.11 at time of DC), endoscopy at that time that showed esophagitis. Pt also notes dizziness, denies SOB but has a note from his PCP office from today noting dyspnea and pt was referred to ED. Pt also reports that he had one episode of BRBPR this morning, had dark stool yesterday and one episode of emesis yesterday that appeared dark possibly like coffee grounds. Pt denies abdominal surgeries, h/o gallstone. Pt denies CP.

## 2022-01-27 LAB
ALBUMIN SERPL ELPH-MCNC: 3.9 G/DL — SIGNIFICANT CHANGE UP (ref 3.3–5)
ALP SERPL-CCNC: 61 U/L — SIGNIFICANT CHANGE UP (ref 40–120)
ALT FLD-CCNC: 115 U/L — HIGH (ref 10–45)
ANION GAP SERPL CALC-SCNC: 15 MMOL/L — SIGNIFICANT CHANGE UP (ref 5–17)
AST SERPL-CCNC: 112 U/L — HIGH (ref 10–40)
BILIRUB SERPL-MCNC: 0.7 MG/DL — SIGNIFICANT CHANGE UP (ref 0.2–1.2)
BUN SERPL-MCNC: 33 MG/DL — HIGH (ref 7–23)
CALCIUM SERPL-MCNC: 9.6 MG/DL — SIGNIFICANT CHANGE UP (ref 8.4–10.5)
CHLORIDE SERPL-SCNC: 101 MMOL/L — SIGNIFICANT CHANGE UP (ref 96–108)
CO2 SERPL-SCNC: 19 MMOL/L — LOW (ref 22–31)
CREAT ?TM UR-MCNC: 55 MG/DL — SIGNIFICANT CHANGE UP
CREAT SERPL-MCNC: 1.54 MG/DL — HIGH (ref 0.5–1.3)
GLUCOSE BLDC GLUCOMTR-MCNC: 96 MG/DL — SIGNIFICANT CHANGE UP (ref 70–99)
GLUCOSE SERPL-MCNC: 92 MG/DL — SIGNIFICANT CHANGE UP (ref 70–99)
HCT VFR BLD CALC: 36.1 % — LOW (ref 39–50)
HGB BLD-MCNC: 12.1 G/DL — LOW (ref 13–17)
MAGNESIUM SERPL-MCNC: 1.6 MG/DL — SIGNIFICANT CHANGE UP (ref 1.6–2.6)
MCHC RBC-ENTMCNC: 31.3 PG — SIGNIFICANT CHANGE UP (ref 27–34)
MCHC RBC-ENTMCNC: 33.5 GM/DL — SIGNIFICANT CHANGE UP (ref 32–36)
MCV RBC AUTO: 93.5 FL — SIGNIFICANT CHANGE UP (ref 80–100)
NRBC # BLD: 0 /100 WBCS — SIGNIFICANT CHANGE UP (ref 0–0)
OSMOLALITY UR: 328 MOS/KG — SIGNIFICANT CHANGE UP (ref 300–900)
PHOSPHATE SERPL-MCNC: 7.1 MG/DL — HIGH (ref 2.5–4.5)
PLATELET # BLD AUTO: 130 K/UL — LOW (ref 150–400)
POTASSIUM SERPL-MCNC: 5.5 MMOL/L — HIGH (ref 3.5–5.3)
POTASSIUM SERPL-SCNC: 5.5 MMOL/L — HIGH (ref 3.5–5.3)
PROT SERPL-MCNC: 7 G/DL — SIGNIFICANT CHANGE UP (ref 6–8.3)
RBC # BLD: 3.86 M/UL — LOW (ref 4.2–5.8)
RBC # FLD: 14.3 % — SIGNIFICANT CHANGE UP (ref 10.3–14.5)
SODIUM SERPL-SCNC: 135 MMOL/L — SIGNIFICANT CHANGE UP (ref 135–145)
SODIUM UR-SCNC: 41 MMOL/L — SIGNIFICANT CHANGE UP
WBC # BLD: 3.53 K/UL — LOW (ref 3.8–10.5)
WBC # FLD AUTO: 3.53 K/UL — LOW (ref 3.8–10.5)

## 2022-01-27 PROCEDURE — 99232 SBSQ HOSP IP/OBS MODERATE 35: CPT

## 2022-01-27 PROCEDURE — 78264 GASTRIC EMPTYING IMG STUDY: CPT | Mod: 26

## 2022-01-27 RX ADMIN — PANTOPRAZOLE SODIUM 40 MILLIGRAM(S): 20 TABLET, DELAYED RELEASE ORAL at 17:40

## 2022-01-27 RX ADMIN — PANTOPRAZOLE SODIUM 40 MILLIGRAM(S): 20 TABLET, DELAYED RELEASE ORAL at 05:29

## 2022-01-28 ENCOUNTER — TRANSCRIPTION ENCOUNTER (OUTPATIENT)
Age: 62
End: 2022-01-28

## 2022-01-28 VITALS
TEMPERATURE: 98 F | DIASTOLIC BLOOD PRESSURE: 95 MMHG | HEART RATE: 74 BPM | RESPIRATION RATE: 16 BRPM | OXYGEN SATURATION: 99 % | SYSTOLIC BLOOD PRESSURE: 154 MMHG

## 2022-01-28 LAB
ANION GAP SERPL CALC-SCNC: 16 MMOL/L — SIGNIFICANT CHANGE UP (ref 5–17)
BUN SERPL-MCNC: 24 MG/DL — HIGH (ref 7–23)
CALCIUM SERPL-MCNC: 10 MG/DL — SIGNIFICANT CHANGE UP (ref 8.4–10.5)
CHLORIDE SERPL-SCNC: 99 MMOL/L — SIGNIFICANT CHANGE UP (ref 96–108)
CO2 SERPL-SCNC: 21 MMOL/L — LOW (ref 22–31)
CREAT SERPL-MCNC: 1.44 MG/DL — HIGH (ref 0.5–1.3)
GLUCOSE BLDC GLUCOMTR-MCNC: 104 MG/DL — HIGH (ref 70–99)
GLUCOSE SERPL-MCNC: 103 MG/DL — HIGH (ref 70–99)
HCT VFR BLD CALC: 38.3 % — LOW (ref 39–50)
HGB BLD-MCNC: 13 G/DL — SIGNIFICANT CHANGE UP (ref 13–17)
MAGNESIUM SERPL-MCNC: 1.5 MG/DL — LOW (ref 1.6–2.6)
MCHC RBC-ENTMCNC: 31.3 PG — SIGNIFICANT CHANGE UP (ref 27–34)
MCHC RBC-ENTMCNC: 33.9 GM/DL — SIGNIFICANT CHANGE UP (ref 32–36)
MCV RBC AUTO: 92.1 FL — SIGNIFICANT CHANGE UP (ref 80–100)
NRBC # BLD: 0 /100 WBCS — SIGNIFICANT CHANGE UP (ref 0–0)
PHOSPHATE SERPL-MCNC: 3.7 MG/DL — SIGNIFICANT CHANGE UP (ref 2.5–4.5)
PLATELET # BLD AUTO: 157 K/UL — SIGNIFICANT CHANGE UP (ref 150–400)
POTASSIUM SERPL-MCNC: 3.7 MMOL/L — SIGNIFICANT CHANGE UP (ref 3.5–5.3)
POTASSIUM SERPL-SCNC: 3.7 MMOL/L — SIGNIFICANT CHANGE UP (ref 3.5–5.3)
RBC # BLD: 4.16 M/UL — LOW (ref 4.2–5.8)
RBC # FLD: 14 % — SIGNIFICANT CHANGE UP (ref 10.3–14.5)
SODIUM SERPL-SCNC: 136 MMOL/L — SIGNIFICANT CHANGE UP (ref 135–145)
WBC # BLD: 3.59 K/UL — LOW (ref 3.8–10.5)
WBC # FLD AUTO: 3.59 K/UL — LOW (ref 3.8–10.5)

## 2022-01-28 PROCEDURE — 85610 PROTHROMBIN TIME: CPT

## 2022-01-28 PROCEDURE — 83605 ASSAY OF LACTIC ACID: CPT

## 2022-01-28 PROCEDURE — 99232 SBSQ HOSP IP/OBS MODERATE 35: CPT

## 2022-01-28 PROCEDURE — U0003: CPT

## 2022-01-28 PROCEDURE — 84300 ASSAY OF URINE SODIUM: CPT

## 2022-01-28 PROCEDURE — 74176 CT ABD & PELVIS W/O CONTRAST: CPT | Mod: MA

## 2022-01-28 PROCEDURE — 86850 RBC ANTIBODY SCREEN: CPT

## 2022-01-28 PROCEDURE — 36415 COLL VENOUS BLD VENIPUNCTURE: CPT

## 2022-01-28 PROCEDURE — A9541: CPT

## 2022-01-28 PROCEDURE — 82010 KETONE BODYS QUAN: CPT

## 2022-01-28 PROCEDURE — 85014 HEMATOCRIT: CPT

## 2022-01-28 PROCEDURE — 83935 ASSAY OF URINE OSMOLALITY: CPT

## 2022-01-28 PROCEDURE — 84295 ASSAY OF SERUM SODIUM: CPT

## 2022-01-28 PROCEDURE — 85025 COMPLETE CBC W/AUTO DIFF WBC: CPT

## 2022-01-28 PROCEDURE — 82947 ASSAY GLUCOSE BLOOD QUANT: CPT

## 2022-01-28 PROCEDURE — 85027 COMPLETE CBC AUTOMATED: CPT

## 2022-01-28 PROCEDURE — 84484 ASSAY OF TROPONIN QUANT: CPT

## 2022-01-28 PROCEDURE — 93005 ELECTROCARDIOGRAM TRACING: CPT

## 2022-01-28 PROCEDURE — 84100 ASSAY OF PHOSPHORUS: CPT

## 2022-01-28 PROCEDURE — 71250 CT THORAX DX C-: CPT | Mod: MA

## 2022-01-28 PROCEDURE — 82962 GLUCOSE BLOOD TEST: CPT

## 2022-01-28 PROCEDURE — 78264 GASTRIC EMPTYING IMG STUDY: CPT | Mod: 26,GC

## 2022-01-28 PROCEDURE — 85018 HEMOGLOBIN: CPT

## 2022-01-28 PROCEDURE — 87177 OVA AND PARASITES SMEARS: CPT

## 2022-01-28 PROCEDURE — 83930 ASSAY OF BLOOD OSMOLALITY: CPT

## 2022-01-28 PROCEDURE — 86900 BLOOD TYPING SEROLOGIC ABO: CPT

## 2022-01-28 PROCEDURE — 84132 ASSAY OF SERUM POTASSIUM: CPT

## 2022-01-28 PROCEDURE — 96374 THER/PROPH/DIAG INJ IV PUSH: CPT

## 2022-01-28 PROCEDURE — 83690 ASSAY OF LIPASE: CPT

## 2022-01-28 PROCEDURE — 76700 US EXAM ABDOM COMPLETE: CPT

## 2022-01-28 PROCEDURE — 82435 ASSAY OF BLOOD CHLORIDE: CPT

## 2022-01-28 PROCEDURE — 80053 COMPREHEN METABOLIC PANEL: CPT

## 2022-01-28 PROCEDURE — 81001 URINALYSIS AUTO W/SCOPE: CPT

## 2022-01-28 PROCEDURE — 83735 ASSAY OF MAGNESIUM: CPT

## 2022-01-28 PROCEDURE — 86901 BLOOD TYPING SEROLOGIC RH(D): CPT

## 2022-01-28 PROCEDURE — 85730 THROMBOPLASTIN TIME PARTIAL: CPT

## 2022-01-28 PROCEDURE — 96376 TX/PRO/DX INJ SAME DRUG ADON: CPT

## 2022-01-28 PROCEDURE — 82330 ASSAY OF CALCIUM: CPT

## 2022-01-28 PROCEDURE — 82570 ASSAY OF URINE CREATININE: CPT

## 2022-01-28 PROCEDURE — 87046 STOOL CULTR AEROBIC BACT EA: CPT

## 2022-01-28 PROCEDURE — 83036 HEMOGLOBIN GLYCOSYLATED A1C: CPT

## 2022-01-28 PROCEDURE — 80048 BASIC METABOLIC PNL TOTAL CA: CPT

## 2022-01-28 PROCEDURE — 99285 EMERGENCY DEPT VISIT HI MDM: CPT | Mod: 25

## 2022-01-28 PROCEDURE — U0005: CPT

## 2022-01-28 PROCEDURE — 80076 HEPATIC FUNCTION PANEL: CPT

## 2022-01-28 PROCEDURE — 87086 URINE CULTURE/COLONY COUNT: CPT

## 2022-01-28 PROCEDURE — G0378: CPT

## 2022-01-28 PROCEDURE — 87045 FECES CULTURE AEROBIC BACT: CPT

## 2022-01-28 PROCEDURE — 82803 BLOOD GASES ANY COMBINATION: CPT

## 2022-01-28 PROCEDURE — 78264 GASTRIC EMPTYING IMG STUDY: CPT

## 2022-01-28 RX ORDER — LOSARTAN POTASSIUM 100 MG/1
50 TABLET, FILM COATED ORAL DAILY
Refills: 0 | Status: DISCONTINUED | OUTPATIENT
Start: 2022-01-28 | End: 2022-01-28

## 2022-01-28 RX ORDER — MAGNESIUM SULFATE 500 MG/ML
2 VIAL (ML) INJECTION ONCE
Refills: 0 | Status: COMPLETED | OUTPATIENT
Start: 2022-01-28 | End: 2022-01-28

## 2022-01-28 RX ADMIN — PANTOPRAZOLE SODIUM 40 MILLIGRAM(S): 20 TABLET, DELAYED RELEASE ORAL at 06:15

## 2022-01-28 RX ADMIN — Medication 150 GRAM(S): at 13:38

## 2022-01-28 NOTE — PROGRESS NOTE ADULT - PROBLEM SELECTOR PLAN 5
Note AST/ALT in 100s.   -Trend CMPs  -RUQ shows fatty liver- rec weight loss and ETOh reduction

## 2022-01-28 NOTE — PROGRESS NOTE ADULT - ASSESSMENT
61M w/ hx of HTN, fatty liver, hiatal hernia, hx of possible cyclical vomiting p/w nausea, vomiting, diarrhea and dizziness found to have tachycardia, OFELIA, hypokalemia. 
61M w/ hx of HTN, fatty liver, hiatal hernia, hx of possible cyclical vomiting p/w nausea, vomiting, diarrhea and dizziness found to have tachycardia, OFELIA, hypokalemia. 
61M w/ hx of HTN, fatty liver, hiatal hernia, hx of possible cyclical vomiting p/w nausea, vomiting, diarrhea and dizziness found to have tachycardia, OFELIA, hypokalemia and possible UTI

## 2022-01-28 NOTE — PROGRESS NOTE ADULT - PROBLEM SELECTOR PLAN 4
UA could be considered positive but urine cx negative will Dc abx
UA could be considered positive but urine cx negative will Dc abx
UA could be considered positive  -Cont. IV ceftriaxone  -F/u UCx

## 2022-01-28 NOTE — PROGRESS NOTE ADULT - PROBLEM SELECTOR PLAN 3
K 2.9 likely from GI losses. S/p aggressive repletion in ER now slightly hyperK will check BMP this afternoon.  Can DC tele when electrolytes stable
Resolved  DC tele
K 2.9 likely from GI losses. S/p aggressive repletion in ER  -Check BMP stat as this one is hemolysed   -Replete as needed  -Check lytes in am

## 2022-01-28 NOTE — DISCHARGE NOTE PROVIDER - HOSPITAL COURSE
61M w/ hx of HTN, fatty liver, hiatal hernia, hx of possible cyclical vomiting p/w nausea, vomiting, diarrhea and dizziness. Pt has history of similar incidents in the past. Starting 4-5 days ago pt started to develop nausea, vomiting and some episodes of diarrhea. When patient started to get lightheaded he went to see his PMD who recommended he come in for further evaluation. Pt denies any rafaela fevers, chest pain or palpitations. Not clear if he has had any BRBPR or melena.     In ER: Given IV ceftriaxone, NS 1L, pepcid 20mg IV, maalox 30m, Zofran 4mg IV, ppi 40 IV, KCl 40 PO 10IV x3    Patient given ceftriaxone, urine culture no bacteria, ABX d/c'd ,  Negative gastric emptying study .   Patient hypokalemia , repleted.   Stool culture sent ___________________________________incomplete discharge 61M w/ hx of HTN, fatty liver, hiatal hernia, hx of possible cyclical vomiting p/w nausea, vomiting, diarrhea and dizziness. Pt has history of similar incidents in the past. Starting 4-5 days ago pt started to develop nausea, vomiting and some episodes of diarrhea. When patient started to get lightheaded he went to see his PMD who recommended he come in for further evaluation. Pt denies any rafaela fevers, chest pain or palpitations. Not clear if he has had any BRBPR or melena.     In ER: Given IV ceftriaxone, NS 1L, pepcid 20mg IV, maalox 30m, Zofran 4mg IV, ppi 40 IV, KCl 40 PO 10IV x3    Patient given ceftriaxone, urine culture no bacteria, ABX d/c'd ,  Negative gastric emptying study .   Patient hypokalemia , repleted.   Stool culture sent but diarrhea resolved on its own.

## 2022-01-28 NOTE — DISCHARGE NOTE PROVIDER - NSDCMRMEDTOKEN_GEN_ALL_CORE_FT
famotidine 40 mg oral tablet: 1 tab(s) orally once a day (at bedtime)  losartan 50 mg oral tablet: 1 tab(s) orally once a day  omeprazole 40 mg oral delayed release capsule: 1 cap(s) orally 2 times a day  Vitamin D2 1.25 mg (50,000 intl units) oral capsule: 1 cap(s) orally once a week

## 2022-01-28 NOTE — DISCHARGE NOTE PROVIDER - CARE PROVIDER_API CALL
JO ANN MOSELEY  Internal Medicine  Samantha WILSON,    Phone: ([phone]  Fax: ()-  Follow Up Time:     Refugio Hancock)  Gastroenterology  [address] [place] [address], [facility] [address]  [place] [address]  Phone: [phone]  Fax: [phone]  Follow Up Time:

## 2022-01-28 NOTE — PROGRESS NOTE ADULT - REASON FOR ADMISSION
nausea, vomiting, diarrhea, lightheadedness

## 2022-01-28 NOTE — DISCHARGE NOTE NURSING/CASE MANAGEMENT/SOCIAL WORK - PATIENT PORTAL LINK FT
You can access the FollowMyHealth Patient Portal offered by [facility] by registering at the following website: [email or web address]. By joining [employer]’s FollowMyHealth portal, you will also be able to view your health information using other applications (apps) compatible with our system.

## 2022-01-28 NOTE — PROGRESS NOTE ADULT - PROBLEM SELECTOR PLAN 2
Suspect pre-renal given GI symptoms and normal bladder US  -Cont. hydration  -Holding losartan  -Renal dose medications  -Limit nephrotoxic meds
Suspect pre-renal given GI symptoms and normal bladder US  -Cont. hydration  -Hold losartan  -Renal dose medications  -Limit nephrotoxic meds
Suspect pre-renal given GI symptoms and normal bladder US  -Cont. hydration  -Hold losartan  -Renal dose medications  -Limit nephrotoxic meds

## 2022-01-28 NOTE — DISCHARGE NOTE PROVIDER - CARE PROVIDERS DIRECT ADDRESSES
,DirectAddress_Unknown,[email or web address]

## 2022-01-28 NOTE — PROGRESS NOTE ADULT - SUBJECTIVE AND OBJECTIVE BOX
PROGRESS NOTE:   Yaima Louie DO  Hospitalist  Pager [phone]  After 5pm/weekends or if no answer ext: [phone]      Patient is a 61y old  Male who presents with a chief complaint of nausea, vomiting, diarrhea, lightheadedness (2022 14:44)      SUBJECTIVE / OVERNIGHT EVENTS:  Still tolerating food, having some diarhea about 5 episodes in last 24hrs.     ADDITIONAL REVIEW OF SYSTEMS:  no fever or chills  no n/v + diarrhea    MEDICATIONS  (STANDING):  lactated ringers. 1000 milliLiter(s) (100 mL/Hr) IV Continuous <Continuous>  pantoprazole  Injectable 40 milliGRAM(s) IV Push two times a day    MEDICATIONS  (PRN):  acetaminophen     Tablet .. 650 milliGRAM(s) Oral every 6 hours PRN Temp greater or equal to 38C (100.4F), Mild Pain (1 - 3)  melatonin 3 milliGRAM(s) Oral at bedtime PRN Insomnia  ondansetron Injectable 4 milliGRAM(s) IV Push every 8 hours PRN Nausea and/or Vomiting      CAPILLARY BLOOD GLUCOSE      POCT Blood Glucose.: 96 mg/dL (2022 08:20)    I&O's Summary    2022 07:01  -  2022 07:00  --------------------------------------------------------  IN: 0 mL / OUT: 800 mL / NET: -800 mL        PHYSICAL EXAM:  Vital Signs Last 24 Hrs  T(C): 36.6 (2022 11:53), Max: 36.6 (2022 12:47)  T(F): 97.9 (2022 11:53), Max: 97.9 (2022 12:47)  HR: 89 (2022 11:53) (70 - 91)  BP: 143/96 (2022 11:53) (123/87 - 155/99)  BP(mean): --  RR: 18 (2022 11:53) (18 - 18)  SpO2: 100% (2022 11:53) (99% - 100%)    CONSTITUTIONAL: NAD, well-developed, non toxic appearing  RESPIRATORY: Normal respiratory effort; lungs are clear to auscultation bilaterally  CARDIOVASCULAR: Regular rate and rhythm, normal S1 and S2, no murmur/rub/gallop; No lower extremity edema; Peripheral pulses are 2+ bilaterally  ABDOMEN: Nontender to palpation, normoactive bowel sounds, no rebound/guarding; No hepatosplenomegaly  MUSCLOSKELETAL: no clubbing or cyanosis of digits; no joint swelling or tenderness to palpation  PSYCH: A+O to person, place, and time; affect appropriate    LABS:                        12.1   3.53  )-----------( 130      ( 2022 06:26 )             36.1         135  |  101  |  33<H>  ----------------------------<  92  5.5<H>   |  19<L>  |  1.54<H>    Ca    9.6      2022 06:25  Phos  7.1       Mg     1.6         TPro  7.0  /  Alb  3.9  /  TBili  0.7  /  DBili  x   /  AST  112<H>  /  ALT  115<H>  /  AlkPhos  61      PT/INR - ( 2022 16:31 )   PT: 12.7 sec;   INR: 1.06 ratio         PTT - ( 2022 16:31 )  PTT:26.9 sec      Urinalysis Basic - ( 2022 21:50 )    Color: Yellow / Appearance: Slightly Turbid / S.019 / pH: x  Gluc: x / Ketone: Negative  / Bili: Negative / Urobili: Negative   Blood: x / Protein: 100 / Nitrite: Negative   Leuk Esterase: Large / RBC: 72 /hpf / WBC 23 /HPF   Sq Epi: x / Non Sq Epi: 2 /hpf / Bacteria: Negative        Culture - Urine (collected 2022 00:44)  Source: Clean Catch Clean Catch (Midstream)  Final Report (2022 21:22):    <10,000 CFU/mL Normal Urogenital Connie        RADIOLOGY & ADDITIONAL TESTS:  Results Reviewed:   Imaging Personally Reviewed:  Electrocardiogram Personally Reviewed:    COORDINATION OF CARE:  Care Discussed with Consultants/Other Providers [Y/N]:  Prior or Outpatient Records Reviewed [Y/N]:  
PROGRESS NOTE:   Yaima Louie DO  Hospitalist  Pager [phone]  After 5pm/weekends or if no answer ext: [phone]      Patient is a 61y old  Male who presents with a chief complaint of nausea, vomiting, diarrhea, lightheadedness (27 Jan 2022 12:34)      SUBJECTIVE / OVERNIGHT EVENTS: TRESA    ADDITIONAL REVIEW OF SYSTEMS:  no fever or chills  no n/v/d    MEDICATIONS  (STANDING):  lactated ringers. 1000 milliLiter(s) (100 mL/Hr) IV Continuous <Continuous>  magnesium sulfate  IVPB 2 Gram(s) IV Intermittent once  pantoprazole  Injectable 40 milliGRAM(s) IV Push two times a day    MEDICATIONS  (PRN):  acetaminophen     Tablet .. 650 milliGRAM(s) Oral every 6 hours PRN Temp greater or equal to 38C (100.4F), Mild Pain (1 - 3)  melatonin 3 milliGRAM(s) Oral at bedtime PRN Insomnia  ondansetron Injectable 4 milliGRAM(s) IV Push every 8 hours PRN Nausea and/or Vomiting      CAPILLARY BLOOD GLUCOSE      POCT Blood Glucose.: 104 mg/dL (28 Jan 2022 07:52)    I&O's Summary    27 Jan 2022 07:01  -  28 Jan 2022 07:00  --------------------------------------------------------  IN: 120 mL / OUT: 1000 mL / NET: -880 mL        PHYSICAL EXAM:  Vital Signs Last 24 Hrs  T(C): 36.9 (28 Jan 2022 01:28), Max: 36.9 (28 Jan 2022 01:28)  T(F): 98.4 (28 Jan 2022 01:28), Max: 98.4 (28 Jan 2022 01:28)  HR: 94 (28 Jan 2022 05:22) (75 - 94)  BP: 158/98 (28 Jan 2022 06:00) (158/98 - 160/96)  BP(mean): --  RR: 18 (28 Jan 2022 05:22) (18 - 18)  SpO2: 97% (28 Jan 2022 05:22) (97% - 100%)    CONSTITUTIONAL: NAD, well-developed, non toxic appearing  RESPIRATORY: Normal respiratory effort; lungs are clear to auscultation bilaterally  CARDIOVASCULAR: Regular rate and rhythm, normal S1 and S2, no murmur/rub/gallop; No lower extremity edema; Peripheral pulses are 2+ bilaterally  ABDOMEN: Nontender to palpation, normoactive bowel sounds, no rebound/guarding; No hepatosplenomegaly  MUSCLOSKELETAL: no clubbing or cyanosis of digits; no joint swelling or tenderness to palpation  PSYCH: A+O to person, place, and time; affect appropriate    LABS:                        13.0   3.59  )-----------( 157      ( 28 Jan 2022 06:20 )             38.3     01-28    136  |  99  |  24<H>  ----------------------------<  103<H>  3.7   |  21<L>  |  1.44<H>    Ca    10.0      28 Jan 2022 06:20  Phos  3.7     01-28  Mg     1.5     01-28    TPro  7.0  /  Alb  3.9  /  TBili  0.7  /  DBili  x   /  AST  112<H>  /  ALT  115<H>  /  AlkPhos  61  01-27              Culture - Urine (collected 26 Jan 2022 00:44)  Source: Clean Catch Clean Catch (Midstream)  Final Report (26 Jan 2022 21:22):    <10,000 CFU/mL Normal Urogenital Connie        RADIOLOGY & ADDITIONAL TESTS:  Results Reviewed:   Imaging Personally Reviewed:  Electrocardiogram Personally Reviewed:    COORDINATION OF CARE:  Care Discussed with Consultants/Other Providers [Y/N]:  Prior or Outpatient Records Reviewed [Y/N]:  
PROGRESS NOTE:   Yaima Louie DO  Hospitalist  Pager [phone]  After 5pm/weekends or if no answer ext: [phone]      Patient is a 61y old  Male who presents with a chief complaint of nausea, vomiting, diarrhea, lightheadedness (2022 23:50)      SUBJECTIVE / OVERNIGHT EVENTS: No more nausea or diarrhea today.  Eating breakfast during my interview which patient doesnt like but is tolerating.     ADDITIONAL REVIEW OF SYSTEMS:  no fever or chills  no more nausea or diarrhea since coming to hospital.     MEDICATIONS  (STANDING):  cefTRIAXone   IVPB 1000 milliGRAM(s) IV Intermittent every 24 hours  lactated ringers. 1000 milliLiter(s) (100 mL/Hr) IV Continuous <Continuous>  pantoprazole  Injectable 40 milliGRAM(s) IV Push two times a day  potassium chloride    Tablet ER 40 milliEquivalent(s) Oral daily    MEDICATIONS  (PRN):  acetaminophen     Tablet .. 650 milliGRAM(s) Oral every 6 hours PRN Temp greater or equal to 38C (100.4F), Mild Pain (1 - 3)  melatonin 3 milliGRAM(s) Oral at bedtime PRN Insomnia  ondansetron Injectable 4 milliGRAM(s) IV Push every 8 hours PRN Nausea and/or Vomiting      CAPILLARY BLOOD GLUCOSE        I&O's Summary    2022 07:01  -  2022 07:00  --------------------------------------------------------  IN: 0 mL / OUT: 300 mL / NET: -300 mL        PHYSICAL EXAM:  Vital Signs Last 24 Hrs  T(C): 36.6 (2022 12:47), Max: 37 (2022 22:55)  T(F): 97.9 (2022 12:47), Max: 98.6 (2022 22:55)  HR: 85 (2022 12:47) (64 - 98)  BP: 139/87 (2022 12:47) (100/68 - 139/87)  BP(mean): --  RR: 18 (2022 12:47) (16 - 18)  SpO2: 100% (2022 12:47) (97% - 100%)    CONSTITUTIONAL: NAD, well-developed, non toxic  RESPIRATORY: Normal respiratory effort; lungs are clear to auscultation bilaterally  CARDIOVASCULAR: Regular rate and rhythm, normal S1 and S2, no murmur/rub/gallop; No lower extremity edema; Peripheral pulses are 2+ bilaterally  ABDOMEN: Nontender to palpation, normoactive bowel sounds, no rebound/guarding; No hepatosplenomegaly  MUSCLOSKELETAL: no clubbing or cyanosis of digits; no joint swelling or tenderness to palpation  PSYCH: A+O to person, place, and time; affect agitated    LABS:                        13.2   4.31  )-----------( 145      ( 2022 02:11 )             38.7         130<L>  |  98  |  50<H>  ----------------------------<  119<H>  4.2   |  17<L>  |  2.60<H>    Ca    9.1      2022 02:06  Phos  2.4       Mg     1.9         TPro  7.4  /  Alb  4.1  /  TBili  1.1  /  DBili  0.2  /  AST  138<H>  /  ALT  108<H>  /  AlkPhos  56      PT/INR - ( 2022 16:31 )   PT: 12.7 sec;   INR: 1.06 ratio         PTT - ( 2022 16:31 )  PTT:26.9 sec      Urinalysis Basic - ( 2022 21:50 )    Color: Yellow / Appearance: Slightly Turbid / S.019 / pH: x  Gluc: x / Ketone: Negative  / Bili: Negative / Urobili: Negative   Blood: x / Protein: 100 / Nitrite: Negative   Leuk Esterase: Large / RBC: 72 /hpf / WBC 23 /HPF   Sq Epi: x / Non Sq Epi: 2 /hpf / Bacteria: Negative          RADIOLOGY & ADDITIONAL TESTS:  Results Reviewed:   Imaging Personally Reviewed:  Electrocardiogram Personally Reviewed:    COORDINATION OF CARE:  Care Discussed with Consultants/Other Providers [Y/N]:  Prior or Outpatient Records Reviewed [Y/N]:

## 2022-01-28 NOTE — DISCHARGE NOTE PROVIDER - NSDCCPCAREPLAN_GEN_ALL_CORE_FT
PRINCIPAL DISCHARGE DIAGNOSIS  Diagnosis: OFELIA (acute kidney injury)  Assessment and Plan of Treatment: Resolved with hydration. OK to resume your Losartan      SECONDARY DISCHARGE DIAGNOSES  Diagnosis: Nausea and vomiting  Assessment and Plan of Treatment: Your previous EGD did not show any obstruction, gastric emptying study here is negative. Follow up with your PMD and GI doctor for ongoing investigation of these symptoms.    Diagnosis: Pancreatitis  Assessment and Plan of Treatment:     Diagnosis: Hypokalemia  Assessment and Plan of Treatment: Resolved    Diagnosis: Transaminitis  Assessment and Plan of Treatment:     Diagnosis: Hyponatremia  Assessment and Plan of Treatment: Resolved

## 2022-01-28 NOTE — PROGRESS NOTE ADULT - PROBLEM SELECTOR PLAN 7
DVT PPx  -SCDs    Dispo: advancing diet, correcting electorlytes, gastric empty study if still here otherwise outpt GI follow up.
DVT PPx  -SCDs    Dispo: pending gastric emptying results and stool studies. Anticipate DC w/in next 24 hrs unless unable to tolerate PO.
DVT PPx  -SCDs    Dispo: advancing diet, correcting electorlytes, gastric empty study pending for tomorrow then outpt GI follow up.

## 2022-01-28 NOTE — PROGRESS NOTE ADULT - PROBLEM SELECTOR PLAN 1
Has hx of vomiting episodes- could be ETOH related vs. gastroparesis? vs. gastritis vs. viral gastroenteritis but less likely given this has been ongoing for over 10 years with similar presentations of n/v x 4 days followed by diarrhea  -Followed prior with GI Dr. Rishi Bautista but hasn't seen in several years  -Was 206lbs last admission 4/2019 and is 194lbs here 1/25  -Diet as tolerated so far able to eat  -IVF for dehydration   -Cont. anti-emetics and GI prophylaxis PRN  -GI consult if vomiting symptoms recur otherwise outpt f/u   -RUQ shows fatty liver no gall bladder etiology   -A1c only 6 so not diabetic gastroparesis, F/U gastric emptyping study - if positive can start Reglan 10 Q8hrs to see if this improves and will need outpt GI f/u for etiology (not diabetic, not able to discern if had viral syndrome at beginning since many years ago, not on any meds that would cause it, could be autoimmune vs. neuro) Rest of work up should be outpt  -F/U stool culture and O/P sent 1/27
Has hx of vomiting episodes- could be ETOH related vs. gastroparesis? vs. gastritis vs. viral gastroenteritis but less likely given this has been ongoing for over 10 years with similar presentations of n/v x 4 days followed by diarrhea  -Followed prior with GI Dr. Rishi Bautista but hasn't seen in several years  -Was 206lbs last admission 4/2019 and is 194lbs here 1/25  -Diet as tolerated so far able to eat  -IVF for dehydration   -Cont. anti-emetics and GI prophylaxis PRN  -GI consult if vomiting symptoms recur otherwise outpt f/u   -RUQ shows fatty liver no gall bladder etiology   -A1c only 6 so not diabetic gastroparesis, can get gastric emptying study   -F/U stool culture and O/P
Has hx of vomiting episodes- could be ETOH related vs. gastroparesis? vs. gastritis vs. viral gastroenteritis but less likely given this has been ongoing for over 10 years with similar presentations of n/v x 4 days followed by diarrhea  -Followed prior with GI Dr. Rishi Bautista but hasn't seen in several years  -Was 206lbs last admission 4/2019 and is 194lbs here 1/25  -Diet as tolerated so far able to eat  -IVF for dehydration   -Cont. anti-emetics and GI prophylaxis PRN  -GI consult if symptoms recur otherwise outpt f/u   -RUQ shows fatty liver no gall bladder etiology   -A1c only 6 so not diabetic gastroparesis, can get gastric emptying study as we correct electrolyes if becomes DC ready in this respect could f/u as outpt.   -No diarrhea here to send stool cultures but if recurs would order stool culture and O/P

## 2022-01-28 NOTE — PROGRESS NOTE ADULT - PROBLEM SELECTOR PLAN 6
-Trend vital signs  -Holding losartan given OFELIA

## 2022-01-28 NOTE — DISCHARGE NOTE NURSING/CASE MANAGEMENT/SOCIAL WORK - NSDCPEFALRISK_GEN_ALL_CORE
For information on Fall & Injury Prevention, visit: [email or web address] OR  [email or web address] OR  https://www.cdc.gov/steadi/patient.html

## 2022-01-30 LAB
CULTURE RESULTS: SIGNIFICANT CHANGE UP
SPECIMEN SOURCE: SIGNIFICANT CHANGE UP

## 2022-01-31 LAB
CULTURE RESULTS: SIGNIFICANT CHANGE UP
SPECIMEN SOURCE: SIGNIFICANT CHANGE UP

## 2022-03-09 PROBLEM — Z00.00 ENCOUNTER FOR PREVENTIVE HEALTH EXAMINATION: Status: ACTIVE | Noted: 2022-03-09

## 2022-10-31 ENCOUNTER — INPATIENT (INPATIENT)
Facility: HOSPITAL | Age: 62
LOS: 9 days | Discharge: HOME | DRG: 439 | End: 2022-11-10
Attending: STUDENT IN AN ORGANIZED HEALTH CARE EDUCATION/TRAINING PROGRAM | Admitting: SURGERY
Payer: COMMERCIAL

## 2022-10-31 VITALS
WEIGHT: 179.9 LBS | TEMPERATURE: 98 F | OXYGEN SATURATION: 100 % | HEART RATE: 141 BPM | RESPIRATION RATE: 17 BRPM | DIASTOLIC BLOOD PRESSURE: 76 MMHG | HEIGHT: 67 IN | SYSTOLIC BLOOD PRESSURE: 106 MMHG

## 2022-10-31 DIAGNOSIS — N17.9 ACUTE KIDNEY FAILURE, UNSPECIFIED: ICD-10-CM

## 2022-10-31 DIAGNOSIS — K85.90 ACUTE PANCREATITIS WITHOUT NECROSIS OR INFECTION, UNSPECIFIED: ICD-10-CM

## 2022-10-31 LAB
ALBUMIN SERPL ELPH-MCNC: 4 G/DL — SIGNIFICANT CHANGE UP (ref 3.3–5)
ALBUMIN SERPL ELPH-MCNC: 4.5 G/DL — SIGNIFICANT CHANGE UP (ref 3.3–5)
ALP SERPL-CCNC: 116 U/L — SIGNIFICANT CHANGE UP (ref 40–120)
ALP SERPL-CCNC: 136 U/L — HIGH (ref 40–120)
ALT FLD-CCNC: 30 U/L — SIGNIFICANT CHANGE UP (ref 10–45)
ALT FLD-CCNC: 37 U/L — SIGNIFICANT CHANGE UP (ref 10–45)
ANION GAP SERPL CALC-SCNC: 22 MMOL/L — HIGH (ref 5–17)
ANION GAP SERPL CALC-SCNC: 24 MMOL/L — HIGH (ref 5–17)
APPEARANCE UR: ABNORMAL
AST SERPL-CCNC: 102 U/L — HIGH (ref 10–40)
AST SERPL-CCNC: 87 U/L — HIGH (ref 10–40)
BACTERIA # UR AUTO: NEGATIVE — SIGNIFICANT CHANGE UP
BASE EXCESS BLDV CALC-SCNC: -0.4 MMOL/L — SIGNIFICANT CHANGE UP (ref -2–3)
BASOPHILS # BLD AUTO: 0.02 K/UL — SIGNIFICANT CHANGE UP (ref 0–0.2)
BASOPHILS NFR BLD AUTO: 0.2 % — SIGNIFICANT CHANGE UP (ref 0–2)
BILIRUB DIRECT SERPL-MCNC: 1.9 MG/DL — HIGH (ref 0–0.3)
BILIRUB INDIRECT FLD-MCNC: 1.3 MG/DL — HIGH (ref 0.2–1)
BILIRUB SERPL-MCNC: 3.2 MG/DL — HIGH (ref 0.2–1.2)
BILIRUB SERPL-MCNC: 3.7 MG/DL — HIGH (ref 0.2–1.2)
BILIRUB UR-MCNC: ABNORMAL
BUN SERPL-MCNC: 48 MG/DL — HIGH (ref 7–23)
BUN SERPL-MCNC: 49 MG/DL — HIGH (ref 7–23)
CA-I SERPL-SCNC: 1.29 MMOL/L — SIGNIFICANT CHANGE UP (ref 1.15–1.33)
CALCIUM SERPL-MCNC: 10.9 MG/DL — HIGH (ref 8.4–10.5)
CALCIUM SERPL-MCNC: 9.8 MG/DL — SIGNIFICANT CHANGE UP (ref 8.4–10.5)
CHLORIDE BLDV-SCNC: 91 MMOL/L — LOW (ref 96–108)
CHLORIDE SERPL-SCNC: 88 MMOL/L — LOW (ref 96–108)
CHLORIDE SERPL-SCNC: 92 MMOL/L — LOW (ref 96–108)
CO2 BLDV-SCNC: 27 MMOL/L — HIGH (ref 22–26)
CO2 SERPL-SCNC: 18 MMOL/L — LOW (ref 22–31)
CO2 SERPL-SCNC: 19 MMOL/L — LOW (ref 22–31)
COLOR SPEC: ABNORMAL
COMMENT - URINE: SIGNIFICANT CHANGE UP
CREAT ?TM UR-MCNC: 314 MG/DL — SIGNIFICANT CHANGE UP
CREAT SERPL-MCNC: 5.35 MG/DL — HIGH (ref 0.5–1.3)
CREAT SERPL-MCNC: 5.41 MG/DL — HIGH (ref 0.5–1.3)
DIFF PNL FLD: ABNORMAL
EGFR: 11 ML/MIN/1.73M2 — LOW
EGFR: 11 ML/MIN/1.73M2 — LOW
EOSINOPHIL # BLD AUTO: 0.01 K/UL — SIGNIFICANT CHANGE UP (ref 0–0.5)
EOSINOPHIL NFR BLD AUTO: 0.1 % — SIGNIFICANT CHANGE UP (ref 0–6)
EPI CELLS # UR: 3 /HPF — SIGNIFICANT CHANGE UP
GAS PNL BLDV: 130 MMOL/L — LOW (ref 136–145)
GAS PNL BLDV: SIGNIFICANT CHANGE UP
GAS PNL BLDV: SIGNIFICANT CHANGE UP
GLUCOSE BLDV-MCNC: 120 MG/DL — HIGH (ref 70–99)
GLUCOSE SERPL-MCNC: 110 MG/DL — HIGH (ref 70–99)
GLUCOSE SERPL-MCNC: 113 MG/DL — HIGH (ref 70–99)
GLUCOSE UR QL: NEGATIVE — SIGNIFICANT CHANGE UP
HCO3 BLDV-SCNC: 25 MMOL/L — SIGNIFICANT CHANGE UP (ref 22–29)
HCT VFR BLD CALC: 40.8 % — SIGNIFICANT CHANGE UP (ref 39–50)
HCT VFR BLDA CALC: 44 % — SIGNIFICANT CHANGE UP (ref 39–51)
HGB BLD CALC-MCNC: 14.6 G/DL — SIGNIFICANT CHANGE UP (ref 12.6–17.4)
HGB BLD-MCNC: 14 G/DL — SIGNIFICANT CHANGE UP (ref 13–17)
HYALINE CASTS # UR AUTO: 13 /LPF — HIGH (ref 0–2)
IMM GRANULOCYTES NFR BLD AUTO: 0.6 % — SIGNIFICANT CHANGE UP (ref 0–0.9)
KETONES UR-MCNC: ABNORMAL
LACTATE BLDV-MCNC: 2.5 MMOL/L — HIGH (ref 0.5–2)
LEUKOCYTE ESTERASE UR-ACNC: NEGATIVE — SIGNIFICANT CHANGE UP
LIDOCAIN IGE QN: >3000 U/L — HIGH (ref 7–60)
LYMPHOCYTES # BLD AUTO: 0.69 K/UL — LOW (ref 1–3.3)
LYMPHOCYTES # BLD AUTO: 5.8 % — LOW (ref 13–44)
MAGNESIUM SERPL-MCNC: 1.3 MG/DL — LOW (ref 1.6–2.6)
MCHC RBC-ENTMCNC: 31.5 PG — SIGNIFICANT CHANGE UP (ref 27–34)
MCHC RBC-ENTMCNC: 34.3 GM/DL — SIGNIFICANT CHANGE UP (ref 32–36)
MCV RBC AUTO: 91.7 FL — SIGNIFICANT CHANGE UP (ref 80–100)
MONOCYTES # BLD AUTO: 1.39 K/UL — HIGH (ref 0–0.9)
MONOCYTES NFR BLD AUTO: 11.6 % — SIGNIFICANT CHANGE UP (ref 2–14)
NEUTROPHILS # BLD AUTO: 9.76 K/UL — HIGH (ref 1.8–7.4)
NEUTROPHILS NFR BLD AUTO: 81.7 % — HIGH (ref 43–77)
NITRITE UR-MCNC: NEGATIVE — SIGNIFICANT CHANGE UP
NRBC # BLD: 0 /100 WBCS — SIGNIFICANT CHANGE UP (ref 0–0)
OSMOLALITY UR: 347 MOS/KG — SIGNIFICANT CHANGE UP (ref 300–900)
PCO2 BLDV: 45 MMHG — SIGNIFICANT CHANGE UP (ref 42–55)
PH BLDV: 7.36 — SIGNIFICANT CHANGE UP (ref 7.32–7.43)
PH UR: 6 — SIGNIFICANT CHANGE UP (ref 5–8)
PHOSPHATE SERPL-MCNC: 1 MG/DL — CRITICAL LOW (ref 2.5–4.5)
PLATELET # BLD AUTO: 143 K/UL — LOW (ref 150–400)
PO2 BLDV: 19 MMHG — LOW (ref 25–45)
POTASSIUM BLDV-SCNC: 3.7 MMOL/L — SIGNIFICANT CHANGE UP (ref 3.5–5.1)
POTASSIUM SERPL-MCNC: 3.1 MMOL/L — LOW (ref 3.5–5.3)
POTASSIUM SERPL-MCNC: 3.8 MMOL/L — SIGNIFICANT CHANGE UP (ref 3.5–5.3)
POTASSIUM SERPL-SCNC: 3.1 MMOL/L — LOW (ref 3.5–5.3)
POTASSIUM SERPL-SCNC: 3.8 MMOL/L — SIGNIFICANT CHANGE UP (ref 3.5–5.3)
PROT SERPL-MCNC: 8.2 G/DL — SIGNIFICANT CHANGE UP (ref 6–8.3)
PROT SERPL-MCNC: 9.6 G/DL — HIGH (ref 6–8.3)
PROT UR-MCNC: ABNORMAL
RAPID RVP RESULT: SIGNIFICANT CHANGE UP
RBC # BLD: 4.45 M/UL — SIGNIFICANT CHANGE UP (ref 4.2–5.8)
RBC # FLD: 13.3 % — SIGNIFICANT CHANGE UP (ref 10.3–14.5)
RBC CASTS # UR COMP ASSIST: 15 /HPF — HIGH (ref 0–4)
SAO2 % BLDV: 14.1 % — LOW (ref 67–88)
SARS-COV-2 RNA SPEC QL NAA+PROBE: SIGNIFICANT CHANGE UP
SODIUM SERPL-SCNC: 131 MMOL/L — LOW (ref 135–145)
SODIUM SERPL-SCNC: 132 MMOL/L — LOW (ref 135–145)
SODIUM UR-SCNC: 27 MMOL/L — SIGNIFICANT CHANGE UP
SP GR SPEC: 1.02 — SIGNIFICANT CHANGE UP (ref 1.01–1.02)
UROBILINOGEN FLD QL: ABNORMAL
WBC # BLD: 11.94 K/UL — HIGH (ref 3.8–10.5)
WBC # FLD AUTO: 11.94 K/UL — HIGH (ref 3.8–10.5)
WBC UR QL: 13 /HPF — HIGH (ref 0–5)

## 2022-10-31 PROCEDURE — 71045 X-RAY EXAM CHEST 1 VIEW: CPT | Mod: 26

## 2022-10-31 PROCEDURE — 93010 ELECTROCARDIOGRAM REPORT: CPT

## 2022-10-31 PROCEDURE — 99291 CRITICAL CARE FIRST HOUR: CPT | Mod: GC

## 2022-10-31 PROCEDURE — 74176 CT ABD & PELVIS W/O CONTRAST: CPT | Mod: 26,MA

## 2022-10-31 PROCEDURE — 76705 ECHO EXAM OF ABDOMEN: CPT | Mod: 26

## 2022-10-31 PROCEDURE — 99223 1ST HOSP IP/OBS HIGH 75: CPT | Mod: GC

## 2022-10-31 PROCEDURE — 99285 EMERGENCY DEPT VISIT HI MDM: CPT | Mod: 25

## 2022-10-31 RX ORDER — PANTOPRAZOLE SODIUM 20 MG/1
40 TABLET, DELAYED RELEASE ORAL ONCE
Refills: 0 | Status: COMPLETED | OUTPATIENT
Start: 2022-10-31 | End: 2022-10-31

## 2022-10-31 RX ORDER — SODIUM CHLORIDE 9 MG/ML
1000 INJECTION INTRAMUSCULAR; INTRAVENOUS; SUBCUTANEOUS ONCE
Refills: 0 | Status: COMPLETED | OUTPATIENT
Start: 2022-10-31 | End: 2022-10-31

## 2022-10-31 RX ORDER — MAGNESIUM SULFATE 500 MG/ML
2 VIAL (ML) INJECTION ONCE
Refills: 0 | Status: COMPLETED | OUTPATIENT
Start: 2022-10-31 | End: 2022-10-31

## 2022-10-31 RX ORDER — SODIUM CHLORIDE 9 MG/ML
1000 INJECTION, SOLUTION INTRAVENOUS ONCE
Refills: 0 | Status: COMPLETED | OUTPATIENT
Start: 2022-10-31 | End: 2022-10-31

## 2022-10-31 RX ORDER — PANTOPRAZOLE SODIUM 20 MG/1
40 TABLET, DELAYED RELEASE ORAL EVERY 24 HOURS
Refills: 0 | Status: DISCONTINUED | OUTPATIENT
Start: 2022-10-31 | End: 2022-11-02

## 2022-10-31 RX ORDER — HYDROMORPHONE HYDROCHLORIDE 2 MG/ML
0.25 INJECTION INTRAMUSCULAR; INTRAVENOUS; SUBCUTANEOUS
Refills: 0 | Status: DISCONTINUED | OUTPATIENT
Start: 2022-10-31 | End: 2022-11-02

## 2022-10-31 RX ORDER — SODIUM CHLORIDE 9 MG/ML
500 INJECTION INTRAMUSCULAR; INTRAVENOUS; SUBCUTANEOUS
Refills: 0 | Status: DISCONTINUED | OUTPATIENT
Start: 2022-10-31 | End: 2022-11-01

## 2022-10-31 RX ORDER — ACETAMINOPHEN 500 MG
1000 TABLET ORAL ONCE
Refills: 0 | Status: COMPLETED | OUTPATIENT
Start: 2022-10-31 | End: 2022-10-31

## 2022-10-31 RX ORDER — HEPARIN SODIUM 5000 [USP'U]/ML
5000 INJECTION INTRAVENOUS; SUBCUTANEOUS EVERY 8 HOURS
Refills: 0 | Status: DISCONTINUED | OUTPATIENT
Start: 2022-10-31 | End: 2022-11-02

## 2022-10-31 RX ORDER — CEFEPIME 1 G/1
1000 INJECTION, POWDER, FOR SOLUTION INTRAMUSCULAR; INTRAVENOUS ONCE
Refills: 0 | Status: COMPLETED | OUTPATIENT
Start: 2022-10-31 | End: 2022-10-31

## 2022-10-31 RX ORDER — HYDROMORPHONE HYDROCHLORIDE 2 MG/ML
0.5 INJECTION INTRAMUSCULAR; INTRAVENOUS; SUBCUTANEOUS
Refills: 0 | Status: DISCONTINUED | OUTPATIENT
Start: 2022-10-31 | End: 2022-11-02

## 2022-10-31 RX ORDER — ONDANSETRON 8 MG/1
4 TABLET, FILM COATED ORAL ONCE
Refills: 0 | Status: COMPLETED | OUTPATIENT
Start: 2022-10-31 | End: 2022-10-31

## 2022-10-31 RX ORDER — SODIUM CHLORIDE 9 MG/ML
500 INJECTION INTRAMUSCULAR; INTRAVENOUS; SUBCUTANEOUS ONCE
Refills: 0 | Status: DISCONTINUED | OUTPATIENT
Start: 2022-10-31 | End: 2022-10-31

## 2022-10-31 RX ADMIN — SODIUM CHLORIDE 75 MILLILITER(S): 9 INJECTION INTRAMUSCULAR; INTRAVENOUS; SUBCUTANEOUS at 17:44

## 2022-10-31 RX ADMIN — Medication 400 MILLIGRAM(S): at 16:04

## 2022-10-31 RX ADMIN — ONDANSETRON 4 MILLIGRAM(S): 8 TABLET, FILM COATED ORAL at 13:56

## 2022-10-31 RX ADMIN — PANTOPRAZOLE SODIUM 40 MILLIGRAM(S): 20 TABLET, DELAYED RELEASE ORAL at 13:56

## 2022-10-31 RX ADMIN — SODIUM CHLORIDE 150 MILLILITER(S): 9 INJECTION INTRAMUSCULAR; INTRAVENOUS; SUBCUTANEOUS at 22:41

## 2022-10-31 RX ADMIN — Medication 63.75 MILLIMOLE(S): at 20:25

## 2022-10-31 RX ADMIN — SODIUM CHLORIDE 1000 MILLILITER(S): 9 INJECTION INTRAMUSCULAR; INTRAVENOUS; SUBCUTANEOUS at 13:56

## 2022-10-31 RX ADMIN — Medication 1000 MILLIGRAM(S): at 18:28

## 2022-10-31 RX ADMIN — Medication 1 MILLIGRAM(S): at 14:40

## 2022-10-31 RX ADMIN — SODIUM CHLORIDE 1000 MILLILITER(S): 9 INJECTION, SOLUTION INTRAVENOUS at 19:17

## 2022-10-31 RX ADMIN — Medication 25 GRAM(S): at 20:12

## 2022-10-31 RX ADMIN — CEFEPIME 100 MILLIGRAM(S): 1 INJECTION, POWDER, FOR SOLUTION INTRAMUSCULAR; INTRAVENOUS at 16:09

## 2022-10-31 NOTE — H&P ADULT - HISTORY OF PRESENT ILLNESS
62M PMHx HTN, GERD, hiatal hernia, ?cyclical vomiting syndrome who pw nausea/vomiting for 5 days associated with epigastric pain, found to have acute interstitial pancreatitis on labs/imaging. Patient reports onset of nausea + non bloody, bilious emesis last Wednesday aw epigastric pain. Reports 3-4 episodes of emesis/day, PO intolerance, which has not improved prompting presentation to [facility] ED. Denies fevers/chills at home, diarrhea, hematochezia, hematemesis, CP, SOB, lightheadedness, dizziness. Notably, patient reports active daily ETOH use, "3-4 drinks/night". Also of note, patient has had prior admissions for similar sxs, last admitted January 2022 for nausea/vomiting aw epigastric pain x4 days. W/u for gastroparesis and GOO was negative at that time. CT A/P w/o acute intra abdominal findings at that time. No evidence of pancreatitis either.     In ED, patient is tachycardic to 140s, borderline hypotensive 90s/60s, w low grade fever 100.4. Labs remarkable for mild leukocytosis (WBC 12), Na 131, Crt 5.4, T bili:3.7, AlkP: 136, AST:102, Lipase: >3000. CT A/P w IV contrast demonstrates acute interstitial pancreatitis without peripancreatic fluid collections. RUQ US, w/o evidence of cholecystitis, cholelithiasis, choledocholithiasis, or biliary ductal dilation.     Received 2.5L bolus in ED. 
No

## 2022-10-31 NOTE — CONSULT NOTE ADULT - SUBJECTIVE AND OBJECTIVE BOX
HISTORY OF PRESENT ILLNESS:  SARAHY DIAZ is a 62y Male 62M PMHx HTN, GERD, hiatal hernia, ?cyclical vomiting syndrome who pw nausea/vomiting for 5 days associated with epigastric pain, found to have acute interstitial pancreatitis on labs/imaging. Patient reports onset of nausea + non bloody, bilious emesis last Wednesday with epigastric pain. Reports 3-4 episodes of emesis/day, PO intolerance, which has not improved prompting presentation to [facility] ED. Denies fevers/chills at home, diarrhea, hematochezia, hematemesis, CP, SOB, lightheadedness, dizziness. Notably, patient reports active daily ETOH use, "3-4 drinks/night". Also of note, patient has had prior admissions for similar sxs, last admitted January 2022 for nausea/vomiting aw epigastric pain x4 days. W/u for gastroparesis and GOO was negative at that time. CT A/P w/o acute intra abdominal findings at that time. No evidence of pancreatitis either.     In ED, patient is tachycardic to 140s, borderline hypotensive 90s/60s, w low grade fever 100.4. Labs remarkable for mild leukocytosis (WBC 12), Na 131, Crt 5.4, T bili:3.7, AlkP: 136, AST:102, Lipase: >3000. CT A/P w IV contrast demonstrates acute interstitial pancreatitis without peripancreatic fluid collections. RUQ US, w/o evidence of cholecystitis, cholelithiasis, choledocholithiasis, or biliary ductal dilation.     Received 2.5L bolus in ED.     PAST MEDICAL HISTORY: HTN (hypertension)    Acid reflux        PAST SURGICAL HISTORY: No significant past surgical history        FAMILY HISTORY: No pertinent family history in first degree relatives    FH: hypertension    SOCIAL HISTORY:  · Substance use	Yes  · Alcohol use	3-4drinks/day  · Substance use	denies other recreational drug use  · Tobacco use	denies tobacco use      CODE STATUS: Full    HOME MEDICATIONS:  · 	losartan 50 mg oral tablet: 1 tab(s) orally once a day  · 	famotidine 40 mg oral tablet: 1 tab(s) orally once a day (at bedtime)  · 	omeprazole 40 mg oral delayed release capsule: 1 cap(s) orally 2 times a day  · 	Vitamin D2 1.25 mg (50,000 intl units) oral capsule: 1 cap(s) orally once a week      ALLERGIES: No Known Allergies      VITAL SIGNS:  ICU Vital Signs Last 24 Hrs  T(C): 37.1 (31 Oct 2022 23:45), Max: 38 (31 Oct 2022 16:00)  T(F): 98.7 (31 Oct 2022 23:45), Max: 100.4 (31 Oct 2022 16:00)  HR: 133 (31 Oct 2022 23:45) (124 - 141)  BP: 110/70 (31 Oct 2022 23:45) (93/61 - 115/74)  BP(mean): 85 (31 Oct 2022 23:45) (85 - 85)  ABP: --  ABP(mean): --  RR: 20 (31 Oct 2022 23:45) (16 - 20)  SpO2: 100% (31 Oct 2022 23:45) (98% - 100%)    O2 Parameters below as of 31 Oct 2022 23:45  Patient On (Oxygen Delivery Method): room air            NEURO  Exam: AOx3, NAD. Follows commands, able to move all extremities.   Meds:HYDROmorphone  Injectable 0.25 milliGRAM(s) IV Push every 3 hours PRN Moderate Pain (4 - 6)  HYDROmorphone  Injectable 0.5 milliGRAM(s) IV Push every 3 hours PRN Severe Pain (7 - 10)      RESPIRATORY  Exam: CTA b/l  Meds:diphenhydrAMINE Injectable 25 milliGRAM(s) IV Push once      CARDIOVASCULAR  VBG - ( 31 Oct 2022 13:45 )  pH: 7.36  /  pCO2: 45    /  pO2: 19    / HCO3: 25    / Base Excess: -0.4  /  SaO2: 14.1   Lactate: 2.5      Exam: S1S2, rate 120s, regular rhythm. No murmurs, rubs gallops appreciated.   Cardiac Rhythm: Sinus tachycardia  Meds:    GI/NUTRITION  Exam: Soft, non-disdended, tender in epigastric region  Diet: NPO  Meds: pantoprazole  Injectable 40 milliGRAM(s) IV Push every 24 hours      GENITOURINARY/RENAL  Meds:magnesium sulfate  IVPB 2 Gram(s) IV Intermittent once  potassium chloride  10 mEq/100 mL IVPB 10 milliEquivalent(s) IV Intermittent every 1 hour  sodium chloride 0.9% 1000 milliLiter(s) IV Continuous <Continuous>  sodium phosphate IVPB 15 milliMole(s) IV Intermittent every 2 hours      Weight (kg): 81.6 (10-31 @ 11:57)  10-31    132<L>  |  92<L>  |  49<H>  ----------------------------<  110<H>  3.1<L>   |  18<L>  |  5.35<H>    Ca    9.8      31 Oct 2022 18:53  Phos  1.0     10-31  Mg     1.3     10-31    TPro  8.2  /  Alb  4.0  /  TBili  3.2<H>  /  DBili  1.9<H>  /  AST  87<H>  /  ALT  30  /  AlkPhos  116  10-31    [ X ] Juarez catheter, indication: urine output monitoring in critically ill patient    HEMATOLOGIC  [ X ] VTE Prophylaxis:  heparin   Injectable 5000 Unit(s) SubCutaneous every 8 hours                          14.0   11.94 )-----------( 143      ( 31 Oct 2022 14:15 )             40.8       Transfusion: [ ] PRBC	[ ] Platelets	[ ] FFP	[ ] Cryoprecipitate      INFECTIOUS DISEASES  Meds:  RECENT CULTURES:      ENDOCRINE  Meds:  CAPILLARY BLOOD GLUCOSE          PATIENT CARE ACCESS DEVICES:  [ X ] Peripheral IV  [ ] Central Venous Line	[ ] R	[ ] L	[ ] IJ	[ ] Fem	[ ] SC	Placed:   [ ] Arterial Line		[ ] R	[ ] L	[ ] Fem	[ ] Rad	[ ] Ax	Placed:   [ ] PICC:					[ ] Mediport  [ X ] Urinary Catheter, Date Placed: 10/31/2022  [x] Necessity of urinary, arterial, and venous catheters discussed    OTHER MEDICATIONS:     IMAGING STUDIES: < from: CT Abdomen and Pelvis No Cont (10.31.22 @ 17:08) >    IMPRESSION:  Acute interstitial pancreatitis. No peripancreatic fluid collections.    Nonobstructive left renal calculi. No right urolithiasis. No   hydronephrosis in either kidney.    Hepatic steatosis.    < end of copied text >

## 2022-10-31 NOTE — CONSULT NOTE ADULT - ASSESSMENT
Pt. is a 62 y.o. M w/ PMHx of cyclical vomiting, HTN, and GERD presents with nausea, vomiting and abdominal pain for the last few days and now with pancreatitis. Nephrology consulted for OFELIA.

## 2022-10-31 NOTE — ED ADULT NURSE NOTE - NSIMPLEMENTINTERV_GEN_ALL_ED
Implemented All Fall Risk Interventions:  [place] to call system. Call bell, personal items and telephone within reach. Instruct patient to call for assistance. Room bathroom lighting operational. Non-slip footwear when patient is off stretcher. Physically safe environment: no spills, clutter or unnecessary equipment. Stretcher in lowest position, wheels locked, appropriate side rails in place. Provide visual cue, wrist band, yellow gown, etc. Monitor gait and stability. Monitor for mental status changes and reorient to person, place, and time. Review medications for side effects contributing to fall risk. Reinforce activity limits and safety measures with patient and family.

## 2022-10-31 NOTE — ED PROVIDER NOTE - NS ED ROS FT
Constitutional: no fevers, chills  HEENT: no cough, rhinorrhea  Cardiac: no chest pain, palpitations  Respiratory: no SOB  GI: RUQ and epigastric abd pain, n/v. no bloody or dark stools  : dysuria. no frequency, or hematuria  MSK: no joint pain  Skin: no rashes  Neuro: no headache, change in vision, focal weakness  Psych: negative

## 2022-10-31 NOTE — ED ADULT NURSE REASSESSMENT NOTE - NS ED NURSE REASSESS COMMENT FT1
Juarez catheter placed using sterile technique. Second RN present to confirm sterility. Draining to gravity. Secured w/ stat lock. Pt tolerated procedure well. Will cont to monitor.

## 2022-10-31 NOTE — CONSULT NOTE ADULT - NS PANP COMMENT GEN_ALL_CORE FT
62 yr M hx of htn, gerd, etoh use a pancreatitis and severe hypophosphatemia and hypomagnesemia as well as acute renal dysfunction    alert and awake, pain management multi modal  on room air, monitor for signs of resp failure and hypoxia due to pancreatitis  tachycardia and low grade fever likely inflammatory  will continue to monitor for life threatening dysrrythmias due to electrolyte imbalance  will keep strict NPO For now  IV maintenance  replation of electrlyites and monitor for urine output  VTE PPX  off antibiotics, monitor for signs of infetion. CTa/p neg.  glycemic control, at risk for hyperglycemia

## 2022-10-31 NOTE — ED PROVIDER NOTE - ATTENDING CONTRIBUTION TO CARE
RGUJRAL 61yo male hx listed presents with n/v/d since Thursday. States he only has abd pain with retching. No fever, chills, recent travel. No chest pain, palp, sob. Pt has had similar episodes in past. Endorses drinking alcohol 4x/week, no hx withdrawal.   Pt noted to be tachycardic on arrival.   On exam, Patient is awake, alert and oriented x 3.  Patient is mild distress due to nausea.   NCAT  Neck is supple  Lungs are CTA B/L,+S1S2   Abdomen:Soft nd/+RUQ and epigastric ttp +bs no rebound or guarding.  Extremity no edema or calf tender.  Skin with no rash.  Neuro CN3-12 intact. Strength 5/5 in upper and lower extremities. Nml Sensation.  Pt noted to  be tachycardic. Check labs, US/CT to eval for symptoms. Pain control, anti emetics, IVF.

## 2022-10-31 NOTE — H&P ADULT - NSHPPHYSICALEXAM_GEN_ALL_CORE
PAP due  
General: NAD  Neuro: A/Ox4  HEENT: NC/AT  Respiratory: RA, no increased work of breathing  CV: sinus tachycardia   Abdomen: Soft, Non distended, tender to palpation in epigastrium without rebound tenderness/guarding/rigidity  Extremities: WWP, w/o gross deformity, FRITZ  Vascular: b/l radial pulses palpable   Skin: intact, w/o breakdown

## 2022-10-31 NOTE — H&P ADULT - ASSESSMENT
62M PMHx HTN, GERD, hiatal hernia, ?cyclical vomiting syndrome who pw acute pancreatitis (Lipase>3000) associated with hemodynamic instability and OFELIA (Crt 5.4).     Plan:  -Admit to [facility], Dr. Meyer   -SICU Consult i/s/o hemodynamic instability and severe OFELIA  -Aggressive fluid resuscitation-IVF @150-200cc/hr  -NPO  -Cw Home GERD meds, hold Anti-HTN while borderline hypotensive  -Juarez for close I/O  -Check Blood Alcohol Level  -[facility] protocol   -Chem VTE ppx  -AM Labs    Discussed with Attending Surgeon Dr. Betsy Awan MD PGY2   [facility]   [id number] 62M PMHx HTN, GERD, hiatal hernia, ?cyclical vomiting syndrome who pw acute pancreatitis (Lipase>3000) associated with hemodynamic instability and OFELIA (Crt 5.4).     Plan:  -Admit to [facility], Dr. Meyer   -SICU Consult i/s/o hemodynamic instability and severe OFELIA  -Aggressive fluid resuscitation-IVF @150-200cc/hr  -Appreciate nephro recs-pt may need HD, although refusing at this time  -Trend Crt   -NPO  -Cw Home GERD meds, hold Anti-HTN while borderline hypotensive  -Juarez for close I/O  -Check Blood Alcohol Level  -[facility] protocol   -Chem VTE ppx  -AM Labs    Discussed with Attending Surgeon Dr. Betsy Awan MD PGY2   Green Team Surgery   [id number]

## 2022-10-31 NOTE — PATIENT PROFILE ADULT - FALL HARM RISK - HARM RISK INTERVENTIONS

## 2022-10-31 NOTE — ED ADULT NURSE NOTE - OBJECTIVE STATEMENT
Pt is here for nausea vomiting since last Thursday. Pt endorsed alcohol use daily (2 drinks per day, 4-5 days a week.) Hx GERD, HTN, Hernia.

## 2022-10-31 NOTE — CONSULT NOTE ADULT - ASSESSMENT
ASSESSMENT: 62yMale PMHx HTN, GERD, hiatal hernia, ?cyclical vomiting syndrome who pw acute pancreatitis (Lipase>3000) associated with hypotension and severe electrolyte disfunction.     PLAN:   Neurologic:  AOx3  Pain control with Dilaudid    Respiratory:  Encourage incentive spirometry    Cardiovascular:  Tachycardia, likely multifactorial. Continue IVF, pain control.  Trend LA.  Hold home Losartan    Gastrointestinal/Nutrition:  Strict NPO  IVF  Continue home PPI  Trend Lipase    Genitourinary/Renal:  Aggressive electrolyte supplementation  K>4, Phos > 3, Mag >2  Home Losartan held in setting of OFELIA    Hematologic:  Heparin SQ for DVT ppx    Infectious Disease:   Monitor WBC curve    Endocrine: Euglycemic    Disposition: SICU  [id number]

## 2022-10-31 NOTE — ED PROVIDER NOTE - PHYSICAL EXAMINATION
General: NAD, intermittent wretching in room  HEENT: NCAT, PERRL  Cardiac: RRR, no murmurs, 2+ peripheral pulses  Chest: CTAB  Abdomen: epigastric ttp and RUQ ttp, no rebound or guarding. soft, non-distended, bowel sounds present.   Extremities: no peripheral edema, calf tenderness, or leg size discrepancies  Skin: no rashes  Neuro: AAOx4, 5+motor, sensory grossly intact  Psych: mood and affect appropriate

## 2022-10-31 NOTE — H&P ADULT - NSHPLABSRESULTS_GEN_ALL_CORE
14.0   11.94 )-----------( 143      ( 31 Oct 2022 14:15 )             40.8   10-31-22 @ 18:53    132<L>  |  92<L>  |  49<H>             --------------------------< 110<H>     3.1<L>  |  18<L>  | 5.35<H>    eGFR AA: --  eGFR N-AA: --    Calcium: 9.8  Phosphorus: 1.0<LL>  Magnesium: 1.3<L>    AST: 87<H>    ALT: 30  AlkPhos: 116  Protein: 8.2  Albumin: 4.0  TBili: 3.2<H>  D-Bili: 1.9<H>  10-31-22 @ 14:15    131<L>  |  88<L>  |  48<H>             --------------------------< 113<H>     3.8  |  19<L>  | 5.41<H>    eGFR AA: --  eGFR N-AA: --    Calcium: 10.9<H>  Phosphorus: --  Magnesium: 1.6    AST: 102<H>    ALT: 37  AlkPhos: 136<H>  Protein: 9.6<H>  Albumin: 4.5  TBili: 3.7<H>  D-Bili: --  Lipase, Serum (10.31.22 @ 14:15)    Lipase, Serum: >3000 U/L    < from: CT Abdomen and Pelvis No Cont (10.31.22 @ 17:08) >    ACC: [id number] EXAM:  CT ABDOMEN AND PELVIS                          PROCEDURE DATE:  10/31/2022      < end of copied text >    < from: CT Abdomen and Pelvis No Cont (10.31.22 @ 17:08) >    IMPRESSION:  Acute interstitial pancreatitis. No peripancreatic fluid collections.    Nonobstructive left renal calculi. No right urolithiasis. No   hydronephrosis in either kidney.    Hepatic steatosis.        --- End of Report ---            RIDDHI BUENO MD; Attending Radiologist  This document has been electronically signed. Oct 31 2022  5:14PM    < end of copied text >

## 2022-10-31 NOTE — ED PROVIDER NOTE - PROGRESS NOTE DETAILS
Nephrology consulted for OFELIA. Lipase noted. IVF and continue to monitor. RGUJRAL Nephrology consulted for OFELIA. Lipase noted, pending CT/US. Bladder scan ordered. IVF and continue to monitor. RGUJRAL Pt reassessed, states he still has RUQ abd pain, CT called to expedite scan. Spoke to US to change to portable study. Patient remains tachycardic, hydration in ED, episodes of vomiting improved. Will administer 1 dose of cefepime renally dosed for possible cholangitis or cholecystitis vs intraabd path. Continue to monitor closely. [facility] Surgery consulted. ANSLEY

## 2022-10-31 NOTE — ED PROVIDER NOTE - OBJECTIVE STATEMENT
61yo M pmhx of cyclical vomiting syndrome, HTN, GERD comes to ED w/ nausea and vomiting (non-bloddy, greenish). Started randomly 3 days prior.  Their pain/symptom is moderate to severe, constant, non mediating with rest, associated with RUQ and epigastric abd pain. Denies chest pain, sob, cough, rhinorrhea, falls, trauma.

## 2022-10-31 NOTE — PATIENT PROFILE ADULT - FUNCTIONAL ASSESSMENT - DAILY ACTIVITY 5.
Patient called to check on her refill request for Xanax. Explained per the Doctor it was denied due to her last being seen 08/2017. Patient states this is ridiculous and that she will find a new doctor. Explained the medication Xanax requires patient to meet with the doctor regularly and patient states she'll find a new doctor. 4 = No assist / stand by assistance

## 2022-10-31 NOTE — ED PROVIDER NOTE - CLINICAL SUMMARY MEDICAL DECISION MAKING FREE TEXT BOX
Impression: 63yo M pmhx of cyclical vomiting syndrome, HTN, GERD comes to ED w/ nausea and vomiting (non-bloddy, greenish). Their symptoms of RUQ and epigastric abd pain, n/v, exam findings of epigastric ttp and RUQ ttp are concerning for gallbladder pathology, pancreatitis, viral illness, exacerbation of cyclical vomiting syndrome.    Ordered labs, imaging, medications for diagnosis, management, and treatment.

## 2022-10-31 NOTE — CONSULT NOTE ADULT - SUBJECTIVE AND OBJECTIVE BOX
[facility] DIVISION OF KIDNEY DISEASES AND HYPERTENSION -- [phone]  -- INITIAL CONSULT NOTE  --------------------------------------------------------------------------------  HPI:  Cruzito. is a 62 y.o. M w/ PMHx of cyclical vomiting, HTN, and GERD presents with nausea, vomiting and abdominal pain for the last few days. Nephrology consulted for OFELIA. Pt. states that he was told he had borderline renal function inhte past by his PCP for the last few years but has never seen a nephrologist. Pt. was admitted in January 2022 for a similar episode inslucing OFELIA which resolved with IVF. Pt. states he never followed up wany speicalist afterwards. He dies endorse dysuria for the last 2 months feels like he has been passing stones but never formerly diagnosed. He endorses some sweating and shakes with the vomiting over the last few days. He denies any difficulty passing urine but has to sit down to feel like he completely voids. He endorses drinking 4-5x per week and has about 2 drinks per time. He is admitted with a SC.r of 5.4. He was last admitted in [facility] with a SCr. in the 3 range which trended to 1.4 by the time       PAST HISTORY  --------------------------------------------------------------------------------  PAST MEDICAL & SURGICAL HISTORY:  HTN (hypertension)      Acid reflux      No significant past surgical history        FAMILY HISTORY:  FH: hypertension      PAST SOCIAL HISTORY:    ALLERGIES & MEDICATIONS  --------------------------------------------------------------------------------  Allergies    No Known Allergies    Intolerances      Standing Inpatient Medications  cefepime   IVPB 1000 milliGRAM(s) IV Intermittent once  sodium chloride 0.9%. 500 milliLiter(s) IV Continuous <Continuous>    PRN Inpatient Medications      REVIEW OF SYSTEMS  --------------------------------------------------------------------------------  Gen: No fevers/chills  Skin: No rashes  Head/Eyes/Ears: Normal hearing,   Respiratory: No dyspnea, cough  CV: No chest pain  GI: No abdominal pain, diarrhea  : No dysuria, hematuria  MSK: No  edema  Heme: No easy bruising or bleeding  Psych: No significant depression    All other systems were reviewed and are negative, except as noted.    VITALS/PHYSICAL EXAM  --------------------------------------------------------------------------------  T(C): 36.7 (10-31-22 @ 12:37), Max: 36.9 (10-31-22 @ 11:57)  HR: 138 (10-31-22 @ 12:37) (138 - 141)  BP: 103/87 (10-31-22 @ 12:37) (103/87 - 106/76)  RR: 16 (10-31-22 @ 12:37) (16 - 17)  SpO2: 100% (10-31-22 @ 12:37) (100% - 100%)  Wt(kg): --  Height (cm): 170.2 (10-31-22 @ 11:57)  Weight (kg): 81.6 (10-31-22 @ 11:57)  BMI (kg/m2): 28.2 (10-31-22 @ 11:57)  BSA (m2): 1.93 (10-31-22 @ 11:57)      Physical Exam:  	Gen: NAD  	HEENT: MMM  	Pulm: CTA B/L  	CV: S1S2  	Abd: Soft, +BS   	Ext: No LE edema B/L  	Neuro: Awake  	Skin: Warm and dry  	Vascular access:    LABS/STUDIES  --------------------------------------------------------------------------------              14.0   11.94 >-----------<  143      [10-31-22 @ 14:15]              40.8     131  |  88  |  48  ----------------------------<  113      [10-31-22 @ 14:15]  3.8   |  19  |  5.41        Ca     10.9     [10-31-22 @ 14:15]      Mg     1.6     [10-31-22 @ 14:15]    TPro  9.6  /  Alb  4.5  /  TBili  3.7  /  DBili  x   /  AST  102  /  ALT  37  /  AlkPhos  136  [10-31-22 @ 14:15]          Creatinine Trend:  SCr 5.41 [10-31 @ 14:15]    Urinalysis - [01-25-22 @ 21:50]      Color Yellow / Appearance Slightly Turbid / SG 1.019 / pH 6.0      Gluc Negative / Ketone Negative  / Bili Negative / Urobili Negative       Blood Large / Protein 100 / Leuk Est Large / Nitrite Negative      RBC 72 / WBC 23 / Hyaline 4 / Gran  / Sq Epi  / Non Sq Epi 2 / Bacteria Negative        HCV 0.06, Nonreact      [04-11-19 @ 09:35]     [facility] DIVISION OF KIDNEY DISEASES AND HYPERTENSION -- [phone]  -- INITIAL CONSULT NOTE  --------------------------------------------------------------------------------  HPI:  Pt. is a 62 y.o. M w/ PMHx of cyclical vomiting, HTN, and GERD presents with nausea, vomiting and abdominal pain for the last few days. Nephrology consulted for OFELIA. Pt. states that he was told he had borderline renal function inhte past by his PCP for the last few years but has never seen a nephrologist. Pt. was admitted in January 2022 for a similar episode inslucing OFELIA which resolved with IVF. Pt. states he never followed up wany speicalist afterwards. He dies endorse dysuria for the last 2 months feels like he has been passing stones but never formerly diagnosed. He endorses some sweating and shakes with the vomiting over the last few days. He denies any difficulty passing urine but has to sit down to feel like he completely voids. He endorses drinking 4-5x per week and has about 2 drinks per time. He is admitted with a SC.r of 5.4. He was last admitted in [facility] with a SCr. in the 3 range which trended to 1.4 by the time he was discharged. Denies any NSAID use. States he fell on Saturday and then on Sunday, denies hitting his head. States he takes Losartan and nother BP medication but is unable to remember the name      PAST HISTORY  --------------------------------------------------------------------------------  PAST MEDICAL & SURGICAL HISTORY:  HTN (hypertension)      Acid reflux      No significant past surgical history        FAMILY HISTORY:  FH: hypertension      PAST SOCIAL HISTORY:    ALLERGIES & MEDICATIONS  --------------------------------------------------------------------------------  Allergies    No Known Allergies    Intolerances      Standing Inpatient Medications  cefepime   IVPB 1000 milliGRAM(s) IV Intermittent once  sodium chloride 0.9%. 500 milliLiter(s) IV Continuous <Continuous>    PRN Inpatient Medications      REVIEW OF SYSTEMS  --------------------------------------------------------------------------------  As per HPI    VITALS/PHYSICAL EXAM  --------------------------------------------------------------------------------  T(C): 36.7 (10-31-22 @ 12:37), Max: 36.9 (10-31-22 @ 11:57)  HR: 138 (10-31-22 @ 12:37) (138 - 141)  BP: 103/87 (10-31-22 @ 12:37) (103/87 - 106/76)  RR: 16 (10-31-22 @ 12:37) (16 - 17)  SpO2: 100% (10-31-22 @ 12:37) (100% - 100%)  Wt(kg): --  Height (cm): 170.2 (10-31-22 @ 11:57)  Weight (kg): 81.6 (10-31-22 @ 11:57)  BMI (kg/m2): 28.2 (10-31-22 @ 11:57)  BSA (m2): 1.93 (10-31-22 @ 11:57)      Physical Exam:  	Gen: ill-appearing  	HEENT: MMM, Hiccups+  	Pulm: CTA B/L  	CV: S1S2  	Abd: Soft, +BS, epigastric and RUQ abdominal pain  	Ext: No LE edema B/L  	Neuro: Awake, minor tremors   	Skin: Warm and dry  	Vascular access:    LABS/STUDIES  --------------------------------------------------------------------------------              14.0   11.94 >-----------<  143      [10-31-22 @ 14:15]              40.8     131  |  88  |  48  ----------------------------<  113      [10-31-22 @ 14:15]  3.8   |  19  |  5.41        Ca     10.9     [10-31-22 @ 14:15]      Mg     1.6     [10-31-22 @ 14:15]    TPro  9.6  /  Alb  4.5  /  TBili  3.7  /  DBili  x   /  AST  102  /  ALT  37  /  AlkPhos  136  [10-31-22 @ 14:15]          Creatinine Trend:  SCr 5.41 [10-31 @ 14:15]    Urinalysis - [01-25-22 @ 21:50]      Color Yellow / Appearance Slightly Turbid / SG 1.019 / pH 6.0      Gluc Negative / Ketone Negative  / Bili Negative / Urobili Negative       Blood Large / Protein 100 / Leuk Est Large / Nitrite Negative      RBC 72 / WBC 23 / Hyaline 4 / Gran  / Sq Epi  / Non Sq Epi 2 / Bacteria Negative        HCV 0.06, Nonreact      [04-11-19 @ 09:35]

## 2022-11-01 DIAGNOSIS — E83.52 HYPERCALCEMIA: ICD-10-CM

## 2022-11-01 LAB
ALBUMIN SERPL ELPH-MCNC: 3.7 G/DL — SIGNIFICANT CHANGE UP (ref 3.3–5)
ALP SERPL-CCNC: 107 U/L — SIGNIFICANT CHANGE UP (ref 40–120)
ALT FLD-CCNC: 28 U/L — SIGNIFICANT CHANGE UP (ref 10–45)
ANION GAP SERPL CALC-SCNC: 14 MMOL/L — SIGNIFICANT CHANGE UP (ref 5–17)
ANION GAP SERPL CALC-SCNC: 21 MMOL/L — HIGH (ref 5–17)
APTT BLD: 28.5 SEC — SIGNIFICANT CHANGE UP (ref 27.5–35.5)
AST SERPL-CCNC: 65 U/L — HIGH (ref 10–40)
BASE EXCESS BLDV CALC-SCNC: -3.7 MMOL/L — LOW (ref -2–3)
BASE EXCESS BLDV CALC-SCNC: -4.7 MMOL/L — LOW (ref -2–3)
BILIRUB SERPL-MCNC: 3 MG/DL — HIGH (ref 0.2–1.2)
BUN SERPL-MCNC: 42 MG/DL — HIGH (ref 7–23)
BUN SERPL-MCNC: 48 MG/DL — HIGH (ref 7–23)
CA-I SERPL-SCNC: 1.17 MMOL/L — SIGNIFICANT CHANGE UP (ref 1.15–1.33)
CA-I SERPL-SCNC: 1.2 MMOL/L — SIGNIFICANT CHANGE UP (ref 1.15–1.33)
CALCIUM SERPL-MCNC: 9 MG/DL — SIGNIFICANT CHANGE UP (ref 8.4–10.5)
CALCIUM SERPL-MCNC: 9.1 MG/DL — SIGNIFICANT CHANGE UP (ref 8.4–10.5)
CHLORIDE BLDV-SCNC: 101 MMOL/L — SIGNIFICANT CHANGE UP (ref 96–108)
CHLORIDE BLDV-SCNC: 96 MMOL/L — SIGNIFICANT CHANGE UP (ref 96–108)
CHLORIDE SERPL-SCNC: 100 MMOL/L — SIGNIFICANT CHANGE UP (ref 96–108)
CHLORIDE SERPL-SCNC: 95 MMOL/L — LOW (ref 96–108)
CO2 BLDV-SCNC: 22 MMOL/L — SIGNIFICANT CHANGE UP (ref 22–26)
CO2 BLDV-SCNC: 23 MMOL/L — SIGNIFICANT CHANGE UP (ref 22–26)
CO2 SERPL-SCNC: 19 MMOL/L — LOW (ref 22–31)
CO2 SERPL-SCNC: 20 MMOL/L — LOW (ref 22–31)
CREAT SERPL-MCNC: 2.93 MG/DL — HIGH (ref 0.5–1.3)
CREAT SERPL-MCNC: 4.25 MG/DL — HIGH (ref 0.5–1.3)
EGFR: 15 ML/MIN/1.73M2 — LOW
EGFR: 24 ML/MIN/1.73M2 — LOW
GAS PNL BLDV: 130 MMOL/L — LOW (ref 136–145)
GAS PNL BLDV: 132 MMOL/L — LOW (ref 136–145)
GAS PNL BLDV: SIGNIFICANT CHANGE UP
GLUCOSE BLDV-MCNC: 130 MG/DL — HIGH (ref 70–99)
GLUCOSE BLDV-MCNC: 196 MG/DL — HIGH (ref 70–99)
GLUCOSE SERPL-MCNC: 122 MG/DL — HIGH (ref 70–99)
GLUCOSE SERPL-MCNC: 178 MG/DL — HIGH (ref 70–99)
HCO3 BLDV-SCNC: 20 MMOL/L — LOW (ref 22–29)
HCO3 BLDV-SCNC: 22 MMOL/L — SIGNIFICANT CHANGE UP (ref 22–29)
HCT VFR BLD CALC: 32.6 % — LOW (ref 39–50)
HCT VFR BLDA CALC: 36 % — LOW (ref 39–51)
HCT VFR BLDA CALC: 38 % — LOW (ref 39–51)
HGB BLD CALC-MCNC: 12 G/DL — LOW (ref 12.6–17.4)
HGB BLD CALC-MCNC: 12.5 G/DL — LOW (ref 12.6–17.4)
HGB BLD-MCNC: 11.4 G/DL — LOW (ref 13–17)
HOROWITZ INDEX BLDV+IHG-RTO: 21 — SIGNIFICANT CHANGE UP
HOROWITZ INDEX BLDV+IHG-RTO: 21 — SIGNIFICANT CHANGE UP
INR BLD: 1.6 RATIO — HIGH (ref 0.88–1.16)
LACTATE BLDV-MCNC: 1.4 MMOL/L — SIGNIFICANT CHANGE UP (ref 0.5–2)
LACTATE BLDV-MCNC: 1.8 MMOL/L — SIGNIFICANT CHANGE UP (ref 0.5–2)
LIDOCAIN IGE QN: 1308 U/L — HIGH (ref 7–60)
LIDOCAIN IGE QN: 904 U/L — HIGH (ref 7–60)
MAGNESIUM SERPL-MCNC: 1.8 MG/DL — SIGNIFICANT CHANGE UP (ref 1.6–2.6)
MAGNESIUM SERPL-MCNC: 2.3 MG/DL — SIGNIFICANT CHANGE UP (ref 1.6–2.6)
MCHC RBC-ENTMCNC: 31.5 PG — SIGNIFICANT CHANGE UP (ref 27–34)
MCHC RBC-ENTMCNC: 35 GM/DL — SIGNIFICANT CHANGE UP (ref 32–36)
MCV RBC AUTO: 90.1 FL — SIGNIFICANT CHANGE UP (ref 80–100)
NRBC # BLD: 0 /100 WBCS — SIGNIFICANT CHANGE UP (ref 0–0)
OTHER CELLS CSF MANUAL: 5.5 ML/DL — LOW (ref 18–22)
PCO2 BLDV: 37 MMHG — LOW (ref 42–55)
PCO2 BLDV: 42 MMHG — SIGNIFICANT CHANGE UP (ref 42–55)
PH BLDV: 7.33 — SIGNIFICANT CHANGE UP (ref 7.32–7.43)
PH BLDV: 7.35 — SIGNIFICANT CHANGE UP (ref 7.32–7.43)
PHOSPHATE SERPL-MCNC: 2 MG/DL — LOW (ref 2.5–4.5)
PHOSPHATE SERPL-MCNC: 4.5 MG/DL — SIGNIFICANT CHANGE UP (ref 2.5–4.5)
PLATELET # BLD AUTO: 90 K/UL — LOW (ref 150–400)
PO2 BLDV: 22 MMHG — LOW (ref 25–45)
PO2 BLDV: 26 MMHG — SIGNIFICANT CHANGE UP (ref 25–45)
POTASSIUM BLDV-SCNC: 3.2 MMOL/L — LOW (ref 3.5–5.1)
POTASSIUM BLDV-SCNC: 3.3 MMOL/L — LOW (ref 3.5–5.1)
POTASSIUM SERPL-MCNC: 3.2 MMOL/L — LOW (ref 3.5–5.3)
POTASSIUM SERPL-MCNC: 3.4 MMOL/L — LOW (ref 3.5–5.3)
POTASSIUM SERPL-SCNC: 3.2 MMOL/L — LOW (ref 3.5–5.3)
POTASSIUM SERPL-SCNC: 3.4 MMOL/L — LOW (ref 3.5–5.3)
PROT SERPL-MCNC: 7.6 G/DL — SIGNIFICANT CHANGE UP (ref 6–8.3)
PROTHROM AB SERPL-ACNC: 18.7 SEC — HIGH (ref 10.5–13.4)
RBC # BLD: 3.62 M/UL — LOW (ref 4.2–5.8)
RBC # FLD: 13.5 % — SIGNIFICANT CHANGE UP (ref 10.3–14.5)
SAO2 % BLDV: 21.4 % — LOW (ref 67–88)
SAO2 % BLDV: 33.1 % — LOW (ref 67–88)
SODIUM SERPL-SCNC: 133 MMOL/L — LOW (ref 135–145)
SODIUM SERPL-SCNC: 136 MMOL/L — SIGNIFICANT CHANGE UP (ref 135–145)
TRIGL SERPL-MCNC: 137 MG/DL — SIGNIFICANT CHANGE UP
WBC # BLD: 9.28 K/UL — SIGNIFICANT CHANGE UP (ref 3.8–10.5)
WBC # FLD AUTO: 9.28 K/UL — SIGNIFICANT CHANGE UP (ref 3.8–10.5)

## 2022-11-01 PROCEDURE — 99233 SBSQ HOSP IP/OBS HIGH 50: CPT

## 2022-11-01 PROCEDURE — 99232 SBSQ HOSP IP/OBS MODERATE 35: CPT | Mod: GC

## 2022-11-01 PROCEDURE — 93970 EXTREMITY STUDY: CPT | Mod: 26

## 2022-11-01 RX ORDER — LIPASE/PROTEASE/AMYLASE 16-48-48K
1 CAPSULE,DELAYED RELEASE (ENTERIC COATED) ORAL
Refills: 0 | Status: DISCONTINUED | OUTPATIENT
Start: 2022-11-01 | End: 2022-11-02

## 2022-11-01 RX ORDER — POTASSIUM CHLORIDE 20 MEQ
10 PACKET (EA) ORAL
Refills: 0 | Status: COMPLETED | OUTPATIENT
Start: 2022-11-01 | End: 2022-11-01

## 2022-11-01 RX ORDER — MAGNESIUM SULFATE 500 MG/ML
2 VIAL (ML) INJECTION ONCE
Refills: 0 | Status: COMPLETED | OUTPATIENT
Start: 2022-11-01 | End: 2022-11-01

## 2022-11-01 RX ORDER — ONDANSETRON 8 MG/1
4 TABLET, FILM COATED ORAL EVERY 6 HOURS
Refills: 0 | Status: DISCONTINUED | OUTPATIENT
Start: 2022-11-01 | End: 2022-11-02

## 2022-11-01 RX ORDER — SODIUM CHLORIDE 9 MG/ML
1000 INJECTION, SOLUTION INTRAVENOUS
Refills: 0 | Status: DISCONTINUED | OUTPATIENT
Start: 2022-11-01 | End: 2022-11-01

## 2022-11-01 RX ORDER — POTASSIUM PHOSPHATE, MONOBASIC POTASSIUM PHOSPHATE, DIBASIC 236; 224 MG/ML; MG/ML
30 INJECTION, SOLUTION INTRAVENOUS ONCE
Refills: 0 | Status: COMPLETED | OUTPATIENT
Start: 2022-11-01 | End: 2022-11-01

## 2022-11-01 RX ORDER — DIPHENHYDRAMINE HCL 50 MG
25 CAPSULE ORAL ONCE
Refills: 0 | Status: COMPLETED | OUTPATIENT
Start: 2022-11-01 | End: 2022-11-01

## 2022-11-01 RX ORDER — POTASSIUM CHLORIDE 20 MEQ
40 PACKET (EA) ORAL ONCE
Refills: 0 | Status: COMPLETED | OUTPATIENT
Start: 2022-11-01 | End: 2022-11-01

## 2022-11-01 RX ORDER — DEXTROSE MONOHYDRATE, SODIUM CHLORIDE, AND POTASSIUM CHLORIDE 50; .745; 4.5 G/1000ML; G/1000ML; G/1000ML
1000 INJECTION, SOLUTION INTRAVENOUS
Refills: 0 | Status: DISCONTINUED | OUTPATIENT
Start: 2022-11-01 | End: 2022-11-02

## 2022-11-01 RX ADMIN — Medication 100 MILLIEQUIVALENT(S): at 12:30

## 2022-11-01 RX ADMIN — Medication 100 MILLIEQUIVALENT(S): at 09:12

## 2022-11-01 RX ADMIN — Medication 100 MILLIEQUIVALENT(S): at 00:25

## 2022-11-01 RX ADMIN — HYDROMORPHONE HYDROCHLORIDE 0.5 MILLIGRAM(S): 2 INJECTION INTRAMUSCULAR; INTRAVENOUS; SUBCUTANEOUS at 00:12

## 2022-11-01 RX ADMIN — Medication 255 MILLIMOLE(S): at 00:25

## 2022-11-01 RX ADMIN — Medication 40 MILLIEQUIVALENT(S): at 19:24

## 2022-11-01 RX ADMIN — Medication 100 MILLIEQUIVALENT(S): at 11:36

## 2022-11-01 RX ADMIN — SODIUM CHLORIDE 150 MILLILITER(S): 9 INJECTION, SOLUTION INTRAVENOUS at 20:23

## 2022-11-01 RX ADMIN — SODIUM CHLORIDE 150 MILLILITER(S): 9 INJECTION, SOLUTION INTRAVENOUS at 00:26

## 2022-11-01 RX ADMIN — HEPARIN SODIUM 5000 UNIT(S): 5000 INJECTION INTRAVENOUS; SUBCUTANEOUS at 00:11

## 2022-11-01 RX ADMIN — Medication 255 MILLIMOLE(S): at 02:05

## 2022-11-01 RX ADMIN — HEPARIN SODIUM 5000 UNIT(S): 5000 INJECTION INTRAVENOUS; SUBCUTANEOUS at 09:00

## 2022-11-01 RX ADMIN — Medication 1 CAPSULE(S): at 12:40

## 2022-11-01 RX ADMIN — Medication 25 MILLIGRAM(S): at 00:24

## 2022-11-01 RX ADMIN — POTASSIUM PHOSPHATE, MONOBASIC POTASSIUM PHOSPHATE, DIBASIC 83.33 MILLIMOLE(S): 236; 224 INJECTION, SOLUTION INTRAVENOUS at 22:29

## 2022-11-01 RX ADMIN — Medication 100 MILLIEQUIVALENT(S): at 02:05

## 2022-11-01 RX ADMIN — Medication 25 GRAM(S): at 00:25

## 2022-11-01 RX ADMIN — Medication 100 MILLIEQUIVALENT(S): at 05:13

## 2022-11-01 RX ADMIN — Medication 1 CAPSULE(S): at 17:12

## 2022-11-01 RX ADMIN — Medication 25 GRAM(S): at 19:23

## 2022-11-01 RX ADMIN — Medication 100 MILLIEQUIVALENT(S): at 03:17

## 2022-11-01 RX ADMIN — DEXTROSE MONOHYDRATE, SODIUM CHLORIDE, AND POTASSIUM CHLORIDE 150 MILLILITER(S): 50; .745; 4.5 INJECTION, SOLUTION INTRAVENOUS at 21:30

## 2022-11-01 RX ADMIN — HEPARIN SODIUM 5000 UNIT(S): 5000 INJECTION INTRAVENOUS; SUBCUTANEOUS at 17:12

## 2022-11-01 RX ADMIN — PANTOPRAZOLE SODIUM 40 MILLIGRAM(S): 20 TABLET, DELAYED RELEASE ORAL at 00:11

## 2022-11-01 RX ADMIN — HYDROMORPHONE HYDROCHLORIDE 0.5 MILLIGRAM(S): 2 INJECTION INTRAMUSCULAR; INTRAVENOUS; SUBCUTANEOUS at 00:45

## 2022-11-01 NOTE — PROGRESS NOTE ADULT - SUBJECTIVE AND OBJECTIVE BOX
Overnight events:   - No acute events    SUBJECTIVE: Patient seen and examined      OBJECTIVE:  Vital Signs Last 24 Hrs  T(C): 37.1 (31 Oct 2022 23:45), Max: 38 (31 Oct 2022 16:00)  T(F): 98.7 (31 Oct 2022 23:45), Max: 100.4 (31 Oct 2022 16:00)  HR: 122 (01 Nov 2022 00:00) (122 - 141)  BP: 114/73 (01 Nov 2022 00:00) (93/61 - 115/74)  BP(mean): 88 (01 Nov 2022 00:00) (85 - 88)  RR: 15 (01 Nov 2022 00:00) (15 - 20)  SpO2: 100% (01 Nov 2022 00:00) (98% - 100%)    Parameters below as of 31 Oct 2022 23:45  Patient On (Oxygen Delivery Method): room air          10-31-22 @ 07:01  -  11-01-22 @ 00:59  --------------------------------------------------------  IN: 400 mL / OUT: 120 mL / NET: 280 mL        Physical Examination:  Neuro: A/Ox4  HEENT: NC/AT  Respiratory: RA, no increased work of breathing  CV: sinus tachycardia   Abdomen: Soft, Non distended, tender to palpation in epigastrium without rebound tenderness/guarding/rigidity  Extremities: WWP, w/o gross deformity, FRITZ  Vascular: b/l radial pulses palpable   Skin: intact, w/o breakdown      LABS:                        14.0   11.94 )-----------( 143      ( 31 Oct 2022 14:15 )             40.8       10-31    132<L>  |  92<L>  |  49<H>  ----------------------------<  110<H>  3.1<L>   |  18<L>  |  5.35<H>    Ca    9.8      31 Oct 2022 18:53  Phos  1.0     10-31  Mg     1.3     10-31    TPro  8.2  /  Alb  4.0  /  TBili  3.2<H>  /  DBili  1.9<H>  /  AST  87<H>  /  ALT  30  /  AlkPhos  116  10-31

## 2022-11-01 NOTE — PROGRESS NOTE ADULT - PROBLEM SELECTOR PLAN 1
Pt with OFELIA in the setting of vomiting, Etoh pancreatitis, ACE/ARB use superimposed on CKD. Exact duration of OFELIA however unknown. He is admitted with a SC.r of 5.4. He was last admitted in January with a SCr. in the 3 range which trended to 1.4 by the time he was discharged.  Pt started on IVF. SCr today is down to 4.2. Pt is non oliguric. UA with 300 mg/dl proteinuria, hematuria, pyuria, no bacturia but budding yeast cells seen. Send Ucx. Urine electrolytes suggesting pre-renal etiology. Check spot urine TP/CR. VBG noted. Replace K. CT A/P:  Multiple nonobstructive left renal calculi measuring up to 8 mm.  No hydronephrosis in either kidney. Bladder underdistended.  Optimize Hemodynamics. Monitor labs and strict urine output. Avoid NSAIDs, ACEI/ARBS, RCA and nephrotoxins. Dose medications as per eGFR.

## 2022-11-01 NOTE — PROGRESS NOTE ADULT - PROBLEM SELECTOR PLAN 2
Mild hypercalcemia on arrival. Now resolved after IVF. No hx of hypercalcemia. Check iPTH, PTHrP, 25 vitamin D, 1-25 vitamin D, phos, SEPEP, SIFE, Serum free light chains. Watch for hypocalcemia from acute pancreatitis.         If you have any questions, please feel free to contact me  Yessica Faustin  Nephrology Fellow  Pager [place]: [phone]/ [facility]: [phone]    (After 5 pm or on weekends please page the on-call fellow, can check [email or web address] for schedule. Login is alycia diallo, schedule under [facility] medicine, psych, derm)

## 2022-11-01 NOTE — PROGRESS NOTE ADULT - SUBJECTIVE AND OBJECTIVE BOX
24 HOUR EVENTS:  Remains NPO, on IVF.  Electrolyte supplementation in process, will f/u repeat chemistry once completed.     PAST MEDICAL HISTORY: HTN (hypertension)    Acid reflux        PAST SURGICAL HISTORY: No significant past surgical history        FAMILY HISTORY: No pertinent family history in first degree relatives    FH: hypertension    SOCIAL HISTORY:  · Substance use	Yes  · Alcohol use	3-4drinks/day  · Substance use	denies other recreational drug use  · Tobacco use	denies tobacco use      CODE STATUS: Full    HOME MEDICATIONS:  · 	losartan 50 mg oral tablet: 1 tab(s) orally once a day  · 	famotidine 40 mg oral tablet: 1 tab(s) orally once a day (at bedtime)  · 	omeprazole 40 mg oral delayed release capsule: 1 cap(s) orally 2 times a day  · 	Vitamin D2 1.25 mg (50,000 intl units) oral capsule: 1 cap(s) orally once a week      ALLERGIES: No Known Allergies      VITAL SIGNS:  ICU Vital Signs Last 24 Hrs  T(C): 37.1 (31 Oct 2022 23:45), Max: 38 (31 Oct 2022 16:00)  T(F): 98.7 (31 Oct 2022 23:45), Max: 100.4 (31 Oct 2022 16:00)  HR: 133 (31 Oct 2022 23:45) (124 - 141)  BP: 110/70 (31 Oct 2022 23:45) (93/61 - 115/74)  BP(mean): 85 (31 Oct 2022 23:45) (85 - 85)  ABP: --  ABP(mean): --  RR: 20 (31 Oct 2022 23:45) (16 - 20)  SpO2: 100% (31 Oct 2022 23:45) (98% - 100%)    O2 Parameters below as of 31 Oct 2022 23:45  Patient On (Oxygen Delivery Method): room air            NEURO  Exam: AOx3, NAD. Follows commands, able to move all extremities.   Meds:HYDROmorphone  Injectable 0.25 milliGRAM(s) IV Push every 3 hours PRN Moderate Pain (4 - 6)  HYDROmorphone  Injectable 0.5 milliGRAM(s) IV Push every 3 hours PRN Severe Pain (7 - 10)      RESPIRATORY  Exam: CTA b/l  Meds:diphenhydrAMINE Injectable 25 milliGRAM(s) IV Push once      CARDIOVASCULAR  VBG - ( 31 Oct 2022 13:45 )  pH: 7.36  /  pCO2: 45    /  pO2: 19    / HCO3: 25    / Base Excess: -0.4  /  SaO2: 14.1   Lactate: 2.5      Exam: S1S2, rate 120s, regular rhythm. No murmurs, rubs gallops appreciated.   Cardiac Rhythm: Sinus tachycardia  Meds:    GI/NUTRITION  Exam: Soft, non-disdended, tender in epigastric region  Diet: NPO  Meds: pantoprazole  Injectable 40 milliGRAM(s) IV Push every 24 hours      GENITOURINARY/RENAL  Meds:magnesium sulfate  IVPB 2 Gram(s) IV Intermittent once  potassium chloride  10 mEq/100 mL IVPB 10 milliEquivalent(s) IV Intermittent every 1 hour  sodium chloride 0.9% 1000 milliLiter(s) IV Continuous <Continuous>  sodium phosphate IVPB 15 milliMole(s) IV Intermittent every 2 hours      Weight (kg): 81.6 (10-31 @ 11:57)  10-31    132<L>  |  92<L>  |  49<H>  ----------------------------<  110<H>  3.1<L>   |  18<L>  |  5.35<H>    Ca    9.8      31 Oct 2022 18:53  Phos  1.0     10-31  Mg     1.3     10-31    TPro  8.2  /  Alb  4.0  /  TBili  3.2<H>  /  DBili  1.9<H>  /  AST  87<H>  /  ALT  30  /  AlkPhos  116  10-31    [ X ] Juarez catheter, indication: urine output monitoring in critically ill patient    HEMATOLOGIC  [ X ] VTE Prophylaxis:  heparin   Injectable 5000 Unit(s) SubCutaneous every 8 hours                          14.0   11.94 )-----------( 143      ( 31 Oct 2022 14:15 )             40.8       Transfusion: [ ] PRBC	[ ] Platelets	[ ] FFP	[ ] Cryoprecipitate      INFECTIOUS DISEASES  Meds:  RECENT CULTURES:      ENDOCRINE  Meds:  CAPILLARY BLOOD GLUCOSE          PATIENT CARE ACCESS DEVICES:  [ X ] Peripheral IV  [ ] Central Venous Line	[ ] R	[ ] L	[ ] IJ	[ ] Fem	[ ] SC	Placed:   [ ] Arterial Line		[ ] R	[ ] L	[ ] Fem	[ ] Rad	[ ] Ax	Placed:   [ ] PICC:					[ ] Mediport  [ X ] Urinary Catheter, Date Placed: 10/31/2022  [x] Necessity of urinary, arterial, and venous catheters discussed    OTHER MEDICATIONS:     IMAGING STUDIES: < from: CT Abdomen and Pelvis No Cont (10.31.22 @ 17:08) >    IMPRESSION:  Acute interstitial pancreatitis. No peripancreatic fluid collections.    Nonobstructive left renal calculi. No right urolithiasis. No   hydronephrosis in either kidney.    Hepatic steatosis.    < end of copied text >

## 2022-11-01 NOTE — PROGRESS NOTE ADULT - ATTENDING COMMENTS
OFELIA superimposed on CKD: In the setting of decreased PO intake, vomiting, ongoing use of ARB  Baseline creatinine close to 1.4 mg/dl    -Kidney function improving slowly   -Maintain on IVF  -K repletion   -Hold ARB for now  -Left renal stones noted. Will need outpatient w/u      Rest per Dr. Ramin Robles MD  O: [phone]  Contact me on teams

## 2022-11-01 NOTE — PROGRESS NOTE ADULT - SUBJECTIVE AND OBJECTIVE BOX
[facility] Division of Kidney Diseases & Hypertension  FOLLOW UP NOTE  [phone]--------------------------------------------------------------------------------  Chief Complaint:Acute pancreatitis without infection or necrosis    HPI: Pt. is a 62 y.o. M w/ PMHx of cyclical vomiting, HTN, and GERD presents with nausea, vomiting and abdominal pain for the last few days and now with pancreatitis. Nephrology consulted for OFELIA. He is admitted with a SC.r of 5.4. He was last admitted in January with a SCr. in the 3 range which trended to 1.4 by the time he was discharged.      24 hour events/subjective: Patient seen & examined. Labs & vitals reviewed. Complains of constipation. Abdominal pain, N/V resolved. Denies chest pain, fever, chills, dysuria, hematuria, pus in urine, frothy urine, SOB, leg edema, N/V/D. UO in the last 24 hours 600cc        PAST HISTORY  --------------------------------------------------------------------------------  No significant changes to PMH, PSH, FHx, SHx, unless otherwise noted    ALLERGIES & MEDICATIONS  --------------------------------------------------------------------------------  Allergies    No Known Allergies    Intolerances      Standing Inpatient Medications  heparin   Injectable 5000 Unit(s) SubCutaneous every 8 hours  pantoprazole  Injectable 40 milliGRAM(s) IV Push every 24 hours  sodium chloride 0.9% 1000 milliLiter(s) IV Continuous <Continuous>    PRN Inpatient Medications  HYDROmorphone  Injectable 0.25 milliGRAM(s) IV Push every 3 hours PRN  HYDROmorphone  Injectable 0.5 milliGRAM(s) IV Push every 3 hours PRN      REVIEW OF SYSTEMS  --------------------------------------------------------------------------------  Gen: No chills  Respiratory: No dyspnea, cough  CV: No chest pain  GI: No abdominal pain, diarrhea,  nausea, vomiting  : No increased frequency, dysuria, hematuria  MSK:  no edema  Neuro: No dizziness/lightheadedness      All other systems were reviewed and are negative, except as noted.    VITALS/PHYSICAL EXAM  --------------------------------------------------------------------------------  T(C): 36.9 (11-01-22 @ 07:00), Max: 38 (10-31-22 @ 16:00)  HR: 108 (11-01-22 @ 07:00) (104 - 141)  BP: 146/95 (11-01-22 @ 07:00) (93/61 - 146/95)  RR: 20 (11-01-22 @ 07:00) (12 - 21)  SpO2: 99% (11-01-22 @ 07:00) (96% - 100%)  Wt(kg): --  Height (cm): 180.3 (11-01-22 @ 00:00)  Weight (kg): 89.9 (11-01-22 @ 00:00)  BMI (kg/m2): 27.7 (11-01-22 @ 00:00)  BSA (m2): 2.1 (11-01-22 @ 00:00)      10-31-22 @ 07:01  -  11-01-22 @ 07:00  --------------------------------------------------------  IN: 2250 mL / OUT: 595 mL / NET: 1655 mL      Physical Exam:  	Gen: NAD  	HEENT: MMM, Hiccups+  	Pulm: CTA B/L  	CV: S1S2  	Abd: Soft, +BS, +epigastric tenderness  	Ext: No LE edema B/L  	Neuro: Awake,  	Skin: Warm and dry  	Vascular access:      LABS/STUDIES  --------------------------------------------------------------------------------              11.4   9.28  >-----------<  90       [11-01-22 @ 06:15]              32.6     136  |  95  |  48  ----------------------------<  122      [11-01-22 @ 06:20]  3.2   |  20  |  4.25        Ca     9.0     [11-01-22 @ 06:20]      Mg     1.3     [10-31-22 @ 18:53]      Phos  1.0     [10-31-22 @ 18:53]    TPro  7.6  /  Alb  3.7  /  TBili  3.0  /  DBili  x   /  AST  65  /  ALT  28  /  AlkPhos  107  [11-01-22 @ 06:20]    PT/INR: PT 18.7 , INR 1.60       [11-01-22 @ 06:15]  PTT: 28.5       [11-01-22 @ 06:15]      Creatinine Trend:  SCr 4.25 [11-01 @ 06:20]  SCr 5.35 [10-31 @ 18:53]  SCr 5.41 [10-31 @ 14:15]    Urinalysis - [10-31-22 @ 23:04]      Color Dark Yellow / Appearance Slightly Turbid / SG 1.022 / pH 6.0      Gluc Negative / Ketone Small  / Bili Moderate / Urobili 4 mg/dL       Blood Large / Protein 300 mg/dL / Leuk Est Negative / Nitrite Negative      RBC 15 / WBC 13 / Hyaline 13 / Gran  / Sq Epi  / Non Sq Epi 3 / Bacteria Negative    Urine Creatinine 314      [10-31-22 @ 23:04]  Urine Sodium 27      [10-31-22 @ 23:04]  Urine Osmolality 347      [10-31-22 @ 23:04]    Lipid: chol --, , HDL --, LDL --      [10-31-22 @ 18:53]

## 2022-11-01 NOTE — PROGRESS NOTE ADULT - ASSESSMENT
62M PMHx HTN, GERD, hiatal hernia, ?cyclical vomiting syndrome who pw acute pancreatitis (Lipase>3000) associated with hemodynamic instability and OFELIA (Crt 5.4).     Plan:  -Aggressive fluid resuscitation-IVF @150-200cc/hr  -Appreciate nephro recs-pt may need HD, although refusing at this time  -Trend Crt   -NPO  -Cw Home GERD meds, hold Anti-HTN while borderline hypotensive  -Juarez for close I/O  -Check Blood Alcohol Level  -[facility] protocol   -Chem VTE ppx  -AM Labs  -Appreciate excellent care per SICU    Green Team Surgery   [phone]

## 2022-11-01 NOTE — PROGRESS NOTE ADULT - ASSESSMENT
ASSESSMENT: 62yMale PMHx HTN, GERD, hiatal hernia, ?cyclical vomiting syndrome who pw acute pancreatitis (Lipase>3000) associated with hypotension and severe electrolyte disfunction.     PLAN:   Neurologic:  AOx3  Pain control with Dilaudid    Respiratory:  Encourage incentive spirometry    Cardiovascular:  Tachycardia, likely multifactorial. Continue IVF, pain control.  Trend LA.  Hold home Losartan    Gastrointestinal/Nutrition:  Strict NPO  IVF  Continue home PPI  Trend Lipase    Genitourinary/Renal:  Aggressive electrolyte supplementation  K>4, Phos > 3, Mag >2  Home Losartan held in setting of OFELIA    Hematologic:  Heparin SQ for DVT ppx    Infectious Disease:   Monitor WBC curve    Endocrine: Euglycemic    Disposition: SICU  [id number]

## 2022-11-02 DIAGNOSIS — F10.10 ALCOHOL ABUSE, UNCOMPLICATED: ICD-10-CM

## 2022-11-02 DIAGNOSIS — K92.1 MELENA: ICD-10-CM

## 2022-11-02 DIAGNOSIS — R00.0 TACHYCARDIA, UNSPECIFIED: ICD-10-CM

## 2022-11-02 DIAGNOSIS — I10 ESSENTIAL (PRIMARY) HYPERTENSION: ICD-10-CM

## 2022-11-02 DIAGNOSIS — K85.90 ACUTE PANCREATITIS WITHOUT NECROSIS OR INFECTION, UNSPECIFIED: ICD-10-CM

## 2022-11-02 LAB
ANION GAP SERPL CALC-SCNC: 16 MMOL/L — SIGNIFICANT CHANGE UP (ref 5–17)
APTT BLD: 27.6 SEC — SIGNIFICANT CHANGE UP (ref 27.5–35.5)
BUN SERPL-MCNC: 32 MG/DL — HIGH (ref 7–23)
CALCIUM SERPL-MCNC: 8.5 MG/DL — SIGNIFICANT CHANGE UP (ref 8.4–10.5)
CHLORIDE SERPL-SCNC: 104 MMOL/L — SIGNIFICANT CHANGE UP (ref 96–108)
CO2 SERPL-SCNC: 16 MMOL/L — LOW (ref 22–31)
CREAT SERPL-MCNC: 1.69 MG/DL — HIGH (ref 0.5–1.3)
CULTURE RESULTS: NO GROWTH — SIGNIFICANT CHANGE UP
EGFR: 46 ML/MIN/1.73M2 — LOW
GLUCOSE SERPL-MCNC: 89 MG/DL — SIGNIFICANT CHANGE UP (ref 70–99)
HCT VFR BLD CALC: 30.4 % — LOW (ref 39–50)
HGB BLD-MCNC: 10.6 G/DL — LOW (ref 13–17)
INR BLD: 1.64 RATIO — HIGH (ref 0.88–1.16)
LIDOCAIN IGE QN: 568 U/L — HIGH (ref 7–60)
MAGNESIUM SERPL-MCNC: 2 MG/DL — SIGNIFICANT CHANGE UP (ref 1.6–2.6)
MCHC RBC-ENTMCNC: 31.8 PG — SIGNIFICANT CHANGE UP (ref 27–34)
MCHC RBC-ENTMCNC: 34.9 GM/DL — SIGNIFICANT CHANGE UP (ref 32–36)
MCV RBC AUTO: 91.3 FL — SIGNIFICANT CHANGE UP (ref 80–100)
NRBC # BLD: 0 /100 WBCS — SIGNIFICANT CHANGE UP (ref 0–0)
PHOSPHATE SERPL-MCNC: 2.9 MG/DL — SIGNIFICANT CHANGE UP (ref 2.5–4.5)
PLATELET # BLD AUTO: 86 K/UL — LOW (ref 150–400)
POTASSIUM SERPL-MCNC: 4 MMOL/L — SIGNIFICANT CHANGE UP (ref 3.5–5.3)
POTASSIUM SERPL-SCNC: 4 MMOL/L — SIGNIFICANT CHANGE UP (ref 3.5–5.3)
PROTHROM AB SERPL-ACNC: 18.9 SEC — HIGH (ref 10.5–13.4)
RBC # BLD: 3.33 M/UL — LOW (ref 4.2–5.8)
RBC # FLD: 14.3 % — SIGNIFICANT CHANGE UP (ref 10.3–14.5)
SODIUM SERPL-SCNC: 136 MMOL/L — SIGNIFICANT CHANGE UP (ref 135–145)
SPECIMEN SOURCE: SIGNIFICANT CHANGE UP
WBC # BLD: 8.24 K/UL — SIGNIFICANT CHANGE UP (ref 3.8–10.5)
WBC # FLD AUTO: 8.24 K/UL — SIGNIFICANT CHANGE UP (ref 3.8–10.5)

## 2022-11-02 PROCEDURE — 99232 SBSQ HOSP IP/OBS MODERATE 35: CPT | Mod: GC

## 2022-11-02 PROCEDURE — 99223 1ST HOSP IP/OBS HIGH 75: CPT

## 2022-11-02 PROCEDURE — 93010 ELECTROCARDIOGRAM REPORT: CPT

## 2022-11-02 RX ORDER — FOLIC ACID 0.8 MG
1 TABLET ORAL DAILY
Refills: 0 | Status: DISCONTINUED | OUTPATIENT
Start: 2022-11-02 | End: 2022-11-10

## 2022-11-02 RX ORDER — ACETAMINOPHEN 500 MG
975 TABLET ORAL EVERY 6 HOURS
Refills: 0 | Status: DISCONTINUED | OUTPATIENT
Start: 2022-11-02 | End: 2022-11-10

## 2022-11-02 RX ORDER — OXYCODONE HYDROCHLORIDE 5 MG/1
5 TABLET ORAL EVERY 4 HOURS
Refills: 0 | Status: DISCONTINUED | OUTPATIENT
Start: 2022-11-02 | End: 2022-11-02

## 2022-11-02 RX ORDER — SENNA PLUS 8.6 MG/1
2 TABLET ORAL AT BEDTIME
Refills: 0 | Status: DISCONTINUED | OUTPATIENT
Start: 2022-11-02 | End: 2022-11-02

## 2022-11-02 RX ORDER — POLYETHYLENE GLYCOL 3350 17 G/17G
17 POWDER, FOR SOLUTION ORAL DAILY
Refills: 0 | Status: DISCONTINUED | OUTPATIENT
Start: 2022-11-02 | End: 2022-11-02

## 2022-11-02 RX ORDER — LANOLIN ALCOHOL/MO/W.PET/CERES
5 CREAM (GRAM) TOPICAL AT BEDTIME
Refills: 0 | Status: DISCONTINUED | OUTPATIENT
Start: 2022-11-02 | End: 2022-11-02

## 2022-11-02 RX ORDER — PANTOPRAZOLE SODIUM 20 MG/1
40 TABLET, DELAYED RELEASE ORAL EVERY 12 HOURS
Refills: 0 | Status: DISCONTINUED | OUTPATIENT
Start: 2022-11-02 | End: 2022-11-10

## 2022-11-02 RX ORDER — ERGOCALCIFEROL 1.25 MG/1
50000 CAPSULE ORAL
Refills: 0 | Status: DISCONTINUED | OUTPATIENT
Start: 2022-11-02 | End: 2022-11-10

## 2022-11-02 RX ORDER — OXYCODONE HYDROCHLORIDE 5 MG/1
5 TABLET ORAL EVERY 4 HOURS
Refills: 0 | Status: DISCONTINUED | OUTPATIENT
Start: 2022-11-02 | End: 2022-11-07

## 2022-11-02 RX ORDER — THIAMINE MONONITRATE (VIT B1) 100 MG
100 TABLET ORAL DAILY
Refills: 0 | Status: DISCONTINUED | OUTPATIENT
Start: 2022-11-02 | End: 2022-11-10

## 2022-11-02 RX ORDER — LOSARTAN POTASSIUM 100 MG/1
50 TABLET, FILM COATED ORAL DAILY
Refills: 0 | Status: DISCONTINUED | OUTPATIENT
Start: 2022-11-02 | End: 2022-11-02

## 2022-11-02 RX ORDER — LANOLIN ALCOHOL/MO/W.PET/CERES
5 CREAM (GRAM) TOPICAL ONCE
Refills: 0 | Status: COMPLETED | OUTPATIENT
Start: 2022-11-02 | End: 2022-11-02

## 2022-11-02 RX ORDER — SODIUM CHLORIDE 9 MG/ML
1000 INJECTION, SOLUTION INTRAVENOUS
Refills: 0 | Status: DISCONTINUED | OUTPATIENT
Start: 2022-11-02 | End: 2022-11-04

## 2022-11-02 RX ADMIN — PANTOPRAZOLE SODIUM 40 MILLIGRAM(S): 20 TABLET, DELAYED RELEASE ORAL at 17:04

## 2022-11-02 RX ADMIN — Medication 5 MILLIGRAM(S): at 03:52

## 2022-11-02 RX ADMIN — PANTOPRAZOLE SODIUM 40 MILLIGRAM(S): 20 TABLET, DELAYED RELEASE ORAL at 00:29

## 2022-11-02 RX ADMIN — LOSARTAN POTASSIUM 50 MILLIGRAM(S): 100 TABLET, FILM COATED ORAL at 06:21

## 2022-11-02 RX ADMIN — Medication 100 MILLIGRAM(S): at 11:16

## 2022-11-02 RX ADMIN — HEPARIN SODIUM 5000 UNIT(S): 5000 INJECTION INTRAVENOUS; SUBCUTANEOUS at 07:13

## 2022-11-02 RX ADMIN — ERGOCALCIFEROL 50000 UNIT(S): 1.25 CAPSULE ORAL at 11:17

## 2022-11-02 RX ADMIN — DEXTROSE MONOHYDRATE, SODIUM CHLORIDE, AND POTASSIUM CHLORIDE 150 MILLILITER(S): 50; .745; 4.5 INJECTION, SOLUTION INTRAVENOUS at 07:11

## 2022-11-02 RX ADMIN — Medication 1 MILLIGRAM(S): at 11:16

## 2022-11-02 RX ADMIN — PANTOPRAZOLE SODIUM 40 MILLIGRAM(S): 20 TABLET, DELAYED RELEASE ORAL at 05:02

## 2022-11-02 RX ADMIN — Medication 1 CAPSULE(S): at 07:13

## 2022-11-02 RX ADMIN — DEXTROSE MONOHYDRATE, SODIUM CHLORIDE, AND POTASSIUM CHLORIDE 150 MILLILITER(S): 50; .745; 4.5 INJECTION, SOLUTION INTRAVENOUS at 05:03

## 2022-11-02 RX ADMIN — SODIUM CHLORIDE 125 MILLILITER(S): 9 INJECTION, SOLUTION INTRAVENOUS at 17:04

## 2022-11-02 RX ADMIN — HEPARIN SODIUM 5000 UNIT(S): 5000 INJECTION INTRAVENOUS; SUBCUTANEOUS at 00:29

## 2022-11-02 NOTE — PROGRESS NOTE ADULT - ATTENDING COMMENTS
OFELIA superimposed on CKD: In the setting of decreased PO intake, vomiting, ongoing use of ARB  Baseline creatinine close to 1.4 mg/dl    -Kidney function improved significantly and almost close to baseline    -Please check urine P/Creat  -Maintain on IVF, can decrease rate  -Please Hold ARB for now  -Left renal stones noted. Will need outpatient w/u      Rest per Dr. Ramin Robles MD  O: [phone]  Contact me on teams OFELIA superimposed on CKD: In the setting of decreased PO intake, vomiting, ongoing use of ARB  Baseline creatinine close to 1.4 mg/dl    -Kidney function improved significantly and almost close to baseline    -Please check urine Protein /Creat  -Maintain on IVF, can decrease rate  -Start sodium bicarbonate 650 TID  -Please Hold ARB for now  -Left renal stones noted. Will need outpatient w/u      Rest per Dr. Ramin Robles MD  O: [phone]  Contact me on teams

## 2022-11-02 NOTE — CONSULT NOTE ADULT - PROBLEM SELECTOR RECOMMENDATION 2
Recently developed on 11/2    -Monitor Hg  -Monitor for recurrence  -If recurs I will consider GI consult

## 2022-11-02 NOTE — PROGRESS NOTE ADULT - ASSESSMENT
62M PMHx HTN, GERD, hiatal hernia, ?cyclical vomiting syndrome who pw acute pancreatitis (Lipase>3000) associated with hemodynamic instability and OFELIA (Crt 5.4).     Plan:  -Trend Crt   -NPO  -Cw Home GERD meds, hold Anti-HTN while borderline hypotensive  -Juarez for close I/O  -CIWA protocol   -Chem VTE ppx  -AM Labs  -Appreciate excellent care per SICU    Green Team Surgery   [id number] 62M PMHx HTN, GERD, hiatal hernia, ?cyclical vomiting syndrome who pw acute pancreatitis (Lipase>3000) associated with hemodynamic instability and OFELIA (Crt 5.4). downgraded from SICU on 11/2    Plan:  -CLD  -Creon  -Cw Home GERD meds, hold Anti-HTN while borderline hypotensive  -Juarez for close I/O  -CIWA protocol   -Chem VTE ppx  -AM Labs    [place] Team Surgery   [id number] 62M PMHx HTN, GERD, hiatal hernia, ?cyclical vomiting syndrome who pw acute pancreatitis (Lipase>3000) associated with hemodynamic instability and OFELIA (Crt 5.4). downgraded from SICU on 11/2    Plan:  -cont CLD c beer at meals  -Creon  -continue home meds  -Chem VTE ppx  -AM Labs  - possible tx to medicine service    Green Team Surgery   [phone]

## 2022-11-02 NOTE — CONSULT NOTE ADULT - PROBLEM SELECTOR RECOMMENDATION 9
-Start full liquid diet and advance ad lion  -LR at 150 cc/hr for today  -Pain control PRN  -Per surgery likely etiology is alcohol abuse
Pt with OFELIA in the setting of vomiting, ACE/ARB use, ETOH use/abuse superimposed on CKD. Exact duration of OFELIA however unknown. Send UA, urine electrolytes, spot urine TP/CR. He is admitted with a SC.r of 5.4. He was last admitted in January with a SCr. in the 3 range which trended to 1.4 by the time he was discharged. Will need to consider HD if renal failure continues to worsen, however currently refusing as his mother was on ESRD for unknown reasons. Please send SPEP. R/o obstruction. Agree with giving IVF. Optimize Hemodynamics. Monitor labs and strict urine output. Avoid NSAIDs, ACEI/ARBS, RCA and nephrotoxins. Dose medications as per eGFR.    Upon discharge, for appointment scheduling please email Nephrology at [email or web address]    If you have any questions, please feel free to contact me  Rik Amaya  Nephrology Fellow  [phone]; Prefer [employer] TEAMS  (After 5pm or on weekends please page the on-call fellow)

## 2022-11-02 NOTE — CONSULT NOTE ADULT - SUBJECTIVE AND OBJECTIVE BOX
Patient is a 62y old  Male who presents with a chief complaint of     HPI:  "62M PMHx HTN, GERD, hiatal hernia, ?cyclical vomiting syndrome who pw nausea/vomiting for 5 days associated with epigastric pain, found to have acute interstitial pancreatitis on labs/imaging. Patient reports onset of nausea + non bloody, bilious emesis last Wednesday aw epigastric pain. Reports 3-4 episodes of emesis/day, PO intolerance, which has not improved prompting presentation to [facility] ED. Denies fevers/chills at home, diarrhea, hematochezia, hematemesis, CP, SOB, lightheadedness, dizziness. Notably, patient reports active daily ETOH use, "3-4 drinks/night". Also of note, patient has had prior admissions for similar sxs, last admitted 2022 for nausea/vomiting aw epigastric pain x4 days. W/u for gastroparesis and GOO was negative at that time. CT A/P w/o acute intra abdominal findings at that time. No evidence of pancreatitis either.     In ED, patient is tachycardic to 140s, borderline hypotensive 90s/60s, w low grade fever 100.4. Labs remarkable for mild leukocytosis (WBC 12), Na 131, Crt 5.4, T bili:3.7, AlkP: 136, AST:102, Lipase: >3000. CT A/P w IV contrast demonstrates acute interstitial pancreatitis without peripancreatic fluid collections. RUQ US, w/o evidence of cholecystitis, cholelithiasis, choledocholithiasis, or biliary ductal dilation.     Received 2.5L bolus in ED.  (31 Oct 2022 19:51)"    HOSPITAL COURSE: Patient found to have elevated lipase with CT A&P showing acute interstitial pancreatitis. Patient with OFELIA-on-CKD3 concerning for severe pancreatitis and was seen by surgery and admitted to SICU for further management. He had his electrolytes repleted and was aggressively hydrated. He had Juarez palced for his OFELIA-on-CKD3 and seen by nephrology--Cr eventually improved. Patient downgraded to med-surg on . He also developed three bouts of BRBPR which he describes as "a lot." Medicine consulted for further management. Last alcoholic drink >3 days prior. Tolerating clear liquid diet, but states it gives him diarrhea. Denies nausea/vomiting. Improving abdominal pain.     SUBJECTIVE / OVERNIGHT EVENTS: Patient seen and examined at bedside. No acute events overnight. Mild epigastric abdominal pain. Denies n/v    REVIEW OF SYSTEMS:   GEN: no night sweats or change in appetite  EYES: no changes in vision or diplopia   ENT: no epistaxis, sinus pain, gingival bleeding, odynophagia or dysphagia  CV: no CP, PND or palpitations  RESP: no cough, wheezing, or hemoptysis  GI: no hematemesis or melena; +hematochezia   : no dysuria, polyuria, or hematuria  MSK: no arthralgias or joint swelling   NEURO: no gross sensory changes, numbness, focal deficits  PSYCH: no depression or changes in concentration  HEME/ONC: no purpura, petechiae or night sweats  SKIN: no pruritus, hair loss or skin lesions  ALL: no photosensitivity, no complaints of anaphylaxis (SOB, throat swelling)      PAST MEDICAL & SURGICAL HISTORY:  HTN (hypertension)  Acid reflux    No significant past surgical history    FAMILY HISTORY:  FH: hypertension    Social History: 4 alcohol drinks per day    MEDICATIONS  (STANDING):  ergocalciferol 12891 Unit(s) Oral every week  folic acid 1 milliGRAM(s) Oral daily  lactated ringers. 1000 milliLiter(s) (125 mL/Hr) IV Continuous <Continuous>  pantoprazole    Tablet 40 milliGRAM(s) Oral every 12 hours  thiamine 100 milliGRAM(s) Oral daily    MEDICATIONS  (PRN):  acetaminophen     Tablet .. 975 milliGRAM(s) Oral every 6 hours PRN Mild Pain (1 - 3)  oxyCODONE    IR 5 milliGRAM(s) Oral every 4 hours PRN Severe Pain (7 - 10)      CAPILLARY BLOOD GLUCOSE        I&O's Summary    2022 07:01  -  2022 07:00  --------------------------------------------------------  IN: 4841.4 mL / OUT: 3240 mL / NET: 1601.4 mL    2022 07:  -  2022 13:24  --------------------------------------------------------  IN: 765 mL / OUT: 400 mL / NET: 365 mL    PHYSICAL EXAM:  Vital Signs Last 24 Hrs  T(C): 37 (2022 12:39), Max: 37.4 (2022 15:00)  T(F): 98.6 (:39), Max: 99.3 (2022 15:00)  HR: 110 (:39) (107 - 123)  BP: 141/91 (:39) (124/95 - 165/89)  BP(mean): 108 (2022 01:00) (93 - 120)  RR: 18 (:39) (17 - 29)  SpO2: 99% (:39) (96% - 100%)    Parameters below as of 2022 12:39  Patient On (Oxygen Delivery Method): room air    GEN: male in NAD, appears comfortable, no diaphoresis  EYES: No scleral injection, PERRL, EOMI  ENTM: neck supple & symmetric without tracheal deviation, moist membranes, no gross hearing impairment, thyroid gland not enlarged  CV: +S1/S2, no m/r/g, no abdominal bruit, no LE edema  RESP: breathing comfortably, no respiratory accessory muscle use, CTAB, no w/r/r  GI: normoactive BS, soft, ND, no rebounding/guarding, no palpable masses, +tender to deep palpation in epigastric region  LYMPHATICS: no LAD or tenderness to palpation  NEURO: AOx3, no focal deficits, CNII-XII grossly intact  PSYCH: No SI/HI/AVH, appropriate affect, appropriate insight/judgment   SKIN: no petechiae, ecchymosis or maculopapular rash noted    LABS:                        10.6   8.24  )-----------( 86       ( 2022 09:43 )             30.4     11-02    136  |  104  |  32<H>  ----------------------------<  89  4.0   |  16<L>  |  1.69<H>    Ca    8.5      2022 09:43  Phos  2.9     11-  Mg     2.0     11-    TPro  7.6  /  Alb  3.7  /  TBili  3.0<H>  /  DBili  x   /  AST  65<H>  /  ALT  28  /  AlkPhos  107  11-01    PT/INR - ( 2022 09:43 )   PT: 18.9 sec;   INR: 1.64 ratio         PTT - ( 2022 09:43 )  PTT:27.6 sec      Urinalysis Basic - ( 31 Oct 2022 23:04 )    Color: Dark Yellow / Appearance: Slightly Turbid / S.022 / pH: x  Gluc: x / Ketone: Small  / Bili: Moderate / Urobili: 4 mg/dL   Blood: x / Protein: 300 mg/dL / Nitrite: Negative   Leuk Esterase: Negative / RBC: 15 /hpf / WBC 13 /HPF   Sq Epi: x / Non Sq Epi: 3 /hpf / Bacteria: Negative        Culture - Urine (collected 31 Oct 2022 23:04)  Source: Clean Catch Clean Catch (Midstream)  Final Report (2022 07:49):    No growth    Culture - Blood (collected 31 Oct 2022 15:40)  Source: .Blood Blood-Venous  Preliminary Report (2022 22:02):    No growth to date.    Culture - Blood (collected 31 Oct 2022 15:40)  Source: .Blood Blood-Peripheral  Preliminary Report (2022 22:02):    No growth to date.        RADIOLOGY & ADDITIONAL TESTS:  Results Reviewed:   Imaging Personally Reviewed:  Electrocardiogram Personally Reviewed:    COORDINATION OF CARE:  Care Discussed with Consultants/Other Providers [Y/N]:  Prior or Outpatient Records Reviewed [Y/N]:

## 2022-11-02 NOTE — CHART NOTE - NSCHARTNOTEFT_GEN_A_CORE
SICU Transfer Note    Transfer from: SICU  Transfer to:   Accepting physican:    SICU COURSE:  62yMale PMHx HTN, GERD, hiatal hernia, ?cyclical vomiting syndrome who pw acute pancreatitis (Lipase>3000) associated with hypotension and severe electrolyte dysfunction. patient was fluid resuscitated, electrolyte repleted, given Creon, and tolerated clear liquid diet. He was downgraded to the floor on 11/2.      ASSESSMENT & PLAN: 62yMale PMHx HTN, GERD, hiatal hernia, ?cyclical vomiting syndrome who pw acute pancreatitis (Lipase>3000) associated with hypotension and severe electrolyte dysfunction. After fluid resuscitation, phos repletions, he was downgraded to the floor on 11/2.      For Follow-Up:  -Am labs  -Diet tolerance    Vital Signs Last 24 Hrs  T(C): 36.9 (02 Nov 2022 02:23), Max: 37.4 (01 Nov 2022 15:00)  T(F): 98.4 (02 Nov 2022 02:23), Max: 99.3 (01 Nov 2022 15:00)  HR: 111 (02 Nov 2022 02:23) (104 - 123)  BP: 142/100 (02 Nov 2022 02:23) (114/66 - 165/89)  BP(mean): 108 (02 Nov 2022 01:00) (85 - 120)  RR: 18 (02 Nov 2022 02:23) (13 - 29)  SpO2: 99% (02 Nov 2022 02:23) (96% - 100%)    Parameters below as of 02 Nov 2022 02:23  Patient On (Oxygen Delivery Method): room air      I&O's Summary    31 Oct 2022 07:01  -  01 Nov 2022 07:00  --------------------------------------------------------  IN: 2250 mL / OUT: 595 mL / NET: 1655 mL    01 Nov 2022 07:01  -  02 Nov 2022 03:15  --------------------------------------------------------  IN: 4091.4 mL / OUT: 2640 mL / NET: 1451.4 mL          MEDICATIONS  (STANDING):  heparin   Injectable 5000 Unit(s) SubCutaneous every 8 hours  melatonin 5 milliGRAM(s) Oral once  pancrelipase  (CREON 12,000 Lipase Units) 1 Capsule(s) Oral three times a day with meals  pantoprazole    Tablet 40 milliGRAM(s) Oral every 12 hours  polyethylene glycol 3350 17 Gram(s) Oral daily  senna 2 Tablet(s) Oral at bedtime  sodium chloride 0.9% with potassium chloride 20 mEq/L 1000 milliLiter(s) (150 mL/Hr) IV Continuous <Continuous>    MEDICATIONS  (PRN):  acetaminophen     Tablet .. 975 milliGRAM(s) Oral every 6 hours PRN Mild Pain (1 - 3)  oxyCODONE    IR 5 milliGRAM(s) Oral every 4 hours PRN Moderate Pain (4 - 6)        LABS                                            11.4                  Neurophils% (auto):   x      (11-01 @ 06:15):    9.28 )-----------(90           Lymphocytes% (auto):  x                                             32.6                   Eosinphils% (auto):   x        Manual%: Neutrophils x    ; Lymphocytes x    ; Eosinophils x    ; Bands%: x    ; Blasts x                                    133    |  100    |  42                  Calcium: 9.1   / iCa: x      (11-01 @ 18:22)    ----------------------------<  178       Magnesium: 1.8                              3.4     |  19     |  2.93             Phosphorous: 2.0      TPro  7.6    /  Alb  3.7    /  TBili  3.0    /  DBili  x      /  AST  65     /  ALT  28     /  AlkPhos  107    01 Nov 2022 06:20    ( 11-01 @ 06:15 )   PT: 18.7 sec;   INR: 1.60 ratio  aPTT: 28.5 sec SICU Transfer Note    Transfer from: SICU  Transfer to: [address] [place] [address]  Accepting physician: Betsy    SICU COURSE:  62yMale PMHx HTN, GERD, hiatal hernia, ?cyclical vomiting syndrome who pw acute pancreatitis (Lipase>3000) associated with hypotension and severe electrolyte dysfunction. patient was fluid resuscitated, electrolyte repleted, given Creon, and tolerated clear liquid diet. He was downgraded to the floor on 11/2.      ASSESSMENT & PLAN: 62yMale PMHx HTN, GERD, hiatal hernia, ?cyclical vomiting syndrome who pw acute pancreatitis (Lipase>3000) associated with hypotension and severe electrolyte dysfunction. After fluid resuscitation, phos repletions, he was downgraded to the floor on 11/2.      For Follow-Up:  -Am labs  -Diet tolerance    Vital Signs Last 24 Hrs  T(C): 36.9 (02 Nov 2022 02:23), Max: 37.4 (01 Nov 2022 15:00)  T(F): 98.4 (02 Nov 2022 02:23), Max: 99.3 (01 Nov 2022 15:00)  HR: 111 (02 Nov 2022 02:23) (104 - 123)  BP: 142/100 (02 Nov 2022 02:23) (114/66 - 165/89)  BP(mean): 108 (02 Nov 2022 01:00) (85 - 120)  RR: 18 (02 Nov 2022 02:23) (13 - 29)  SpO2: 99% (02 Nov 2022 02:23) (96% - 100%)    Parameters below as of 02 Nov 2022 02:23  Patient On (Oxygen Delivery Method): room air      I&O's Summary    31 Oct 2022 07:01  -  01 Nov 2022 07:00  --------------------------------------------------------  IN: 2250 mL / OUT: 595 mL / NET: 1655 mL    01 Nov 2022 07:01  -  02 Nov 2022 03:15  --------------------------------------------------------  IN: 4091.4 mL / OUT: 2640 mL / NET: 1451.4 mL          MEDICATIONS  (STANDING):  heparin   Injectable 5000 Unit(s) SubCutaneous every 8 hours  melatonin 5 milliGRAM(s) Oral once  pancrelipase  (CREON 12,000 Lipase Units) 1 Capsule(s) Oral three times a day with meals  pantoprazole    Tablet 40 milliGRAM(s) Oral every 12 hours  polyethylene glycol 3350 17 Gram(s) Oral daily  senna 2 Tablet(s) Oral at bedtime  sodium chloride 0.9% with potassium chloride 20 mEq/L 1000 milliLiter(s) (150 mL/Hr) IV Continuous <Continuous>    MEDICATIONS  (PRN):  acetaminophen     Tablet .. 975 milliGRAM(s) Oral every 6 hours PRN Mild Pain (1 - 3)  oxyCODONE    IR 5 milliGRAM(s) Oral every 4 hours PRN Moderate Pain (4 - 6)        LABS                                            11.4                  Neurophils% (auto):   x      (11-01 @ 06:15):    9.28 )-----------(90           Lymphocytes% (auto):  x                                             32.6                   Eosinphils% (auto):   x        Manual%: Neutrophils x    ; Lymphocytes x    ; Eosinophils x    ; Bands%: x    ; Blasts x                                    133    |  100    |  42                  Calcium: 9.1   / iCa: x      (11-01 @ 18:22)    ----------------------------<  178       Magnesium: 1.8                              3.4     |  19     |  2.93             Phosphorous: 2.0      TPro  7.6    /  Alb  3.7    /  TBili  3.0    /  DBili  x      /  AST  65     /  ALT  28     /  AlkPhos  107    01 Nov 2022 06:20    ( 11-01 @ 06:15 )   PT: 18.7 sec;   INR: 1.60 ratio  aPTT: 28.5 sec

## 2022-11-02 NOTE — PROGRESS NOTE ADULT - PROBLEM SELECTOR PLAN 2
Mild hypercalcemia on arrival. Now resolved after IVF. No hx of hypercalcemia. Check iPTH, PTHrP, 25 vitamin D, 1-25 vitamin D, phos, SEPEP, SIFE, Serum free light chains. Watch for hypocalcemia from acute pancreatitis.         If you have any questions, please feel free to contact me  Yessica Faustin  Nephrology Fellow  Pager NS: [phone]/ [facility]: [phone]    (After 5 pm or on weekends please page the on-call fellow, can check [email or web address] for schedule. Login is alycia diallo, schedule under [facility] medicine, psych, derm) Mild hypercalcemia on arrival. Now resolved after IVF. No hx of hypercalcemia.  Watch for hypocalcemia from acute pancreatitis.         If you have any questions, please feel free to contact me  Yessica Faustin  Nephrology Fellow  Pager NS: [phone]/ [facility]: [phone]    (After 5 pm or on weekends please page the on-call fellow, can check [email or web address] for schedule. Login is alycia diallo, schedule under [facility] medicine, psych, derm)

## 2022-11-02 NOTE — PROGRESS NOTE ADULT - SUBJECTIVE AND OBJECTIVE BOX
[facility] Division of Kidney Diseases & Hypertension  FOLLOW UP NOTE  [phone]--------------------------------------------------------------------------------  Chief Complaint:Acute pancreatitis without infection or necrosis      HPI: Pt. is a 62 y.o. M w/ PMHx of cyclical vomiting, HTN, and GERD presents with nausea, vomiting and abdominal pain for the last few days and now with pancreatitis. Nephrology consulted for OFELIA. He is admitted with a SC.r of 5.4. He was last admitted in January with a SCr. in the 3 range which trended to 1.4 by the time he was discharged.      24 hour events/subjective: Patient seen & examined. Labs & vitals reviewed. Complains of bloody BMs today. Abdominal pain, N/V resolved. Denies chest pain, fever, chills, dysuria, hematuria, pus in urine, frothy urine, SOB, leg edema, N/V/D. UO in the last 24 hour3.2L        PAST HISTORY  --------------------------------------------------------------------------------  No significant changes to PMH, PSH, FHx, SHx, unless otherwise noted    ALLERGIES & MEDICATIONS  --------------------------------------------------------------------------------  Allergies    No Known Allergies    Intolerances      Standing Inpatient Medications  ergocalciferol 85702 Unit(s) Oral every week  folic acid 1 milliGRAM(s) Oral daily  heparin   Injectable 5000 Unit(s) SubCutaneous every 8 hours  losartan 50 milliGRAM(s) Oral daily  pancrelipase  (CREON 12,000 Lipase Units) 1 Capsule(s) Oral three times a day with meals  pantoprazole    Tablet 40 milliGRAM(s) Oral every 12 hours  polyethylene glycol 3350 17 Gram(s) Oral daily  senna 2 Tablet(s) Oral at bedtime  sodium chloride 0.9% with potassium chloride 20 mEq/L 1000 milliLiter(s) IV Continuous <Continuous>  thiamine 100 milliGRAM(s) Oral daily    PRN Inpatient Medications  acetaminophen     Tablet .. 975 milliGRAM(s) Oral every 6 hours PRN  oxyCODONE    IR 5 milliGRAM(s) Oral every 4 hours PRN      REVIEW OF SYSTEMS  --------------------------------------------------------------------------------        All other systems were reviewed and are negative, except as noted.    VITALS/PHYSICAL EXAM  --------------------------------------------------------------------------------  T(C): 36.9 (11-02-22 @ 06:22), Max: 37.4 (11-01-22 @ 15:00)  HR: 111 (11-02-22 @ 06:22) (107 - 123)  BP: 154/99 (11-02-22 @ 06:22) (124/95 - 165/89)  RR: 18 (11-02-22 @ 06:22) (17 - 29)  SpO2: 99% (11-02-22 @ 06:22) (96% - 100%)  Wt(kg): --  Height (cm): 180.3 (11-01-22 @ 00:00)  Weight (kg): 89.9 (11-01-22 @ 00:00)  BMI (kg/m2): 27.7 (11-01-22 @ 00:00)  BSA (m2): 2.1 (11-01-22 @ 00:00)      11-01-22 @ 07:01  -  11-02-22 @ 07:00  --------------------------------------------------------  IN: 4841.4 mL / OUT: 3240 mL / NET: 1601.4 mL    11-02-22 @ 07:01  -  11-02-22 @ 12:06  --------------------------------------------------------  IN: 540 mL / OUT: 400 mL / NET: 140 mL      Physical Exam:  	Gen: NAD  	HEENT: MMM  	Pulm: CTA B/L  	CV: S1S2  	Abd: Soft, +BS, +epigastric tenderness  	Ext: No LE edema B/L  	Neuro: Awake, no asterexis, mild tremor  	Skin: Warm and dry  	Vascular access:    LABS/STUDIES  --------------------------------------------------------------------------------              10.6   8.24  >-----------<  86       [11-02-22 @ 09:43]              30.4     136  |  104  |  32  ----------------------------<  89      [11-02-22 @ 09:43]  4.0   |  16  |  1.69        Ca     8.5     [11-02-22 @ 09:43]      Mg     2.0     [11-02-22 @ 09:43]      Phos  2.9     [11-02-22 @ 09:43]    TPro  7.6  /  Alb  3.7  /  TBili  3.0  /  DBili  x   /  AST  65  /  ALT  28  /  AlkPhos  107  [11-01-22 @ 06:20]    PT/INR: PT 18.9 , INR 1.64       [11-02-22 @ 09:43]  PTT: 27.6       [11-02-22 @ 09:43]      Creatinine Trend:  SCr 1.69 [11-02 @ 09:43]  SCr 2.93 [11-01 @ 18:22]  SCr 4.25 [11-01 @ 06:20]  SCr 5.35 [10-31 @ 18:53]  SCr 5.41 [10-31 @ 14:15]    Urinalysis - [10-31-22 @ 23:04]      Color Dark Yellow / Appearance Slightly Turbid / SG 1.022 / pH 6.0      Gluc Negative / Ketone Small  / Bili Moderate / Urobili 4 mg/dL       Blood Large / Protein 300 mg/dL / Leuk Est Negative / Nitrite Negative      RBC 15 / WBC 13 / Hyaline 13 / Gran  / Sq Epi  / Non Sq Epi 3 / Bacteria Negative    Urine Creatinine 314      [10-31-22 @ 23:04]  Urine Sodium 27      [10-31-22 @ 23:04]  Urine Osmolality 347      [10-31-22 @ 23:04]    Lipid: chol --, , HDL --, LDL --      [10-31-22 @ 18:53]

## 2022-11-02 NOTE — CONSULT NOTE ADULT - ASSESSMENT
62M with PMHx of HTN, GERD/Hiatal Hernia, CKD3, alcohol abuse, and hepatic steatosis presenting for poor PO intake and epigastric pain found to have pancreatitis. Patient initially in SICU and then downgraded to med-surg unit on 11/2. Hospital course complicated by OFELIA-on-CKD3 and recent BRBPR. Patient to be accepted to medicine for further management.

## 2022-11-02 NOTE — PROGRESS NOTE ADULT - SUBJECTIVE AND OBJECTIVE BOX
POD #    Overnight events:   - No acute events    SUBJECTIVE: Patient seen and examined      OBJECTIVE:  Vital Signs Last 24 Hrs  T(C): 36.9 (02 Nov 2022 02:23), Max: 37.4 (01 Nov 2022 15:00)  T(F): 98.4 (02 Nov 2022 02:23), Max: 99.3 (01 Nov 2022 15:00)  HR: 111 (02 Nov 2022 02:23) (104 - 123)  BP: 142/100 (02 Nov 2022 02:23) (114/66 - 165/89)  BP(mean): 108 (02 Nov 2022 01:00) (85 - 120)  RR: 18 (02 Nov 2022 02:23) (12 - 29)  SpO2: 99% (02 Nov 2022 02:23) (96% - 100%)    Parameters below as of 02 Nov 2022 02:23  Patient On (Oxygen Delivery Method): room air          10-31-22 @ 07:01  -  11-01-22 @ 07:00  --------------------------------------------------------  IN: 2250 mL / OUT: 595 mL / NET: 1655 mL    11-01-22 @ 07:01  -  11-02-22 @ 02:30  --------------------------------------------------------  IN: 4091.4 mL / OUT: 2640 mL / NET: 1451.4 mL        Physical Examination:  Neuro: A/Ox4  HEENT: NC/AT  Respiratory: RA, no increased work of breathing  CV: sinus tachycardia   Abdomen: Soft, Non distended, tender to palpation in epigastrium without rebound tenderness/guarding/rigidity  Extremities: WWP, w/o gross deformity, FRITZ  Vascular: b/l radial pulses palpable   Skin: intact, w/o breakdown      LABS:                        11.4   9.28  )-----------( 90       ( 01 Nov 2022 06:15 )             32.6       11-01    133<L>  |  100  |  42<H>  ----------------------------<  178<H>  3.4<L>   |  19<L>  |  2.93<H>    Ca    9.1      01 Nov 2022 18:22  Phos  2.0     11-01  Mg     1.8     11-01    TPro  7.6  /  Alb  3.7  /  TBili  3.0<H>  /  DBili  x   /  AST  65<H>  /  ALT  28  /  AlkPhos  107  11-01         Overnight events:   - No acute events    SUBJECTIVE: Patient seen and examined      OBJECTIVE:  Vital Signs Last 24 Hrs  T(C): 36.9 (02 Nov 2022 02:23), Max: 37.4 (01 Nov 2022 15:00)  T(F): 98.4 (02 Nov 2022 02:23), Max: 99.3 (01 Nov 2022 15:00)  HR: 111 (02 Nov 2022 02:23) (104 - 123)  BP: 142/100 (02 Nov 2022 02:23) (114/66 - 165/89)  BP(mean): 108 (02 Nov 2022 01:00) (85 - 120)  RR: 18 (02 Nov 2022 02:23) (12 - 29)  SpO2: 99% (02 Nov 2022 02:23) (96% - 100%)    Parameters below as of 02 Nov 2022 02:23  Patient On (Oxygen Delivery Method): room air          10-31-22 @ 07:01  -  11-01-22 @ 07:00  --------------------------------------------------------  IN: 2250 mL / OUT: 595 mL / NET: 1655 mL    11-01-22 @ 07:01  -  11-02-22 @ 02:30  --------------------------------------------------------  IN: 4091.4 mL / OUT: 2640 mL / NET: 1451.4 mL        Physical Examination:  Neuro: A/Ox4  HEENT: NC/AT  Respiratory: RA, no increased work of breathing  CV: sinus tachycardia   Abdomen: Soft, Non distended, tender to palpation in epigastrium without rebound tenderness/guarding/rigidity  Extremities: WWP, w/o gross deformity, FRITZ  Vascular: b/l radial pulses palpable   Skin: intact, w/o breakdown      LABS:                        11.4   9.28  )-----------( 90       ( 01 Nov 2022 06:15 )             32.6       11-01    133<L>  |  100  |  42<H>  ----------------------------<  178<H>  3.4<L>   |  19<L>  |  2.93<H>    Ca    9.1      01 Nov 2022 18:22  Phos  2.0     11-01  Mg     1.8     11-01    TPro  7.6  /  Alb  3.7  /  TBili  3.0<H>  /  DBili  x   /  AST  65<H>  /  ALT  28  /  AlkPhos  107  11-01         Overnight events:   - downgraded from SICU    SUBJECTIVE: Patient seen and examined      OBJECTIVE:  Vital Signs Last 24 Hrs  T(C): 36.9 (02 Nov 2022 02:23), Max: 37.4 (01 Nov 2022 15:00)  T(F): 98.4 (02 Nov 2022 02:23), Max: 99.3 (01 Nov 2022 15:00)  HR: 111 (02 Nov 2022 02:23) (104 - 123)  BP: 142/100 (02 Nov 2022 02:23) (114/66 - 165/89)  BP(mean): 108 (02 Nov 2022 01:00) (85 - 120)  RR: 18 (02 Nov 2022 02:23) (12 - 29)  SpO2: 99% (02 Nov 2022 02:23) (96% - 100%)    Parameters below as of 02 Nov 2022 02:23  Patient On (Oxygen Delivery Method): room air          10-31-22 @ 07:01  -  11-01-22 @ 07:00  --------------------------------------------------------  IN: 2250 mL / OUT: 595 mL / NET: 1655 mL    11-01-22 @ 07:01  -  11-02-22 @ 02:30  --------------------------------------------------------  IN: 4091.4 mL / OUT: 2640 mL / NET: 1451.4 mL        Physical Examination:  Neuro: A/Ox4  HEENT: NC/AT  Respiratory: RA, no increased work of breathing  CV: sinus tachycardia   Abdomen: Soft, Non distended, tender to palpation in epigastrium without rebound tenderness/guarding/rigidity  Extremities: WWP, w/o gross deformity, FRITZ  Vascular: b/l radial pulses palpable   Skin: intact, w/o breakdown      LABS:                        11.4   9.28  )-----------( 90       ( 01 Nov 2022 06:15 )             32.6       11-01    133<L>  |  100  |  42<H>  ----------------------------<  178<H>  3.4<L>   |  19<L>  |  2.93<H>    Ca    9.1      01 Nov 2022 18:22  Phos  2.0     11-01  Mg     1.8     11-01    TPro  7.6  /  Alb  3.7  /  TBili  3.0<H>  /  DBili  x   /  AST  65<H>  /  ALT  28  /  AlkPhos  107  11-01         Overnight events:   - downgraded from SICU    SUBJECTIVE: Patient seen and examined      OBJECTIVE:  Vital Signs Last 24 Hrs  T(C): 36.9 (02 Nov 2022 02:23), Max: 37.4 (01 Nov 2022 15:00)  T(F): 98.4 (02 Nov 2022 02:23), Max: 99.3 (01 Nov 2022 15:00)  HR: 111 (02 Nov 2022 02:23) (104 - 123)  BP: 142/100 (02 Nov 2022 02:23) (114/66 - 165/89)  BP(mean): 108 (02 Nov 2022 01:00) (85 - 120)  RR: 18 (02 Nov 2022 02:23) (12 - 29)  SpO2: 99% (02 Nov 2022 02:23) (96% - 100%)    Parameters below as of 02 Nov 2022 02:23  Patient On (Oxygen Delivery Method): room air          10-31-22 @ 07:01  -  11-01-22 @ 07:00  --------------------------------------------------------  IN: 2250 mL / OUT: 595 mL / NET: 1655 mL    11-01-22 @ 07:01  -  11-02-22 @ 02:30  --------------------------------------------------------  IN: 4091.4 mL / OUT: 2640 mL / NET: 1451.4 mL        Physical Examination:  Neuro: A/Ox4  HEENT: NC/AT  Respiratory: RA, no increased work of breathing  CV: sinus tachycardia  Abdomen: Soft, Non distended, tender to palpation in epigastrium without rebound tenderness/guarding/rigidity  Extremities: WWP, w/o gross deformity, FRITZ  Vascular: b/l radial pulses palpable   Skin: intact, w/o breakdown      LABS:                        11.4   9.28  )-----------( 90       ( 01 Nov 2022 06:15 )             32.6       11-01    133<L>  |  100  |  42<H>  ----------------------------<  178<H>  3.4<L>   |  19<L>  |  2.93<H>    Ca    9.1      01 Nov 2022 18:22  Phos  2.0     11-01  Mg     1.8     11-01    TPro  7.6  /  Alb  3.7  /  TBili  3.0<H>  /  DBili  x   /  AST  65<H>  /  ALT  28  /  AlkPhos  107  11-01         Overnight events:   - downgraded from SICU    SUBJECTIVE: Patient seen and examined, c/o abdominal pain, no n/v      OBJECTIVE:  Vital Signs Last 24 Hrs  T(C): 36.9 (02 Nov 2022 02:23), Max: 37.4 (01 Nov 2022 15:00)  T(F): 98.4 (02 Nov 2022 02:23), Max: 99.3 (01 Nov 2022 15:00)  HR: 111 (02 Nov 2022 02:23) (104 - 123)  BP: 142/100 (02 Nov 2022 02:23) (114/66 - 165/89)  BP(mean): 108 (02 Nov 2022 01:00) (85 - 120)  RR: 18 (02 Nov 2022 02:23) (12 - 29)  SpO2: 99% (02 Nov 2022 02:23) (96% - 100%)    Parameters below as of 02 Nov 2022 02:23  Patient On (Oxygen Delivery Method): room air          10-31-22 @ 07:01  -  11-01-22 @ 07:00  --------------------------------------------------------  IN: 2250 mL / OUT: 595 mL / NET: 1655 mL    11-01-22 @ 07:01  -  11-02-22 @ 02:30  --------------------------------------------------------  IN: 4091.4 mL / OUT: 2640 mL / NET: 1451.4 mL        Physical Examination:  HEENT: NC/AT  Respiratory: RA, no increased work of breathing  CV: sinus tachycardia  Abdomen: Soft, Non distended, tender to palpation in epigastrium without rebound tenderness/guarding/rigidity  Extremities: WWP, w/o gross deformity, FRITZ      LABS:                        11.4   9.28  )-----------( 90       ( 01 Nov 2022 06:15 )             32.6       11-01    133<L>  |  100  |  42<H>  ----------------------------<  178<H>  3.4<L>   |  19<L>  |  2.93<H>    Ca    9.1      01 Nov 2022 18:22  Phos  2.0     11-01  Mg     1.8     11-01    TPro  7.6  /  Alb  3.7  /  TBili  3.0<H>  /  DBili  x   /  AST  65<H>  /  ALT  28  /  AlkPhos  107  11-01

## 2022-11-02 NOTE — PROGRESS NOTE ADULT - ATTENDING COMMENTS
Patient seen/examined.  Agree w above note and plan and have discussed plan w house staff.  Pain somewhat improved.  afebrile.  Abdomen soft.  Care per cardiac service.  Pls re-consult prn    Rishi Meyer MD Patient seen/examined.  Agree w above note and plan and have discussed plan w house staff.  Pain somewhat improved.  afebrile.  C/o small amount blood per rectum.  Last colonoscopy "many years ago".  Will need GI consult after pancreatitis resolved.  Adin for DT prophylaxis.  Abdomen soft.  Care per cardiac service.  Pls re-consult prn    Rishi Meyer MD

## 2022-11-02 NOTE — PROGRESS NOTE ADULT - PROBLEM SELECTOR PLAN 1
Pt with OFELIA in the setting of vomiting, Etoh pancreatitis, ACE/ARB use superimposed on CKD. Exact duration of OFELIA however unknown. He is admitted with a SC.r of 5.4. He was last admitted in January with a SCr. in the 3 range which trended to 1.4 by the time he was discharged.  Pt on IVF. SCr today is down to 1.6. Kidney function improved significantly and almost close to baseline. Pt is non oliguric. UA with 300 mg/dl proteinuria, hematuria, pyuria, no bacturia but budding yeast cells seen. Send Ucx. Urine electrolytes suggesting pre-renal etiology. Check spot urine TP/CR. VBG noted. Replace K. CT A/P:  Multiple nonobstructive left renal calculi measuring up to 8 mm.  No hydronephrosis in either kidney. c/w IVF. Optimize Hemodynamics. Monitor labs and strict urine output. Avoid NSAIDs, ACEI/ARBS, RCA and nephrotoxins. Dose medications as per eGFR.

## 2022-11-03 LAB
ALBUMIN SERPL ELPH-MCNC: 3.2 G/DL — LOW (ref 3.3–5)
ALP SERPL-CCNC: 107 U/L — SIGNIFICANT CHANGE UP (ref 40–120)
ALT FLD-CCNC: 26 U/L — SIGNIFICANT CHANGE UP (ref 10–45)
ANION GAP SERPL CALC-SCNC: 12 MMOL/L — SIGNIFICANT CHANGE UP (ref 5–17)
AST SERPL-CCNC: 58 U/L — HIGH (ref 10–40)
BILIRUB SERPL-MCNC: 2.1 MG/DL — HIGH (ref 0.2–1.2)
BLD GP AB SCN SERPL QL: NEGATIVE — SIGNIFICANT CHANGE UP
BUN SERPL-MCNC: 20 MG/DL — SIGNIFICANT CHANGE UP (ref 7–23)
CALCIUM SERPL-MCNC: 8.8 MG/DL — SIGNIFICANT CHANGE UP (ref 8.4–10.5)
CHLORIDE SERPL-SCNC: 105 MMOL/L — SIGNIFICANT CHANGE UP (ref 96–108)
CO2 SERPL-SCNC: 19 MMOL/L — LOW (ref 22–31)
CREAT SERPL-MCNC: 0.99 MG/DL — SIGNIFICANT CHANGE UP (ref 0.5–1.3)
EGFR: 87 ML/MIN/1.73M2 — SIGNIFICANT CHANGE UP
GLUCOSE SERPL-MCNC: 129 MG/DL — HIGH (ref 70–99)
HCT VFR BLD CALC: 28 % — LOW (ref 39–50)
HGB BLD-MCNC: 9.7 G/DL — LOW (ref 13–17)
MAGNESIUM SERPL-MCNC: 1.4 MG/DL — LOW (ref 1.6–2.6)
MAGNESIUM SERPL-MCNC: 1.5 MG/DL — LOW (ref 1.6–2.6)
MCHC RBC-ENTMCNC: 31.7 PG — SIGNIFICANT CHANGE UP (ref 27–34)
MCHC RBC-ENTMCNC: 34.6 GM/DL — SIGNIFICANT CHANGE UP (ref 32–36)
MCV RBC AUTO: 91.5 FL — SIGNIFICANT CHANGE UP (ref 80–100)
NRBC # BLD: 0 /100 WBCS — SIGNIFICANT CHANGE UP (ref 0–0)
PHOSPHATE SERPL-MCNC: 1.6 MG/DL — LOW (ref 2.5–4.5)
PHOSPHATE SERPL-MCNC: 2.3 MG/DL — LOW (ref 2.5–4.5)
PLATELET # BLD AUTO: 130 K/UL — LOW (ref 150–400)
POTASSIUM SERPL-MCNC: 3.2 MMOL/L — LOW (ref 3.5–5.3)
POTASSIUM SERPL-MCNC: 3.6 MMOL/L — SIGNIFICANT CHANGE UP (ref 3.5–5.3)
POTASSIUM SERPL-SCNC: 3.2 MMOL/L — LOW (ref 3.5–5.3)
POTASSIUM SERPL-SCNC: 3.6 MMOL/L — SIGNIFICANT CHANGE UP (ref 3.5–5.3)
PROT SERPL-MCNC: 6.8 G/DL — SIGNIFICANT CHANGE UP (ref 6–8.3)
RBC # BLD: 3.06 M/UL — LOW (ref 4.2–5.8)
RBC # FLD: 14.2 % — SIGNIFICANT CHANGE UP (ref 10.3–14.5)
RH IG SCN BLD-IMP: POSITIVE — SIGNIFICANT CHANGE UP
SODIUM SERPL-SCNC: 136 MMOL/L — SIGNIFICANT CHANGE UP (ref 135–145)
WBC # BLD: 8 K/UL — SIGNIFICANT CHANGE UP (ref 3.8–10.5)
WBC # FLD AUTO: 8 K/UL — SIGNIFICANT CHANGE UP (ref 3.8–10.5)

## 2022-11-03 PROCEDURE — 99233 SBSQ HOSP IP/OBS HIGH 50: CPT | Mod: GC

## 2022-11-03 RX ORDER — POTASSIUM CHLORIDE 20 MEQ
20 PACKET (EA) ORAL
Refills: 0 | Status: COMPLETED | OUTPATIENT
Start: 2022-11-03 | End: 2022-11-03

## 2022-11-03 RX ORDER — MAGNESIUM SULFATE 500 MG/ML
1 VIAL (ML) INJECTION ONCE
Refills: 0 | Status: COMPLETED | OUTPATIENT
Start: 2022-11-03 | End: 2022-11-03

## 2022-11-03 RX ORDER — POTASSIUM PHOSPHATE, MONOBASIC POTASSIUM PHOSPHATE, DIBASIC 236; 224 MG/ML; MG/ML
30 INJECTION, SOLUTION INTRAVENOUS ONCE
Refills: 0 | Status: COMPLETED | OUTPATIENT
Start: 2022-11-03 | End: 2022-11-03

## 2022-11-03 RX ORDER — LIDOCAINE 4 G/100G
1 CREAM TOPICAL ONCE
Refills: 0 | Status: COMPLETED | OUTPATIENT
Start: 2022-11-03 | End: 2022-11-03

## 2022-11-03 RX ADMIN — POTASSIUM PHOSPHATE, MONOBASIC POTASSIUM PHOSPHATE, DIBASIC 83.33 MILLIMOLE(S): 236; 224 INJECTION, SOLUTION INTRAVENOUS at 10:13

## 2022-11-03 RX ADMIN — Medication 20 MILLIEQUIVALENT(S): at 09:03

## 2022-11-03 RX ADMIN — SODIUM CHLORIDE 125 MILLILITER(S): 9 INJECTION, SOLUTION INTRAVENOUS at 05:10

## 2022-11-03 RX ADMIN — Medication 100 GRAM(S): at 09:04

## 2022-11-03 RX ADMIN — PANTOPRAZOLE SODIUM 40 MILLIGRAM(S): 20 TABLET, DELAYED RELEASE ORAL at 17:05

## 2022-11-03 RX ADMIN — PANTOPRAZOLE SODIUM 40 MILLIGRAM(S): 20 TABLET, DELAYED RELEASE ORAL at 05:09

## 2022-11-03 RX ADMIN — LIDOCAINE 1 PATCH: 4 CREAM TOPICAL at 20:11

## 2022-11-03 RX ADMIN — Medication 20 MILLIEQUIVALENT(S): at 13:18

## 2022-11-03 RX ADMIN — Medication 20 MILLIEQUIVALENT(S): at 11:25

## 2022-11-03 RX ADMIN — SODIUM CHLORIDE 125 MILLILITER(S): 9 INJECTION, SOLUTION INTRAVENOUS at 09:03

## 2022-11-03 NOTE — PROGRESS NOTE ADULT - SUBJECTIVE AND OBJECTIVE BOX
[facility] Division of Kidney Diseases & Hypertension  FOLLOW UP NOTE  [phone]--------------------------------------------------------------------------------  Chief Complaint:Acute pancreatitis without infection or necrosis        HPI: Pt. is a 62 y.o. M w/ PMHx of cyclical vomiting, HTN, and GERD presents with nausea, vomiting and abdominal pain for the last few days and now with pancreatitis. Nephrology consulted for OFELIA. He is admitted with a SC.r of 5.4. He was last admitted in January with a SCr. in the 3 range which trended to 1.4 by the time he was discharged.      24 hour events/subjective: Patient seen & examined. Labs & vitals reviewed. Abdominal pain, N/V resolved. Denies chest pain, fever, chills, dysuria, hematuria, pus in urine, frothy urine, SOB, leg edema, N/V/D. UO in the last 24 hour 1.7L          PAST HISTORY  --------------------------------------------------------------------------------  No significant changes to PMH, PSH, FHx, SHx, unless otherwise noted    ALLERGIES & MEDICATIONS  --------------------------------------------------------------------------------  Allergies    No Known Allergies    Intolerances      Standing Inpatient Medications  ergocalciferol 36644 Unit(s) Oral every week  folic acid 1 milliGRAM(s) Oral daily  lactated ringers. 1000 milliLiter(s) IV Continuous <Continuous>  pantoprazole    Tablet 40 milliGRAM(s) Oral every 12 hours  thiamine 100 milliGRAM(s) Oral daily    PRN Inpatient Medications  acetaminophen     Tablet .. 975 milliGRAM(s) Oral every 6 hours PRN  oxyCODONE    IR 5 milliGRAM(s) Oral every 4 hours PRN      REVIEW OF SYSTEMS  --------------------------------------------------------------------------------  Gen: No chills  Respiratory: No dyspnea, cough  CV: No chest pain  GI: No abdominal pain, diarrhea,  nausea, vomiting  : No increased frequency, dysuria, hematuria  MSK:  no edema  Neuro: No dizziness/lightheadedness      All other systems were reviewed and are negative, except as noted.    VITALS/PHYSICAL EXAM  --------------------------------------------------------------------------------  T(C): 37.2 (11-03-22 @ 09:54), Max: 37.5 (11-02-22 @ 21:00)  HR: 104 (11-03-22 @ 09:54) (104 - 126)  BP: 148/99 (11-03-22 @ 09:54) (124/84 - 156/102)  RR: 19 (11-03-22 @ 09:54) (18 - 19)  SpO2: 100% (11-03-22 @ 09:54) (97% - 100%)  Wt(kg): --        11-02-22 @ 07:01  -  11-03-22 @ 07:00  --------------------------------------------------------  IN: 3255 mL / OUT: 1775 mL / NET: 1480 mL    11-03-22 @ 07:01  -  11-03-22 @ 13:54  --------------------------------------------------------  IN: 840 mL / OUT: 500 mL / NET: 340 mL      Physical Exam:  	Gen: NAD  	HEENT: MMM  	Pulm: CTA B/L  	CV: S1S2  	Abd: Soft, +BS, +epigastric tenderness  	Ext: No LE edema B/L  	Neuro: Awake  	Skin: Warm and dry  	Vascular access:        LABS/STUDIES  --------------------------------------------------------------------------------              9.7    8.00  >-----------<  130      [11-03-22 @ 06:56]              28.0     136  |  105  |  20  ----------------------------<  129      [11-03-22 @ 06:53]  3.2   |  19  |  0.99        Ca     8.8     [11-03-22 @ 06:53]      Mg     1.5     [11-03-22 @ 06:53]      Phos  1.6     [11-03-22 @ 06:53]    TPro  6.8  /  Alb  3.2  /  TBili  2.1  /  DBili  x   /  AST  58  /  ALT  26  /  AlkPhos  107  [11-03-22 @ 06:53]    PT/INR: PT 18.9 , INR 1.64       [11-02-22 @ 09:43]  PTT: 27.6       [11-02-22 @ 09:43]      Creatinine Trend:  SCr 0.99 [11-03 @ 06:53]  SCr 1.69 [11-02 @ 09:43]  SCr 2.93 [11-01 @ 18:22]  SCr 4.25 [11-01 @ 06:20]  SCr 5.35 [10-31 @ 18:53]    Urinalysis - [10-31-22 @ 23:04]      Color Dark Yellow / Appearance Slightly Turbid / SG 1.022 / pH 6.0      Gluc Negative / Ketone Small  / Bili Moderate / Urobili 4 mg/dL       Blood Large / Protein 300 mg/dL / Leuk Est Negative / Nitrite Negative      RBC 15 / WBC 13 / Hyaline 13 / Gran  / Sq Epi  / Non Sq Epi 3 / Bacteria Negative    Urine Creatinine 314      [10-31-22 @ 23:04]  Urine Sodium 27      [10-31-22 @ 23:04]  Urine Osmolality 347      [10-31-22 @ 23:04]    Lipid: chol --, , HDL --, LDL --      [10-31-22 @ 18:53]

## 2022-11-03 NOTE — PROGRESS NOTE ADULT - PROBLEM SELECTOR PLAN 2
Mild hypercalcemia on arrival. Now resolved after IVF. No hx of hypercalcemia.  Watch for hypocalcemia from acute pancreatitis.     Will sign off. Please call us prn        If you have any questions, please feel free to contact me  Yessica Faustin  Nephrology Fellow  Pager NS: [phone]/ [facility]: [phone]    (After 5 pm or on weekends please page the on-call fellow, can check [email or web address] for schedule. Login is alycia diallo, schedule under [facility] medicine, psych, derm)

## 2022-11-03 NOTE — CONSULT NOTE ADULT - ATTENDING COMMENTS
BRBPR  Recommend elective colonoscopy when completely recovered from acute complaints  Sooner if clinically significant bleeding develops  Advance diet per primary team
Rest per Dr. Jose Luis Robles MD  O: [phone]  Contact me on teams

## 2022-11-03 NOTE — PROGRESS NOTE ADULT - SUBJECTIVE AND OBJECTIVE BOX
Patient is a 62y old  Male who presents with a chief complaint of Epigastric Pain/Pancreatitis (02 Nov 2022 13:23)      SUBJECTIVE / OVERNIGHT EVENTS:   62M PMHx HTN, GERD, hiatal hernia, ?cyclical vomiting syndrome who pw nausea/vomiting for 5 days associated with epigastric pain, found to have acute interstitial pancreatitis on labs/imaging. Patient reports onset of nausea + non bloody, bilious emesis last Wednesday aw epigastric pain. Reports 3-4 episodes of emesis/day, PO intolerance, which has not improved prompting presentation to [facility] ED. Denies fevers/chills at home, diarrhea, hematochezia, hematemesis, CP, SOB, lightheadedness, dizziness. Notably, patient reports active daily ETOH use, "3-4 drinks/night". Also of note, patient has had prior admissions for similar sxs, last admitted January 2022 for nausea/vomiting aw epigastric pain x4 days. W/u for gastroparesis and GOO was negative at that time. CT A/P w/o acute intra abdominal findings at that time. No evidence of pancreatitis either.     In ED, patient is tachycardic to 140s, borderline hypotensive 90s/60s, w low grade fever 100.4. Labs remarkable for mild leukocytosis (WBC 12), Na 131, Crt 5.4, T bili:3.7, AlkP: 136, AST:102, Lipase: >3000. CT A/P w IV contrast demonstrates acute interstitial pancreatitis without peripancreatic fluid collections. RUQ US, w/o evidence of cholecystitis, cholelithiasis, choledocholithiasis, or biliary ductal dilation.     Received 2.5L bolus in ED.  (31 Oct 2022 19:51)"    HOSPITAL COURSE: Patient found to have elevated lipase with CT A&P showing acute interstitial pancreatitis. Patient with OFELIA-on-CKD3 concerning for severe pancreatitis and was seen by surgery and admitted to SICU for further management. He had his electrolytes repleted and was aggressively hydrated. He had Juarez palced for his OFELIA-on-CKD3 and seen by nephrology--Cr eventually improved. Patient downgraded to med-surg on 11/2.  Patient is now transferred to Medicine service on 11/3.        ADDITIONAL REVIEW OF SYSTEMS: Negative except for above    MEDICATIONS  (STANDING):  ergocalciferol 94524 Unit(s) Oral every week  folic acid 1 milliGRAM(s) Oral daily  lactated ringers. 1000 milliLiter(s) (125 mL/Hr) IV Continuous <Continuous>  pantoprazole    Tablet 40 milliGRAM(s) Oral every 12 hours  potassium chloride    Tablet ER 20 milliEquivalent(s) Oral every 2 hours  thiamine 100 milliGRAM(s) Oral daily    MEDICATIONS  (PRN):  acetaminophen     Tablet .. 975 milliGRAM(s) Oral every 6 hours PRN Mild Pain (1 - 3)  oxyCODONE    IR 5 milliGRAM(s) Oral every 4 hours PRN Severe Pain (7 - 10)      CAPILLARY BLOOD GLUCOSE        I&O's Summary    02 Nov 2022 07:01  -  03 Nov 2022 07:00  --------------------------------------------------------  IN: 3255 mL / OUT: 1775 mL / NET: 1480 mL    03 Nov 2022 07:01  -  03 Nov 2022 13:10  --------------------------------------------------------  IN: 840 mL / OUT: 500 mL / NET: 340 mL        PHYSICAL EXAM:  Vital Signs Last 24 Hrs  T(C): 37.2 (03 Nov 2022 09:54), Max: 37.5 (02 Nov 2022 21:00)  T(F): 99 (03 Nov 2022 09:54), Max: 99.5 (02 Nov 2022 21:00)  HR: 104 (03 Nov 2022 09:54) (104 - 126)  BP: 148/99 (03 Nov 2022 09:54) (124/84 - 156/102)  BP(mean): --  RR: 19 (03 Nov 2022 09:54) (18 - 19)  SpO2: 100% (03 Nov 2022 09:54) (97% - 100%)    Parameters below as of 03 Nov 2022 09:54  Patient On (Oxygen Delivery Method): room air        PHYSICAL EXAM:  GENERAL: NAD, well-developed, obese male   HEAD:  Atraumatic, Normocephalic  EYES:  conjunctiva and sclera clear  NECK: Supple, No JVD  CHEST/LUNG: Clear to auscultation bilaterally; No wheeze  HEART: Regular rate and rhythm; No murmurs, rubs, or gallops  ABDOMEN: Soft, Nontender, Nondistended; Bowel sounds present  EXTREMITIES:  2+ Peripheral Pulses, No clubbing, cyanosis, or edema  PSYCH: AAOx3  NEUROLOGY: non-focal  SKIN: No rashes or lesions      LABS:                        9.7    8.00  )-----------( 130      ( 03 Nov 2022 06:56 )             28.0     11-03    136  |  105  |  20  ----------------------------<  129<H>  3.2<L>   |  19<L>  |  0.99    Ca    8.8      03 Nov 2022 06:53  Phos  1.6     11-03  Mg     1.5     11-03    TPro  6.8  /  Alb  3.2<L>  /  TBili  2.1<H>  /  DBili  x   /  AST  58<H>  /  ALT  26  /  AlkPhos  107  11-03    PT/INR - ( 02 Nov 2022 09:43 )   PT: 18.9 sec;   INR: 1.64 ratio         PTT - ( 02 Nov 2022 09:43 )  PTT:27.6 sec          Culture - Urine (collected 31 Oct 2022 23:04)  Source: Clean Catch Clean Catch (Midstream)  Final Report (02 Nov 2022 07:49):    No growth    Culture - Blood (collected 31 Oct 2022 15:40)  Source: .Blood Blood-Venous  Preliminary Report (01 Nov 2022 22:02):    No growth to date.    Culture - Blood (collected 31 Oct 2022 15:40)  Source: .Blood Blood-Peripheral  Preliminary Report (01 Nov 2022 22:02):    No growth to date.        RADIOLOGY & ADDITIONAL TESTS:    Imaging Personally Reviewed:    Electrocardiogram Personally Reviewed:    COORDINATION OF CARE:  Care Discussed with Consultants/Other Providers [Y/N]:  Prior or Outpatient Records Reviewed [Y/N]:

## 2022-11-03 NOTE — PROGRESS NOTE ADULT - PROBLEM SELECTOR PLAN 1
Pt with OFELIA in the setting of vomiting, Etoh pancreatitis, ACE/ARB use superimposed on CKD. Exact duration of OFELIA however unknown. He is admitted with a SC.r of 5.4. He was last admitted in January with a SCr. in the 3 range which trended to 1.4 by the time he was discharged.  s/p  IVF. SCr today is down to 0.9. Kidney function improved. Monitor labs and strict urine output. Avoid NSAIDs, ACEI/ARBS, RCA and nephrotoxins. Dose medications as per eGFR.

## 2022-11-03 NOTE — CONSULT NOTE ADULT - ASSESSMENT
62M PMHx HTN, GERD, hiatal hernia, ?cyclical vomiting syndrome who pw nausea/vomiting for 5 days associated with epigastric pain, found to have acute interstitial pancreatitis on labs/imaging.     #Pancreatitis c/b severe OFELIA, s/p fluid resuscitation. No longer requiring pain medications  #BRBPR: Pt endorsing diarrhea and BRBPR. Hgb downtrending. Pt hemodynamically stable  #Anemia, normocytic: Pt may have initially been hemoconcentrated in setting of acute pancreatitis.     Plan  - will hold off on colonoscopy for now pending resolution of his pancreatitis  - trend CBC, keep active T&S, transfuse for Hgb<7  - monitor for overt signs of bleeding  - send stool PCR, cx if patient having persistent diarrhea    GI will continue to follow.     All recommendations are tentative until note is attested by attending.     Steph Goodwin, PGY5  Gastroenterology/Hepatology Fellow  Available on [employer] Teams  [phone] ([employer] Short Range Pager)  [phone] (Long Range Pager)    After 5pm, please contact the on-call GI fellow. [phone] 62M PMHx HTN, GERD, hiatal hernia, ?cyclical vomiting syndrome who pw nausea/vomiting for 5 days associated with epigastric pain, found to have acute interstitial pancreatitis on labs/imaging.     #Pancreatitis c/b severe OFELIA, s/p fluid resuscitation. No longer requiring pain medications  #BRBPR: Pt endorsing diarrhea and BRBPR. Hgb downtrending. Pt hemodynamically stable  #Anemia, normocytic: Pt may have initially been hemoconcentrated in setting of acute pancreatitis.     Plan  - will hold off on colonoscopy for now pending resolution of his pancreatitis  - trend CBC, keep active T&S, transfuse for Hgb<7  - monitor for overt signs of bleeding  - send stool PCR, cx if patient having persistent diarrhea    GI to sign off.    All recommendations are tentative until note is attested by attending.     Steph Goodwin, PGY5  Gastroenterology/Hepatology Fellow  Available on [employer] Teams  [phone] ([employer] Short Range Pager)  [phone] (Long Range Pager)    After 5pm, please contact the on-call GI fellow. [phone]

## 2022-11-03 NOTE — PROGRESS NOTE ADULT - ATTENDING COMMENTS
Patient seen/examined.  Agree w above note and plan and have discussed plan w house staff.   Pain improved, abdomen less tender.  Will need colonoscopy eventually for c/o BRBPR.  Please re-consult as needed    Rishi Meyer MD

## 2022-11-03 NOTE — PROGRESS NOTE ADULT - SUBJECTIVE AND OBJECTIVE BOX
SUBJECTIVE:  Tolerating diet. Abdominal pain improving. Denies nausea, vomiting.    OBJECTIVE:  Vital Signs Last 24 Hrs  T(C): 37.2 (03 Nov 2022 05:14), Max: 37.5 (02 Nov 2022 21:00)  T(F): 99 (03 Nov 2022 05:14), Max: 99.5 (02 Nov 2022 21:00)  HR: 113 (03 Nov 2022 05:14) (106 - 126)  BP: 156/102 (03 Nov 2022 05:14) (124/84 - 156/102)  BP(mean): --  RR: 18 (03 Nov 2022 05:14) (18 - 18)  SpO2: 100% (03 Nov 2022 05:14) (97% - 100%)    Parameters below as of 03 Nov 2022 05:14  Patient On (Oxygen Delivery Method): room air          11-01-22 @ 07:01  -  11-02-22 @ 07:00  --------------------------------------------------------  IN: 4841.4 mL / OUT: 3240 mL / NET: 1601.4 mL    11-02-22 @ 07:01  -  11-03-22 @ 05:33  --------------------------------------------------------  IN: 1755 mL / OUT: 1775 mL / NET: -20 mL        Physical Examination:  GEN: NAD, resting quietly  PULM: symmetric chest rise bilaterally, no increased WOB  ABD: soft, mildly epigastric tenderness, nondistended, no rebound or guarding  EXTR: no LE erythema, moving all extremities      LABS:                        10.6   8.24  )-----------( 86       ( 02 Nov 2022 09:43 )             30.4       11-02    136  |  104  |  32<H>  ----------------------------<  89  4.0   |  16<L>  |  1.69<H>    Ca    8.5      02 Nov 2022 09:43  Phos  2.9     11-02  Mg     2.0     11-02    TPro  7.6  /  Alb  3.7  /  TBili  3.0<H>  /  DBili  x   /  AST  65<H>  /  ALT  28  /  AlkPhos  107  11-01

## 2022-11-03 NOTE — PROGRESS NOTE ADULT - PROBLEM SELECTOR PLAN 2
Recently developed on 11/2---> GI consults     -Monitor Hg ( noted Drop in Hem )   -Monitor for recurrence  CBC every 12 hours and cont close monitoring

## 2022-11-03 NOTE — PROGRESS NOTE ADULT - ASSESSMENT
62M with PMHx of HTN, GERD/Hiatal Hernia, CKD3, alcohol abuse, and hepatic steatosis presenting for poor PO intake and epigastric pain found to have pancreatitis. Patient initially in SICU and then downgraded to med-surg unit on 11/2. Hospital course complicated by OFELIA-on-CKD3 and recent BRBPR. Patient is now transferred to medicine for further management.

## 2022-11-03 NOTE — CONSULT NOTE ADULT - SUBJECTIVE AND OBJECTIVE BOX
HPI:  62M PMHx HTN, GERD, hiatal hernia, ?cyclical vomiting syndrome who pw nausea/vomiting for 5 days associated with epigastric pain, found to have acute interstitial pancreatitis on labs/imaging. Patient reports onset of nausea + non bloody, bilious emesis last Wednesday a/w epigastric pain. On admission, patient was tachycardic, mild hypotension, low grade fever. Labs notable for leukocytosis, OFELIA, lipase>3K. CT A/P w IV contrast demonstrates acute interstitial pancreatitis without peripancreatic fluid collections. RUQ US, w/o evidence of cholecystitis, cholelithiasis, choledocholithiasis, or biliary ductal dilation.     GI consulted due to decreasing Hgb and reported bright red blood per rectum. Patient states he would rarely notice BRB on the toilet paper at home. Here in the hospital, patient states he has been having multiple watery BMs/day. He states that yesterday, he saw bright red blood on the tissue paper after multiple BMs but not in the toilet bowl. He states that he had his last colonoscopy at age 51, reportedly unremarkable. He was told to f/u in 5 years. EGD in 2019 showed gastritis.    Allergies:  No Known Allergies    Home Medications:  famotidine 40 mg oral tablet: 1 tab(s) orally once a day (at bedtime) (26 Jan 2022 00:36)  losartan 50 mg oral tablet: 1 tab(s) orally once a day (26 Jan 2022 00:36)  omeprazole 40 mg oral delayed release capsule: 1 cap(s) orally 2 times a day (26 Jan 2022 00:36)  Vitamin D2 1.25 mg (50,000 intl units) oral capsule: 1 cap(s) orally once a week (26 Jan 2022 00:36)    Hospital Medications:  acetaminophen     Tablet .. 975 milliGRAM(s) Oral every 6 hours PRN  ergocalciferol 03373 Unit(s) Oral every week  folic acid 1 milliGRAM(s) Oral daily  lactated ringers. 1000 milliLiter(s) IV Continuous <Continuous>  oxyCODONE    IR 5 milliGRAM(s) Oral every 4 hours PRN  pantoprazole    Tablet 40 milliGRAM(s) Oral every 12 hours  thiamine 100 milliGRAM(s) Oral daily    PMHX/PSHX:  HTN (hypertension)    Acid reflux    No significant past surgical history    Family history:  No pertinent family history in first degree relatives    FH: hypertension    Denies family history of colon cancer/polyps, stomach cancer/polyps, pancreatic cancer/masses, liver cancer/disease, ovarian cancer and endometrial cancer.    Social History:   Tob: Denies  EtOH: Denies  Illicit Drugs: Denies    ROS:   General:  No wt loss, fevers, chills, night sweats, fatigue  Eyes:  Good vision, no reported pain  ENT:  No sore throat, pain, runny nose, dysphagia  CV:  No pain, palpitations, hypo/hypertension  Pulm:  No dyspnea, cough, tachypnea, wheezing  GI:  see HPI  :  No pain, bleeding, incontinence, nocturia  Muscle:  No pain, weakness  Neuro:  No weakness, tingling, memory problems  Psych:  No fatigue, insomnia, mood problems, depression  Endocrine:  No polyuria, polydipsia, cold/heat intolerance  Heme:  No petechiae, ecchymosis, easy bruisability  Skin:  No rash, tattoos, scars, edema    PHYSICAL EXAM:   GENERAL:  No acute distress  HEENT:  NCAT, no scleral icterus   CHEST:  no respiratory distress  HEART:  Regular rate and rhythm  ABDOMEN:  Soft, non-tender, non-distended   EXTREMITIES: No edema  SKIN:  No rash/erythema/ecchymoses   NEURO:  Alert and oriented x 3   Rectal: no external hemorrhoids appreciated, yellow/brown stool in rectal vault, no blood    Vital Signs:  Vital Signs Last 24 Hrs  T(C): 36.8 (03 Nov 2022 17:04), Max: 37.5 (02 Nov 2022 21:00)  T(F): 98.2 (03 Nov 2022 17:04), Max: 99.5 (02 Nov 2022 21:00)  HR: 101 (03 Nov 2022 17:04) (101 - 126)  BP: 138/95 (03 Nov 2022 17:04) (124/84 - 156/102)  BP(mean): --  RR: 18 (03 Nov 2022 17:04) (18 - 19)  SpO2: 99% (03 Nov 2022 17:04) (97% - 100%)    Parameters below as of 03 Nov 2022 17:04  Patient On (Oxygen Delivery Method): room air    Daily     Daily     LABS:                        9.7    8.00  )-----------( 130      ( 03 Nov 2022 06:56 )             28.0     Mean Cell Volume: 91.5 fl (11-03-22 @ 06:56)    11-03    136  |  105  |  20  ----------------------------<  129<H>  3.2<L>   |  19<L>  |  0.99    Ca    8.8      03 Nov 2022 06:53  Phos  1.6     11-03  Mg     1.5     11-03    TPro  6.8  /  Alb  3.2<L>  /  TBili  2.1<H>  /  DBili  x   /  AST  58<H>  /  ALT  26  /  AlkPhos  107  11-03    LIVER FUNCTIONS - ( 03 Nov 2022 06:53 )  Alb: 3.2 g/dL / Pro: 6.8 g/dL / ALK PHOS: 107 U/L / ALT: 26 U/L / AST: 58 U/L / GGT: x           PT/INR - ( 02 Nov 2022 09:43 )   PT: 18.9 sec;   INR: 1.64 ratio       PTT - ( 02 Nov 2022 09:43 )  PTT:27.6 sec                            9.7    8.00  )-----------( 130      ( 03 Nov 2022 06:56 )             28.0                         10.6   8.24  )-----------( 86       ( 02 Nov 2022 09:43 )             30.4                         11.4   9.28  )-----------( 90       ( 01 Nov 2022 06:15 )             32.6       Imaging:  reviewed

## 2022-11-03 NOTE — PROGRESS NOTE ADULT - ASSESSMENT
62M PMHx HTN, GERD, hiatal hernia, cyclical vomiting syndrome who pw acute alcoholic interstitial pancreatitis associated with hypotension and OFELIA (Crt 5.4), overall improving.     Plan:  - Transferred to medicine service 11/2  - Advance diet as tolerated  - Monitor for signs of alcohol withdrawal  - Rest of care per primary team    Green Team Surgery [id number]

## 2022-11-04 LAB
ALBUMIN SERPL ELPH-MCNC: 3.4 G/DL — SIGNIFICANT CHANGE UP (ref 3.3–5)
ALP SERPL-CCNC: 133 U/L — HIGH (ref 40–120)
ALT FLD-CCNC: 34 U/L — SIGNIFICANT CHANGE UP (ref 10–45)
ANION GAP SERPL CALC-SCNC: 12 MMOL/L — SIGNIFICANT CHANGE UP (ref 5–17)
AST SERPL-CCNC: 69 U/L — HIGH (ref 10–40)
BASOPHILS # BLD AUTO: 0 K/UL — SIGNIFICANT CHANGE UP (ref 0–0.2)
BASOPHILS NFR BLD AUTO: 0 % — SIGNIFICANT CHANGE UP (ref 0–2)
BILIRUB SERPL-MCNC: 1.6 MG/DL — HIGH (ref 0.2–1.2)
BUN SERPL-MCNC: 12 MG/DL — SIGNIFICANT CHANGE UP (ref 7–23)
CA-I BLD-SCNC: 1.28 MMOL/L — SIGNIFICANT CHANGE UP (ref 1.15–1.33)
CALCIUM SERPL-MCNC: 9.1 MG/DL — SIGNIFICANT CHANGE UP (ref 8.4–10.5)
CHLORIDE SERPL-SCNC: 106 MMOL/L — SIGNIFICANT CHANGE UP (ref 96–108)
CO2 SERPL-SCNC: 21 MMOL/L — LOW (ref 22–31)
CREAT SERPL-MCNC: 0.79 MG/DL — SIGNIFICANT CHANGE UP (ref 0.5–1.3)
EGFR: 101 ML/MIN/1.73M2 — SIGNIFICANT CHANGE UP
EOSINOPHIL # BLD AUTO: 0.08 K/UL — SIGNIFICANT CHANGE UP (ref 0–0.5)
EOSINOPHIL NFR BLD AUTO: 0.9 % — SIGNIFICANT CHANGE UP (ref 0–6)
GLUCOSE SERPL-MCNC: 111 MG/DL — HIGH (ref 70–99)
HCT VFR BLD CALC: 31.4 % — LOW (ref 39–50)
HGB BLD-MCNC: 10.6 G/DL — LOW (ref 13–17)
LYMPHOCYTES # BLD AUTO: 0.32 K/UL — LOW (ref 1–3.3)
LYMPHOCYTES # BLD AUTO: 3.5 % — LOW (ref 13–44)
MAGNESIUM SERPL-MCNC: 1.5 MG/DL — LOW (ref 1.6–2.6)
MCHC RBC-ENTMCNC: 31.7 PG — SIGNIFICANT CHANGE UP (ref 27–34)
MCHC RBC-ENTMCNC: 33.8 GM/DL — SIGNIFICANT CHANGE UP (ref 32–36)
MCV RBC AUTO: 94 FL — SIGNIFICANT CHANGE UP (ref 80–100)
MONOCYTES # BLD AUTO: 2.17 K/UL — HIGH (ref 0–0.9)
MONOCYTES NFR BLD AUTO: 23.5 % — HIGH (ref 2–14)
NEUTROPHILS # BLD AUTO: 6.59 K/UL — SIGNIFICANT CHANGE UP (ref 1.8–7.4)
NEUTROPHILS NFR BLD AUTO: 66.9 % — SIGNIFICANT CHANGE UP (ref 43–77)
PHOSPHATE SERPL-MCNC: 2.7 MG/DL — SIGNIFICANT CHANGE UP (ref 2.5–4.5)
PLATELET # BLD AUTO: 212 K/UL — SIGNIFICANT CHANGE UP (ref 150–400)
POTASSIUM SERPL-MCNC: 3.9 MMOL/L — SIGNIFICANT CHANGE UP (ref 3.5–5.3)
POTASSIUM SERPL-SCNC: 3.9 MMOL/L — SIGNIFICANT CHANGE UP (ref 3.5–5.3)
PROT SERPL-MCNC: 7.4 G/DL — SIGNIFICANT CHANGE UP (ref 6–8.3)
RBC # BLD: 3.34 M/UL — LOW (ref 4.2–5.8)
RBC # FLD: 14.6 % — HIGH (ref 10.3–14.5)
SODIUM SERPL-SCNC: 139 MMOL/L — SIGNIFICANT CHANGE UP (ref 135–145)
WBC # BLD: 9.25 K/UL — SIGNIFICANT CHANGE UP (ref 3.8–10.5)
WBC # FLD AUTO: 9.25 K/UL — SIGNIFICANT CHANGE UP (ref 3.8–10.5)

## 2022-11-04 PROCEDURE — 99233 SBSQ HOSP IP/OBS HIGH 50: CPT | Mod: GC

## 2022-11-04 RX ORDER — MAGNESIUM SULFATE 500 MG/ML
1 VIAL (ML) INJECTION ONCE
Refills: 0 | Status: COMPLETED | OUTPATIENT
Start: 2022-11-04 | End: 2022-11-04

## 2022-11-04 RX ORDER — MAGNESIUM SULFATE 500 MG/ML
2 VIAL (ML) INJECTION ONCE
Refills: 0 | Status: COMPLETED | OUTPATIENT
Start: 2022-11-04 | End: 2022-11-04

## 2022-11-04 RX ORDER — HEPARIN SODIUM 5000 [USP'U]/ML
5000 INJECTION INTRAVENOUS; SUBCUTANEOUS EVERY 8 HOURS
Refills: 0 | Status: DISCONTINUED | OUTPATIENT
Start: 2022-11-04 | End: 2022-11-06

## 2022-11-04 RX ORDER — POTASSIUM PHOSPHATE, MONOBASIC POTASSIUM PHOSPHATE, DIBASIC 236; 224 MG/ML; MG/ML
15 INJECTION, SOLUTION INTRAVENOUS ONCE
Refills: 0 | Status: COMPLETED | OUTPATIENT
Start: 2022-11-04 | End: 2022-11-04

## 2022-11-04 RX ADMIN — HEPARIN SODIUM 5000 UNIT(S): 5000 INJECTION INTRAVENOUS; SUBCUTANEOUS at 21:01

## 2022-11-04 RX ADMIN — PANTOPRAZOLE SODIUM 40 MILLIGRAM(S): 20 TABLET, DELAYED RELEASE ORAL at 18:49

## 2022-11-04 RX ADMIN — Medication 100 MILLIGRAM(S): at 13:52

## 2022-11-04 RX ADMIN — Medication 1 MILLIGRAM(S): at 13:52

## 2022-11-04 RX ADMIN — POTASSIUM PHOSPHATE, MONOBASIC POTASSIUM PHOSPHATE, DIBASIC 62.5 MILLIMOLE(S): 236; 224 INJECTION, SOLUTION INTRAVENOUS at 05:19

## 2022-11-04 RX ADMIN — PANTOPRAZOLE SODIUM 40 MILLIGRAM(S): 20 TABLET, DELAYED RELEASE ORAL at 05:18

## 2022-11-04 RX ADMIN — Medication 25 GRAM(S): at 22:22

## 2022-11-04 RX ADMIN — Medication 100 GRAM(S): at 02:44

## 2022-11-04 NOTE — PROGRESS NOTE ADULT - PROBLEM SELECTOR PLAN 2
Recently developed on 11/2---> GI consults     -Monitor Hg ( noted Drop in Hem )   -Monitor for recurrence  CBC every 12 hours and cont close monitoring Recently developed on 11/2---> GI consults     -Monitor Hg ( noted Drop in Hem )   -Monitor for recurrence  CBC every 12 hours and cont close monitoring    Will request stool count to examine the stool and monitor for any bleeding and will get stool culture and GI PCR

## 2022-11-04 NOTE — PROGRESS NOTE ADULT - SUBJECTIVE AND OBJECTIVE BOX
Patient is a 62y old  Male who presents with a chief complaint of Epigastric pain (03 Nov 2022 13:09)      SUBJECTIVE / OVERNIGHT EVENTS:    Tele reviewed:       ADDITIONAL REVIEW OF SYSTEMS: Negative except for above    MEDICATIONS  (STANDING):  ergocalciferol 48045 Unit(s) Oral every week  folic acid 1 milliGRAM(s) Oral daily  lactated ringers. 1000 milliLiter(s) (125 mL/Hr) IV Continuous <Continuous>  pantoprazole    Tablet 40 milliGRAM(s) Oral every 12 hours  thiamine 100 milliGRAM(s) Oral daily    MEDICATIONS  (PRN):  acetaminophen     Tablet .. 975 milliGRAM(s) Oral every 6 hours PRN Mild Pain (1 - 3)  oxyCODONE    IR 5 milliGRAM(s) Oral every 4 hours PRN Severe Pain (7 - 10)      CAPILLARY BLOOD GLUCOSE        I&O's Summary    03 Nov 2022 07:01  -  04 Nov 2022 07:00  --------------------------------------------------------  IN: 1905 mL / OUT: 1550 mL / NET: 355 mL    04 Nov 2022 07:01  -  04 Nov 2022 13:56  --------------------------------------------------------  IN: 240 mL / OUT: 700 mL / NET: -460 mL        PHYSICAL EXAM:  Vital Signs Last 24 Hrs  T(C): 37 (04 Nov 2022 12:36), Max: 37.4 (04 Nov 2022 10:00)  T(F): 98.6 (04 Nov 2022 12:36), Max: 99.3 (04 Nov 2022 10:00)  HR: 101 (04 Nov 2022 12:36) (100 - 110)  BP: 141/95 (04 Nov 2022 12:36) (113/73 - 146/98)  BP(mean): --  RR: 18 (04 Nov 2022 12:36) (18 - 18)  SpO2: 100% (04 Nov 2022 12:36) (97% - 100%)    Parameters below as of 04 Nov 2022 12:36  Patient On (Oxygen Delivery Method): room air        PHYSICAL EXAM:      LABS:                        10.6   x     )-----------( x        ( 04 Nov 2022 13:43 )             31.4     11-03    x   |  x   |  x   ----------------------------<  x   3.6   |  x   |  x     Ca    8.8      03 Nov 2022 06:53  Phos  2.3     11-03  Mg     1.4     11-03    TPro  6.8  /  Alb  3.2<L>  /  TBili  2.1<H>  /  DBili  x   /  AST  58<H>  /  ALT  26  /  AlkPhos  107  11-03                RADIOLOGY & ADDITIONAL TESTS:    Imaging Personally Reviewed:    Electrocardiogram Personally Reviewed:    COORDINATION OF CARE:  Care Discussed with Consultants/Other Providers [Y/N]:  Prior or Outpatient Records Reviewed [Y/N]:     Patient is a 62y old  Male who presents with a chief complaint of Epigastric pain (03 Nov 2022 13:09)      SUBJECTIVE / OVERNIGHT EVENTS:   Patient was seen with his brother at the bedside .  Patient is demanding to have inpatient colonoscopy and states that someone promised him the colonoscopy, but he cannot recall who promised him the colonoscopy.  On 11/3 , I ask him to describe his stool and the blood per rectum which he said was bright red and today , he states that his stool was dark and he had many stools , he cannot recall many informations and almost is making things up to compensate for not recalling      ADDITIONAL REVIEW OF SYSTEMS: Negative except for above    MEDICATIONS  (STANDING):  ergocalciferol 28803 Unit(s) Oral every week  folic acid 1 milliGRAM(s) Oral daily  lactated ringers. 1000 milliLiter(s) (125 mL/Hr) IV Continuous <Continuous>  pantoprazole    Tablet 40 milliGRAM(s) Oral every 12 hours  thiamine 100 milliGRAM(s) Oral daily    MEDICATIONS  (PRN):  acetaminophen     Tablet .. 975 milliGRAM(s) Oral every 6 hours PRN Mild Pain (1 - 3)  oxyCODONE    IR 5 milliGRAM(s) Oral every 4 hours PRN Severe Pain (7 - 10)      CAPILLARY BLOOD GLUCOSE        I&O's Summary    03 Nov 2022 07:01  -  04 Nov 2022 07:00  --------------------------------------------------------  IN: 1905 mL / OUT: 1550 mL / NET: 355 mL    04 Nov 2022 07:01  -  04 Nov 2022 13:56  --------------------------------------------------------  IN: 240 mL / OUT: 700 mL / NET: -460 mL        PHYSICAL EXAM:  Vital Signs Last 24 Hrs  T(C): 37 (04 Nov 2022 12:36), Max: 37.4 (04 Nov 2022 10:00)  T(F): 98.6 (04 Nov 2022 12:36), Max: 99.3 (04 Nov 2022 10:00)  HR: 101 (04 Nov 2022 12:36) (100 - 110)  BP: 141/95 (04 Nov 2022 12:36) (113/73 - 146/98)  BP(mean): --  RR: 18 (04 Nov 2022 12:36) (18 - 18)  SpO2: 100% (04 Nov 2022 12:36) (97% - 100%)    Parameters below as of 04 Nov 2022 12:36  Patient On (Oxygen Delivery Method): room air        PHYSICAL EXAM:  GENERAL: NAD, well-developed, obese male   HEAD:  Atraumatic, Normocephalic  EYES:  conjunctiva and sclera clear  NECK: Supple, No JVD  CHEST/LUNG: Clear to auscultation bilaterally; No wheeze  HEART: Regular rate and rhythm; No murmurs, rubs, or gallops  ABDOMEN: Soft, Nontender, Nondistended; Bowel sounds present  EXTREMITIES:  2+ Peripheral Pulses, No clubbing, cyanosis, or edema  PSYCH: AAOx3  NEUROLOGY: non-focal  SKIN: No rashes or lesions    LABS:                        10.6   x     )-----------( x        ( 04 Nov 2022 13:43 )             31.4     11-03    x   |  x   |  x   ----------------------------<  x   3.6   |  x   |  x     Ca    8.8      03 Nov 2022 06:53  Phos  2.3     11-03  Mg     1.4     11-03    TPro  6.8  /  Alb  3.2<L>  /  TBili  2.1<H>  /  DBili  x   /  AST  58<H>  /  ALT  26  /  AlkPhos  107  11-03                RADIOLOGY & ADDITIONAL TESTS:    Imaging Personally Reviewed:    Electrocardiogram Personally Reviewed:    COORDINATION OF CARE:  Care Discussed with Consultants/Other Providers [Y/N]:  Prior or Outpatient Records Reviewed [Y/N]:

## 2022-11-04 NOTE — PROGRESS NOTE ADULT - NSPROGADDITIONALINFOA_GEN_ALL_CORE
Beatrice Martínez   Hospitalist    /TEAMS Patient has difficulty remembering and will get  evaluation , pt will need oupt support for ETOH abstinence .        Beatrice Martínez   Hospitalist    /TEAMS

## 2022-11-04 NOTE — PROGRESS NOTE ADULT - PROBLEM SELECTOR PLAN 6
-Noted to have low rate tachycardia in 110s  -CTM  -Perform EKG--will look at it later. -Noted to have low rate tachycardia in 110s  -CTM  -Perform EKG--sinus

## 2022-11-04 NOTE — PROGRESS NOTE ADULT - PROBLEM SELECTOR PLAN 1
-LR at 125 cc/hr CW   -Pain control PRN  -Per surgery likely etiology is alcohol abuse. -S/P IVF   -Pain control PRN  -Per surgery likely etiology is alcohol abuse.

## 2022-11-05 LAB
ALBUMIN SERPL ELPH-MCNC: 3 G/DL — LOW (ref 3.3–5)
ALP SERPL-CCNC: 121 U/L — HIGH (ref 40–120)
ALT FLD-CCNC: 27 U/L — SIGNIFICANT CHANGE UP (ref 10–45)
ANION GAP SERPL CALC-SCNC: 11 MMOL/L — SIGNIFICANT CHANGE UP (ref 5–17)
AST SERPL-CCNC: 50 U/L — HIGH (ref 10–40)
BILIRUB SERPL-MCNC: 1.4 MG/DL — HIGH (ref 0.2–1.2)
BUN SERPL-MCNC: 12 MG/DL — SIGNIFICANT CHANGE UP (ref 7–23)
CALCIUM SERPL-MCNC: 9 MG/DL — SIGNIFICANT CHANGE UP (ref 8.4–10.5)
CHLORIDE SERPL-SCNC: 105 MMOL/L — SIGNIFICANT CHANGE UP (ref 96–108)
CO2 SERPL-SCNC: 20 MMOL/L — LOW (ref 22–31)
CREAT SERPL-MCNC: 0.72 MG/DL — SIGNIFICANT CHANGE UP (ref 0.5–1.3)
CULTURE RESULTS: SIGNIFICANT CHANGE UP
CULTURE RESULTS: SIGNIFICANT CHANGE UP
EGFR: 104 ML/MIN/1.73M2 — SIGNIFICANT CHANGE UP
GI PCR PANEL: SIGNIFICANT CHANGE UP
GLUCOSE SERPL-MCNC: 99 MG/DL — SIGNIFICANT CHANGE UP (ref 70–99)
HCT VFR BLD CALC: 30.3 % — LOW (ref 39–50)
HGB BLD-MCNC: 10.1 G/DL — LOW (ref 13–17)
MCHC RBC-ENTMCNC: 31.1 PG — SIGNIFICANT CHANGE UP (ref 27–34)
MCHC RBC-ENTMCNC: 33.3 GM/DL — SIGNIFICANT CHANGE UP (ref 32–36)
MCV RBC AUTO: 93.2 FL — SIGNIFICANT CHANGE UP (ref 80–100)
NRBC # BLD: 0 /100 WBCS — SIGNIFICANT CHANGE UP (ref 0–0)
PLATELET # BLD AUTO: 282 K/UL — SIGNIFICANT CHANGE UP (ref 150–400)
POTASSIUM SERPL-MCNC: 3.5 MMOL/L — SIGNIFICANT CHANGE UP (ref 3.5–5.3)
POTASSIUM SERPL-SCNC: 3.5 MMOL/L — SIGNIFICANT CHANGE UP (ref 3.5–5.3)
PROT SERPL-MCNC: 6.9 G/DL — SIGNIFICANT CHANGE UP (ref 6–8.3)
RBC # BLD: 3.25 M/UL — LOW (ref 4.2–5.8)
RBC # FLD: 14.6 % — HIGH (ref 10.3–14.5)
SODIUM SERPL-SCNC: 136 MMOL/L — SIGNIFICANT CHANGE UP (ref 135–145)
SPECIMEN SOURCE: SIGNIFICANT CHANGE UP
SPECIMEN SOURCE: SIGNIFICANT CHANGE UP
WBC # BLD: 10.66 K/UL — HIGH (ref 3.8–10.5)
WBC # FLD AUTO: 10.66 K/UL — HIGH (ref 3.8–10.5)

## 2022-11-05 PROCEDURE — 99233 SBSQ HOSP IP/OBS HIGH 50: CPT

## 2022-11-05 RX ADMIN — HEPARIN SODIUM 5000 UNIT(S): 5000 INJECTION INTRAVENOUS; SUBCUTANEOUS at 21:30

## 2022-11-05 RX ADMIN — HEPARIN SODIUM 5000 UNIT(S): 5000 INJECTION INTRAVENOUS; SUBCUTANEOUS at 05:54

## 2022-11-05 RX ADMIN — Medication 100 MILLIGRAM(S): at 13:07

## 2022-11-05 RX ADMIN — Medication 1 TABLET(S): at 13:08

## 2022-11-05 RX ADMIN — PANTOPRAZOLE SODIUM 40 MILLIGRAM(S): 20 TABLET, DELAYED RELEASE ORAL at 05:54

## 2022-11-05 RX ADMIN — PANTOPRAZOLE SODIUM 40 MILLIGRAM(S): 20 TABLET, DELAYED RELEASE ORAL at 17:38

## 2022-11-05 RX ADMIN — Medication 1 MILLIGRAM(S): at 13:08

## 2022-11-05 RX ADMIN — HEPARIN SODIUM 5000 UNIT(S): 5000 INJECTION INTRAVENOUS; SUBCUTANEOUS at 13:09

## 2022-11-05 NOTE — PROGRESS NOTE ADULT - PROBLEM SELECTOR PLAN 2
Recently developed on 11/2---> GI consults is appreciated , Elective colonoscopy     monitor H/H /now stable     cont stool count to examine the stool and monitor for any bleeding and will get stool culture and GI PCR

## 2022-11-05 NOTE — CHART NOTE - NSCHARTNOTEFT_GEN_A_CORE
Late entry    Notified by RN of pt noted have bloody bowel movement. STAT CBC was ordered. CBC resulted, hgb/hct stable. Dr. Martínez made aware, will continue to monitor.    Latonya Garber NP  [id number]

## 2022-11-05 NOTE — PHYSICAL THERAPY INITIAL EVALUATION ADULT - ADDITIONAL COMMENTS
as per pt, resides in a PH with spouse, 4 stairs to enter, 8 stairs indoor, PTA, pt amb (I), (I) with ADls.

## 2022-11-05 NOTE — PROGRESS NOTE ADULT - NSPROGADDITIONALINFOA_GEN_ALL_CORE
Beatrice Martínez Prepare for DC in AM   Pt wants Information to be given to HIS WIFE ( whom he is  from ) , not to his sibblings     Beatrice Martínez  hospitalist

## 2022-11-05 NOTE — PROGRESS NOTE ADULT - SUBJECTIVE AND OBJECTIVE BOX
Patient is a 62y old  Male who presents with a chief complaint of Epigastric pain (04 Nov 2022 13:55)      SUBJECTIVE / OVERNIGHT EVENTS:    Tele reviewed:       ADDITIONAL REVIEW OF SYSTEMS: Negative except for above    MEDICATIONS  (STANDING):  ergocalciferol 15422 Unit(s) Oral every week  folic acid 1 milliGRAM(s) Oral daily  heparin   Injectable 5000 Unit(s) SubCutaneous every 8 hours  multivitamin 1 Tablet(s) Oral daily  pantoprazole    Tablet 40 milliGRAM(s) Oral every 12 hours  thiamine 100 milliGRAM(s) Oral daily    MEDICATIONS  (PRN):  acetaminophen     Tablet .. 975 milliGRAM(s) Oral every 6 hours PRN Mild Pain (1 - 3)  oxyCODONE    IR 5 milliGRAM(s) Oral every 4 hours PRN Severe Pain (7 - 10)      CAPILLARY BLOOD GLUCOSE        I&O's Summary    04 Nov 2022 07:01  -  05 Nov 2022 07:00  --------------------------------------------------------  IN: 580 mL / OUT: 2590 mL / NET: -2010 mL    05 Nov 2022 08:01  -  05 Nov 2022 14:05  --------------------------------------------------------  IN: 480 mL / OUT: 400 mL / NET: 80 mL        PHYSICAL EXAM:  Vital Signs Last 24 Hrs  T(C): 36.8 (05 Nov 2022 13:07), Max: 37.2 (04 Nov 2022 21:25)  T(F): 98.3 (05 Nov 2022 13:07), Max: 99 (04 Nov 2022 21:25)  HR: 114 (05 Nov 2022 13:07) (98 - 118)  BP: 131/88 (05 Nov 2022 13:07) (131/87 - 154/98)  BP(mean): --  RR: 18 (05 Nov 2022 13:07) (18 - 18)  SpO2: 98% (05 Nov 2022 13:07) (98% - 100%)    Parameters below as of 05 Nov 2022 13:07  Patient On (Oxygen Delivery Method): room air        PHYSICAL EXAM:  GENERAL: NAD, well-developed  HEAD:  Atraumatic, Normocephalic  EYES:  conjunctiva and sclera clear  NECK: Supple, No JVD  CHEST/LUNG: Clear to auscultation bilaterally; No wheeze  HEART: Regular rate and rhythm; No murmurs, rubs, or gallops  ABDOMEN: Soft, Nontender, Nondistended; Bowel sounds present  EXTREMITIES:  2+ Peripheral Pulses, No clubbing, cyanosis, or edema  PSYCH: AAOx3  NEUROLOGY: non-focal  SKIN: No rashes or lesions      LABS:                        10.6   9.25  )-----------( 212      ( 04 Nov 2022 13:43 )             31.4     11-05    136  |  105  |  12  ----------------------------<  99  3.5   |  20<L>  |  0.72    Ca    9.0      05 Nov 2022 07:48  Phos  2.7     11-04  Mg     1.5     11-04    TPro  6.9  /  Alb  3.0<L>  /  TBili  1.4<H>  /  DBili  x   /  AST  50<H>  /  ALT  27  /  AlkPhos  121<H>  11-05                RADIOLOGY & ADDITIONAL TESTS:    Imaging Personally Reviewed:    Electrocardiogram Personally Reviewed:    COORDINATION OF CARE:  Care Discussed with Consultants/Other Providers [Y/N]:  Prior or Outpatient Records Reviewed [Y/N]:     Patient is a 62y old  Male who presents with a chief complaint of Epigastric pain (04 Nov 2022 13:55)      SUBJECTIVE / OVERNIGHT EVENTS:   no rectal bleed .  No abd pain and no vomiting .  Pos persistent tachy as per patient       ADDITIONAL REVIEW OF SYSTEMS: Negative except for above    MEDICATIONS  (STANDING):  ergocalciferol 24986 Unit(s) Oral every week  folic acid 1 milliGRAM(s) Oral daily  heparin   Injectable 5000 Unit(s) SubCutaneous every 8 hours  multivitamin 1 Tablet(s) Oral daily  pantoprazole    Tablet 40 milliGRAM(s) Oral every 12 hours  thiamine 100 milliGRAM(s) Oral daily    MEDICATIONS  (PRN):  acetaminophen     Tablet .. 975 milliGRAM(s) Oral every 6 hours PRN Mild Pain (1 - 3)  oxyCODONE    IR 5 milliGRAM(s) Oral every 4 hours PRN Severe Pain (7 - 10)      CAPILLARY BLOOD GLUCOSE        I&O's Summary    04 Nov 2022 07:01  -  05 Nov 2022 07:00  --------------------------------------------------------  IN: 580 mL / OUT: 2590 mL / NET: -2010 mL    05 Nov 2022 08:01  -  05 Nov 2022 14:05  --------------------------------------------------------  IN: 480 mL / OUT: 400 mL / NET: 80 mL        PHYSICAL EXAM:  Vital Signs Last 24 Hrs  T(C): 36.8 (05 Nov 2022 13:07), Max: 37.2 (04 Nov 2022 21:25)  T(F): 98.3 (05 Nov 2022 13:07), Max: 99 (04 Nov 2022 21:25)  HR: 114 (05 Nov 2022 13:07) (98 - 118)  BP: 131/88 (05 Nov 2022 13:07) (131/87 - 154/98)  BP(mean): --  RR: 18 (05 Nov 2022 13:07) (18 - 18)  SpO2: 98% (05 Nov 2022 13:07) (98% - 100%)    Parameters below as of 05 Nov 2022 13:07  Patient On (Oxygen Delivery Method): room air        PHYSICAL EXAM:  GENERAL: NAD, well-developed  HEAD:  Atraumatic, Normocephalic  EYES:  conjunctiva and sclera clear  NECK: Supple, No JVD  CHEST/LUNG: Clear to auscultation bilaterally; No wheeze  HEART: Regular rate and rhythm; No murmurs, rubs, or gallops  ABDOMEN: Soft, Nontender, Nondistended; Bowel sounds present  EXTREMITIES:  2+ Peripheral Pulses, No clubbing, cyanosis, or edema  PSYCH: AAOx3  NEUROLOGY: non-focal  SKIN: No rashes or lesions      LABS:                        10.6   9.25  )-----------( 212      ( 04 Nov 2022 13:43 )             31.4     11-05    136  |  105  |  12  ----------------------------<  99  3.5   |  20<L>  |  0.72    Ca    9.0      05 Nov 2022 07:48  Phos  2.7     11-04  Mg     1.5     11-04    TPro  6.9  /  Alb  3.0<L>  /  TBili  1.4<H>  /  DBili  x   /  AST  50<H>  /  ALT  27  /  AlkPhos  121<H>  11-05                RADIOLOGY & ADDITIONAL TESTS:    Imaging Personally Reviewed:    Electrocardiogram Personally Reviewed:    COORDINATION OF CARE:  Care Discussed with Consultants/Other Providers [Y/N]:  Prior or Outpatient Records Reviewed [Y/N]:

## 2022-11-05 NOTE — PHYSICAL THERAPY INITIAL EVALUATION ADULT - PERTINENT HX OF CURRENT PROBLEM, REHAB EVAL
62M with PMHx of HTN, GERD/Hiatal Hernia, CKD3, alcohol abuse, and hepatic steatosis presenting for poor PO intake and epigastric pain found to have pancreatitis.  ECG 11/2, tachy, [facility] duplex BLE (-). CT ABD 10/31, acute interstitial pancreatitis, US RUQ 10/31, hepatic steatosis. CXR (-).

## 2022-11-05 NOTE — PROGRESS NOTE ADULT - PROBLEM SELECTOR PLAN 1
-S/P IVF   -Pain control PRN  -Per surgery likely etiology is alcohol abuse.  Pt tolerates regular diet

## 2022-11-05 NOTE — PHYSICAL THERAPY INITIAL EVALUATION ADULT - NSPTDISCHREC_GEN_A_CORE
Tried calling patient again, no answer, unable to leave a message at both phone numbers on file.   No skilled PT needs

## 2022-11-06 LAB
ALBUMIN SERPL ELPH-MCNC: 3.3 G/DL — SIGNIFICANT CHANGE UP (ref 3.3–5)
ALP SERPL-CCNC: 117 U/L — SIGNIFICANT CHANGE UP (ref 40–120)
ALT FLD-CCNC: 28 U/L — SIGNIFICANT CHANGE UP (ref 10–45)
ANION GAP SERPL CALC-SCNC: 11 MMOL/L — SIGNIFICANT CHANGE UP (ref 5–17)
AST SERPL-CCNC: 52 U/L — HIGH (ref 10–40)
BILIRUB SERPL-MCNC: 1.2 MG/DL — SIGNIFICANT CHANGE UP (ref 0.2–1.2)
BUN SERPL-MCNC: 10 MG/DL — SIGNIFICANT CHANGE UP (ref 7–23)
CALCIUM SERPL-MCNC: 9.5 MG/DL — SIGNIFICANT CHANGE UP (ref 8.4–10.5)
CHLORIDE SERPL-SCNC: 106 MMOL/L — SIGNIFICANT CHANGE UP (ref 96–108)
CO2 SERPL-SCNC: 21 MMOL/L — LOW (ref 22–31)
CREAT SERPL-MCNC: 0.58 MG/DL — SIGNIFICANT CHANGE UP (ref 0.5–1.3)
EGFR: 111 ML/MIN/1.73M2 — SIGNIFICANT CHANGE UP
GLUCOSE SERPL-MCNC: 140 MG/DL — HIGH (ref 70–99)
HCT VFR BLD CALC: 28 % — LOW (ref 39–50)
HGB BLD-MCNC: 9.6 G/DL — LOW (ref 13–17)
LIDOCAIN IGE QN: 272 U/L — HIGH (ref 7–60)
MAGNESIUM SERPL-MCNC: 1.3 MG/DL — LOW (ref 1.6–2.6)
MCHC RBC-ENTMCNC: 31.9 PG — SIGNIFICANT CHANGE UP (ref 27–34)
MCHC RBC-ENTMCNC: 34.3 GM/DL — SIGNIFICANT CHANGE UP (ref 32–36)
MCV RBC AUTO: 93 FL — SIGNIFICANT CHANGE UP (ref 80–100)
NRBC # BLD: 0 /100 WBCS — SIGNIFICANT CHANGE UP (ref 0–0)
PHOSPHATE SERPL-MCNC: 3.2 MG/DL — SIGNIFICANT CHANGE UP (ref 2.5–4.5)
PLATELET # BLD AUTO: 310 K/UL — SIGNIFICANT CHANGE UP (ref 150–400)
POTASSIUM SERPL-MCNC: 3.3 MMOL/L — LOW (ref 3.5–5.3)
POTASSIUM SERPL-SCNC: 3.3 MMOL/L — LOW (ref 3.5–5.3)
PROT SERPL-MCNC: 7.6 G/DL — SIGNIFICANT CHANGE UP (ref 6–8.3)
RBC # BLD: 3.01 M/UL — LOW (ref 4.2–5.8)
RBC # FLD: 14.7 % — HIGH (ref 10.3–14.5)
SODIUM SERPL-SCNC: 138 MMOL/L — SIGNIFICANT CHANGE UP (ref 135–145)
T4 FREE+ TSH PNL SERPL: 2.47 UIU/ML — SIGNIFICANT CHANGE UP (ref 0.27–4.2)
TSH SERPL-MCNC: 2.34 UIU/ML — SIGNIFICANT CHANGE UP (ref 0.27–4.2)
WBC # BLD: 9.52 K/UL — SIGNIFICANT CHANGE UP (ref 3.8–10.5)
WBC # FLD AUTO: 9.52 K/UL — SIGNIFICANT CHANGE UP (ref 3.8–10.5)

## 2022-11-06 PROCEDURE — 99233 SBSQ HOSP IP/OBS HIGH 50: CPT

## 2022-11-06 RX ORDER — LOSARTAN POTASSIUM 100 MG/1
25 TABLET, FILM COATED ORAL DAILY
Refills: 0 | Status: DISCONTINUED | OUTPATIENT
Start: 2022-11-06 | End: 2022-11-10

## 2022-11-06 RX ORDER — MAGNESIUM SULFATE 500 MG/ML
2 VIAL (ML) INJECTION ONCE
Refills: 0 | Status: COMPLETED | OUTPATIENT
Start: 2022-11-06 | End: 2022-11-06

## 2022-11-06 RX ORDER — POTASSIUM CHLORIDE 20 MEQ
20 PACKET (EA) ORAL
Refills: 0 | Status: DISCONTINUED | OUTPATIENT
Start: 2022-11-06 | End: 2022-11-08

## 2022-11-06 RX ADMIN — Medication 1 TABLET(S): at 13:01

## 2022-11-06 RX ADMIN — Medication 20 MILLIEQUIVALENT(S): at 21:45

## 2022-11-06 RX ADMIN — Medication 1 MILLIGRAM(S): at 13:01

## 2022-11-06 RX ADMIN — Medication 100 MILLIGRAM(S): at 13:01

## 2022-11-06 RX ADMIN — Medication 25 GRAM(S): at 18:20

## 2022-11-06 RX ADMIN — PANTOPRAZOLE SODIUM 40 MILLIGRAM(S): 20 TABLET, DELAYED RELEASE ORAL at 05:48

## 2022-11-06 RX ADMIN — PANTOPRAZOLE SODIUM 40 MILLIGRAM(S): 20 TABLET, DELAYED RELEASE ORAL at 18:17

## 2022-11-06 RX ADMIN — LOSARTAN POTASSIUM 25 MILLIGRAM(S): 100 TABLET, FILM COATED ORAL at 13:00

## 2022-11-06 RX ADMIN — Medication 20 MILLIEQUIVALENT(S): at 18:17

## 2022-11-06 NOTE — PROGRESS NOTE ADULT - SUBJECTIVE AND OBJECTIVE BOX
Patient is a 62y old  Male who presents with a chief complaint of Epigastric pain (05 Nov 2022 14:05)      SUBJECTIVE / OVERNIGHT EVENTS:    Tele reviewed:       ADDITIONAL REVIEW OF SYSTEMS: Negative except for above    MEDICATIONS  (STANDING):  ergocalciferol 28855 Unit(s) Oral every week  folic acid 1 milliGRAM(s) Oral daily  losartan 25 milliGRAM(s) Oral daily  multivitamin 1 Tablet(s) Oral daily  pantoprazole    Tablet 40 milliGRAM(s) Oral every 12 hours  thiamine 100 milliGRAM(s) Oral daily    MEDICATIONS  (PRN):  acetaminophen     Tablet .. 975 milliGRAM(s) Oral every 6 hours PRN Mild Pain (1 - 3)  oxyCODONE    IR 5 milliGRAM(s) Oral every 4 hours PRN Severe Pain (7 - 10)      CAPILLARY BLOOD GLUCOSE        I&O's Summary    05 Nov 2022 08:01  -  06 Nov 2022 07:00  --------------------------------------------------------  IN: 720 mL / OUT: 1725 mL / NET: -1005 mL        PHYSICAL EXAM:  Vital Signs Last 24 Hrs  T(C): 36.7 (06 Nov 2022 06:25), Max: 37.4 (05 Nov 2022 22:10)  T(F): 98 (06 Nov 2022 06:25), Max: 99.4 (05 Nov 2022 22:10)  HR: 120 (06 Nov 2022 06:25) (110 - 122)  BP: 136/91 (06 Nov 2022 06:25) (131/88 - 153/103)  BP(mean): --  RR: 18 (06 Nov 2022 06:25) (18 - 18)  SpO2: 97% (06 Nov 2022 06:25) (97% - 98%)    Parameters below as of 06 Nov 2022 06:25  Patient On (Oxygen Delivery Method): room air        PHYSICAL EXAM:        LABS:                        10.1   10.66 )-----------( 282      ( 05 Nov 2022 17:14 )             30.3     11-05    136  |  105  |  12  ----------------------------<  99  3.5   |  20<L>  |  0.72    Ca    9.0      05 Nov 2022 07:48  Phos  2.7     11-04  Mg     1.5     11-04    TPro  6.9  /  Alb  3.0<L>  /  TBili  1.4<H>  /  DBili  x   /  AST  50<H>  /  ALT  27  /  AlkPhos  121<H>  11-05              Culture - Stool (collected 05 Nov 2022 11:15)  Source: .Stool Feces  Preliminary Report (06 Nov 2022 08:03):    No enteric pathogens to date: Final culture pending        RADIOLOGY & ADDITIONAL TESTS:    Imaging Personally Reviewed:    Electrocardiogram Personally Reviewed:    COORDINATION OF CARE:  Care Discussed with Consultants/Other Providers [Y/N]:  Prior or Outpatient Records Reviewed [Y/N]:     Patient is a 62y old  Male who presents with a chief complaint of Epigastric pain (05 Nov 2022 14:05)      SUBJECTIVE / OVERNIGHT EVENTS:  Pt states to have recurrence of rectal bleed , which was bright red blood on 11/5 and the nurse states that she observed the bright red blood in stool.        ADDITIONAL REVIEW OF SYSTEMS: Negative except for above    MEDICATIONS  (STANDING):  ergocalciferol 39947 Unit(s) Oral every week  folic acid 1 milliGRAM(s) Oral daily  losartan 25 milliGRAM(s) Oral daily  multivitamin 1 Tablet(s) Oral daily  pantoprazole    Tablet 40 milliGRAM(s) Oral every 12 hours  thiamine 100 milliGRAM(s) Oral daily    MEDICATIONS  (PRN):  acetaminophen     Tablet .. 975 milliGRAM(s) Oral every 6 hours PRN Mild Pain (1 - 3)  oxyCODONE    IR 5 milliGRAM(s) Oral every 4 hours PRN Severe Pain (7 - 10)      CAPILLARY BLOOD GLUCOSE        I&O's Summary    05 Nov 2022 08:01  -  06 Nov 2022 07:00  --------------------------------------------------------  IN: 720 mL / OUT: 1725 mL / NET: -1005 mL        PHYSICAL EXAM:  Vital Signs Last 24 Hrs  T(C): 36.7 (06 Nov 2022 06:25), Max: 37.4 (05 Nov 2022 22:10)  T(F): 98 (06 Nov 2022 06:25), Max: 99.4 (05 Nov 2022 22:10)  HR: 120 (06 Nov 2022 06:25) (110 - 122)  BP: 136/91 (06 Nov 2022 06:25) (131/88 - 153/103)  BP(mean): --  RR: 18 (06 Nov 2022 06:25) (18 - 18)  SpO2: 97% (06 Nov 2022 06:25) (97% - 98%)    Parameters below as of 06 Nov 2022 06:25  Patient On (Oxygen Delivery Method): room air        PHYSICAL EXAM:        LABS:                        10.1   10.66 )-----------( 282      ( 05 Nov 2022 17:14 )             30.3     11-05    136  |  105  |  12  ----------------------------<  99  3.5   |  20<L>  |  0.72    Ca    9.0      05 Nov 2022 07:48  Phos  2.7     11-04  Mg     1.5     11-04    TPro  6.9  /  Alb  3.0<L>  /  TBili  1.4<H>  /  DBili  x   /  AST  50<H>  /  ALT  27  /  AlkPhos  121<H>  11-05              Culture - Stool (collected 05 Nov 2022 11:15)  Source: .Stool Feces  Preliminary Report (06 Nov 2022 08:03):    No enteric pathogens to date: Final culture pending        RADIOLOGY & ADDITIONAL TESTS:    Imaging Personally Reviewed:    Electrocardiogram Personally Reviewed:    COORDINATION OF CARE:  Care Discussed with Consultants/Other Providers [Y/N]:  Prior or Outpatient Records Reviewed [Y/N]:

## 2022-11-06 NOTE — CONSULT NOTE ADULT - CONSULT REASON
Tachycardia
OFELIA
Acute Pancreatitis, Hypotension, Tachycardia
bright red blood per rectum
Transfer to medicine

## 2022-11-06 NOTE — PROGRESS NOTE ADULT - PROBLEM SELECTOR PLAN 2
Recently developed on 11/2---> GI consults is appreciated , Elective colonoscopy --> GI f/up due to recurrence of rectal bleed on 11/5     monitor H/H /now stable     Neg stool culture and GI PCR

## 2022-11-06 NOTE — PROGRESS NOTE ADULT - NSPROGADDITIONALINFOA_GEN_ALL_CORE
Pt wants Information to be given to HIS WIFE ( whom he is  from ) , not to his sibblings     Beatrice Martínez  hospitalist

## 2022-11-06 NOTE — CONSULT NOTE ADULT - SUBJECTIVE AND OBJECTIVE BOX
DATE OF SERVICE: 11-06-22 @ 21:02    CHIEF COMPLAINT:Patient is a 62y old  Male who presents with a chief complaint of Epigastric pain (06 Nov 2022 08:36)      HISTORY OF PRESENT ILLNESS:HPI:  62M PMHx HTN, GERD, hiatal hernia, ?cyclical vomiting syndrome who pw nausea/vomiting for 5 days associated with epigastric pain, found to have acute interstitial pancreatitis on labs/imaging. Patient reports onset of nausea + non bloody, bilious emesis last Wednesday aw epigastric pain. Reports 3-4 episodes of emesis/day, PO intolerance, which has not improved prompting presentation to [facility] ED. Denies fevers/chills at home, diarrhea, hematochezia, hematemesis, CP, SOB, lightheadedness, dizziness. Notably, patient reports active daily ETOH use, "3-4 drinks/night". Also of note, patient has had prior admissions for similar sxs, last admitted January 2022 for nausea/vomiting aw epigastric pain x4 days. W/u for gastroparesis and GOO was negative at that time. CT A/P w/o acute intra abdominal findings at that time. No evidence of pancreatitis either.     In ED, patient is tachycardic to 140s, borderline hypotensive 90s/60s, w low grade fever 100.4. Labs remarkable for mild leukocytosis (WBC 12), Na 131, Crt 5.4, T bili:3.7, AlkP: 136, AST:102, Lipase: >3000. CT A/P w IV contrast demonstrates acute interstitial pancreatitis without peripancreatic fluid collections. RUQ US, w/o evidence of cholecystitis, cholelithiasis, choledocholithiasis, or biliary ductal dilation.     Received 2.5L bolus in ED.  (31 Oct 2022 19:51)      PAST MEDICAL & SURGICAL HISTORY:  HTN (hypertension)      Acid reflux      No significant past surgical history              MEDICATIONS:  losartan 25 milliGRAM(s) Oral daily        acetaminophen     Tablet .. 975 milliGRAM(s) Oral every 6 hours PRN  oxyCODONE    IR 5 milliGRAM(s) Oral every 4 hours PRN    pantoprazole    Tablet 40 milliGRAM(s) Oral every 12 hours      ergocalciferol 28890 Unit(s) Oral every week  folic acid 1 milliGRAM(s) Oral daily  multivitamin 1 Tablet(s) Oral daily  potassium chloride    Tablet ER 20 milliEquivalent(s) Oral every 2 hours  thiamine 100 milliGRAM(s) Oral daily      FAMILY HISTORY:  FH: hypertension        Non-contributory    SOCIAL HISTORY:    [ ] former social smoker  Allergies    No Known Allergies    Intolerances    	    REVIEW OF SYSTEMS:  CONSTITUTIONAL: No fever  EYES: No eye pain, visual disturbances, or discharge  ENMT:  No difficulty hearing, tinnitus  NECK: No pain or stiffness  RESPIRATORY: No cough, wheezing,  CARDIOVASCULAR: No chest pain, palpitations, passing out, dizziness, or leg swelling  GASTROINTESTINAL:  See HPI  GENITOURINARY: No dysuria, hematuria  NEUROLOGICAL: No stroke like symptoms  SKIN: No burning or lesions   ENDOCRINE: No heat or cold intolerance  MUSCULOSKELETAL: No joint pain or swelling  PSYCHIATRIC: No  anxiety, mood swings  HEME/LYMPH: No bleeding gums  ALLERGY AND IMMUNOLOGIC: No hives or eczema	    All other ROS negative    PHYSICAL EXAM:  T(C): 36.7 (11-06-22 @ 20:48), Max: 37.4 (11-05-22 @ 22:10)  HR: 109 (11-06-22 @ 20:48) (98 - 122)  BP: 129/88 (11-06-22 @ 20:48) (129/88 - 153/103)  RR: 18 (11-06-22 @ 20:48) (18 - 18)  SpO2: 99% (11-06-22 @ 20:48) (94% - 99%)  Wt(kg): --  I&O's Summary    05 Nov 2022 08:01  -  06 Nov 2022 07:00  --------------------------------------------------------  IN: 720 mL / OUT: 1725 mL / NET: -1005 mL    06 Nov 2022 07:01  -  06 Nov 2022 21:02  --------------------------------------------------------  IN: 720 mL / OUT: 1300 mL / NET: -580 mL        Appearance: Normal	  HEENT:   Normal oral mucosa, EOMI	  Cardiovascular:  S1 S2, No JVD,    Respiratory: Lungs clear to auscultation	  Psychiatry: Alert  Gastrointestinal:  Soft, Non-tender, + BS	  Skin: No rashes   Neurologic: Non-focal  Extremities:  No edema  Vascular: Peripheral pulses palpable    	    	  	  CARDIAC MARKERS:  Labs personally reviewed by me                                  9.6    9.52  )-----------( 310      ( 06 Nov 2022 10:36 )             28.0     11-06    138  |  106  |  10  ----------------------------<  140<H>  3.3<L>   |  21<L>  |  0.58    Ca    9.5      06 Nov 2022 10:37  Phos  3.2     11-06  Mg     1.3     11-06    TPro  7.6  /  Alb  3.3  /  TBili  1.2  /  DBili  x   /  AST  52<H>  /  ALT  28  /  AlkPhos  117  11-06          EKG: Personally reviewed by me -   Radiology: Personally reviewed by me -       Assessment /Plan:       Differential diagnosis and plan of care discussed with patient after the evaluation. Counseling on diet, nutritional counseling, weight management, exercise and medication compliance was done.   Advanced care planning/advanced directives discussed with patient/family. DNR status including forceful chest compressions to attempt to restart the heart, ventilator support/artificial breathing, electric shock, artificial nutrition, health care proxy, Molst form all discussed with pt. Pt wishes to consider. More than fifteen minutes spent on discussing advanced directives. One hundred ten minutes spent on encounter, of which more than fifty percent of the encounter was spent counseling and/or coordinating care by the attending physician        Pablo Marks DO [facility]  Cardiovascular Medicine  [address] [place] [address], [address]  Office [phone]  Available via call/text on [employer] Teams DATE OF SERVICE: 11-06-22 @ 21:02    CHIEF COMPLAINT:Patient is a 62y old  Male who presents with a chief complaint of Epigastric pain (06 Nov 2022 08:36)      HISTORY OF PRESENT ILLNESS:HPI:  62M PMHx HTN, GERD, hiatal hernia, ?cyclical vomiting syndrome who pw nausea/vomiting for 5 days associated with epigastric pain, found to have acute interstitial pancreatitis on labs/imaging. Patient reports onset of nausea + non bloody, bilious emesis last Wednesday aw epigastric pain. Reports 3-4 episodes of emesis/day, PO intolerance, which has not improved prompting presentation to [facility] ED. Denies fevers/chills at home, diarrhea, hematochezia, hematemesis, CP, SOB, lightheadedness, dizziness. Notably, patient reports active daily ETOH use, "3-4 drinks/night". Also of note, patient has had prior admissions for similar sxs, last admitted January 2022 for nausea/vomiting aw epigastric pain x4 days. W/u for gastroparesis and GOO was negative at that time. CT A/P w/o acute intra abdominal findings at that time. No evidence of pancreatitis either.     In ED, patient is tachycardic to 140s, borderline hypotensive 90s/60s, w low grade fever 100.4. Labs remarkable for mild leukocytosis (WBC 12), Na 131, Crt 5.4, T bili:3.7, AlkP: 136, AST:102, Lipase: >3000. CT A/P w IV contrast demonstrates acute interstitial pancreatitis without peripancreatic fluid collections. RUQ US, w/o evidence of cholecystitis, cholelithiasis, choledocholithiasis, or biliary ductal dilation.     Received 2.5L bolus in ED.  (31 Oct 2022 19:51)      PAST MEDICAL & SURGICAL HISTORY:  HTN (hypertension)      Acid reflux      No significant past surgical history              MEDICATIONS:  losartan 25 milliGRAM(s) Oral daily        acetaminophen     Tablet .. 975 milliGRAM(s) Oral every 6 hours PRN  oxyCODONE    IR 5 milliGRAM(s) Oral every 4 hours PRN    pantoprazole    Tablet 40 milliGRAM(s) Oral every 12 hours      ergocalciferol 75167 Unit(s) Oral every week  folic acid 1 milliGRAM(s) Oral daily  multivitamin 1 Tablet(s) Oral daily  potassium chloride    Tablet ER 20 milliEquivalent(s) Oral every 2 hours  thiamine 100 milliGRAM(s) Oral daily      FAMILY HISTORY:  FH: hypertension        Non-contributory    SOCIAL HISTORY:    [ ] former social smoker  Allergies    No Known Allergies    Intolerances    	    REVIEW OF SYSTEMS:  CONSTITUTIONAL: No fever  EYES: No eye pain, visual disturbances, or discharge  ENMT:  No difficulty hearing, tinnitus  NECK: No pain or stiffness  RESPIRATORY: No cough, wheezing,  CARDIOVASCULAR: No chest pain, palpitations, passing out, dizziness, or leg swelling  GASTROINTESTINAL:  See HPI  GENITOURINARY: No dysuria, hematuria  NEUROLOGICAL: No stroke like symptoms  SKIN: No burning or lesions   ENDOCRINE: No heat or cold intolerance  MUSCULOSKELETAL: No joint pain or swelling  PSYCHIATRIC: No  anxiety, mood swings  HEME/LYMPH: No bleeding gums  ALLERGY AND IMMUNOLOGIC: No hives or eczema	    All other ROS negative    PHYSICAL EXAM:  T(C): 36.7 (11-06-22 @ 20:48), Max: 37.4 (11-05-22 @ 22:10)  HR: 109 (11-06-22 @ 20:48) (98 - 122)  BP: 129/88 (11-06-22 @ 20:48) (129/88 - 153/103)  RR: 18 (11-06-22 @ 20:48) (18 - 18)  SpO2: 99% (11-06-22 @ 20:48) (94% - 99%)  Wt(kg): --  I&O's Summary    05 Nov 2022 08:01  -  06 Nov 2022 07:00  --------------------------------------------------------  IN: 720 mL / OUT: 1725 mL / NET: -1005 mL    06 Nov 2022 07:01  -  06 Nov 2022 21:02  --------------------------------------------------------  IN: 720 mL / OUT: 1300 mL / NET: -580 mL        Appearance: Normal	  HEENT:   Normal oral mucosa, EOMI	  Cardiovascular:  S1 S2, No JVD,    Respiratory: Lungs clear to auscultation	  Psychiatry: Alert  Gastrointestinal:  Soft, Non-tender, + BS	  Skin: No rashes   Neurologic: Non-focal  Extremities:  No edema  Vascular: Peripheral pulses palpable    	    	  	  CARDIAC MARKERS:  Labs personally reviewed by me                                  9.6    9.52  )-----------( 310      ( 06 Nov 2022 10:36 )             28.0     11-06    138  |  106  |  10  ----------------------------<  140<H>  3.3<L>   |  21<L>  |  0.58    Ca    9.5      06 Nov 2022 10:37  Phos  3.2     11-06  Mg     1.3     11-06    TPro  7.6  /  Alb  3.3  /  TBili  1.2  /  DBili  x   /  AST  52<H>  /  ALT  28  /  AlkPhos  117  11-06          EKG: Personally reviewed by me - Sinus tach @ 113bpm  Radiology: Personally reviewed by me - CXR clear lungs      Assessment and Plan:   · Assessment	  62M with PMHx of HTN, GERD/Hiatal Hernia, CKD3, alcohol abuse, and hepatic steatosis presenting for poor PO intake and epigastric pain found to have pancreatitis. Patient initially in SICU and then downgraded to med-surg unit on 11/2. Hospital course complicated by OFELIA-on-CKD3 and recent BRBPR. Patient is now transferred to medicine for further management.      Problem/Plan - 1:  ·  Problem: Tachycardia.   ·  Plan: Noted to have sinus tachycardia in 110s  - likely reactive 2/2 pancreatitis,  acute blood loss anemia, prerenal state OFELIA  -Echo to assess LV function    Problem/Plan - 2:  ·  Problem: HTN (hypertension).   ·  Plan: well controlled on Losartan 25mg .    Problem/Plan - 3:  ·  Problem: Hematochezia.   ·  Plan: Recently developed on 11/2---> GI consults is appreciated , Elective colonoscopy --> GI f/up due to recurrence of rectal bleed on 11/5   monitor H/H /now stable   Neg stool culture and GI PCR.    Problem/Plan - 4:  ·  Problem: AGUILLON  ·  Plan: Complains of 1-2 years of significant AGUILLON  Explained that he would need ischemic eval as outpt once acute issues resolve    Problem/Plan - 5:  ·  Problem: Pancreatitis.   ·  Plan: -S/P IVF   -Pain control PRN  -Per surgery likely etiology is alcohol abuse.  Pt tolerates regular diet.          Differential diagnosis and plan of care discussed with patient after the evaluation. Counseling on diet, nutritional counseling, weight management, exercise and medication compliance was done.   Advanced care planning/advanced directives discussed with patient/family. DNR status including forceful chest compressions to attempt to restart the heart, ventilator support/artificial breathing, electric shock, artificial nutrition, health care proxy, Molst form all discussed with pt. Pt wishes to consider. More than fifteen minutes spent on discussing advanced directives. One hundred ten minutes spent on encounter, of which more than fifty percent of the encounter was spent counseling and/or coordinating care by the attending physician        Pablo Marks DO [facility]  Cardiovascular Medicine  [address] [place] [address], [address]  Office [phone]  Available via call/text on [employer] Teams

## 2022-11-07 LAB
ALBUMIN SERPL ELPH-MCNC: 3 G/DL — LOW (ref 3.3–5)
ALP SERPL-CCNC: 128 U/L — HIGH (ref 40–120)
ALT FLD-CCNC: 27 U/L — SIGNIFICANT CHANGE UP (ref 10–45)
ANION GAP SERPL CALC-SCNC: 9 MMOL/L — SIGNIFICANT CHANGE UP (ref 5–17)
APTT BLD: 30.8 SEC — SIGNIFICANT CHANGE UP (ref 27.5–35.5)
AST SERPL-CCNC: 55 U/L — HIGH (ref 10–40)
BASOPHILS # BLD AUTO: 0 K/UL — SIGNIFICANT CHANGE UP (ref 0–0.2)
BASOPHILS NFR BLD AUTO: 0 % — SIGNIFICANT CHANGE UP (ref 0–2)
BILIRUB SERPL-MCNC: 1.1 MG/DL — SIGNIFICANT CHANGE UP (ref 0.2–1.2)
BUN SERPL-MCNC: 9 MG/DL — SIGNIFICANT CHANGE UP (ref 7–23)
CALCIUM SERPL-MCNC: 9.3 MG/DL — SIGNIFICANT CHANGE UP (ref 8.4–10.5)
CHLORIDE SERPL-SCNC: 105 MMOL/L — SIGNIFICANT CHANGE UP (ref 96–108)
CO2 SERPL-SCNC: 21 MMOL/L — LOW (ref 22–31)
CREAT SERPL-MCNC: 0.68 MG/DL — SIGNIFICANT CHANGE UP (ref 0.5–1.3)
CULTURE RESULTS: SIGNIFICANT CHANGE UP
EGFR: 106 ML/MIN/1.73M2 — SIGNIFICANT CHANGE UP
EOSINOPHIL # BLD AUTO: 0.4 K/UL — SIGNIFICANT CHANGE UP (ref 0–0.5)
EOSINOPHIL NFR BLD AUTO: 3.5 % — SIGNIFICANT CHANGE UP (ref 0–6)
GLUCOSE SERPL-MCNC: 106 MG/DL — HIGH (ref 70–99)
HCT VFR BLD CALC: 27.7 % — LOW (ref 39–50)
HGB BLD-MCNC: 9.2 G/DL — LOW (ref 13–17)
INR BLD: 1.49 RATIO — HIGH (ref 0.88–1.16)
LYMPHOCYTES # BLD AUTO: 0.89 K/UL — LOW (ref 1–3.3)
LYMPHOCYTES # BLD AUTO: 7.9 % — LOW (ref 13–44)
MAGNESIUM SERPL-MCNC: 1.5 MG/DL — LOW (ref 1.6–2.6)
MCHC RBC-ENTMCNC: 31.1 PG — SIGNIFICANT CHANGE UP (ref 27–34)
MCHC RBC-ENTMCNC: 33.2 GM/DL — SIGNIFICANT CHANGE UP (ref 32–36)
MCV RBC AUTO: 93.6 FL — SIGNIFICANT CHANGE UP (ref 80–100)
MONOCYTES # BLD AUTO: 1.39 K/UL — HIGH (ref 0–0.9)
MONOCYTES NFR BLD AUTO: 12.3 % — SIGNIFICANT CHANGE UP (ref 2–14)
NEUTROPHILS # BLD AUTO: 8.64 K/UL — HIGH (ref 1.8–7.4)
NEUTROPHILS NFR BLD AUTO: 75.4 % — SIGNIFICANT CHANGE UP (ref 43–77)
PLATELET # BLD AUTO: 413 K/UL — HIGH (ref 150–400)
POTASSIUM SERPL-MCNC: 3.9 MMOL/L — SIGNIFICANT CHANGE UP (ref 3.5–5.3)
POTASSIUM SERPL-SCNC: 3.9 MMOL/L — SIGNIFICANT CHANGE UP (ref 3.5–5.3)
PROT SERPL-MCNC: 7.1 G/DL — SIGNIFICANT CHANGE UP (ref 6–8.3)
PROTHROM AB SERPL-ACNC: 17.3 SEC — HIGH (ref 10.5–13.4)
RBC # BLD: 2.96 M/UL — LOW (ref 4.2–5.8)
RBC # FLD: 14.6 % — HIGH (ref 10.3–14.5)
SARS-COV-2 RNA SPEC QL NAA+PROBE: SIGNIFICANT CHANGE UP
SODIUM SERPL-SCNC: 135 MMOL/L — SIGNIFICANT CHANGE UP (ref 135–145)
SPECIMEN SOURCE: SIGNIFICANT CHANGE UP
WBC # BLD: 11.32 K/UL — HIGH (ref 3.8–10.5)
WBC # FLD AUTO: 11.32 K/UL — HIGH (ref 3.8–10.5)

## 2022-11-07 PROCEDURE — 99232 SBSQ HOSP IP/OBS MODERATE 35: CPT

## 2022-11-07 PROCEDURE — 93306 TTE W/DOPPLER COMPLETE: CPT | Mod: 26

## 2022-11-07 RX ORDER — SOD SULF/SODIUM/NAHCO3/KCL/PEG
4000 SOLUTION, RECONSTITUTED, ORAL ORAL ONCE
Refills: 0 | Status: COMPLETED | OUTPATIENT
Start: 2022-11-07 | End: 2022-11-07

## 2022-11-07 RX ORDER — MAGNESIUM SULFATE 500 MG/ML
2 VIAL (ML) INJECTION ONCE
Refills: 0 | Status: COMPLETED | OUTPATIENT
Start: 2022-11-07 | End: 2022-11-07

## 2022-11-07 RX ADMIN — PANTOPRAZOLE SODIUM 40 MILLIGRAM(S): 20 TABLET, DELAYED RELEASE ORAL at 17:16

## 2022-11-07 RX ADMIN — Medication 100 MILLIGRAM(S): at 12:07

## 2022-11-07 RX ADMIN — Medication 1 MILLIGRAM(S): at 12:07

## 2022-11-07 RX ADMIN — Medication 4000 MILLILITER(S): at 17:17

## 2022-11-07 RX ADMIN — Medication 25 GRAM(S): at 13:54

## 2022-11-07 RX ADMIN — Medication 1 TABLET(S): at 12:07

## 2022-11-07 RX ADMIN — LOSARTAN POTASSIUM 25 MILLIGRAM(S): 100 TABLET, FILM COATED ORAL at 06:19

## 2022-11-07 RX ADMIN — PANTOPRAZOLE SODIUM 40 MILLIGRAM(S): 20 TABLET, DELAYED RELEASE ORAL at 06:19

## 2022-11-07 NOTE — DIETITIAN INITIAL EVALUATION ADULT - PERTINENT LABORATORY DATA
11-07    135  |  105  |  9   ----------------------------<  106<H>  3.9   |  21<L>  |  0.68    Ca    9.3      07 Nov 2022 07:08  Phos  3.2     11-06  Mg     1.5     11-07    TPro  7.1  /  Alb  3.0<L>  /  TBili  1.1  /  DBili  x   /  AST  55<H>  /  ALT  27  /  AlkPhos  128<H>  11-07  A1C with Estimated Average Glucose Result: 5.3 % (01-26-22 @ 04:18)

## 2022-11-07 NOTE — DIETITIAN INITIAL EVALUATION ADULT - ADD RECOMMEND
1) Consider liberalizing current diet order as medically feasible: clear liquid diet (Remove DASH/TLC restriction)  2) Advance diet as medically feasible: clear liquids --> regular. Defer DASH/TLC restriction at this time.  3) Food preferences obtained; RD to honor as able.   4) Continue micronutrient supplementation: multivitamin, folic acid, thiamine, vitamin D  5) Continue to monitor electrolytes and replete if low.   6) Monitor nutritional intake/tolerance, weights, labs, hydration status, skin integrity, BM, GI symptoms.

## 2022-11-07 NOTE — DIETITIAN INITIAL EVALUATION ADULT - PHYSCIAL ASSESSMENT
Weight Hx Per Pt:  - UBW: 195-200 pounds   - Reported weight changes: none     Current Admission Weights:  - Dosing weight: 198.2 pounds (10/31)    Weight Change:  - Weight stable at this time. Will continue to monitor weight trends as available/able.     IBW: 172 pounds   %IBW: 115%/overweight

## 2022-11-07 NOTE — PROGRESS NOTE ADULT - SUBJECTIVE AND OBJECTIVE BOX
DATE OF SERVICE: 11-07-22 @ 09:57    Patient is a 62y old  Male who presents with a chief complaint of Pancreatitis (06 Nov 2022 21:00)      INTERVAL HISTORY: Feels ok.     REVIEW OF SYSTEMS:  CONSTITUTIONAL: No weakness  EYES/ENT: No visual changes;  No throat pain   NECK: No pain or stiffness  RESPIRATORY: No cough, wheezing; No shortness of breath  CARDIOVASCULAR: No chest pain or palpitations  GASTROINTESTINAL: No abdominal  pain. No nausea, vomiting, or hematemesis  GENITOURINARY: No dysuria, frequency or hematuria  NEUROLOGICAL: No stroke like symptoms  SKIN: No rashes    	  MEDICATIONS:  losartan 25 milliGRAM(s) Oral daily        PHYSICAL EXAM:  T(C): 36.9 (11-07-22 @ 08:26), Max: 36.9 (11-06-22 @ 12:59)  HR: 110 (11-07-22 @ 08:26) (98 - 115)  BP: 134/98 (11-07-22 @ 08:26) (129/88 - 150/98)  RR: 18 (11-07-22 @ 08:26) (18 - 18)  SpO2: 97% (11-07-22 @ 08:26) (94% - 99%)  Wt(kg): --  I&O's Summary    06 Nov 2022 07:01  -  07 Nov 2022 07:00  --------------------------------------------------------  IN: 720 mL / OUT: 1300 mL / NET: -580 mL    07 Nov 2022 07:01  -  07 Nov 2022 09:57  --------------------------------------------------------  IN: 0 mL / OUT: 300 mL / NET: -300 mL          Appearance: In no distress	  HEENT:    PERRL, EOMI	  Cardiovascular:  S1 S2, No JVD  Respiratory: Lungs clear to auscultation	  Gastrointestinal:  Soft, Non-tender, + BS	  Vascularature:  dep edema of LE  Psychiatric: Appropriate affect   Neuro: no acute focal deficits                               9.2    11.32 )-----------( 413      ( 07 Nov 2022 07:10 )             27.7     11-07    135  |  105  |  9   ----------------------------<  106<H>  3.9   |  21<L>  |  0.68    Ca    9.3      07 Nov 2022 07:08  Phos  3.2     11-06  Mg     1.3     11-06    TPro  7.1  /  Alb  3.0<L>  /  TBili  1.1  /  DBili  x   /  AST  55<H>  /  ALT  27  /  AlkPhos  128<H>  11-07        Labs personally reviewed      ASSESSMENT/PLAN: 	    62M with PMHx of HTN, GERD/Hiatal Hernia, CKD3, alcohol abuse, and hepatic steatosis presenting for poor PO intake and epigastric pain found to have pancreatitis. Patient initially in SICU and then downgraded to med-surg unit on 11/2. Hospital course complicated by OFELIA-on-CKD3 and recent BRBPR. Patient is now transferred to medicine for further management.      Problem/Plan - 1:  ·  Problem: Tachycardia.   ·  Plan: Noted to have sinus tachycardia in 110s  - likely reactive 2/2 pancreatitis,  acute blood loss anemia, prerenal state OFELIA  -Echo to assess LV function    Problem/Plan - 2:  ·  Problem: HTN (hypertension).   ·  Plan: well controlled on Losartan 25mg .    Problem/Plan - 3:  ·  Problem: Hematochezia.   ·  Plan: Recently developed on 11/2---> GI consults is appreciated , Elective colonoscopy --> GI f/up due to recurrence of rectal bleed on 11/5   monitor H/H now stable   Neg stool culture and GI PCR.    Problem/Plan - 4:  ·  Problem: AGUILLON  ·  Plan: Complains of 1-2 years of significant AGUILLON  Explained that he would need ischemic eval as outpt once acute issues resolve    Problem/Plan - 5:  ·  Problem: Pancreatitis.   ·  Plan: -S/P IVF   -Pain control PRN  -Per surgery likely etiology is alcohol abuse.  Pt tolerates regular diet.            Miladys Pena, AG-NP   Pablo Marks, DO [facility]  Cardiovascular Medicine  [address] [facility], [address]  Available through call or text on [employer] TEAM[employer]  Office: [phone]

## 2022-11-07 NOTE — DIETITIAN INITIAL EVALUATION ADULT - OTHER INFO
- K+ low 11/06, currently ordered for KCl. Mg low today 11/07, s/p repletion  - Continues on micronutrient supplementation: multivitamin, folic acid, thiamine, vitamin D

## 2022-11-07 NOTE — PROGRESS NOTE ADULT - PROBLEM SELECTOR PLAN 1
- resolved  -S/P IVF   -Pain control PRN  -Per surgery likely etiology is alcohol use  - tolerating regular diet

## 2022-11-07 NOTE — DIETITIAN INITIAL EVALUATION ADULT - PERSON TAUGHT/METHOD
Reviewed heart healthy diet education including: general healthful diet from all food groups, dietary sodium effect on blood pressure, dietary sources of sodium/saturated fat, saturated fat vs. unsaturated fat, limiting concentrated sweets, fiber food sources (with consideration of Hx of hepatic steatosis). Pt endorses understanding, accepted literature at the bedside./verbal instruction/individual instruction/teach back - (Patient repeats in own words)/patient instructed Reviewed heart healthy diet education including: choosing lean animal proteins and plant proteins, whole grains > refined grains, choosing fiber rich fruits/vegetables, decreasing intake of high saturated fat/sodium foods, choosing heart healthy fats (with consideration of Hx of hepatic steatosis). Pt endorses understanding, accepted literature at the bedside./verbal instruction/individual instruction/teach back - (Patient repeats in own words)/patient instructed

## 2022-11-07 NOTE — DIETITIAN INITIAL EVALUATION ADULT - NS FNS DIET ORDER
Diet, NPO after Midnight:      NPO Start Date: 07-Nov-2022,   NPO Start Time: 23:59 (11-07-22 @ 14:40)  Diet, Clear Liquid:   DASH/TLC {Sodium & Cholesterol Restricted} (DASH) (11-06-22 @ 19:31)

## 2022-11-07 NOTE — PROGRESS NOTE ADULT - ATTENDING COMMENTS
Reconsulted for recurrent hematochezia with 2g hgb  Patient now amenable to endoscopic evaluation  Suspect LGIB  Plan for colonoscopy +/- EGD tomm AM

## 2022-11-07 NOTE — PROGRESS NOTE ADULT - PROBLEM SELECTOR PLAN 6
-Noted to have low rate tachycardia in 110s  -could be due to blood loss?  -echo  -cardiology recs appreciated  -Perform EKG--sinus

## 2022-11-07 NOTE — DIETITIAN INITIAL EVALUATION ADULT - PERTINENT MEDS FT
MEDICATIONS  (STANDING):  ergocalciferol 90321 Unit(s) Oral every week  folic acid 1 milliGRAM(s) Oral daily  losartan 25 milliGRAM(s) Oral daily  multivitamin 1 Tablet(s) Oral daily  pantoprazole    Tablet 40 milliGRAM(s) Oral every 12 hours  potassium chloride    Tablet ER 20 milliEquivalent(s) Oral every 2 hours  thiamine 100 milliGRAM(s) Oral daily    MEDICATIONS  (PRN):  acetaminophen     Tablet .. 975 milliGRAM(s) Oral every 6 hours PRN Mild Pain (1 - 3)  oxyCODONE    IR 5 milliGRAM(s) Oral every 4 hours PRN Severe Pain (7 - 10)

## 2022-11-07 NOTE — DIETITIAN INITIAL EVALUATION ADULT - ORAL INTAKE PTA/DIET HISTORY
Pt reports good appetite/PO intake at baseline. Decreased appetite/PO intake x 4 days PTA, little to no PO intake in setting of nausea and 5-6x emesis daily. Hx of ETOH abuse noted per chart.   - Therapeutic restrictions/modifications: no pork (Judaism)  - NKFA/intolerances  - No difficulty chewing/swallowing.   - Micronutrient/Other supplementation: vitamin D2  - Protein-energy supplementation: none

## 2022-11-07 NOTE — DIETITIAN INITIAL EVALUATION ADULT - REASON FOR ADMISSION
Per chart "62M PMHx HTN, GERD, hiatal hernia, ?cyclical vomiting syndrome who pw nausea/vomiting for 5 days associated with epigastric pain, found to have acute interstitial pancreatitis on labs/imaging. " Course C/b hematochezia "Ddx includes hemorrhoidal bleeding vs diverticular vs AVM vs malignancy vs other",

## 2022-11-07 NOTE — PROGRESS NOTE ADULT - PROBLEM SELECTOR PLAN 2
Recently developed on 11/2, has had gradual decline in hgb during hospital course    monitor H/H /now stable     Neg stool culture and GI PCR    GI recs appreciated, colonoscopy 11/8

## 2022-11-07 NOTE — DIETITIAN INITIAL EVALUATION ADULT - ENERGY INTAKE
Poor (<50%) Pt p/w acute pancreatitis, NPO/Clears throughout this length of stay. Of note, reports good appetite/PO intake on regular diet x 3 days. Currently ordered for clear liquid diet x 1 day, pending colonscopy in setting of hematochezia (improved per Pt).   - Food preferences obtained; RD to honor as able.  Pt p/w acute pancreatitis, NPO/Clears throughout this length of stay. Of note, reports good appetite/PO intake on regular diet x 3 days. Currently ordered for clear liquid diet x 2 days, pending colonoscopy in setting of hematochezia (improved per Pt).   - Food preferences obtained; RD to honor as able.

## 2022-11-07 NOTE — PROGRESS NOTE ADULT - SUBJECTIVE AND OBJECTIVE BOX
[facility] Division of Hospital Medicine  Noemy Littlejohn DO  Available on Teams Jorge    Patient is a 62y old  Male who presents with a chief complaint of Per chart "62M PMHx HTN, GERD, hiatal hernia, ?cyclical vomiting syndrome who pw nausea/vomiting for 5 days associated with epigastric pain, found to have acute interstitial pancreatitis on labs/imaging. " Course C/b hematochezia "Ddx includes hemorrhoidal bleeding vs diverticular vs AVM vs malignancy vs other",        (07 Nov 2022 15:05)      SUBJECTIVE / OVERNIGHT EVENTS: none. Patient endorses AGUILLON. Some mid abdominal discomfort.  ADDITIONAL REVIEW OF SYSTEMS: negative    MEDICATIONS  (STANDING):  ergocalciferol 84345 Unit(s) Oral every week  folic acid 1 milliGRAM(s) Oral daily  losartan 25 milliGRAM(s) Oral daily  multivitamin 1 Tablet(s) Oral daily  pantoprazole    Tablet 40 milliGRAM(s) Oral every 12 hours  polyethylene glycol/electrolyte Solution. 4000 milliLiter(s) Oral once  potassium chloride    Tablet ER 20 milliEquivalent(s) Oral every 2 hours  thiamine 100 milliGRAM(s) Oral daily    MEDICATIONS  (PRN):  acetaminophen     Tablet .. 975 milliGRAM(s) Oral every 6 hours PRN Mild Pain (1 - 3)  oxyCODONE    IR 5 milliGRAM(s) Oral every 4 hours PRN Severe Pain (7 - 10)      CAPILLARY BLOOD GLUCOSE        I&O's Summary    06 Nov 2022 07:01  -  07 Nov 2022 07:00  --------------------------------------------------------  IN: 720 mL / OUT: 1300 mL / NET: -580 mL    07 Nov 2022 07:01  -  07 Nov 2022 16:11  --------------------------------------------------------  IN: 480 mL / OUT: 300 mL / NET: 180 mL        PHYSICAL EXAM:  Vital Signs Last 24 Hrs  T(C): 37 (07 Nov 2022 12:39), Max: 37 (07 Nov 2022 12:39)  T(F): 98.6 (07 Nov 2022 12:39), Max: 98.6 (07 Nov 2022 12:39)  HR: 105 (07 Nov 2022 12:39) (98 - 115)  BP: 135/90 (07 Nov 2022 12:39) (129/88 - 150/98)  BP(mean): --  RR: 18 (07 Nov 2022 12:39) (18 - 18)  SpO2: 98% (07 Nov 2022 12:39) (94% - 99%)    Parameters below as of 07 Nov 2022 12:39  Patient On (Oxygen Delivery Method): room air        CONSTITUTIONAL: Well-groomed, in no apparent distress  EYES: No conjunctival or scleral injection, non-icteric; PERRLA and symmetric  ENMT: No external nasal lesions; no pharyngeal injection or exudates, oral mucosa with moist membranes  NECK: Trachea midline without palpable neck mass; thyroid not enlarged and non-tender  RESPIRATORY: Breathing comfortably; lungs CTA without wheeze/rhonchi/rales  CARDIOVASCULAR: +S1S2, RRR, no M/G/R; pedal pulses full and symmetric; 1+ lower extremity edema b/l  GASTROINTESTINAL: No palpable masses or tenderness, +BS throughout, no rebound/guarding  MUSCULOSKELETAL: no digital clubbing or cyanosis; no paraspinal tenderness; normal strength and tone of extremities  SKIN: No rashes or ulcers noted; no subcutaneous nodules or induration palpable  NEUROLOGIC: CN II-XII intact; sensation intact in LEs b/l to light touch  PSYCHIATRIC: A+O x 3; mood and affect appropriate; appropriate insight and judgment    LABS:                        9.2    11.32 )-----------( 413      ( 07 Nov 2022 07:10 )             27.7     11-07    135  |  105  |  9   ----------------------------<  106<H>  3.9   |  21<L>  |  0.68    Ca    9.3      07 Nov 2022 07:08  Phos  3.2     11-06  Mg     1.5     11-07    TPro  7.1  /  Alb  3.0<L>  /  TBili  1.1  /  DBili  x   /  AST  55<H>  /  ALT  27  /  AlkPhos  128<H>  11-07    PT/INR - ( 07 Nov 2022 07:10 )   PT: 17.3 sec;   INR: 1.49 ratio         PTT - ( 07 Nov 2022 07:10 )  PTT:30.8 sec          Culture - Stool (collected 05 Nov 2022 11:15)  Source: .Stool Feces  Final Report (07 Nov 2022 11:50):    No enteric pathogens isolated.    (Stool culture examined for Salmonella,    Shigella, Campylobacter, Aeromonas, Plesiomonas,    Vibrio, E.coli O157 and Yersinia)

## 2022-11-07 NOTE — PROGRESS NOTE ADULT - SUBJECTIVE AND OBJECTIVE BOX
Gastroenterology/Hepatology Progress Note    Interval Events: Patient states he had an episode of BRBPR over the weekend, where he passed a significant amount of blood but no stool. He had a formed brown BM today without bleeding.    Allergies:  No Known Allergies    Hospital Medications:  acetaminophen     Tablet .. 975 milliGRAM(s) Oral every 6 hours PRN  ergocalciferol 54442 Unit(s) Oral every week  folic acid 1 milliGRAM(s) Oral daily  losartan 25 milliGRAM(s) Oral daily  multivitamin 1 Tablet(s) Oral daily  oxyCODONE    IR 5 milliGRAM(s) Oral every 4 hours PRN  pantoprazole    Tablet 40 milliGRAM(s) Oral every 12 hours  potassium chloride    Tablet ER 20 milliEquivalent(s) Oral every 2 hours  thiamine 100 milliGRAM(s) Oral daily    ROS: 14 point ROS negative unless otherwise state in subjective    PHYSICAL EXAM:   Vital Signs:  Vital Signs Last 24 Hrs  T(C): 37 (07 Nov 2022 12:39), Max: 37 (07 Nov 2022 12:39)  T(F): 98.6 (07 Nov 2022 12:39), Max: 98.6 (07 Nov 2022 12:39)  HR: 105 (07 Nov 2022 12:39) (98 - 115)  BP: 135/90 (07 Nov 2022 12:39) (129/88 - 150/98)  BP(mean): --  RR: 18 (07 Nov 2022 12:39) (18 - 18)  SpO2: 98% (07 Nov 2022 12:39) (94% - 99%)    Parameters below as of 07 Nov 2022 12:39  Patient On (Oxygen Delivery Method): room air      Daily     Daily     GENERAL:  No acute distress  HEENT:  NCAT, no scleral icterus  CHEST: no resp distress  HEART:  RRR  ABDOMEN:  Soft, mild epigastric TTP  EXTREMITIES:  No cyanosis, clubbing, or edema  SKIN:  No rash/erythema/ecchymoses  NEURO:  Alert and oriented x 3,    LABS:                        9.2    11.32 )-----------( 413      ( 07 Nov 2022 07:10 )             27.7     Mean Cell Volume: 93.6 fl (11-07-22 @ 07:10)    11-07    135  |  105  |  9   ----------------------------<  106<H>  3.9   |  21<L>  |  0.68    Ca    9.3      07 Nov 2022 07:08  Phos  3.2     11-06  Mg     1.5     11-07    TPro  7.1  /  Alb  3.0<L>  /  TBili  1.1  /  DBili  x   /  AST  55<H>  /  ALT  27  /  AlkPhos  128<H>  11-07    LIVER FUNCTIONS - ( 07 Nov 2022 07:08 )  Alb: 3.0 g/dL / Pro: 7.1 g/dL / ALK PHOS: 128 U/L / ALT: 27 U/L / AST: 55 U/L / GGT: x           PT/INR - ( 07 Nov 2022 07:10 )   PT: 17.3 sec;   INR: 1.49 ratio         PTT - ( 07 Nov 2022 07:10 )  PTT:30.8 sec          Imaging:  reviewed

## 2022-11-07 NOTE — PROGRESS NOTE ADULT - ASSESSMENT
62M PMHx HTN, GERD, hiatal hernia, ?cyclical vomiting syndrome who pw nausea/vomiting for 5 days associated with epigastric pain, found to have acute interstitial pancreatitis on labs/imaging.     #Pancreatitis c/b severe OFELIA, s/p fluid resuscitation. No longer requiring pain medications  #Hematochezia: Pt endorsing hematochezia. Hgb downtrending. Pt hemodynamically stable. Pt states that he would have occasional episodes of hematochezia in the past but it was very infrequent and less in quantity than what he is having in the hospital. Ddx includes hemorrhoidal bleeding vs diverticular vs AVM vs malignancy vs other  - last colonoscopy at age 51, patient was told to follow up in 5 years. EGD 2019 w/ 3 cm hiatal hernia, Grade A reflux esophagitis  #Anemia, normocytic: Pt may have initially been hemoconcentrated in setting of acute pancreatitis.     Plan  - will plan for colonoscopy tomorrow  - keep on CLD today  - NPO after MN  - trend CBC, keep active T&S, transfuse for Hgb<7    All recommendations are tentative until note is attested by attending.     Steph Goodwin, PGY5  Gastroenterology/Hepatology Fellow  Available on [employer] Teams  [phone] ([employer] Short Range Pager)  [phone] (Long Range Pager)    After 5pm, please contact the on-call GI fellow. [phone]

## 2022-11-08 LAB
ALBUMIN SERPL ELPH-MCNC: 3.2 G/DL — LOW (ref 3.3–5)
ALP SERPL-CCNC: 135 U/L — HIGH (ref 40–120)
ALT FLD-CCNC: 31 U/L — SIGNIFICANT CHANGE UP (ref 10–45)
ANION GAP SERPL CALC-SCNC: 13 MMOL/L — SIGNIFICANT CHANGE UP (ref 5–17)
AST SERPL-CCNC: 62 U/L — HIGH (ref 10–40)
BILIRUB DIRECT SERPL-MCNC: 0.6 MG/DL — HIGH (ref 0–0.3)
BILIRUB INDIRECT FLD-MCNC: 0.7 MG/DL — SIGNIFICANT CHANGE UP (ref 0.2–1)
BILIRUB SERPL-MCNC: 1.3 MG/DL — HIGH (ref 0.2–1.2)
BLD GP AB SCN SERPL QL: NEGATIVE — SIGNIFICANT CHANGE UP
BUN SERPL-MCNC: 6 MG/DL — LOW (ref 7–23)
CALCIUM SERPL-MCNC: 9.3 MG/DL — SIGNIFICANT CHANGE UP (ref 8.4–10.5)
CHLORIDE SERPL-SCNC: 105 MMOL/L — SIGNIFICANT CHANGE UP (ref 96–108)
CO2 SERPL-SCNC: 21 MMOL/L — LOW (ref 22–31)
CREAT SERPL-MCNC: 0.7 MG/DL — SIGNIFICANT CHANGE UP (ref 0.5–1.3)
EGFR: 105 ML/MIN/1.73M2 — SIGNIFICANT CHANGE UP
GLUCOSE SERPL-MCNC: 94 MG/DL — SIGNIFICANT CHANGE UP (ref 70–99)
HCT VFR BLD CALC: 28.6 % — LOW (ref 39–50)
HGB BLD-MCNC: 9.6 G/DL — LOW (ref 13–17)
MAGNESIUM SERPL-MCNC: 1.6 MG/DL — SIGNIFICANT CHANGE UP (ref 1.6–2.6)
MCHC RBC-ENTMCNC: 31.5 PG — SIGNIFICANT CHANGE UP (ref 27–34)
MCHC RBC-ENTMCNC: 33.6 GM/DL — SIGNIFICANT CHANGE UP (ref 32–36)
MCV RBC AUTO: 93.8 FL — SIGNIFICANT CHANGE UP (ref 80–100)
NRBC # BLD: 0 /100 WBCS — SIGNIFICANT CHANGE UP (ref 0–0)
PHOSPHATE SERPL-MCNC: 2.9 MG/DL — SIGNIFICANT CHANGE UP (ref 2.5–4.5)
PLATELET # BLD AUTO: 501 K/UL — HIGH (ref 150–400)
POTASSIUM SERPL-MCNC: 3.5 MMOL/L — SIGNIFICANT CHANGE UP (ref 3.5–5.3)
POTASSIUM SERPL-SCNC: 3.5 MMOL/L — SIGNIFICANT CHANGE UP (ref 3.5–5.3)
PROT SERPL-MCNC: 7.3 G/DL — SIGNIFICANT CHANGE UP (ref 6–8.3)
RBC # BLD: 3.05 M/UL — LOW (ref 4.2–5.8)
RBC # FLD: 14.6 % — HIGH (ref 10.3–14.5)
RH IG SCN BLD-IMP: POSITIVE — SIGNIFICANT CHANGE UP
SODIUM SERPL-SCNC: 139 MMOL/L — SIGNIFICANT CHANGE UP (ref 135–145)
WBC # BLD: 12.33 K/UL — HIGH (ref 3.8–10.5)
WBC # FLD AUTO: 12.33 K/UL — HIGH (ref 3.8–10.5)

## 2022-11-08 PROCEDURE — 43235 EGD DIAGNOSTIC BRUSH WASH: CPT

## 2022-11-08 PROCEDURE — 45378 DIAGNOSTIC COLONOSCOPY: CPT

## 2022-11-08 PROCEDURE — 99232 SBSQ HOSP IP/OBS MODERATE 35: CPT

## 2022-11-08 DEVICE — KIT A-LINE 1LUM 20G X 12CM SAFE KIT: Type: IMPLANTABLE DEVICE | Status: FUNCTIONAL

## 2022-11-08 DEVICE — CATH THERMODIL PACE 7.5FR: Type: IMPLANTABLE DEVICE | Status: FUNCTIONAL

## 2022-11-08 DEVICE — NET RETRV ROT ROTH 2.5MMX230CM: Type: IMPLANTABLE DEVICE | Status: FUNCTIONAL

## 2022-11-08 DEVICE — KIT CVC 2LUM MAC 9FR CHG: Type: IMPLANTABLE DEVICE | Status: FUNCTIONAL

## 2022-11-08 RX ORDER — LIDOCAINE HCL 20 MG/ML
4 VIAL (ML) INJECTION ONCE
Refills: 0 | Status: DISCONTINUED | OUTPATIENT
Start: 2022-11-08 | End: 2022-11-10

## 2022-11-08 RX ADMIN — LOSARTAN POTASSIUM 25 MILLIGRAM(S): 100 TABLET, FILM COATED ORAL at 06:16

## 2022-11-08 RX ADMIN — PANTOPRAZOLE SODIUM 40 MILLIGRAM(S): 20 TABLET, DELAYED RELEASE ORAL at 06:16

## 2022-11-08 NOTE — PROVIDER CONTACT NOTE (OTHER) - ASSESSMENT
/108. Patient has no complaints and is resting comfortably.
pt had an episode of red bloody BM
VSS. Patient denies chest pain or shortness of breath. Saturating well on RA.

## 2022-11-08 NOTE — PRE-ANESTHESIA EVALUATION ADULT - MALLAMPATI CLASS
Subjective:       Patient ID: Lisa Chavez is a 59 y.o. female.    Chief Complaint: Establish Care (gi problems) and Abdominal Pain (constipation)    Here to establish care.  She has a h/o diabetes and has been seeing and Endocrinologist that she knows through family.  However, hasn't been seen or had labs in about a year.  Has never really had a PCP.   Has a stressful job that requires a lot of travel and doesn't really like her job.  She has a difficult time deciding if her depression isn't well controlled or if it is just job dissatisfaction.      Abdominal Pain   This is a chronic problem. The current episode started more than 1 year ago. The onset quality is gradual. The problem occurs constantly. The problem has been gradually worsening. The pain is located in the RUQ. The pain is at a severity of 10/10 (at it's worst). The quality of the pain is sharp. The abdominal pain radiates to the LUQ. Associated symptoms include arthralgias, constipation (has to take laxatives several times per week;  has been a problem since she was a teenager), frequency, myalgias and weight loss. Pertinent negatives include no diarrhea, dysuria, fever, headaches, hematochezia, hematuria, melena, nausea or vomiting. The pain is aggravated by eating. The pain is relieved by bowel movements (minimal relief). She has tried proton pump inhibitors for the symptoms. The treatment provided moderate relief. Prior workup: has had EGD and colonoscopy but nothing since she started having current problems. Her past medical history is significant for PUD.   Diabetes   She presents for her initial diabetic visit. She has type 2 diabetes mellitus. Hypoglycemia symptoms include confusion, dizziness, nervousness/anxiousness and tremors. Pertinent negatives for hypoglycemia include no headaches, seizures or speech difficulty. Associated symptoms include chest pain (on and off last 3 months), fatigue, foot paresthesias (numbness in last 2 weeks; 4th  toe on right foot; has a h/o neuroma), weakness and weight loss. Pertinent negatives for diabetes include no blurred vision, no foot ulcerations, no polydipsia, no polyuria and no visual change. There are no hypoglycemic complications. Risk factors for coronary artery disease include dyslipidemia, family history, post-menopausal and diabetes mellitus. Current diabetic treatment includes insulin injections. She is compliant with treatment some of the time (hasn't been good about mealtime insulin). She is currently taking insulin at bedtime, pre-dinner, pre-lunch and pre-breakfast (36 units of tresiba;  15 units premeal but misses frequently). Insulin injections are given by patient. Rotation sites for injection include the abdominal wall. Her breakfast blood glucose is taken between 7-8 am. Her breakfast blood glucose range is generally >200 mg/dl. (Not really monitoring any other time except first thing in AM  ) An ACE inhibitor/angiotensin II receptor blocker is not being taken. She does not see a podiatrist (saw one years ago but not currently).Eye exam is not current (went for vision check but not dilated exam).   Chest Pain    This is a new problem. The current episode started more than 1 month ago. The onset quality is gradual. The problem occurs every several days (only lasts a few seconds but occurs off and on for several hours). The problem has been unchanged. The pain is present in the substernal region and epigastric region. The pain is at a severity of 6/10. The quality of the pain is described as pressure. The pain does not radiate. Associated symptoms include abdominal pain, back pain, a cough (off and on), diaphoresis, dizziness, palpitations, shortness of breath (sometimes) and weakness. Pertinent negatives include no fever, headaches, nausea, numbness or vomiting. The pain is aggravated by emotional upset. She has tried nothing for the symptoms.   Pertinent negatives for past medical history include  no seizures. Prior workup: had stress test about 2 years ago.     Review of Systems   Constitutional: Positive for chills, diaphoresis, fatigue, unexpected weight change (has lost about 20 pounds over last couple of years without change in diet) and weight loss. Negative for fever.   HENT: Positive for hearing loss (left ear), postnasal drip and trouble swallowing. Negative for congestion, rhinorrhea and voice change.    Eyes: Positive for photophobia and visual disturbance. Negative for blurred vision.   Respiratory: Positive for cough (off and on), choking, chest tightness and shortness of breath (sometimes). Negative for apnea and wheezing.         Snoring   Cardiovascular: Positive for chest pain (on and off last 3 months) and palpitations. Negative for leg swelling.   Gastrointestinal: Positive for abdominal pain and constipation (has to take laxatives several times per week;  has been a problem since she was a teenager). Negative for blood in stool, diarrhea, hematochezia, melena, nausea, rectal pain and vomiting.   Endocrine: Negative for cold intolerance, heat intolerance, polydipsia and polyuria.   Genitourinary: Positive for frequency and urgency. Negative for decreased urine volume, difficulty urinating, dysuria, genital sores, hematuria, menstrual problem, pelvic pain, vaginal bleeding and vaginal discharge.   Musculoskeletal: Positive for arthralgias, back pain, myalgias, neck pain and neck stiffness. Negative for gait problem and joint swelling.   Skin: Negative for color change, rash and wound.   Allergic/Immunologic: Positive for environmental allergies. Negative for food allergies.   Neurological: Positive for dizziness, tremors, weakness and light-headedness. Negative for seizures, syncope, facial asymmetry, speech difficulty, numbness and headaches.   Hematological: Negative for adenopathy. Bruises/bleeds easily.   Psychiatric/Behavioral: Positive for confusion and sleep disturbance. Negative for  hallucinations and suicidal ideas. The patient is nervous/anxious.        Past Medical History:   Diagnosis Date    Anxiety     Cardiac tumor, pericardium, primary     benign    Depression     Diabetes mellitus, type 2     Hyperlipidemia       Past Surgical History:   Procedure Laterality Date    APPENDECTOMY      breast implants      CHOLECYSTECTOMY      tummy tuck         Family History   Problem Relation Age of Onset    Lung cancer Mother     Diabetes Father     Heart attack Father      <55    Colon cancer Paternal Grandmother     Breast cancer Neg Hx        Social History     Social History    Marital status:      Spouse name: N/A    Number of children: N/A    Years of education: N/A     Occupational History    diagnostic       Social History Main Topics    Smoking status: Never Smoker    Smokeless tobacco: Never Used    Alcohol use Yes      Comment: occasional    Drug use: No    Sexual activity: Not Currently     Other Topics Concern    None     Social History Narrative    Live with        Current Outpatient Prescriptions   Medication Sig Dispense Refill    HUMALOG KWIKPEN INSULIN 100 unit/mL InPn pen Inject 15 Units into the skin 3 (three) times daily with meals. Add sliding scale as directed 3 Box 1    hydrocodone-acetaminophen 7.5-325mg (NORCO) 7.5-325 mg per tablet Take 1 tablet by mouth daily as needed for Pain.      rosuvastatin (CRESTOR) 20 MG tablet Take 1 tablet (20 mg total) by mouth once daily. 90 tablet 1    traZODone (DESYREL) 100 MG tablet Take 1 tablet (100 mg total) by mouth every evening. 90 tablet 1    TRESIBA FLEXTOUCH U-200 200 unit/mL (3 mL) InPn Inject 36 Units into the skin every evening. Increase by 1 unit nightly until fasting sugar <120 3 Syringe 1    escitalopram oxalate (LEXAPRO) 10 MG tablet Take 1 tablet (10 mg total) by mouth once daily. 30 tablet 1    linaclotide (LINZESS) 145 mcg Cap capsule Take 1 capsule (145 mcg total) by  "mouth once daily. 30 capsule 1    pantoprazole (PROTONIX) 40 MG tablet Take 1 tablet (40 mg total) by mouth once daily. 30 tablet 1    pen needle, diabetic (PEN NEEDLE) 31 gauge x 5/16" Ndle Use with insulin pen needle 4 times daily 400 each 1     No current facility-administered medications for this visit.        Review of patient's allergies indicates:   Allergen Reactions    Demerol [meperidine] Nausea Only     Objective:    HPI     Establish Care    Additional comments: gi problems           Abdominal Pain    Additional comments: constipation       Last edited by Amol Campo MA on 4/19/2018  2:06 PM. (History)      Blood pressure 110/76, pulse 92, temperature 97.7 °F (36.5 °C), temperature source Temporal, height 5' 6" (1.676 m), weight 82.1 kg (181 lb), SpO2 97 %. Body mass index is 29.21 kg/m².   Physical Exam   Constitutional: She appears well-developed.   HENT:   Head: Normocephalic and atraumatic.   Right Ear: Hearing, tympanic membrane, external ear and ear canal normal.   Left Ear: Hearing, tympanic membrane, external ear and ear canal normal.   Nose: Nose normal.   Mouth/Throat: Uvula is midline and oropharynx is clear and moist.   Eyes: Conjunctivae, EOM and lids are normal. Pupils are equal, round, and reactive to light. Right eye exhibits no discharge. Left eye exhibits no discharge. Right conjunctiva is not injected. Right conjunctiva has no hemorrhage. Left conjunctiva is not injected. Left conjunctiva has no hemorrhage. No scleral icterus.   Neck: Carotid bruit is not present. No thyromegaly present.   Cardiovascular: Normal rate, regular rhythm and normal heart sounds.  Exam reveals no gallop and no friction rub.    No murmur heard.  Pulses:       Dorsalis pedis pulses are 2+ on the right side, and 2+ on the left side.   Pulmonary/Chest: Effort normal and breath sounds normal. No respiratory distress. She has no wheezes. She has no rhonchi. She has no rales.   Abdominal: Soft. Bowel sounds " are normal. She exhibits no distension, no abdominal bruit, no pulsatile midline mass and no mass. There is no hepatosplenomegaly. There is tenderness in the epigastric area. There is no rebound and no guarding. No hernia.   Musculoskeletal: She exhibits no edema.   Feet:   Right Foot:   Protective Sensation: 10 sites tested. 9 sites sensed.   Skin Integrity: Negative for ulcer, blister, skin breakdown, erythema, warmth or callus.   Left Foot:   Protective Sensation: 10 sites tested. 10 sites sensed.   Skin Integrity: Negative for ulcer, blister, skin breakdown, erythema, warmth or callus.   Lymphadenopathy:     She has no cervical adenopathy.   Neurological: She is alert. She has normal reflexes. She displays no tremor. No cranial nerve deficit.   Skin: Skin is warm and dry.   Psychiatric: She has a normal mood and affect. Her speech is normal and behavior is normal.   Nursing note and vitals reviewed.          Assessment:       1. Epigastric pain    2. Hyperlipidemia, unspecified hyperlipidemia type    3. Type 2 diabetes mellitus without complication, with long-term current use of insulin    4. Depression, unspecified depression type    5. Chronic constipation    6. Atypical chest pain    7. Unintended weight loss        Plan:       Lisa was seen today for establish care and abdominal pain.    Diagnoses and all orders for this visit:    Epigastric pain  Comments:  I suspect she has either an ulcer or gastritis.  Start PPI and get GI eval    Orders:  -     Ambulatory consult to Gastroenterology  -     CBC auto differential; Future  -     Amylase; Future  -     Lipase; Future  -     CBC auto differential  -     Amylase  -     Lipase    Hyperlipidemia, unspecified hyperlipidemia type  -     rosuvastatin (CRESTOR) 20 MG tablet; Take 1 tablet (20 mg total) by mouth once daily.    Type 2 diabetes mellitus without complication, with long-term current use of insulin  Comments:  Need to get labs.  Discussed importance of  "compliance with med and followup.    Orders:  -     Comprehensive metabolic panel; Future  -     Hemoglobin A1c; Future  -     Lipid panel; Future  -     Microalbumin/creatinine urine ratio; Future  -     Urinalysis; Future  -     Ambulatory referral to Ophthalmology  -     HUMALOG KWIKPEN INSULIN 100 unit/mL InPn pen; Inject 15 Units into the skin 3 (three) times daily with meals. Add sliding scale as directed  -     TRESIBA FLEXTOUCH U-200 200 unit/mL (3 mL) InPn; Inject 36 Units into the skin every evening. Increase by 1 unit nightly until fasting sugar <120  -     pen needle, diabetic (PEN NEEDLE) 31 gauge x 5/16" Ndle; Use with insulin pen needle 4 times daily  -     Comprehensive metabolic panel  -     Hemoglobin A1c  -     Lipid panel  -     Microalbumin/creatinine urine ratio  -     Urinalysis    Depression, unspecified depression type  Comments:  Will switch to lexapro and see if we can improve mood    Orders:  -     escitalopram oxalate (LEXAPRO) 10 MG tablet; Take 1 tablet (10 mg total) by mouth once daily.  -     traZODone (DESYREL) 100 MG tablet; Take 1 tablet (100 mg total) by mouth every evening.    Chronic constipation  Comments:  Trial linzess, GI eval.  Avoid opiates as much as possible.    Orders:  -     Ambulatory consult to Gastroenterology  -     pantoprazole (PROTONIX) 40 MG tablet; Take 1 tablet (40 mg total) by mouth once daily.    Atypical chest pain  Comments:  Sound atypical and may be GI related.  May need stress test if no response to PPI.    Unintended weight loss  -     TSH; Future  -     TSH    Other orders  -     linaclotide (LINZESS) 145 mcg Cap capsule; Take 1 capsule (145 mcg total) by mouth once daily.         " Class III - visualization of the soft palate and the base of the uvula

## 2022-11-08 NOTE — PROVIDER CONTACT NOTE (OTHER) - ACTION/TREATMENT ORDERED:
NP made aware. no further interventions at this time. will continue to monitor.
As per provider orders, notify if patient uptrends. Patient has been elevated in the past and hospitality would like to hold medication patient takes at home until discharge to resume.
Provider aware. Patient agreeable to go to CT tomorrow. CT called and test rescheduled.

## 2022-11-08 NOTE — PROGRESS NOTE ADULT - PROBLEM SELECTOR PLAN 6
-Noted to have low rate tachycardia in 110s  -could be due to blood loss?  -echo normal  -cardiology recs appreciated  -Perform EKG--sinus -Noted to have low rate tachycardia in 110s  -could be due to blood loss?  -echo normal  -cardiology recs appreciated  -Perform EKG--sinus  -due to persistent AGUILLON and tachycardia will check CTA chest to r/o PE and lung etiologies.

## 2022-11-08 NOTE — PROVIDER CONTACT NOTE (OTHER) - SITUATION
/108
Patient ordered for CT Angio Chest PE protocol but refusing to go tonight. States he is tired and will go tomorrow morning.
pt had an episode of red bloody BM

## 2022-11-08 NOTE — PROGRESS NOTE ADULT - NSPROGADDITIONALINFOA_GEN_ALL_CORE
Plan discussed with WILDA Littlejohn, DO  Available on Teams angela    Plan for discharge tomorrow, will f/u results of colonoscopy today.

## 2022-11-08 NOTE — PROGRESS NOTE ADULT - SUBJECTIVE AND OBJECTIVE BOX
DATE OF SERVICE: 11-08-22 @ 10:39    Patient is a 62y old  Male who presents with a chief complaint of Per chart "62M PMHx HTN, GERD, hiatal hernia, ?cyclical vomiting syndrome who pw nausea/vomiting for 5 days associated with epigastric pain, found to have acute interstitial pancreatitis on labs/imaging. " Course C/b hematochezia "Ddx includes hemorrhoidal bleeding vs diverticular vs AVM vs malignancy vs other",        (07 Nov 2022 15:05)      INTERVAL HISTORY: Feels ok.     REVIEW OF SYSTEMS:  CONSTITUTIONAL: No weakness  EYES/ENT: No visual changes;  No throat pain   NECK: No pain or stiffness  RESPIRATORY: No cough, wheezing; No shortness of breath  CARDIOVASCULAR: No chest pain or palpitations  GASTROINTESTINAL: No abdominal  pain. No nausea, vomiting, or hematemesis  GENITOURINARY: No dysuria, frequency or hematuria  NEUROLOGICAL: No stroke like symptoms  SKIN: No rashes    	  MEDICATIONS:  losartan 25 milliGRAM(s) Oral daily        PHYSICAL EXAM:  T(C): 36.9 (11-08-22 @ 09:16), Max: 37 (11-07-22 @ 12:39)  HR: 95 (11-08-22 @ 09:16) (86 - 105)  BP: 145/85 (11-08-22 @ 09:16) (128/86 - 151/93)  RR: 18 (11-08-22 @ 09:16) (18 - 18)  SpO2: 99% (11-08-22 @ 09:16) (98% - 100%)  Wt(kg): --  I&O's Summary    07 Nov 2022 07:01  -  08 Nov 2022 07:00  --------------------------------------------------------  IN: 530 mL / OUT: 1110 mL / NET: -580 mL          Appearance: In no distress	  HEENT:    PERRL, EOMI	  Cardiovascular:  S1 S2, No JVD  Respiratory: Lungs clear to auscultation	  Gastrointestinal:  Soft, Non-tender, + BS	  Vascularature:  No edema of LE  Psychiatric: Appropriate affect   Neuro: no acute focal deficits                               9.6    12.33 )-----------( 501      ( 08 Nov 2022 07:19 )             28.6     11-08    139  |  105  |  6<L>  ----------------------------<  94  3.5   |  21<L>  |  0.70    Ca    9.3      08 Nov 2022 07:20  Phos  2.9     11-08  Mg     1.6     11-08    TPro  7.3  /  Alb  3.2<L>  /  TBili  1.3<H>  /  DBili  0.6<H>  /  AST  62<H>  /  ALT  31  /  AlkPhos  135<H>  11-08        Labs personally reviewed    < from: TTE with Doppler (w/Cont) (11.07.22 @ 15:14) >  EF (Green Rule): 70 %Doppler Peak Velocity (m/sec):  AoV=1.1  ------------------------------------------------------------------------  Observations:  Mitral Valve: Normal mitral valve.  Aortic Valve/Aorta: Normal trileaflet aortic valve. Peak  transaortic valve gradient equals 5 mm Hg. Minimal aortic  regurgitation.  Peak left ventricular outflow tract  gradient equals 3 mm Hg, mean gradient is equal to 2 mm Hg,  LVOT velocity time integral equals 17 cm.  Aortic Root: 3.6 cm.  Ascending Aorta: 3.9 cm.  LVOT diameter: 2.4 cm.  Left Atrium: Normal left atrium.  LA volume index = 29  cc/m2.  Left Ventricle: Normal left ventricular systolic function.   Endocardial visualization enhanced with intravenous  injection of Ultrasonic Enhancing Agent (Lumason). Normal  left ventricular internal dimensions and wall thicknesses.  Right Heart: Normal right atrium. Normal right ventricular  size and function. Normal tricuspid valve. Minimal  tricuspid regurgitation. Normal pulmonic valve. Minimal  pulmonic regurgitation.  Pericardium/Pleura: Normal pericardium with no pericardial  effusion.  Hemodynamic: Estimated right atrial pressure is 8 mm Hg.  Estimated right ventricular systolic pressure equals 20 mm  Hg, assuming right atrial pressure equals 8 mm Hg,  consistent with normal pulmonary pressures.  ------------------------------------------------------------------------  Conclusions:  1. Normal left ventricular internal dimensions and wall  thicknesses.  2. Normal left ventricular systolic function.   Endocardial  visualization enhanced with intravenous injection of  Ultrasonic Enhancing Agent (Lumason).  3. Normal right ventricular size and function.  *** No previous Echo exam.    < end of copied text >    ASSESSMENT/PLAN: 	    62M with PMHx of HTN, GERD/Hiatal Hernia, CKD3, alcohol abuse, and hepatic steatosis presenting for poor PO intake and epigastric pain found to have pancreatitis. Patient initially in SICU and then downgraded to med-surg unit on 11/2. Hospital course complicated by OFELIA-on-CKD3 and recent BRBPR. Patient is now transferred to medicine for further management.      Problem/Plan - 1:  ·  Problem: Tachycardia.   ·  Plan: Noted to have sinus tachycardia in 110s  - likely reactive 2/2 pancreatitis,  acute blood loss anemia, prerenal state OFELIA  -Echo with EF 70%, normal LV systolic function, no WMA  - HR now normalizing    Problem/Plan - 2:  ·  Problem: HTN (hypertension).   ·  Plan: well controlled on Losartan 25mg .    Problem/Plan - 3:  ·  Problem: Hematochezia.   ·  Plan: Recently developed on 11/2---> GI consults is appreciated , Elective colonoscopy --> GI f/up due to recurrence of rectal bleed on 11/5   monitor H/H now stable   Neg stool culture and GI PCR.    Problem/Plan - 4:  ·  Problem: AGUILLON  ·  Plan: Complains of 1-2 years of significant AGUILLON  Explained that he would need ischemic eval as outpt once acute issues resolve    Problem/Plan - 5:  ·  Problem: Pancreatitis.   ·  Plan: -S/P IVF   -Pain control PRN  -Per surgery likely etiology is alcohol abuse.  Pt tolerates regular diet.          Miladys Pena, IRASEMA-NP   Pablo Marks DO [facility]  Cardiovascular Medicine  [address] [facility], [address]  Available through call or text on [employer]  Office: [phone]   DATE OF SERVICE: 11-08-22 @ 10:39    Patient is a 62y old  Male who presents with a chief complaint of Per chart "62M PMHx HTN, GERD, hiatal hernia, ?cyclical vomiting syndrome who pw nausea/vomiting for 5 days associated with epigastric pain, found to have acute interstitial pancreatitis on labs/imaging. " Course C/b hematochezia "Ddx includes hemorrhoidal bleeding vs diverticular vs AVM vs malignancy vs other",        (07 Nov 2022 15:05)      INTERVAL HISTORY: Feels ok.     REVIEW OF SYSTEMS:  CONSTITUTIONAL: No weakness  EYES/ENT: No visual changes;  No throat pain   NECK: No pain or stiffness  RESPIRATORY: No cough, wheezing; No shortness of breath  CARDIOVASCULAR: No chest pain or palpitations  GASTROINTESTINAL: No abdominal  pain. No nausea, vomiting, or hematemesis  GENITOURINARY: No dysuria, frequency or hematuria  NEUROLOGICAL: No stroke like symptoms  SKIN: No rashes    	  MEDICATIONS:  losartan 25 milliGRAM(s) Oral daily        PHYSICAL EXAM:  T(C): 36.9 (11-08-22 @ 09:16), Max: 37 (11-07-22 @ 12:39)  HR: 95 (11-08-22 @ 09:16) (86 - 105)  BP: 145/85 (11-08-22 @ 09:16) (128/86 - 151/93)  RR: 18 (11-08-22 @ 09:16) (18 - 18)  SpO2: 99% (11-08-22 @ 09:16) (98% - 100%)  Wt(kg): --  I&O's Summary    07 Nov 2022 07:01  -  08 Nov 2022 07:00  --------------------------------------------------------  IN: 530 mL / OUT: 1110 mL / NET: -580 mL          Appearance: In no distress	  HEENT:    PERRL, EOMI	  Cardiovascular:  S1 S2, No JVD  Respiratory: Lungs clear to auscultation	  Gastrointestinal:  Soft, Non-tender, + BS	  Vascularature:  No edema of LE  Psychiatric: Appropriate affect   Neuro: no acute focal deficits                               9.6    12.33 )-----------( 501      ( 08 Nov 2022 07:19 )             28.6     11-08    139  |  105  |  6<L>  ----------------------------<  94  3.5   |  21<L>  |  0.70    Ca    9.3      08 Nov 2022 07:20  Phos  2.9     11-08  Mg     1.6     11-08    TPro  7.3  /  Alb  3.2<L>  /  TBili  1.3<H>  /  DBili  0.6<H>  /  AST  62<H>  /  ALT  31  /  AlkPhos  135<H>  11-08        Labs personally reviewed    < from: TTE with Doppler (w/Cont) (11.07.22 @ 15:14) >  EF (Green Rule): 70 %Doppler Peak Velocity (m/sec):  AoV=1.1  ------------------------------------------------------------------------  Observations:  Mitral Valve: Normal mitral valve.  Aortic Valve/Aorta: Normal trileaflet aortic valve. Peak  transaortic valve gradient equals 5 mm Hg. Minimal aortic  regurgitation.  Peak left ventricular outflow tract  gradient equals 3 mm Hg, mean gradient is equal to 2 mm Hg,  LVOT velocity time integral equals 17 cm.  Aortic Root: 3.6 cm.  Ascending Aorta: 3.9 cm.  LVOT diameter: 2.4 cm.  Left Atrium: Normal left atrium.  LA volume index = 29  cc/m2.  Left Ventricle: Normal left ventricular systolic function.   Endocardial visualization enhanced with intravenous  injection of Ultrasonic Enhancing Agent (Lumason). Normal  left ventricular internal dimensions and wall thicknesses.  Right Heart: Normal right atrium. Normal right ventricular  size and function. Normal tricuspid valve. Minimal  tricuspid regurgitation. Normal pulmonic valve. Minimal  pulmonic regurgitation.  Pericardium/Pleura: Normal pericardium with no pericardial  effusion.  Hemodynamic: Estimated right atrial pressure is 8 mm Hg.  Estimated right ventricular systolic pressure equals 20 mm  Hg, assuming right atrial pressure equals 8 mm Hg,  consistent with normal pulmonary pressures.  ------------------------------------------------------------------------  Conclusions:  1. Normal left ventricular internal dimensions and wall  thicknesses.  2. Normal left ventricular systolic function.   Endocardial  visualization enhanced with intravenous injection of  Ultrasonic Enhancing Agent (Lumason).  3. Normal right ventricular size and function.  *** No previous Echo exam.    < end of copied text >    ASSESSMENT/PLAN: 	    62M with PMHx of HTN, GERD/Hiatal Hernia, CKD3, alcohol abuse, and hepatic steatosis presenting for poor PO intake and epigastric pain found to have pancreatitis. Patient initially in SICU and then downgraded to med-surg unit on 11/2. Hospital course complicated by OFELIA-on-CKD3 and recent BRBPR. Patient is now transferred to medicine for further management.      Problem/Plan - 1:  ·  Problem: Tachycardia.   ·  Plan: Noted to have sinus tachycardia in 110s  - likely reactive 2/2 pancreatitis,  acute blood loss anemia, prerenal state OFELIA  -Echo with EF 70%, normal LV systolic function, no WMA  - HR now normalizing    Problem/Plan - 2:  ·  Problem: HTN (hypertension).   ·  Plan: well controlled on Losartan 25mg .    Problem/Plan - 3:  ·  Problem: Hematochezia.   ·  Plan: Recently developed on 11/2---> GI consults is appreciated , Elective colonoscopy --> GI f/up due to recurrence of rectal bleed on 11/5   monitor H/H now stable   Neg stool culture and GI PCR.    Problem/Plan - 4:  ·  Problem: AGUILLON  ·  Plan: Complains of 1-2 years of significant AGUILLON  Explained that he would need ischemic eval as outpt once acute issues resolve  CTPA to r/o PE    Problem/Plan - 5:  ·  Problem: Pancreatitis.   ·  Plan: -S/P IVF   -Pain control PRN  -Per surgery likely etiology is alcohol abuse.  Pt tolerates regular diet.          Miladys Pena, AG-NP   Pablo Marks DO [facility]  Cardiovascular Medicine  [address] [facility], [address]  Available through call or text on [employer] TE[employer]  Office: [phone]

## 2022-11-08 NOTE — PROGRESS NOTE ADULT - SUBJECTIVE AND OBJECTIVE BOX
[facility] Division of Hospital Medicine  Noemy Littlejohn, DO  Available on Teams Jorge    Patient is a 62y old  Male who presents with a chief complaint of Per chart "62M PMHx HTN, GERD, hiatal hernia, ?cyclical vomiting syndrome who pw nausea/vomiting for 5 days associated with epigastric pain, found to have acute interstitial pancreatitis on labs/imaging. " Course C/b hematochezia "Ddx includes hemorrhoidal bleeding vs diverticular vs AVM vs malignancy vs other",        (07 Nov 2022 15:05)      SUBJECTIVE / OVERNIGHT EVENTS:   ADDITIONAL REVIEW OF SYSTEMS: negative    MEDICATIONS  (STANDING):  ergocalciferol 39570 Unit(s) Oral every week  folic acid 1 milliGRAM(s) Oral daily  lidocaine 4% Injection for Nebulization 4 milliLiter(s) Nebulizer once  losartan 25 milliGRAM(s) Oral daily  multivitamin 1 Tablet(s) Oral daily  pantoprazole    Tablet 40 milliGRAM(s) Oral every 12 hours  thiamine 100 milliGRAM(s) Oral daily    MEDICATIONS  (PRN):  acetaminophen     Tablet .. 975 milliGRAM(s) Oral every 6 hours PRN Mild Pain (1 - 3)      CAPILLARY BLOOD GLUCOSE        I&O's Summary    07 Nov 2022 07:01  -  08 Nov 2022 07:00  --------------------------------------------------------  IN: 530 mL / OUT: 1110 mL / NET: -580 mL    08 Nov 2022 07:01  -  08 Nov 2022 17:09  --------------------------------------------------------  IN: 0 mL / OUT: 500 mL / NET: -500 mL        PHYSICAL EXAM:  Vital Signs Last 24 Hrs  T(C): 35.8 (08 Nov 2022 15:24), Max: 36.9 (08 Nov 2022 01:42)  T(F): 96.4 (08 Nov 2022 14:27), Max: 98.4 (08 Nov 2022 01:42)  HR: 80 (08 Nov 2022 16:42) (77 - 98)  BP: 154/93 (08 Nov 2022 16:42) (117/75 - 175/97)  BP(mean): 108 (08 Nov 2022 15:24) (108 - 108)  RR: 16 (08 Nov 2022 16:42) (12 - 18)  SpO2: 98% (08 Nov 2022 16:42) (98% - 100%)    Parameters below as of 08 Nov 2022 16:42  Patient On (Oxygen Delivery Method): room air        CONSTITUTIONAL: Well-groomed, in no apparent distress  EYES: No conjunctival or scleral injection, non-icteric; PERRLA and symmetric  ENMT: No external nasal lesions; no pharyngeal injection or exudates, oral mucosa with moist membranes  NECK: Trachea midline without palpable neck mass; thyroid not enlarged and non-tender  RESPIRATORY: Breathing comfortably; lungs CTA without wheeze/rhonchi/rales  CARDIOVASCULAR: +S1S2, RRR, no M/G/R; pedal pulses full and symmetric; no lower extremity edema  GASTROINTESTINAL: No palpable masses or tenderness, +BS throughout, no rebound/guarding; no hepatosplenomegaly; no hernia palpated  LYMPHATIC: No cervical LAD or tenderness; no axillary LAD or tenderness  MUSCULOSKELETAL: no digital clubbing or cyanosis; no paraspinal tenderness; normal strength and tone of extremities  SKIN: No rashes or ulcers noted; no subcutaneous nodules or induration palpable  NEUROLOGIC: CN II-XII intact; sensation intact in LEs b/l to light touch  PSYCHIATRIC: A+O x 3; mood and affect appropriate; appropriate insight and judgment    LABS:                        9.6    12.33 )-----------( 501      ( 08 Nov 2022 07:19 )             28.6     11-08    139  |  105  |  6<L>  ----------------------------<  94  3.5   |  21<L>  |  0.70    Ca    9.3      08 Nov 2022 07:20  Phos  2.9     11-08  Mg     1.6     11-08    TPro  7.3  /  Alb  3.2<L>  /  TBili  1.3<H>  /  DBili  0.6<H>  /  AST  62<H>  /  ALT  31  /  AlkPhos  135<H>  11-08    PT/INR - ( 07 Nov 2022 07:10 )   PT: 17.3 sec;   INR: 1.49 ratio         PTT - ( 07 Nov 2022 07:10 )  PTT:30.8 sec            RADIOLOGY & ADDITIONAL TESTS:  Results Reviewed:   Imaging Personally Reviewed:  Electrocardiogram Personally Reviewed:    COORDINATION OF CARE:  Care Discussed with Consultants/Other Providers [Y/N]:  Prior or Outpatient Records Reviewed [Y/N]:

## 2022-11-08 NOTE — PRE PROCEDURE NOTE - PRE PROCEDURE EVALUATION
Attending Physician:               Dr. Walter             Procedure: EGD/colonoscopy    Indication for Procedure: hematochezia  ________________________________________________________  PAST MEDICAL & SURGICAL HISTORY:  HTN (hypertension)      Acid reflux      No significant past surgical history        ALLERGIES:  No Known Allergies    HOME MEDICATIONS:  famotidine 40 mg oral tablet: 1 tab(s) orally once a day (at bedtime)  losartan 50 mg oral tablet: 1 tab(s) orally once a day  omeprazole 40 mg oral delayed release capsule: 1 cap(s) orally 2 times a day  Vitamin D2 1.25 mg (50,000 intl units) oral capsule: 1 cap(s) orally once a week    AICD/PPM: [ ] yes   [ ] no    PERTINENT LAB DATA:                        9.6    12.33 )-----------( 501      ( 08 Nov 2022 07:19 )             28.6     11-08    139  |  105  |  6<L>  ----------------------------<  94  3.5   |  21<L>  |  0.70    Ca    9.3      08 Nov 2022 07:20  Phos  2.9     11-08  Mg     1.6     11-08    TPro  7.3  /  Alb  3.2<L>  /  TBili  1.3<H>  /  DBili  0.6<H>  /  AST  62<H>  /  ALT  31  /  AlkPhos  135<H>  11-08    PT/INR - ( 07 Nov 2022 07:10 )   PT: 17.3 sec;   INR: 1.49 ratio         PTT - ( 07 Nov 2022 07:10 )  PTT:30.8 sec            PHYSICAL EXAMINATION:    T(C): 36.6  HR: 87  BP: 134/88  RR: 18  SpO2: 100%    Constitutional: NAD  HEENT: PERRLA, EOMI,    Neck:  No JVD  Respiratory: CTAB/L  Cardiovascular: S1 and S2  Gastrointestinal: BS+, soft, NT/ND  Extremities: No peripheral edema  Neurological: A/O x 3, no focal deficits  Psychiatric: Normal mood, normal affect  Skin: No rashes    ASA Class: I [ ]  II [ ]  III [cx ]  IV [ ]    COMMENTS:    The patient is a suitable candidate for the planned procedure unless box checked [ ]  No, explain:

## 2022-11-09 ENCOUNTER — TRANSCRIPTION ENCOUNTER (OUTPATIENT)
Age: 62
End: 2022-11-09

## 2022-11-09 LAB
ALBUMIN SERPL ELPH-MCNC: 3.1 G/DL — LOW (ref 3.3–5)
ALP SERPL-CCNC: 131 U/L — HIGH (ref 40–120)
ALT FLD-CCNC: 30 U/L — SIGNIFICANT CHANGE UP (ref 10–45)
ANION GAP SERPL CALC-SCNC: 11 MMOL/L — SIGNIFICANT CHANGE UP (ref 5–17)
AST SERPL-CCNC: 59 U/L — HIGH (ref 10–40)
BILIRUB DIRECT SERPL-MCNC: 0.4 MG/DL — HIGH (ref 0–0.3)
BILIRUB INDIRECT FLD-MCNC: 0.5 MG/DL — SIGNIFICANT CHANGE UP (ref 0.2–1)
BILIRUB SERPL-MCNC: 0.9 MG/DL — SIGNIFICANT CHANGE UP (ref 0.2–1.2)
BUN SERPL-MCNC: 9 MG/DL — SIGNIFICANT CHANGE UP (ref 7–23)
CALCIUM SERPL-MCNC: 9.4 MG/DL — SIGNIFICANT CHANGE UP (ref 8.4–10.5)
CHLORIDE SERPL-SCNC: 106 MMOL/L — SIGNIFICANT CHANGE UP (ref 96–108)
CO2 SERPL-SCNC: 21 MMOL/L — LOW (ref 22–31)
CREAT SERPL-MCNC: 0.78 MG/DL — SIGNIFICANT CHANGE UP (ref 0.5–1.3)
EGFR: 101 ML/MIN/1.73M2 — SIGNIFICANT CHANGE UP
GLUCOSE SERPL-MCNC: 107 MG/DL — HIGH (ref 70–99)
HCT VFR BLD CALC: 27.7 % — LOW (ref 39–50)
HGB BLD-MCNC: 9.2 G/DL — LOW (ref 13–17)
MAGNESIUM SERPL-MCNC: 1.4 MG/DL — LOW (ref 1.6–2.6)
MCHC RBC-ENTMCNC: 31.3 PG — SIGNIFICANT CHANGE UP (ref 27–34)
MCHC RBC-ENTMCNC: 33.2 GM/DL — SIGNIFICANT CHANGE UP (ref 32–36)
MCV RBC AUTO: 94.2 FL — SIGNIFICANT CHANGE UP (ref 80–100)
NRBC # BLD: 0 /100 WBCS — SIGNIFICANT CHANGE UP (ref 0–0)
NT-PROBNP SERPL-SCNC: 249 PG/ML — SIGNIFICANT CHANGE UP (ref 0–300)
PHOSPHATE SERPL-MCNC: 3.1 MG/DL — SIGNIFICANT CHANGE UP (ref 2.5–4.5)
PLATELET # BLD AUTO: 550 K/UL — HIGH (ref 150–400)
POTASSIUM SERPL-MCNC: 3.5 MMOL/L — SIGNIFICANT CHANGE UP (ref 3.5–5.3)
POTASSIUM SERPL-SCNC: 3.5 MMOL/L — SIGNIFICANT CHANGE UP (ref 3.5–5.3)
PROT SERPL-MCNC: 7.3 G/DL — SIGNIFICANT CHANGE UP (ref 6–8.3)
RBC # BLD: 2.94 M/UL — LOW (ref 4.2–5.8)
RBC # FLD: 14.9 % — HIGH (ref 10.3–14.5)
SODIUM SERPL-SCNC: 138 MMOL/L — SIGNIFICANT CHANGE UP (ref 135–145)
WBC # BLD: 11.93 K/UL — HIGH (ref 3.8–10.5)
WBC # FLD AUTO: 11.93 K/UL — HIGH (ref 3.8–10.5)

## 2022-11-09 PROCEDURE — 99231 SBSQ HOSP IP/OBS SF/LOW 25: CPT

## 2022-11-09 PROCEDURE — 71275 CT ANGIOGRAPHY CHEST: CPT | Mod: 26

## 2022-11-09 PROCEDURE — 99233 SBSQ HOSP IP/OBS HIGH 50: CPT

## 2022-11-09 RX ORDER — MAGNESIUM SULFATE 500 MG/ML
2 VIAL (ML) INJECTION ONCE
Refills: 0 | Status: COMPLETED | OUTPATIENT
Start: 2022-11-09 | End: 2022-11-09

## 2022-11-09 RX ADMIN — LOSARTAN POTASSIUM 25 MILLIGRAM(S): 100 TABLET, FILM COATED ORAL at 05:15

## 2022-11-09 RX ADMIN — Medication 25 GRAM(S): at 15:50

## 2022-11-09 RX ADMIN — PANTOPRAZOLE SODIUM 40 MILLIGRAM(S): 20 TABLET, DELAYED RELEASE ORAL at 05:15

## 2022-11-09 NOTE — DISCHARGE NOTE PROVIDER - ATTENDING DISCHARGE PHYSICAL EXAMINATION:
Patient seen and examined. Discussed findings of CT chest, which were normal. Discussed he will need to see Dr. Marks in his office for further testing to find out why he has shortness of breath when he walks. The life-threatening things have been ruled out ie heart attack, pulmonary embolism, pneumonia.    CONSTITUTIONAL: Well-groomed, in no apparent distress  EYES: No conjunctival or scleral injection, non-icteric; PERRLA and symmetric  ENMT: No external nasal lesions; no pharyngeal injection or exudates, oral mucosa with moist membranes  NECK: Trachea midline without palpable neck mass; thyroid not enlarged and non-tender  RESPIRATORY: Breathing comfortably; lungs CTA without wheeze/rhonchi/rales  CARDIOVASCULAR: +S1S2, RRR, no M/G/R; pedal pulses full and symmetric; no lower extremity edema  GASTROINTESTINAL: No palpable masses or tenderness, +BS throughout, no rebound/guarding; no hepatosplenomegaly; no hernia palpated  LYMPHATIC: No cervical LAD or tenderness; no axillary LAD or tenderness  MUSCULOSKELETAL: no digital clubbing or cyanosis; no paraspinal tenderness; normal strength and tone of extremities  SKIN: No rashes or ulcers noted; no subcutaneous nodules or induration palpable  NEUROLOGIC: CN II-XII intact; sensation intact in LEs b/l to light touch  PSYCHIATRIC: A+O x 3; mood and affect appropriate; appropriate insight and judgment Patient seen and examined. Discussed findings of CT chest, which were normal. He has an appointment to see Dr. Marks in his office for further cardiac testing next week. Patient says he may reschedule for the week after, which I said was fine.     CONSTITUTIONAL: Well-groomed, in no apparent distress  EYES: No conjunctival or scleral injection, non-icteric; PERRLA and symmetric  ENMT: No external nasal lesions; no pharyngeal injection or exudates, oral mucosa with moist membranes  NECK: Trachea midline without palpable neck mass; thyroid not enlarged and non-tender  RESPIRATORY: Breathing comfortably; lungs CTA without wheeze/rhonchi/rales  CARDIOVASCULAR: +S1S2, RRR, no M/G/R; pedal pulses full and symmetric; no lower extremity edema  GASTROINTESTINAL: No palpable masses or tenderness, +BS throughout, no rebound/guarding; no hepatosplenomegaly; no hernia palpated  LYMPHATIC: No cervical LAD or tenderness; no axillary LAD or tenderness  MUSCULOSKELETAL: no digital clubbing or cyanosis; no paraspinal tenderness; normal strength and tone of extremities  SKIN: No rashes or ulcers noted; no subcutaneous nodules or induration palpable  NEUROLOGIC: CN II-XII intact; sensation intact in LEs b/l to light touch  PSYCHIATRIC: A+O x 3; mood and affect appropriate; appropriate insight and judgment

## 2022-11-09 NOTE — PROGRESS NOTE ADULT - PROBLEM SELECTOR PLAN 5
Last drink prior to admission. Low concern for withdrawal. Patient should stop drinking.   ros

## 2022-11-09 NOTE — PROGRESS NOTE ADULT - PROBLEM SELECTOR PROBLEM 1
Pancreatitis
Pancreatitis
Acute kidney injury superimposed on CKD
Acute kidney injury superimposed on CKD
Pancreatitis
Acute kidney injury superimposed on CKD
Pancreatitis

## 2022-11-09 NOTE — PROGRESS NOTE ADULT - SUBJECTIVE AND OBJECTIVE BOX
[facility] Division of Hospital Medicine  Noemy Littlejohn, DO  Available on Teams Jorge    Patient is a 62y old  Male who presents with a chief complaint of Per chart "62M PMHx HTN, GERD, hiatal hernia, ?cyclical vomiting syndrome who pw nausea/vomiting for 5 days associated with epigastric pain, found to have acute interstitial pancreatitis on labs/imaging. " Course C/b hematochezia "Ddx includes hemorrhoidal bleeding vs diverticular vs AVM vs malignancy vs other",        (07 Nov 2022 15:05)      SUBJECTIVE / OVERNIGHT EVENTS: no complaints, but has not walked around very much today to see how dyspneic he gets.  ADDITIONAL REVIEW OF SYSTEMS: negative    MEDICATIONS  (STANDING):  ergocalciferol 14603 Unit(s) Oral every week  folic acid 1 milliGRAM(s) Oral daily  lidocaine 4% Injection for Nebulization 4 milliLiter(s) Nebulizer once  losartan 25 milliGRAM(s) Oral daily  multivitamin 1 Tablet(s) Oral daily  pantoprazole    Tablet 40 milliGRAM(s) Oral every 12 hours  thiamine 100 milliGRAM(s) Oral daily    MEDICATIONS  (PRN):  acetaminophen     Tablet .. 975 milliGRAM(s) Oral every 6 hours PRN Mild Pain (1 - 3)      CAPILLARY BLOOD GLUCOSE        I&O's Summary    08 Nov 2022 07:01  -  09 Nov 2022 07:00  --------------------------------------------------------  IN: 240 mL / OUT: 600 mL / NET: -360 mL    09 Nov 2022 07:01  -  09 Nov 2022 17:47  --------------------------------------------------------  IN: 690 mL / OUT: 750 mL / NET: -60 mL        PHYSICAL EXAM:  Vital Signs Last 24 Hrs  T(C): 36.6 (09 Nov 2022 17:35), Max: 37 (08 Nov 2022 21:32)  T(F): 97.9 (09 Nov 2022 17:35), Max: 98.6 (08 Nov 2022 21:32)  HR: 91 (09 Nov 2022 17:35) (89 - 110)  BP: 137/88 (09 Nov 2022 17:35) (133/86 - 137/97)  BP(mean): --  RR: 18 (09 Nov 2022 17:35) (18 - 18)  SpO2: 99% (09 Nov 2022 17:35) (97% - 100%)    Parameters below as of 09 Nov 2022 17:35  Patient On (Oxygen Delivery Method): room air      CONSTITUTIONAL: Well-groomed, in no apparent distress  EYES: No conjunctival or scleral injection, non-icteric; PERRLA and symmetric  ENMT: No external nasal lesions; no pharyngeal injection or exudates, oral mucosa with moist membranes  NECK: Trachea midline without palpable neck mass; thyroid not enlarged and non-tender  RESPIRATORY: Breathing comfortably; lungs CTA without wheeze/rhonchi/rales  CARDIOVASCULAR: +S1S2, RRR, no M/G/R; pedal pulses full and symmetric; no lower extremity edema  GASTROINTESTINAL: No palpable masses or tenderness, +BS throughout, no rebound/guarding; no hepatosplenomegaly; no hernia palpated  LYMPHATIC: No cervical LAD or tenderness; no axillary LAD or tenderness  MUSCULOSKELETAL: no digital clubbing or cyanosis; no paraspinal tenderness; normal strength and tone of extremities  SKIN: No rashes or ulcers noted; no subcutaneous nodules or induration palpable  NEUROLOGIC: CN II-XII intact; sensation intact in LEs b/l to light touch  PSYCHIATRIC: A+O x 3; mood and affect appropriate; appropriate insight and judgment    LABS:                        9.2    11.93 )-----------( 550      ( 09 Nov 2022 06:47 )             27.7     11-09    138  |  106  |  9   ----------------------------<  107<H>  3.5   |  21<L>  |  0.78    Ca    9.4      09 Nov 2022 06:47  Phos  3.1     11-09  Mg     1.4     11-09    TPro  7.3  /  Alb  3.1<L>  /  TBili  0.9  /  DBili  0.4<H>  /  AST  59<H>  /  ALT  30  /  AlkPhos  131<H>  11-09

## 2022-11-09 NOTE — DISCHARGE NOTE PROVIDER - CARE PROVIDERS DIRECT ADDRESSES
,DirectAddress_Unknown ,DirectAddress_Unknown,[email or web address]

## 2022-11-09 NOTE — DISCHARGE NOTE PROVIDER - NSDCFUADDAPPT_GEN_ALL_CORE_FT
APPTS ARE READY TO BE MADE: [ ] YES    Best Family or Patient Contact (if needed):    Additional Information about above appointments (if needed):    1: Needs appt with Dr. Pablo Marks (cardiology)  2: Needs appt with primary care doctor, if he does not have one he can see Dr. Yecenia Mata ([phone])  3:     Other comments or requests:    APPTS ARE READY TO BE MADE: [X] YES    Best Family or Patient Contact (if needed):    Additional Information about above appointments (if needed):    1: Needs appt with Dr. Pablo Marks (cardiology)  2: Needs appt with primary care doctor, if he does not have one he can see Dr. Yecenia Mata ([phone])  3:     Other comments or requests:

## 2022-11-09 NOTE — DISCHARGE NOTE PROVIDER - NSDCCPCAREPLAN_GEN_ALL_CORE_FT
PRINCIPAL DISCHARGE DIAGNOSIS  Diagnosis: Pancreatitis  Assessment and Plan of Treatment: You were treated for pancreatitis, inflammation of your pancreas, during this hospitalization. You should stop drinking alcohol to prevent future episodes of pancreatitis. This will also help your liver from developing cirrhosis.       PRINCIPAL DISCHARGE DIAGNOSIS  Diagnosis: Pancreatitis  Assessment and Plan of Treatment: You were treated for pancreatitis, inflammation of your pancreas, during this hospitalization. You should stop drinking alcohol to prevent future episodes of pancreatitis. This will also help your liver from developing cirrhosis.  Please follow up with your gastroenterologist   Please follow up wiht your primray care phsician in one week      SECONDARY DISCHARGE DIAGNOSES  Diagnosis: Acute kidney injury superimposed on CKD  Assessment and Plan of Treatment: Now resolved    Diagnosis: Tachycardia  Assessment and Plan of Treatment: oted to have low rate tachycardia in 110s  Echocardiogram was normal   EKg showed sinustachycardia   Due to persistent AGUILLON and tachycardia CTA chest was obtained, however it was clear of lung pathology and PE.  Please follow up with your cardiologist in one to two weeks       Diagnosis: HTN (hypertension)  Assessment and Plan of Treatment: Continue current medications as directed    Diagnosis: Alcohol abuse  Assessment and Plan of Treatment: Alcohol cessation strongly recommended    Diagnosis: Hematochezia  Assessment and Plan of Treatment: Episodes of hematochezia and  has had gradual decline in hgb during hospital course  Neg stool culture and GI PCR  s/p GI evaluation , underwent for EGD+colonoscopy 11/8 w/only hiatal hernia and internal hemorrhoids.  please follow up with your primary care physician and gastroenterologist if rectal bleeding presist

## 2022-11-09 NOTE — PROGRESS NOTE ADULT - PROBLEM SELECTOR PLAN 2
Recently developed on 11/2, has had gradual decline in hgb during hospital course    monitor H/H /now stable     Neg stool culture and GI PCR    GI recs appreciated, EGD+colonoscopy 11/8 w/only hiatal hernia and internal hemorrhoids

## 2022-11-09 NOTE — DISCHARGE NOTE PROVIDER - HOSPITAL COURSE
62M with PMHx of HTN, GERD/Hiatal Hernia, CKD3, alcohol use disorder, and hepatic steatosis presenting for poor PO intake and epigastric pain found to have pancreatitis. He also syncopized twice at home before presenting to the hospital. Patient initially in SICU and then downgraded to med-surg unit on 11/2. Hospital course complicated by OFELIA-on-CKD3 and recent BRBPR. Patient was transferred to medicine for further care when out of the SICU. His pancreatitis resolved, he was tolerating regular diet. Due to drop in hemoglobin and BRBPR, he underwent endoscopy and colonoscopy on 11/8, which found his known hiatal hernia and internal hemorrhoids, no active bleeding. Patient still complained of AGUILLON and tachycardia and so CTA chest was obtained, which was negative for PE and lung pathology. Patient was followed by cardiology during admission, who recommended echocardiogram, which was grossly normal. Patient will see cardiology as an outpatient for further ischemic work-up.

## 2022-11-09 NOTE — PROGRESS NOTE ADULT - PROBLEM SELECTOR PLAN 6
-Noted to have low rate tachycardia in 110s  -could be due to blood loss?  -echo normal  -cardiology recs appreciated  -Perform EKG--sinus  -due to persistent AGUILLON and tachycardia CTA chest was obtained, however it was clear of lung pathology and PE. -Noted to have low rate tachycardia in 110s  -could be due to blood loss?  -echo normal  -cardiology recs appreciated  -Perform EKG--sinus  -due to persistent AGUILLON and tachycardia CTA chest was obtained, however it was clear of lung pathology and PE.  -discussed plan with Dr. Marks

## 2022-11-09 NOTE — PROGRESS NOTE ADULT - NSPROGADDITIONALINFOA_GEN_ALL_CORE
Plan discussed with NP Darlene Littlejohn, DO  Available on Teams angela    Patient medically stable for discharge. Will give discharge notice today, informed patient of such. Patient will likely leave tomorrow.

## 2022-11-09 NOTE — DISCHARGE NOTE PROVIDER - CARE PROVIDER_API CALL
Pablo Marks (DO)  Cardiovascular Disease; Internal Medicine; Nuclear Cardiology  [address] [place] [address], [place] [address]  Phone: [phone]  Fax: [phone]  Follow Up Time: 2 weeks   Pablo Marks (DO)  Cardiovascular Disease; Internal Medicine; Nuclear Cardiology  [address] [facility], [address]  [place] [address]  Phone: [phone]  Fax: [phone]  Follow Up Time: 2 weeks    Naeem Walter (MD)  Gastroenterology; Internal Medicine  [address] [place] [address]  Phone: [phone]  Fax: [phone]  Follow Up Time: 1 month

## 2022-11-09 NOTE — PROGRESS NOTE ADULT - SUBJECTIVE AND OBJECTIVE BOX
DATE OF SERVICE: 11-09-22 @ 12:50    Patient is a 62y old  Male who presents with a chief complaint of Per chart "62M PMHx HTN, GERD, hiatal hernia, ?cyclical vomiting syndrome who pw nausea/vomiting for 5 days associated with epigastric pain, found to have acute interstitial pancreatitis on labs/imaging. " Course C/b hematochezia "Ddx includes hemorrhoidal bleeding vs diverticular vs AVM vs malignancy vs other",        (07 Nov 2022 15:05)      INTERVAL HISTORY:     REVIEW OF SYSTEMS:  CONSTITUTIONAL: No weakness  EYES/ENT: No visual changes;  No throat pain   NECK: No pain or stiffness  RESPIRATORY: No cough, wheezing; No shortness of breath  CARDIOVASCULAR: No chest pain or palpitations  GASTROINTESTINAL: No abdominal  pain. No nausea, vomiting, or hematemesis  GENITOURINARY: No dysuria, frequency or hematuria  NEUROLOGICAL: No stroke like symptoms  SKIN: No rashes    TELEMETRY Personally reviewed:  	  MEDICATIONS:  losartan 25 milliGRAM(s) Oral daily        PHYSICAL EXAM:  T(C): 36.9 (11-09-22 @ 12:33), Max: 37 (11-08-22 @ 21:32)  HR: 90 (11-09-22 @ 12:33) (71 - 110)  BP: 135/85 (11-09-22 @ 12:33) (117/75 - 175/97)  RR: 18 (11-09-22 @ 12:33) (12 - 18)  SpO2: 100% (11-09-22 @ 12:33) (97% - 100%)  Wt(kg): --  I&O's Summary    08 Nov 2022 07:01  -  09 Nov 2022 07:00  --------------------------------------------------------  IN: 240 mL / OUT: 600 mL / NET: -360 mL    09 Nov 2022 07:01  -  09 Nov 2022 12:50  --------------------------------------------------------  IN: 480 mL / OUT: 200 mL / NET: 280 mL      Height (cm): 180.3 (11-08 @ 15:24)  Weight (kg): 89.9 (11-08 @ 15:24)  BMI (kg/m2): 27.7 (11-08 @ 15:24)  BSA (m2): 2.1 (11-08 @ 15:24)    Appearance: In no distress	  HEENT:    PERRL, EOMI	  Cardiovascular:  S1 S2, No JVD  Respiratory: Lungs clear to auscultation	  Gastrointestinal:  Soft, Non-tender, + BS	  Vascularature:  No edema of LE  Psychiatric: Appropriate affect   Neuro: no acute focal deficits                               9.2    11.93 )-----------( 550      ( 09 Nov 2022 06:47 )             27.7     11-09    138  |  106  |  9   ----------------------------<  107<H>  3.5   |  21<L>  |  0.78    Ca    9.4      09 Nov 2022 06:47  Phos  3.1     11-09  Mg     1.4     11-09    TPro  7.3  /  Alb  3.1<L>  /  TBili  0.9  /  DBili  0.4<H>  /  AST  59<H>  /  ALT  30  /  AlkPhos  131<H>  11-09        Labs personally reviewed      ASSESSMENT/PLAN: 	      62M with PMHx of HTN, GERD/Hiatal Hernia, CKD3, alcohol abuse, and hepatic steatosis presenting for poor PO intake and epigastric pain found to have pancreatitis. Patient initially in SICU and then downgraded to med-surg unit on 11/2. Hospital course complicated by OFELIA-on-CKD3 and recent BRBPR. Patient is now transferred to medicine for further management.      Problem/Plan - 1:  ·  Problem: Tachycardia.   ·  Plan: Noted to have sinus tachycardia in 110s  - likely reactive 2/2 pancreatitis,  acute blood loss anemia, prerenal state OFELIA  - Echo with EF 70%, normal LV systolic function, no WMA  - HR now normalizing  - CT r/o PE pending    Problem/Plan - 2:  ·  Problem: HTN (hypertension).   ·  Plan: well controlled on Losartan 25mg .    Problem/Plan - 3:  ·  Problem: Hematochezia.   ·  Plan: Recently developed on 11/2---> GI consults is appreciated , Elective colonoscopy --> GI f/up due to recurrence of rectal bleed on 11/5   monitor H/H now stable   Neg stool culture and GI PCR.    Problem/Plan - 4:  ·  Problem: AGUILLON  ·  Plan: Complains of 1-2 years of significant AGUILLON  Explained that he would need ischemic eval as outpt once acute issues resolve  CTPA to r/o PE    Problem/Plan - 5:  ·  Problem: Pancreatitis.   ·  Plan: -S/P IVF   -Pain control PRN  -Per surgery likely etiology is alcohol abuse.  Pt tolerates regular diet.          Miladys Pena, IRASEMA-NP   Pablo Marks DO [facility]  Cardiovascular Medicine  [address] [place] [address], [address]  Available through call or text on [employer] TEAMs  Office: [phone]   DATE OF SERVICE: 11-09-22 @ 12:50    Patient is a 62y old  Male who presents with a chief complaint of Per chart "62M PMHx HTN, GERD, hiatal hernia, ?cyclical vomiting syndrome who pw nausea/vomiting for 5 days associated with epigastric pain, found to have acute interstitial pancreatitis on labs/imaging. " Course C/b hematochezia "Ddx includes hemorrhoidal bleeding vs diverticular vs AVM vs malignancy vs other",        (07 Nov 2022 15:05)      INTERVAL HISTORY:     REVIEW OF SYSTEMS:  CONSTITUTIONAL: No weakness  EYES/ENT: No visual changes;  No throat pain   NECK: No pain or stiffness  RESPIRATORY: No cough, wheezing; No shortness of breath  CARDIOVASCULAR: No chest pain or palpitations  GASTROINTESTINAL: No abdominal  pain. No nausea, vomiting, or hematemesis  GENITOURINARY: No dysuria, frequency or hematuria  NEUROLOGICAL: No stroke like symptoms  SKIN: No rashes    TELEMETRY Personally reviewed:  	  MEDICATIONS:  losartan 25 milliGRAM(s) Oral daily        PHYSICAL EXAM:  T(C): 36.9 (11-09-22 @ 12:33), Max: 37 (11-08-22 @ 21:32)  HR: 90 (11-09-22 @ 12:33) (71 - 110)  BP: 135/85 (11-09-22 @ 12:33) (117/75 - 175/97)  RR: 18 (11-09-22 @ 12:33) (12 - 18)  SpO2: 100% (11-09-22 @ 12:33) (97% - 100%)  Wt(kg): --  I&O's Summary    08 Nov 2022 07:01  -  09 Nov 2022 07:00  --------------------------------------------------------  IN: 240 mL / OUT: 600 mL / NET: -360 mL    09 Nov 2022 07:01  -  09 Nov 2022 12:50  --------------------------------------------------------  IN: 480 mL / OUT: 200 mL / NET: 280 mL      Height (cm): 180.3 (11-08 @ 15:24)  Weight (kg): 89.9 (11-08 @ 15:24)  BMI (kg/m2): 27.7 (11-08 @ 15:24)  BSA (m2): 2.1 (11-08 @ 15:24)    Appearance: In no distress	  HEENT:    PERRL, EOMI	  Cardiovascular:  S1 S2, No JVD  Respiratory: Lungs clear to auscultation	  Gastrointestinal:  Soft, Non-tender, + BS	  Vascularature:  No edema of LE  Psychiatric: Appropriate affect   Neuro: no acute focal deficits                               9.2    11.93 )-----------( 550      ( 09 Nov 2022 06:47 )             27.7     11-09    138  |  106  |  9   ----------------------------<  107<H>  3.5   |  21<L>  |  0.78    Ca    9.4      09 Nov 2022 06:47  Phos  3.1     11-09  Mg     1.4     11-09    TPro  7.3  /  Alb  3.1<L>  /  TBili  0.9  /  DBili  0.4<H>  /  AST  59<H>  /  ALT  30  /  AlkPhos  131<H>  11-09        Labs personally reviewed      ASSESSMENT/PLAN: 	      62M with PMHx of HTN, GERD/Hiatal Hernia, CKD3, alcohol abuse, and hepatic steatosis presenting for poor PO intake and epigastric pain found to have pancreatitis. Patient initially in SICU and then downgraded to med-surg unit on 11/2. Hospital course complicated by OFELIA-on-CKD3 and recent BRBPR. Patient is now transferred to medicine for further management.      Problem/Plan - 1:  ·  Problem: Tachycardia.   ·  Plan: Noted to have sinus tachycardia in 110s  - likely reactive 2/2 pancreatitis,  acute blood loss anemia, prerenal state OFELIA  - Echo with EF 70%, normal LV systolic function, no WMA  - HR now normalizing  - CTA r/o PE pending    Problem/Plan - 2:  ·  Problem: HTN (hypertension).   ·  Plan: well controlled on Losartan 25mg .    Problem/Plan - 3:  ·  Problem: Hematochezia.   ·  Plan: Recently developed on 11/2---> GI consults is appreciated , Elective colonoscopy --> GI f/up due to recurrence of rectal bleed on 11/5   monitor H/H now stable   Neg stool culture and GI PCR.    Problem/Plan - 4:  ·  Problem: AGUILLON  ·  Plan: Complains of 1-2 years of significant AGUILLON  Explained that he would need ischemic eval as outpt once acute issues resolve  CTPA to r/o PE    Problem/Plan - 5:  ·  Problem: Pancreatitis.   ·  Plan: -S/P IVF   -Pain control PRN  -Per surgery likely etiology is alcohol abuse.  Pt tolerates regular diet.          Miladys Pena, IRASEMA-NP   Pablo Marks DO [facility]  Cardiovascular Medicine  [address] [place] [address], [address]  Available through call or text on [employer] TEAMs  Office: [phone]

## 2022-11-09 NOTE — PROGRESS NOTE ADULT - PROBLEM SELECTOR PROBLEM 2
Hypercalcemia
Hematochezia

## 2022-11-09 NOTE — PROGRESS NOTE ADULT - PROBLEM SELECTOR PLAN 4
-Nephrology following  -Had Juarez which was removed  -Now back at baseline  -Continue to monitor.

## 2022-11-09 NOTE — PROGRESS NOTE ADULT - PROBLEM SELECTOR PLAN 3
-Monitor BP  -ARB was restarted
-Monitor BP  -ARB was restarted
-Monitor BP  -ARB being held for now, restart at DC
-Monitor BP  -ARB being held for now, restart soon.
-Monitor BP  -ARB was restarted
-Monitor BP  -ARB was restarted
-Monitor BP  -ARB being held for now, restart soon.

## 2022-11-09 NOTE — DISCHARGE NOTE PROVIDER - PROVIDER TOKENS
PROVIDER:[TOKEN:[[id number]],FOLLOWUP:[2 weeks]] PROVIDER:[TOKEN:[[id number]],FOLLOWUP:[2 weeks]],PROVIDER:[TOKEN:[[id number]],FOLLOWUP:[1 month]]

## 2022-11-10 ENCOUNTER — TRANSCRIPTION ENCOUNTER (OUTPATIENT)
Age: 62
End: 2022-11-10

## 2022-11-10 VITALS
RESPIRATION RATE: 18 BRPM | OXYGEN SATURATION: 98 % | DIASTOLIC BLOOD PRESSURE: 81 MMHG | TEMPERATURE: 98 F | HEART RATE: 105 BPM | SYSTOLIC BLOOD PRESSURE: 150 MMHG

## 2022-11-10 PROCEDURE — 86901 BLOOD TYPING SEROLOGIC RH(D): CPT

## 2022-11-10 PROCEDURE — 84484 ASSAY OF TROPONIN QUANT: CPT

## 2022-11-10 PROCEDURE — 84100 ASSAY OF PHOSPHORUS: CPT

## 2022-11-10 PROCEDURE — 87046 STOOL CULTR AEROBIC BACT EA: CPT

## 2022-11-10 PROCEDURE — 90686 IIV4 VACC NO PRSV 0.5 ML IM: CPT

## 2022-11-10 PROCEDURE — 93970 EXTREMITY STUDY: CPT

## 2022-11-10 PROCEDURE — 85610 PROTHROMBIN TIME: CPT

## 2022-11-10 PROCEDURE — 99285 EMERGENCY DEPT VISIT HI MDM: CPT | Mod: 25

## 2022-11-10 PROCEDURE — 74176 CT ABD & PELVIS W/O CONTRAST: CPT | Mod: MA

## 2022-11-10 PROCEDURE — 96374 THER/PROPH/DIAG INJ IV PUSH: CPT

## 2022-11-10 PROCEDURE — 84295 ASSAY OF SERUM SODIUM: CPT

## 2022-11-10 PROCEDURE — 84443 ASSAY THYROID STIM HORMONE: CPT

## 2022-11-10 PROCEDURE — U0003: CPT

## 2022-11-10 PROCEDURE — 84132 ASSAY OF SERUM POTASSIUM: CPT

## 2022-11-10 PROCEDURE — 82803 BLOOD GASES ANY COMBINATION: CPT

## 2022-11-10 PROCEDURE — 83935 ASSAY OF URINE OSMOLALITY: CPT

## 2022-11-10 PROCEDURE — 85014 HEMATOCRIT: CPT

## 2022-11-10 PROCEDURE — U0005: CPT

## 2022-11-10 PROCEDURE — 36415 COLL VENOUS BLD VENIPUNCTURE: CPT

## 2022-11-10 PROCEDURE — 82435 ASSAY OF BLOOD CHLORIDE: CPT

## 2022-11-10 PROCEDURE — 85730 THROMBOPLASTIN TIME PARTIAL: CPT

## 2022-11-10 PROCEDURE — 87507 IADNA-DNA/RNA PROBE TQ 12-25: CPT

## 2022-11-10 PROCEDURE — 81001 URINALYSIS AUTO W/SCOPE: CPT

## 2022-11-10 PROCEDURE — 82570 ASSAY OF URINE CREATININE: CPT

## 2022-11-10 PROCEDURE — 80053 COMPREHEN METABOLIC PANEL: CPT

## 2022-11-10 PROCEDURE — 99239 HOSP IP/OBS DSCHRG MGMT >30: CPT

## 2022-11-10 PROCEDURE — 71045 X-RAY EXAM CHEST 1 VIEW: CPT

## 2022-11-10 PROCEDURE — 87045 FECES CULTURE AEROBIC BACT: CPT

## 2022-11-10 PROCEDURE — 71275 CT ANGIOGRAPHY CHEST: CPT

## 2022-11-10 PROCEDURE — 83690 ASSAY OF LIPASE: CPT

## 2022-11-10 PROCEDURE — 84478 ASSAY OF TRIGLYCERIDES: CPT

## 2022-11-10 PROCEDURE — 93005 ELECTROCARDIOGRAM TRACING: CPT

## 2022-11-10 PROCEDURE — 80076 HEPATIC FUNCTION PANEL: CPT

## 2022-11-10 PROCEDURE — 97162 PT EVAL MOD COMPLEX 30 MIN: CPT

## 2022-11-10 PROCEDURE — 83880 ASSAY OF NATRIURETIC PEPTIDE: CPT

## 2022-11-10 PROCEDURE — 80048 BASIC METABOLIC PNL TOTAL CA: CPT

## 2022-11-10 PROCEDURE — 82248 BILIRUBIN DIRECT: CPT

## 2022-11-10 PROCEDURE — 96375 TX/PRO/DX INJ NEW DRUG ADDON: CPT

## 2022-11-10 PROCEDURE — 86900 BLOOD TYPING SEROLOGIC ABO: CPT

## 2022-11-10 PROCEDURE — 86850 RBC ANTIBODY SCREEN: CPT

## 2022-11-10 PROCEDURE — 83735 ASSAY OF MAGNESIUM: CPT

## 2022-11-10 PROCEDURE — 0225U NFCT DS DNA&RNA 21 SARSCOV2: CPT

## 2022-11-10 PROCEDURE — 93306 TTE W/DOPPLER COMPLETE: CPT

## 2022-11-10 PROCEDURE — 85018 HEMOGLOBIN: CPT

## 2022-11-10 PROCEDURE — 82330 ASSAY OF CALCIUM: CPT

## 2022-11-10 PROCEDURE — 84300 ASSAY OF URINE SODIUM: CPT

## 2022-11-10 PROCEDURE — 82947 ASSAY GLUCOSE BLOOD QUANT: CPT

## 2022-11-10 PROCEDURE — 85027 COMPLETE CBC AUTOMATED: CPT

## 2022-11-10 PROCEDURE — 87086 URINE CULTURE/COLONY COUNT: CPT

## 2022-11-10 PROCEDURE — 85025 COMPLETE CBC W/AUTO DIFF WBC: CPT

## 2022-11-10 PROCEDURE — 76705 ECHO EXAM OF ABDOMEN: CPT

## 2022-11-10 PROCEDURE — 83605 ASSAY OF LACTIC ACID: CPT

## 2022-11-10 PROCEDURE — 87040 BLOOD CULTURE FOR BACTERIA: CPT

## 2022-11-10 RX ORDER — INFLUENZA VIRUS VACCINE 15; 15; 15; 15 UG/.5ML; UG/.5ML; UG/.5ML; UG/.5ML
0.5 SUSPENSION INTRAMUSCULAR ONCE
Refills: 0 | Status: COMPLETED | OUTPATIENT
Start: 2022-11-10 | End: 2022-11-10

## 2022-11-10 RX ADMIN — PANTOPRAZOLE SODIUM 40 MILLIGRAM(S): 20 TABLET, DELAYED RELEASE ORAL at 06:47

## 2022-11-10 RX ADMIN — INFLUENZA VIRUS VACCINE 0.5 MILLILITER(S): 15; 15; 15; 15 SUSPENSION INTRAMUSCULAR at 15:45

## 2022-11-10 RX ADMIN — Medication 1 MILLIGRAM(S): at 12:49

## 2022-11-10 RX ADMIN — LOSARTAN POTASSIUM 25 MILLIGRAM(S): 100 TABLET, FILM COATED ORAL at 06:46

## 2022-11-10 RX ADMIN — Medication 1 TABLET(S): at 12:50

## 2022-11-10 RX ADMIN — Medication 100 MILLIGRAM(S): at 12:49

## 2022-11-10 NOTE — PROGRESS NOTE ADULT - PROVIDER SPECIALTY LIST ADULT
Cardiology
Nephrology
Cardiology
Gastroenterology
Hospitalist
SICU
Surgery
Surgery
Hospitalist
Nephrology
Surgery
Internal Medicine
Nephrology
Internal Medicine
Hospitalist
Internal Medicine
Hospitalist

## 2022-11-10 NOTE — DISCHARGE NOTE NURSING/CASE MANAGEMENT/SOCIAL WORK - NSDCFUADDAPPT_GEN_ALL_CORE_FT
APPTS ARE READY TO BE MADE: [ ] YES    Best Family or Patient Contact (if needed):    Additional Information about above appointments (if needed):    1: Needs appt with Dr. Pablo Marks (cardiology)  2: Needs appt with primary care doctor, if he does not have one he can see Dr. Yecenia Mata ([phone])  3:     Other comments or requests:

## 2022-11-10 NOTE — DISCHARGE NOTE NURSING/CASE MANAGEMENT/SOCIAL WORK - PATIENT PORTAL LINK FT
You can access the FollowMyHealth Patient Portal offered by [facility] by registering at the following website: [email or web address]. By joining [employer]’s FollowMyHealth portal, you will also be able to view your health information using other applications (apps) compatible with our system.

## 2022-11-10 NOTE — DISCHARGE NOTE NURSING/CASE MANAGEMENT/SOCIAL WORK - NSDCPEFALRISK_GEN_ALL_CORE
For information on Fall & Injury Prevention, visit: [email or web address] OR  [email or web address] OR  https://www.cdc.gov/steadi/patient.html

## 2022-11-10 NOTE — PROGRESS NOTE ADULT - SUBJECTIVE AND OBJECTIVE BOX
DATE OF SERVICE: 11-10-22 @ 11:21    Patient is a 62y old  Male who presents with a chief complaint of pancreatitis (09 Nov 2022 17:52)      INTERVAL HISTORY: Feels ok.     REVIEW OF SYSTEMS:  CONSTITUTIONAL: No weakness  EYES/ENT: No visual changes;  No throat pain   NECK: No pain or stiffness  RESPIRATORY: No cough, wheezing; No shortness of breath  CARDIOVASCULAR: No chest pain or palpitations  GASTROINTESTINAL: No abdominal  pain. No nausea, vomiting, or hematemesis  GENITOURINARY: No dysuria, frequency or hematuria  NEUROLOGICAL: No stroke like symptoms  SKIN: No rashes    	  MEDICATIONS:  losartan 25 milliGRAM(s) Oral daily        PHYSICAL EXAM:  T(C): 37 (11-10-22 @ 09:00), Max: 37.2 (11-09-22 @ 21:00)  HR: 102 (11-10-22 @ 09:00) (90 - 107)  BP: 140/81 (11-10-22 @ 09:00) (106/81 - 157/93)  RR: 18 (11-10-22 @ 09:00) (18 - 18)  SpO2: 98% (11-10-22 @ 09:00) (97% - 100%)  Wt(kg): --  I&O's Summary    09 Nov 2022 07:01  -  10 Nov 2022 07:00  --------------------------------------------------------  IN: 690 mL / OUT: 1150 mL / NET: -460 mL    10 Nov 2022 07:01  -  10 Nov 2022 11:21  --------------------------------------------------------  IN: 240 mL / OUT: 200 mL / NET: 40 mL          Appearance: In no distress	  HEENT:    PERRL, EOMI	  Cardiovascular:  S1 S2, No JVD  Respiratory: Lungs clear to auscultation	  Gastrointestinal:  Soft, Non-tender, + BS	  Vascularature:  No edema of LE  Psychiatric: Appropriate affect   Neuro: no acute focal deficits                               9.2    11.93 )-----------( 550      ( 09 Nov 2022 06:47 )             27.7     11-09    138  |  106  |  9   ----------------------------<  107<H>  3.5   |  21<L>  |  0.78    Ca    9.4      09 Nov 2022 06:47  Phos  3.1     11-09  Mg     1.4     11-09    TPro  7.3  /  Alb  3.1<L>  /  TBili  0.9  /  DBili  0.4<H>  /  AST  59<H>  /  ALT  30  /  AlkPhos  131<H>  11-09        Labs personally reviewed      ASSESSMENT/PLAN: 	    62M with PMHx of HTN, GERD/Hiatal Hernia, CKD3, alcohol abuse, and hepatic steatosis presenting for poor PO intake and epigastric pain found to have pancreatitis. Patient initially in SICU and then downgraded to med-surg unit on 11/2. Hospital course complicated by OFELIA-on-CKD3 and recent BRBPR. Patient is now transferred to medicine for further management.      Problem/Plan - 1:  ·  Problem: Tachycardia.   ·  Plan: Noted to have sinus tachycardia in 110s  - likely reactive 2/2 pancreatitis,  acute blood loss anemia, prerenal state OFELIA  - Echo with EF 70%, normal LV systolic function, no WMA  - HR now normalizing  - CTA r/o PE negative    Problem/Plan - 2:  ·  Problem: HTN (hypertension).   ·  Plan: well controlled on Losartan 25mg .    Problem/Plan - 3:  ·  Problem: Hematochezia.   ·  Plan: Recently developed on 11/2---> GI consults is appreciated , Elective colonoscopy --> GI f/up due to recurrence of rectal bleed on 11/5   monitor H/H now stable   Neg stool culture and GI PCR.    Problem/Plan - 4:  ·  Problem: AGUILLON  ·  Plan: Complains of 1-2 years of significant AGUILLON  CTPA to r/o PE neg  - OP ischemic eval    Problem/Plan - 5:  ·  Problem: Pancreatitis.   ·  Plan: -S/P IVF   -Pain control PRN  -Per surgery likely etiology is alcohol abuse.  Pt tolerates regular diet.        Miladys Pena, AG-NP   Pablo Marks, DO [facility]  Cardiovascular Medicine  [address], [address]  Available through call or text on [employer] TE[employer]  Office: [phone]

## 2022-11-10 NOTE — PROGRESS NOTE ADULT - NS ATTEND AMEND GEN_ALL_CORE FT
Patient care and plan discussed and reviewed with Advanced Care Provider. Plan as outlined above edited by me to reflect our discussion.
Patient care and plan discussed and reviewed with Advanced Care Provider. Plan as outlined above edited by me to reflect our discussion. Thirty five minutes spent on encounter, of which more than fifty percent of the encounter was spent on counseling and/or coordinating care by the attending physician.
Admitted with diag of alc pancreatitis    Pain improving  Hemodynamically stable saturating well on RA  Start clears, Add Creon  Generous IVF  OFELIA improving, likely from intravascular depletion, making urine  Pharm DVT ppx with SQ Heparin

## 2022-12-13 NOTE — ED PROCEDURE NOTE - PROCEDURE NAME, MLM
General [place] Counseling:  I discussed with the patient the risks of imiquimod including but not limited to erythema, scaling, itching, weeping, crusting, and pain.  Patient understands that the inflammatory response to imiquimod is variable from person to person and was educated regarded proper titration schedule.  If flu-like symptoms develop, patient knows to discontinue the medication and contact us.

## 2022-12-19 ENCOUNTER — TRANSCRIPTION ENCOUNTER (OUTPATIENT)
Age: 62
End: 2022-12-19

## 2023-02-14 ENCOUNTER — APPOINTMENT (OUTPATIENT)
Dept: GASTROENTEROLOGY | Facility: CLINIC | Age: 63
End: 2023-02-14
Payer: COMMERCIAL

## 2023-02-14 ENCOUNTER — NON-APPOINTMENT (OUTPATIENT)
Age: 63
End: 2023-02-14

## 2023-02-14 VITALS
SYSTOLIC BLOOD PRESSURE: 139 MMHG | HEART RATE: 103 BPM | TEMPERATURE: 93.7 F | WEIGHT: 239 LBS | HEIGHT: 69 IN | OXYGEN SATURATION: 98 % | DIASTOLIC BLOOD PRESSURE: 79 MMHG | BODY MASS INDEX: 35.4 KG/M2

## 2023-02-14 DIAGNOSIS — K85.90 ACUTE PANCREATITIS WITHOUT NECROSIS OR INFECTION, UNSPECIFIED: ICD-10-CM

## 2023-02-14 DIAGNOSIS — Z78.9 OTHER SPECIFIED HEALTH STATUS: ICD-10-CM

## 2023-02-14 DIAGNOSIS — K62.5 HEMORRHAGE OF ANUS AND RECTUM: ICD-10-CM

## 2023-02-14 DIAGNOSIS — Z12.11 ENCOUNTER FOR SCREENING FOR MALIGNANT NEOPLASM OF COLON: ICD-10-CM

## 2023-02-14 DIAGNOSIS — K21.9 DIAPHRAGMATIC HERNIA W/OUT OBSTRUCTION OR GANGRENE: ICD-10-CM

## 2023-02-14 DIAGNOSIS — M25.50 PAIN IN UNSPECIFIED JOINT: ICD-10-CM

## 2023-02-14 DIAGNOSIS — K44.9 DIAPHRAGMATIC HERNIA W/OUT OBSTRUCTION OR GANGRENE: ICD-10-CM

## 2023-02-14 DIAGNOSIS — F12.91 CANNABIS USE, UNSPECIFIED, IN REMISSION: ICD-10-CM

## 2023-02-14 PROCEDURE — 99215 OFFICE O/P EST HI 40 MIN: CPT

## 2023-02-14 PROCEDURE — 99205 OFFICE O/P NEW HI 60 MIN: CPT

## 2023-02-14 RX ORDER — OMEPRAZOLE 40 MG/1
40 CAPSULE, DELAYED RELEASE ORAL
Refills: 0 | Status: ACTIVE | COMMUNITY

## 2023-02-14 RX ORDER — HYDROCHLOROTHIAZIDE 12.5 MG/1
12.5 TABLET ORAL
Refills: 0 | Status: ACTIVE | COMMUNITY

## 2023-02-14 RX ORDER — FAMOTIDINE 40 MG/1
40 TABLET, FILM COATED ORAL
Refills: 0 | Status: ACTIVE | COMMUNITY

## 2023-02-14 RX ORDER — LOSARTAN POTASSIUM 50 MG/1
50 TABLET, FILM COATED ORAL
Refills: 0 | Status: ACTIVE | COMMUNITY

## 2023-02-15 LAB
ALBUMIN SERPL ELPH-MCNC: 4.1 G/DL
ALP BLD-CCNC: 88 U/L
ALT SERPL-CCNC: 30 U/L
AMYLASE/CREAT SERPL: 188 U/L
ANION GAP SERPL CALC-SCNC: 12 MMOL/L
AST SERPL-CCNC: 45 U/L
BASOPHILS # BLD AUTO: 0.07 K/UL
BASOPHILS NFR BLD AUTO: 1.3 %
BILIRUB SERPL-MCNC: 0.6 MG/DL
BUN SERPL-MCNC: 18 MG/DL
CALCIUM SERPL-MCNC: 9.9 MG/DL
CHLORIDE SERPL-SCNC: 107 MMOL/L
CO2 SERPL-SCNC: 25 MMOL/L
CREAT SERPL-MCNC: 0.8 MG/DL
EGFR: 101 ML/MIN/1.73M2
EOSINOPHIL # BLD AUTO: 0.13 K/UL
EOSINOPHIL NFR BLD AUTO: 2.4 %
FERRITIN SERPL-MCNC: 43 NG/ML
GGT SERPL-CCNC: 141 U/L
GLUCOSE SERPL-MCNC: 104 MG/DL
HAV IGM SER QL: NONREACTIVE
HBV CORE IGG+IGM SER QL: NONREACTIVE
HBV SURFACE AB SER QL: NONREACTIVE
HBV SURFACE AG SER QL: NONREACTIVE
HCT VFR BLD CALC: 36.2 %
HCV AB SER QL: NONREACTIVE
HCV S/CO RATIO: 0.08 S/CO
HEPATITIS A IGG ANTIBODY: REACTIVE
HGB BLD-MCNC: 11.4 G/DL
IGA SER QL IEP: 316 MG/DL
IMM GRANULOCYTES NFR BLD AUTO: 0.2 %
IRON SATN MFR SERPL: 15 %
IRON SERPL-MCNC: 60 UG/DL
LPL SERPL-CCNC: 42 U/L
LYMPHOCYTES # BLD AUTO: 1.55 K/UL
LYMPHOCYTES NFR BLD AUTO: 28.1 %
MAN DIFF?: NORMAL
MCHC RBC-ENTMCNC: 26.6 PG
MCHC RBC-ENTMCNC: 31.5 GM/DL
MCV RBC AUTO: 84.6 FL
MONOCYTES # BLD AUTO: 0.87 K/UL
MONOCYTES NFR BLD AUTO: 15.8 %
NEUTROPHILS # BLD AUTO: 2.89 K/UL
NEUTROPHILS NFR BLD AUTO: 52.2 %
PLATELET # BLD AUTO: 296 K/UL
POTASSIUM SERPL-SCNC: 4.4 MMOL/L
PROT SERPL-MCNC: 7.5 G/DL
RBC # BLD: 4.28 M/UL
RBC # FLD: 17.1 %
SODIUM SERPL-SCNC: 144 MMOL/L
TIBC SERPL-MCNC: 401 UG/DL
TTG IGA SER IA-ACNC: <1.2 U/ML
TTG IGA SER-ACNC: NEGATIVE
TTG IGG SER IA-ACNC: 2.4 U/ML
TTG IGG SER IA-ACNC: NEGATIVE
UIBC SERPL-MCNC: 341 UG/DL
WBC # FLD AUTO: 5.52 K/UL

## 2023-02-16 PROBLEM — Z12.11 COLON CANCER SCREENING: Status: ACTIVE | Noted: 2023-02-16

## 2023-02-16 LAB
MITOCHONDRIA AB SER IF-ACNC: NORMAL
SMOOTH MUSCLE AB SER QL IF: NORMAL

## 2023-02-16 NOTE — PHYSICAL EXAM
[Alert] : alert [Normal Voice/Communication] : normal voice/communication [No Acute Distress] : no acute distress [Obese (BMI >= 30)] : obese (BMI >= 30) [Sclera] : the sclera and conjunctiva were normal [Hearing Threshold Finger Rub Not [place]] : hearing was normal [Normal Lips/Gums] : the lips and gums were normal [Oropharynx] : the oropharynx was normal [Normal Appearance] : the appearance of the neck was normal [No Neck Mass] : no neck mass was observed [No Respiratory Distress] : no respiratory distress [No Acc Muscle Use] : no accessory muscle use [Respiration, Rhythm And Depth] : normal respiratory rhythm and effort [Auscultation Breath Sounds / Voice Sounds] : lungs were clear to auscultation bilaterally [Heart Rate And Rhythm] : heart rate was normal and rhythm regular [Normal S1, S2] : normal S1 and S2 [Murmurs] : no murmurs [+2] : edema: +2 [Bowel Sounds] : normal bowel sounds [Abdomen Tenderness] : non-tender [No Masses] : no abdominal mass palpated [Abdomen Soft] : soft [Ascites: ___] : no ascites [Abnormal Walk] : normal gait [No Clubbing, Cyanosis] : no clubbing or cyanosis of the fingernails [No Joint Swelling] : no joint swelling seen [] : no rash [Oriented To Time, Place, And Person] : oriented to person, place, and time [Normal Mood] : the mood was normal [FreeTextEntry1] : no SI [de-identified] : No SLJ [de-identified] : to mid shin [de-identified] : obese abdomen, possible hepatomegaly ( liver 3 finger breadths below rib on percussion) [de-identified] : o

## 2023-02-16 NOTE — ED ADULT NURSE REASSESSMENT NOTE - NS ED NURSE REASSESS COMMENT FT1
Faxed referral! patient bladder scanned for PVR and was found to have 108 CC urine in the bladder. WILDA rodríguez

## 2023-02-16 NOTE — ASSESSMENT
[FreeTextEntry1] : 63 yo M pmh Etoh use c/b acute pancreatitis and elevated liver enzymes, recent LGIB 2/2 hemorrhoids, HH with GERD on PPI who presents to establish GI care.  Patient appears to have etoh dependance disorder though perhaps no chemical dependance.  He is not yet ready to quit drinking at this time, though acknowledges the resources we discussed.  Ultimately today we will set him up for labs and imaging of the pancreas and regroup.\par \par Imp:\par 1) Etoh use disorder - contemplative\par 2) H/o Pancreatitis\par 3) Concern for liver disease with elevated liver enzymes previously\par 4) Obesity\par 5) HH with GERD on PPI/H2RA\par 6) CRC Screening - UTD\par \par Plan:\par 1) labs today working up liver disease\par 2) CT with pancreas protocol to assess for chronic changes\par 3) Possible liver referral\par 4) Etoh counselling provided and resources identified.  Medical importance of etoh reduction discussed\par 5) c/w PPI/H2RA at this time\par 6) Given negative colon with good prep in hospital, would repeat in 5 years (as not truly screening purpose) - goal 2027, patient okay with this\par 7) All discussed at length.  Many tests reviewed. Counselling provided.  \par 8) RPV 3 months\par

## 2023-02-16 NOTE — HISTORY OF PRESENT ILLNESS
[FreeTextEntry1] : Colon 11/8/2022\par Non bleeding hemorrhoids\par Otherwise normal colonoscopy [de-identified] : FINDINGS:\par \par CTA: The study is technically adequate with a good contrast bolus to the pulmonary arteries. There are no filling defects in the pulmonary artery or its branches. The cardiac chambers appear normal in size. There is no pericardial effusion. The mid ascending aorta measures 3.5 cm.\par \par Lungs/Airways/Pleura: Expiratory motion degraded study. Mild dependent atelectasis. Central airways are patent. No pleural effusion.\par \par Mediastinum/Lymph nodes: No thoracic adenopathy.\par \par Upper Abdomen: Subcentimeter hypodensities in the liver. There are nonobstructing left renal stones. Pancreas is enlarged with mild peripancreatic haziness and fluid compatible with pancreatitis demonstrated on recent CT abdomen pelvis on 10/31/2022\par \par Bones and Soft Tissues: No aggressive osseous lesions.\par \par IMPRESSION:\par \par 1. No pulmonary thromboembolism\par \par 2. Findings of pancreatitis, demonstrated on recent CT abdomen pelvis\par \par --- End of Report ---

## 2023-02-21 ENCOUNTER — NON-APPOINTMENT (OUTPATIENT)
Age: 63
End: 2023-02-21

## 2023-02-22 ENCOUNTER — NON-APPOINTMENT (OUTPATIENT)
Age: 63
End: 2023-02-22

## 2023-02-22 LAB
ANA PAT FLD IF-IMP: ABNORMAL
ANA SER IF-ACNC: ABNORMAL

## 2023-02-28 ENCOUNTER — APPOINTMENT (OUTPATIENT)
Dept: ORTHOPEDIC SURGERY | Facility: CLINIC | Age: 63
End: 2023-02-28
Payer: COMMERCIAL

## 2023-02-28 VITALS — WEIGHT: 239 LBS | HEIGHT: 69 IN | BODY MASS INDEX: 35.4 KG/M2

## 2023-02-28 DIAGNOSIS — I10 ESSENTIAL (PRIMARY) HYPERTENSION: ICD-10-CM

## 2023-02-28 PROCEDURE — J3490M: CUSTOM

## 2023-02-28 PROCEDURE — 20611 DRAIN/INJ JOINT/BURSA W/US: CPT | Mod: RT

## 2023-02-28 PROCEDURE — 99203 OFFICE O/P NEW LOW 30 MIN: CPT | Mod: 25

## 2023-02-28 PROCEDURE — 99204 OFFICE O/P NEW MOD 45 MIN: CPT | Mod: 25

## 2023-02-28 PROCEDURE — 73564 X-RAY EXAM KNEE 4 OR MORE: CPT | Mod: 50

## 2023-02-28 NOTE — HISTORY OF PRESENT ILLNESS
[Sudden] : sudden [8] : 8 [0] : 0 [Dull/Aching] : dull/aching [Localized] : localized [Radiating] : radiating [Leisure] : leisure [Rest] : rest [de-identified] : Pt. is a 62 year old male who presents for evaluation of both knees, RT worse than LT. Denies trauma. Symptoms since February 2023, about 10 days. No formal treatment to date. WB without assistive device. No previous injury/problem with either knee reported. He has gained 10 lbs, aleve with some help [] : Post Surgical Visit: no [FreeTextEntry1] : bilateral knees [FreeTextEntry3] : feb 2023 [FreeTextEntry5] : No known injury.  [FreeTextEntry7] : right hip [de-identified] : activity

## 2023-02-28 NOTE — DISCUSSION/SUMMARY
[de-identified] : Patient allowed to gently start resuming activities. \par Discussed change to medication prescription and usage. \par Bracing options discussed with patient. \par Activity modification as needed\par Discussed poss future surgery, pt deciding\par CSI R knee

## 2023-02-28 NOTE — PHYSICAL EXAM
[Left] : left knee [Right] : right knee [5___] : hamstring 5[unfilled]/5 [] : non-antalgic [All Views] : anteroposterior, lateral, skyline, and anteroposterior standing [Degenerative change] : Degenerative change [FreeTextEntry9] : L > R

## 2023-02-28 NOTE — PROCEDURE
[Large Joint Injection] : Large joint injection [Right] : of the right [Knee] : knee [] : Patient tolerated procedure well [Call if redness, pain or fever occur] : call if redness, pain or fever occur [Apply ice for 15min out of every hour for the next 12-24 hours as tolerated] : apply ice for 15 minutes out of every hour for the next 12-24 hours as tolerated [Patient was advised to rest the joint(s) for ____ days] : patient was advised to rest the joint(s) for [unfilled] days [All ultrasound images have been permanently captured and stored accordingly in our picture archiving and communication system] : All ultrasound images have been permanently captured and stored accordingly in our picture archiving and communication system [Pain] : pain [Inflammation] : inflammation

## 2023-08-26 ENCOUNTER — INPATIENT (INPATIENT)
Facility: HOSPITAL | Age: 63
LOS: 3 days | Discharge: HOME | DRG: 380 | End: 2023-08-30
Attending: HOSPITALIST | Admitting: STUDENT IN AN ORGANIZED HEALTH CARE EDUCATION/TRAINING PROGRAM
Payer: COMMERCIAL

## 2023-08-26 VITALS
HEIGHT: 69 IN | OXYGEN SATURATION: 98 % | SYSTOLIC BLOOD PRESSURE: 158 MMHG | DIASTOLIC BLOOD PRESSURE: 103 MMHG | RESPIRATION RATE: 20 BRPM | HEART RATE: 140 BPM | TEMPERATURE: 98 F | WEIGHT: 132.94 LBS

## 2023-08-26 DIAGNOSIS — I10 ESSENTIAL (PRIMARY) HYPERTENSION: ICD-10-CM

## 2023-08-26 DIAGNOSIS — K85.20 ALCOHOL INDUCED ACUTE PANCREATITIS WITHOUT NECROSIS OR INFECTION: ICD-10-CM

## 2023-08-26 DIAGNOSIS — K92.2 GASTROINTESTINAL HEMORRHAGE, UNSPECIFIED: ICD-10-CM

## 2023-08-26 DIAGNOSIS — K85.90 ACUTE PANCREATITIS WITHOUT NECROSIS OR INFECTION, UNSPECIFIED: ICD-10-CM

## 2023-08-26 DIAGNOSIS — N17.9 ACUTE KIDNEY FAILURE, UNSPECIFIED: ICD-10-CM

## 2023-08-26 DIAGNOSIS — F10.10 ALCOHOL ABUSE, UNCOMPLICATED: ICD-10-CM

## 2023-08-26 DIAGNOSIS — Z29.9 ENCOUNTER FOR PROPHYLACTIC MEASURES, UNSPECIFIED: ICD-10-CM

## 2023-08-26 LAB
ALBUMIN SERPL ELPH-MCNC: 5.4 G/DL — HIGH (ref 3.3–5)
ALBUMIN SERPL ELPH-MCNC: 6.1 G/DL — HIGH (ref 3.3–5)
ALP SERPL-CCNC: 104 U/L — SIGNIFICANT CHANGE UP (ref 40–120)
ALP SERPL-CCNC: 90 U/L — SIGNIFICANT CHANGE UP (ref 40–120)
ALT FLD-CCNC: 36 U/L — SIGNIFICANT CHANGE UP (ref 10–45)
ALT FLD-CCNC: 46 U/L — HIGH (ref 10–45)
ANION GAP SERPL CALC-SCNC: 26 MMOL/L — HIGH (ref 5–17)
ANION GAP SERPL CALC-SCNC: 32 MMOL/L — HIGH (ref 5–17)
APTT BLD: 28.2 SEC — SIGNIFICANT CHANGE UP (ref 24.5–35.6)
AST SERPL-CCNC: 58 U/L — HIGH (ref 10–40)
AST SERPL-CCNC: 67 U/L — HIGH (ref 10–40)
BASE EXCESS BLDV CALC-SCNC: 3.2 MMOL/L — HIGH (ref -2–3)
BASE EXCESS BLDV CALC-SCNC: 3.5 MMOL/L — HIGH (ref -2–3)
BASOPHILS # BLD AUTO: 0.02 K/UL — SIGNIFICANT CHANGE UP (ref 0–0.2)
BASOPHILS NFR BLD AUTO: 0.2 % — SIGNIFICANT CHANGE UP (ref 0–2)
BILIRUB SERPL-MCNC: 1.9 MG/DL — HIGH (ref 0.2–1.2)
BILIRUB SERPL-MCNC: 2.3 MG/DL — HIGH (ref 0.2–1.2)
BLD GP AB SCN SERPL QL: NEGATIVE — SIGNIFICANT CHANGE UP
BUN SERPL-MCNC: 35 MG/DL — HIGH (ref 7–23)
BUN SERPL-MCNC: 38 MG/DL — HIGH (ref 7–23)
CA-I SERPL-SCNC: 1.22 MMOL/L — SIGNIFICANT CHANGE UP (ref 1.15–1.33)
CA-I SERPL-SCNC: 1.22 MMOL/L — SIGNIFICANT CHANGE UP (ref 1.15–1.33)
CALCIUM SERPL-MCNC: 10.3 MG/DL — SIGNIFICANT CHANGE UP (ref 8.4–10.5)
CALCIUM SERPL-MCNC: 11.3 MG/DL — HIGH (ref 8.4–10.5)
CHLORIDE BLDV-SCNC: 93 MMOL/L — LOW (ref 96–108)
CHLORIDE BLDV-SCNC: 96 MMOL/L — SIGNIFICANT CHANGE UP (ref 96–108)
CHLORIDE SERPL-SCNC: 90 MMOL/L — LOW (ref 96–108)
CHLORIDE SERPL-SCNC: 91 MMOL/L — LOW (ref 96–108)
CO2 BLDV-SCNC: 28 MMOL/L — HIGH (ref 22–26)
CO2 BLDV-SCNC: 29 MMOL/L — HIGH (ref 22–26)
CO2 SERPL-SCNC: 21 MMOL/L — LOW (ref 22–31)
CO2 SERPL-SCNC: 24 MMOL/L — SIGNIFICANT CHANGE UP (ref 22–31)
CREAT SERPL-MCNC: 1.77 MG/DL — HIGH (ref 0.5–1.3)
CREAT SERPL-MCNC: 1.98 MG/DL — HIGH (ref 0.5–1.3)
EGFR: 37 ML/MIN/1.73M2 — LOW
EGFR: 43 ML/MIN/1.73M2 — LOW
EOSINOPHIL # BLD AUTO: 0 K/UL — SIGNIFICANT CHANGE UP (ref 0–0.5)
EOSINOPHIL NFR BLD AUTO: 0 % — SIGNIFICANT CHANGE UP (ref 0–6)
GAS PNL BLDV: 139 MMOL/L — SIGNIFICANT CHANGE UP (ref 136–145)
GAS PNL BLDV: 141 MMOL/L — SIGNIFICANT CHANGE UP (ref 136–145)
GAS PNL BLDV: SIGNIFICANT CHANGE UP
GAS PNL BLDV: SIGNIFICANT CHANGE UP
GLUCOSE BLDV-MCNC: 141 MG/DL — HIGH (ref 70–99)
GLUCOSE BLDV-MCNC: 181 MG/DL — HIGH (ref 70–99)
GLUCOSE SERPL-MCNC: 139 MG/DL — HIGH (ref 70–99)
GLUCOSE SERPL-MCNC: 173 MG/DL — HIGH (ref 70–99)
HCO3 BLDV-SCNC: 27 MMOL/L — SIGNIFICANT CHANGE UP (ref 22–29)
HCO3 BLDV-SCNC: 28 MMOL/L — SIGNIFICANT CHANGE UP (ref 22–29)
HCT VFR BLD CALC: 46.1 % — SIGNIFICANT CHANGE UP (ref 39–50)
HCT VFR BLDA CALC: 43 % — SIGNIFICANT CHANGE UP (ref 39–51)
HCT VFR BLDA CALC: 49 % — SIGNIFICANT CHANGE UP (ref 39–51)
HGB BLD CALC-MCNC: 14.4 G/DL — SIGNIFICANT CHANGE UP (ref 12.6–17.4)
HGB BLD CALC-MCNC: 16.3 G/DL — SIGNIFICANT CHANGE UP (ref 12.6–17.4)
HGB BLD-MCNC: 15.7 G/DL — SIGNIFICANT CHANGE UP (ref 13–17)
IMM GRANULOCYTES NFR BLD AUTO: 0.3 % — SIGNIFICANT CHANGE UP (ref 0–0.9)
INR BLD: 1.06 RATIO — SIGNIFICANT CHANGE UP (ref 0.85–1.18)
LACTATE BLDV-MCNC: 2.7 MMOL/L — HIGH (ref 0.5–2)
LACTATE BLDV-MCNC: 3.1 MMOL/L — HIGH (ref 0.5–2)
LIDOCAIN IGE QN: 663 U/L — HIGH (ref 7–60)
LYMPHOCYTES # BLD AUTO: 0.64 K/UL — LOW (ref 1–3.3)
LYMPHOCYTES # BLD AUTO: 6.3 % — LOW (ref 13–44)
MCHC RBC-ENTMCNC: 29.4 PG — SIGNIFICANT CHANGE UP (ref 27–34)
MCHC RBC-ENTMCNC: 34.1 GM/DL — SIGNIFICANT CHANGE UP (ref 32–36)
MCV RBC AUTO: 86.3 FL — SIGNIFICANT CHANGE UP (ref 80–100)
MONOCYTES # BLD AUTO: 1.11 K/UL — HIGH (ref 0–0.9)
MONOCYTES NFR BLD AUTO: 10.9 % — SIGNIFICANT CHANGE UP (ref 2–14)
NEUTROPHILS # BLD AUTO: 8.35 K/UL — HIGH (ref 1.8–7.4)
NEUTROPHILS NFR BLD AUTO: 82.3 % — HIGH (ref 43–77)
NRBC # BLD: 0 /100 WBCS — SIGNIFICANT CHANGE UP (ref 0–0)
PCO2 BLDV: 36 MMHG — LOW (ref 42–55)
PCO2 BLDV: 42 MMHG — SIGNIFICANT CHANGE UP (ref 42–55)
PH BLDV: 7.43 — SIGNIFICANT CHANGE UP (ref 7.32–7.43)
PH BLDV: 7.48 — HIGH (ref 7.32–7.43)
PLATELET # BLD AUTO: 244 K/UL — SIGNIFICANT CHANGE UP (ref 150–400)
PO2 BLDV: 29 MMHG — SIGNIFICANT CHANGE UP (ref 25–45)
PO2 BLDV: 29 MMHG — SIGNIFICANT CHANGE UP (ref 25–45)
POTASSIUM BLDV-SCNC: 3.3 MMOL/L — LOW (ref 3.5–5.1)
POTASSIUM BLDV-SCNC: 3.4 MMOL/L — LOW (ref 3.5–5.1)
POTASSIUM SERPL-MCNC: 3.2 MMOL/L — LOW (ref 3.5–5.3)
POTASSIUM SERPL-MCNC: 3.3 MMOL/L — LOW (ref 3.5–5.3)
POTASSIUM SERPL-SCNC: 3.2 MMOL/L — LOW (ref 3.5–5.3)
POTASSIUM SERPL-SCNC: 3.3 MMOL/L — LOW (ref 3.5–5.3)
PROT SERPL-MCNC: 10.5 G/DL — HIGH (ref 6–8.3)
PROT SERPL-MCNC: 8.8 G/DL — HIGH (ref 6–8.3)
PROTHROM AB SERPL-ACNC: 11.1 SEC — SIGNIFICANT CHANGE UP (ref 9.5–13)
RBC # BLD: 5.34 M/UL — SIGNIFICANT CHANGE UP (ref 4.2–5.8)
RBC # FLD: 17 % — HIGH (ref 10.3–14.5)
RH IG SCN BLD-IMP: POSITIVE — SIGNIFICANT CHANGE UP
SAO2 % BLDV: 41.2 % — LOW (ref 67–88)
SAO2 % BLDV: 41.7 % — LOW (ref 67–88)
SODIUM SERPL-SCNC: 140 MMOL/L — SIGNIFICANT CHANGE UP (ref 135–145)
SODIUM SERPL-SCNC: 144 MMOL/L — SIGNIFICANT CHANGE UP (ref 135–145)
WBC # BLD: 10.15 K/UL — SIGNIFICANT CHANGE UP (ref 3.8–10.5)
WBC # FLD AUTO: 10.15 K/UL — SIGNIFICANT CHANGE UP (ref 3.8–10.5)

## 2023-08-26 PROCEDURE — 70450 CT HEAD/BRAIN W/O DYE: CPT | Mod: 26,MA

## 2023-08-26 PROCEDURE — 74177 CT ABD & PELVIS W/CONTRAST: CPT | Mod: 26,MA

## 2023-08-26 PROCEDURE — 71045 X-RAY EXAM CHEST 1 VIEW: CPT | Mod: 26

## 2023-08-26 PROCEDURE — 99285 EMERGENCY DEPT VISIT HI MDM: CPT

## 2023-08-26 PROCEDURE — 99223 1ST HOSP IP/OBS HIGH 75: CPT

## 2023-08-26 PROCEDURE — 71260 CT THORAX DX C+: CPT | Mod: 26,MA

## 2023-08-26 RX ORDER — SODIUM CHLORIDE 9 MG/ML
1000 INJECTION, SOLUTION INTRAVENOUS
Refills: 0 | Status: DISCONTINUED | OUTPATIENT
Start: 2023-08-26 | End: 2023-08-27

## 2023-08-26 RX ORDER — THIAMINE MONONITRATE (VIT B1) 100 MG
100 TABLET ORAL ONCE
Refills: 0 | Status: COMPLETED | OUTPATIENT
Start: 2023-08-26 | End: 2023-08-26

## 2023-08-26 RX ORDER — PANTOPRAZOLE SODIUM 20 MG/1
40 TABLET, DELAYED RELEASE ORAL DAILY
Refills: 0 | Status: DISCONTINUED | OUTPATIENT
Start: 2023-08-26 | End: 2023-08-29

## 2023-08-26 RX ORDER — FOLIC ACID 0.8 MG
1 TABLET ORAL ONCE
Refills: 0 | Status: COMPLETED | OUTPATIENT
Start: 2023-08-26 | End: 2023-08-26

## 2023-08-26 RX ORDER — POTASSIUM PHOSPHATE, MONOBASIC POTASSIUM PHOSPHATE, DIBASIC 236; 224 MG/ML; MG/ML
15 INJECTION, SOLUTION INTRAVENOUS ONCE
Refills: 0 | Status: COMPLETED | OUTPATIENT
Start: 2023-08-26 | End: 2023-08-26

## 2023-08-26 RX ORDER — ONDANSETRON 8 MG/1
4 TABLET, FILM COATED ORAL ONCE
Refills: 0 | Status: COMPLETED | OUTPATIENT
Start: 2023-08-26 | End: 2023-08-26

## 2023-08-26 RX ORDER — POTASSIUM CHLORIDE 20 MEQ
20 PACKET (EA) ORAL
Refills: 0 | Status: COMPLETED | OUTPATIENT
Start: 2023-08-26 | End: 2023-08-27

## 2023-08-26 RX ORDER — ACETAMINOPHEN 500 MG
1000 TABLET ORAL ONCE
Refills: 0 | Status: COMPLETED | OUTPATIENT
Start: 2023-08-26 | End: 2023-08-26

## 2023-08-26 RX ORDER — FOLIC ACID 0.8 MG
1 TABLET ORAL DAILY
Refills: 0 | Status: DISCONTINUED | OUTPATIENT
Start: 2023-08-26 | End: 2023-08-30

## 2023-08-26 RX ORDER — PANTOPRAZOLE SODIUM 20 MG/1
80 TABLET, DELAYED RELEASE ORAL ONCE
Refills: 0 | Status: COMPLETED | OUTPATIENT
Start: 2023-08-26 | End: 2023-08-26

## 2023-08-26 RX ORDER — SODIUM CHLORIDE 9 MG/ML
1000 INJECTION, SOLUTION INTRAVENOUS ONCE
Refills: 0 | Status: COMPLETED | OUTPATIENT
Start: 2023-08-26 | End: 2023-08-26

## 2023-08-26 RX ORDER — SODIUM CHLORIDE 9 MG/ML
1000 INJECTION, SOLUTION INTRAVENOUS
Refills: 0 | Status: DISCONTINUED | OUTPATIENT
Start: 2023-08-26 | End: 2023-08-26

## 2023-08-26 RX ORDER — ACETAMINOPHEN 500 MG
650 TABLET ORAL EVERY 6 HOURS
Refills: 0 | Status: DISCONTINUED | OUTPATIENT
Start: 2023-08-26 | End: 2023-08-30

## 2023-08-26 RX ORDER — OMEPRAZOLE 10 MG/1
1 CAPSULE, DELAYED RELEASE ORAL
Qty: 0 | Refills: 0 | DISCHARGE

## 2023-08-26 RX ORDER — ONDANSETRON 8 MG/1
4 TABLET, FILM COATED ORAL EVERY 6 HOURS
Refills: 0 | Status: DISCONTINUED | OUTPATIENT
Start: 2023-08-26 | End: 2023-08-30

## 2023-08-26 RX ORDER — THIAMINE MONONITRATE (VIT B1) 100 MG
100 TABLET ORAL DAILY
Refills: 0 | Status: COMPLETED | OUTPATIENT
Start: 2023-08-26 | End: 2023-08-29

## 2023-08-26 RX ADMIN — Medication 100 MILLIGRAM(S): at 18:36

## 2023-08-26 RX ADMIN — Medication 2 MILLIGRAM(S): at 17:28

## 2023-08-26 RX ADMIN — Medication 1000 MILLIGRAM(S): at 22:50

## 2023-08-26 RX ADMIN — ONDANSETRON 4 MILLIGRAM(S): 8 TABLET, FILM COATED ORAL at 17:29

## 2023-08-26 RX ADMIN — Medication 1 MILLIGRAM(S): at 22:14

## 2023-08-26 RX ADMIN — PANTOPRAZOLE SODIUM 80 MILLIGRAM(S): 20 TABLET, DELAYED RELEASE ORAL at 17:29

## 2023-08-26 RX ADMIN — Medication 400 MILLIGRAM(S): at 17:37

## 2023-08-26 RX ADMIN — Medication 20 MILLIEQUIVALENT(S): at 22:38

## 2023-08-26 RX ADMIN — SODIUM CHLORIDE 100 MILLILITER(S): 9 INJECTION, SOLUTION INTRAVENOUS at 18:35

## 2023-08-26 RX ADMIN — SODIUM CHLORIDE 1000 MILLILITER(S): 9 INJECTION, SOLUTION INTRAVENOUS at 22:13

## 2023-08-26 RX ADMIN — SODIUM CHLORIDE 1000 MILLILITER(S): 9 INJECTION, SOLUTION INTRAVENOUS at 17:29

## 2023-08-26 RX ADMIN — SODIUM CHLORIDE 100 MILLILITER(S): 9 INJECTION, SOLUTION INTRAVENOUS at 22:08

## 2023-08-26 RX ADMIN — POTASSIUM PHOSPHATE, MONOBASIC POTASSIUM PHOSPHATE, DIBASIC 62.5 MILLIMOLE(S): 236; 224 INJECTION, SOLUTION INTRAVENOUS at 18:36

## 2023-08-26 NOTE — H&P ADULT - NSHPLABSRESULTS_GEN_ALL_CORE
LABS:                      15.7   10.15 )-----------( 244      ( 26 Aug 2023 17:15 )             46.1     08-26    140  |  90<L>  |  35<H>  ----------------------------<  139<H>  3.2<L>   |  24  |  1.77<H>    Ca    10.3      26 Aug 2023 18:46  Phos  0.7     08-26  Mg     4.4     08-26    TPro  8.8<H>  /  Alb  5.4<H>  /  TBili  1.9<H>  /  DBili  x   /  AST  58<H>  /  ALT  36  /  AlkPhos  90  08-26    LIVER FUNCTIONS - ( 26 Aug 2023 18:46 )  Alb: 5.4 g/dL / Pro: 8.8 g/dL / ALK PHOS: 90 U/L / ALT: 36 U/L / AST: 58 U/L / GGT: x           PT/INR - ( 26 Aug 2023 17:15 )   PT: 11.1 sec;   INR: 1.06 ratio    PTT - ( 26 Aug 2023 17:15 )  PTT:28.2 sec    Urinalysis Basic - ( 26 Aug 2023 18:46 )  Color: x / Appearance: x / SG: x / pH: x  Gluc: 139 mg/dL / Ketone: x  / Bili: x / Urobili: x   Blood: x / Protein: x / Nitrite: x   Leuk Esterase: x / RBC: x / WBC x   Sq Epi: x / Non Sq Epi: x / Bacteria: x    IMAGING:  CT Head No Cont (08.26.23 @ 18:34)  IMPRESSION:  - No acute intracranial bleeding.    CT Chest, Abdomen, Pelvis w/ IV Cont (08.26.23 @ 18:38)  IMPRESSION:   - Mild hiatal hernia with mucosal hyperenhancement, which may   be related to vomiting.   - No evidence of active intraluminal extravasation of contrast in the bowel.   - Nonobstructing left renal stones.   - Hepatic steatosis.    [X] Imaging personally reviewed by me- No pancreatic inflammation on CT   [X] ECG personally interpreted by me- Sinus tach w/out acute ischemic changes

## 2023-08-26 NOTE — ED ADULT NURSE NOTE - OBJECTIVE STATEMENT
63 year old male coming in for vomiting. PMH pancreatitis and HTN. Patient coming in today for multiple episodes of black stools and coffee ground emesis for the past three days. Patient is complaining of dizziness, weakness, nausea, and headache. Patient had a fall yesterday states he did not his head denies LOC. Patient is a daily drinker states his last drink was Tuesday. On arrival to ED patient did vomit. Patient has pain on palpation in left upper quadrants. No shortness of breath or difficulty breathing. No chest pain, pressure or palpitations. No fever, chills, or body aches. Three "drinks of tequila a day" denies any hx of with drawl 63 year old male coming in for vomiting. PMH pancreatitis and HTN. Patient coming in today for multiple episodes of black stools and coffee ground emesis for the past three days. Patient is complaining of dizziness, weakness, nausea, and headache. Patient had a fall yesterday states he did not his head denies LOC. Patient is a daily drinker states his last drink was Tuesday. Three "drinks of tequila a day" denies any hx of withdraw. On arrival to ED patient did vomit. Patient has pain on palpation in left upper quadrants. No shortness of breath or difficulty breathing. No chest pain, pressure or palpitations. No fever, chills, or body aches.

## 2023-08-26 NOTE — H&P ADULT - ASSESSMENT
64yo M w/ PMH of HTN, GERD, EtOH abuse, hepatic steatosis and hx of pancreatitis pw abd pain, N/V and c/f melena

## 2023-08-26 NOTE — H&P ADULT - NSICDXFAMILYHX_GEN_ALL_CORE_FT
FAMILY HISTORY:  FH: hypertension    Father  Still living? Unknown  FH: prostate cancer, Age at diagnosis: Age Unknown

## 2023-08-26 NOTE — ED ADULT NURSE NOTE - NSFALLHARMRISKINTERV_ED_ALL_ED
Assistance OOB with selected safe patient handling equipment if applicable/Assistance with ambulation/Communicate risk of Fall with Harm to all staff, patient, and family/Encourage patient to sit up slowly, dangle for a short time, stand at bedside before walking/Monitor gait and stability/Monitor for mental status changes and reorient to person, place, and time, as needed/Orthostatic vital signs/Provide visual cue: red socks, yellow wristband, yellow gown, etc/Reinforce activity limits and safety measures with patient and family/Toileting schedule using arm’s reach rule for commode and bathroom/Use of alarms - bed, stretcher, chair and/or video monitoring/Bed in lowest position, wheels locked, appropriate side rails in place/Call bell, personal items and telephone in reach/Instruct patient to call for assistance before getting out of bed/chair/stretcher/Non-slip footwear applied when patient is off stretcher/Lynn to call system/Physically safe environment - no spills, clutter or unnecessary equipment/Purposeful Proactive Rounding/Room/bathroom lighting operational, light cord in reach

## 2023-08-26 NOTE — ED ADULT TRIAGE NOTE - NSWEIGHTCALCTOOLDRUG_GEN_A_CORE
Discussed with MD, he would like her to follow up in the office. He wants to repeat her xrays as well. Thanks.    used

## 2023-08-26 NOTE — ED PROVIDER NOTE - PHYSICAL EXAMINATION
PHYSICAL EXAM:    GENERAL: Patient in mild distress, A&Ox4  HEENT:  Atraumatic, PERRL, EOMI. No tongue fasciculations   CHEST/LUNG: Chest rise equal bilaterally, CTA b/l  HEART: Regular rate and rhythm, no mrg  ABDOMEN: Soft, mildly distended. Tenderness to palpation by the epigastrium.  EXTREMITIES:  Extremities warm, radial and tibialis posterior pulses palpable b/l  SKIN: No obvious rashes or lesions  NEUROLOGY: strength and sensation intact in all extremities. PHYSICAL EXAM:    GENERAL: Patient in mild distress, A&Ox4  HEENT:  Atraumatic, PERRL, EOMI. No tongue fasciculations   CHEST/LUNG: Chest rise equal bilaterally, CTA b/l  HEART: Tachycardic, no mrg  ABDOMEN: Soft, mildly distended. Tenderness to palpation by the epigastrium.  EXTREMITIES:  Extremities warm, radial and tibialis posterior pulses palpable b/l  SKIN: No obvious rashes or lesions  NEUROLOGY: strength and sensation intact in all extremities. PHYSICAL EXAM:    GENERAL: Patient in mild distress, A&Ox4  HEENT:  Atraumatic, PERRL, EOMI. No tongue fasciculations no conjunctival pallor   CHEST/LUNG: Chest rise equal bilaterally, CTA b/l  HEART: Tachycardic, no mrg  ABDOMEN: Soft, mildly distended. Tenderness to palpation upper quadrants and periumbilical  EXTREMITIES:  Extremities warm, radial and tibialis posterior pulses palpable b/l brisk cap refill  SKIN: No obvious rashes or lesions  NEUROLOGY: strength and sensation intact in all extremities.

## 2023-08-26 NOTE — H&P ADULT - HISTORY OF PRESENT ILLNESS
Pt is a 62yo M w/ PMH of HTN, GERD, EtOH abuse, hepatic steatosis and hx of pancreatitis pw abd pain, N/V.     Reports 4 days of intermittent epigastric abdominal pain w/out radiation rated as a 12/10 that's worsened w/ N/V w/ limited relief w/ Tylenol and Aleve. The nausea is described as being dark/black in color and limiting his oral intake and reports 2 episodes of dark/black stool this AM as well as an episode of dizziness c/b fall yesterday w/out LOC or head strike.     Although he took Aleve for this pain, does not reports chronic use, though does take ASA 81mg qd. He also reports to use of pepto bismol. With regards to EtOH consumption, reports 1-2 "drinks and not shots" of Tequila daily w/ last drink at start of symptoms about 4 days ago. He otherwise denies any CP, SOB, palpitations, F/C.    Had an EGD and colonoscopy on 11/08/22 d/t c/f hematochezia w/ findings of Hiatal hernia and non-bleeding internal hemorrhoids.     In the ED, tachycardic w/ labs notable for electrolyte derangements and Cr of 1.98 and lipase of 663. He was administered 2L LR along w/ Ativan and electrolyte repletion.

## 2023-08-26 NOTE — H&P ADULT - NSHPPHYSICALEXAM_GEN_ALL_CORE
Vital Signs Last 24 Hrs  T(C): 36.5 (26 Aug 2023 16:47), Max: 36.5 (26 Aug 2023 16:47)  T(F): 97.7 (26 Aug 2023 16:47), Max: 97.7 (26 Aug 2023 16:47)  HR: 135 (26 Aug 2023 18:45) (135 - 145)  BP: 122/98 (26 Aug 2023 18:45) (122/98 - 158/103)  BP(mean): 107 (26 Aug 2023 18:45) (107 - 117)  RR: 12 (26 Aug 2023 18:45) (12 - 20)  SpO2: 96% (26 Aug 2023 18:45) (96% - 100%)    Parameters below as of 26 Aug 2023 18:45  Patient On (Oxygen Delivery Method): room air    CONSTITUTIONAL: Well-groomed, in no apparent distress  EYES: No conjunctival or scleral injection, non-icteric;   ENMT: No external nasal lesions; Dry MM  NECK: Trachea midline without palpable neck mass; thyroid not enlarged and non-tender  RESPIRATORY: Breathing comfortably; no dullness to percussion; lungs CTA without wheeze/rhonchi/rales  CARDIOVASCULAR: +S1S2, RRR, no M/G/R; pedal pulses full and symmetric; no lower extremity edema  GASTROINTESTINAL: mild epigastric ttp. No palpable masses, +BS throughout, no rebound/guarding; no hepatosplenomegaly; no hernia palpated  RECTAL: *performed w/ nurse Olive in ED as chaperone. Brown stool  LYMPHATIC: No cervical LAD or tenderness  SKIN: No rashes or ulcers noted  NEUROLOGIC: CN II-XII intact; sensation intact in LEs b/l to light touch  PSYCHIATRIC: A&Ox3; mood and affect appropriate; appropriate insight and judgment

## 2023-08-26 NOTE — H&P ADULT - PROBLEM SELECTOR PLAN 2
- Presenting w/ c/o coffee ground emesis and melanotic stools  - Describes stool as being almost black in color and two episodes this AM   - Takes ASA 81mg qd and used Aleve in the past few days for pain control, though also uses Pepto Bismol  - Given normal hemoglobin on presentation w/ rectal exam indicative of brown stool w/ use of pepto bismol at home, c/f GIB is lower on the ddx though does have AP use along w/ EtOH consumption, thus UGIB cannot be ruled out  - s/p Protonix 80mg IVP in the ED, will continue w/ Protinix 40mg IVP QD for now  - Keep NPO   - Monitor CBC daily or twice daily, based on next CBC  - Maintain active T&S and transfuse if hgb <7  - GI emailed, f/u recs in AM

## 2023-08-26 NOTE — H&P ADULT - NSHPREVIEWOFSYSTEMS_GEN_ALL_CORE
CONSTITUTIONAL: No fever, weight loss  EYES: No eye pain, visual disturbances, or discharge  ENMT:  No difficulty hearing, tinnitus, vertigo; No sinus or throat pain  RESPIRATORY: No SOB. No cough, wheezing, chills or hemoptysis  CARDIOVASCULAR: No chest pain, palpitations, dizziness, or leg swelling  GASTROINTESTINAL: Epigastric pain, nausea, vomiting, hematemesis, diarrhea, melena. No constipation. No hematochezia.  GENITOURINARY: No dysuria, frequency, hematuria, or incontinence  NEUROLOGICAL: No headaches, memory loss, loss of strength, numbness, or tremors  SKIN: No itching, burning, rashes, or lesions   LYMPH NODES: No enlarged glands  ENDOCRINE: No heat or cold intolerance; No hair loss  MUSCULOSKELETAL: No joint pain or swelling; No muscle, back pain  PSYCHIATRIC: No depression, anxiety, mood swings, or difficulty sleeping  HEME/LYMPH: No easy bruising, or bleeding gums

## 2023-08-26 NOTE — ED PROVIDER NOTE - CLINICAL SUMMARY MEDICAL DECISION MAKING FREE TEXT BOX
63 y.o. male with PMH of alcohol use, hepatic steatosis, and pancreatitis presents with a 4 day history of coffee ground emesis and dark stools. Endorses nausea, abdominal pain, dizziness, headache and fatigue since Wednesday. States that he felt dizzy yesterday and fell. Will obtain CBC/CMP, VBG, lipase, Mg, Phos, coags, type and screen, CXR, CTA abdomen, CT chest. Will obtain CT head for fall and headache with Etoh use.   Will treat with fluids, acetaminophen, ondansetron, pantoprazole, and lorazepam. 63 y.o. male with PMH of alcohol use, hepatic steatosis, hiatal hernia CKD and pancreatitis presents with a 4 day history of coffee ground emesis and dark stools. Endorses nausea, abdominal pain, dizziness, headache and fatigue since Wednesday. States that he felt dizzy yesterday and fell. most recent EGD in november '22 without varices. Will obtain CBC/CMP, VBG, lipase, Mg, Phos, coags, type and screen, CXR, CTA abdomen, CT chest. Will obtain CT head for fall and headache with Etoh use. Will treat with fluids, acetaminophen, ondansetron, pantoprazole, and lorazepam.

## 2023-08-26 NOTE — ED ADULT TRIAGE NOTE - CHIEF COMPLAINT QUOTE
black stools & dark coffee ground emesis x3days, dizziness, weakness, fall yesterday did not his head, no LOC  Denies anticoagulation

## 2023-08-26 NOTE — H&P ADULT - PROBLEM SELECTOR PLAN 3
- Dry on exam w/ N/V, loose stool at home w/ decreased PO intake likely pre-renal in nature  - Fluids as above  - Trend BUN/Cr  - Avoid nephrotoxic medications  - Renally dose all medications

## 2023-08-26 NOTE — ED PROVIDER NOTE - EKG ADDITIONAL QUESTION - PERFORMED INDEPENDENT VISUALIZATION
Assumed care at 0700. Bedside report received from [facility]. Patient's chart and MAR reviewed. Pt denies pain at this time. Pt is A & O 4. Patient was updated on plan of care for the day. Questions answered and concerns addressed.  Pt denies any additional needs at this time. White board updated. Call light, phone and personal belongings within reach.    Yes

## 2023-08-26 NOTE — H&P ADULT - NSICDXPASTMEDICALHX_GEN_ALL_CORE_FT
PAST MEDICAL HISTORY:  Acute alcoholic pancreatitis     GERD (gastroesophageal reflux disease)     Hiatal hernia     HTN (hypertension)

## 2023-08-26 NOTE — H&P ADULT - PROBLEM SELECTOR PLAN 1
- Epigastric abdominal pain w/ lipase 633  - s/p 2L LR in the ED  - c/w LR at 150cc/hr  - NPO for now pending below, though can consider advancing diet as tolerated if no procedures planned  - RUQ to r/o stone though less likely w/ ALP normal  - Pain control PRN  - Zofran for N/V

## 2023-08-26 NOTE — H&P ADULT - NSHPSOCIALHISTORY_GEN_ALL_CORE
- Denies tobacco use  - Daily EtOH consumption, 1-2 drinks (Tequila)/day, last 4 days ago  - Denies illicit drug use

## 2023-08-26 NOTE — ED PROVIDER NOTE - ATTENDING CONTRIBUTION TO CARE
I, Hang Valentin, have performed a history and physical exam on this patient, and discussed their management with the resident and student. I have fully participated in the care of this patient. I agree with the history, physical exam, and plan as documented by the resident and student, unless reported as otherwise below.    62 yo M PMHx of HTN, GERD, EtOH dependence, hepatic steatosis, pancreatitis, presenting to ED for 4 days of abd pain, greastest in epigastrium. Associated N/V, no PO tolerance x 4 days, last drink 4 days ago. Emesis described as coffee ground. Also melena. No hx of withdrawal per pt. Also reports lightheadedness. No fevers. Denies CP, SOB. Not on AC.     Gen: Alert. Ox3. Uncomfortable appearing. HEENT: Atraumatic. Mucous membranes dry. CV: Tachycardic, regular rhythm. No significant LE edema. Pulses 2+ in extremities x 4. Sensation and strength intact throughout extremities x 4. Resp: Unlabored-respirations. CTAB. GI: Abdomen diffusely tender to palpation, greatest in epigastrium. Skin/MSK: No open wounds. Neuro: EOMI. Pupils ERRL. Following commands. Psych: Appropriate mood, cooperative. VS w/ tachycardia.    Consider anemia, upper GIB, lower GIB, PUD, gastritis, pancreatitis, biliary pathology, gastric volvulus, hiatal hernia, dehydration, OFELIA, electrolyte abnormality, perforation, SBO, alcohol withdrawal.. Will obtain screening labs, type and screen, XR, CT chest and AP. Provide rehydration. Provide symptomatic tx. Will reassess.

## 2023-08-26 NOTE — ED PROVIDER NOTE - OBJECTIVE STATEMENT
63 y.o male with PMH of hepatic steatosis, alcohol abuse, and pancreatitis presents with a 4 day history of throwing up coffee ground vomit. States that his last drink was 4 days ago, usually 2-3 glasses of tequila. States that he has associated dizziness, nausea, upper abdominal pain, weakness and headache. Also endorses left foot heaviness. Denies any chest pain, changes in sensation, shortness of breath. 63 y.o male with PMH of hepatic steatosis, alcohol abuse, and pancreatitis presents with a 4 day history of throwing up coffee ground vomit. States that his last drink was 4 days ago, usually 2-3 glasses of tequila. States that he has associated dizziness, nausea, upper abdominal pain, weakness and headache. Reports falling yesterday with associated headache, with no known trauma to the head. Also endorses left foot heaviness. Denies any chest pain, changes in sensation, shortness of breath. 63 y.o male with PMH of hepatic steatosis, alcohol abuse, and pancreatitis presents with a 4 day history of throwing up coffee ground vomit. States that his last drink was 4 days ago, usually 2-3 glasses of tequila. States that he has associated dizziness, nausea, upper abdominal pain, weakness and headache. Reports falling yesterday with associated headache, with no known trauma to the head. Also endorses left foot heaviness. Denies any chest pain, changes in sensation, shortness of breath. Denies any blood thinner use. 63 y.o male with PMH of hepatic steatosis, alcohol abuse, hiatal hernia CKD and pancreatitis presents with a 4 day history of throwing up coffee ground vomit. States that his last drink was 4 days ago, usually 2-3 glasses of tequila. States that he has associated dizziness, nausea, upper abdominal pain, weakness and headache. Reports falling yesterday with associated headache, with no known trauma to the head. Also endorses left foot heaviness. Denies any chest pain, changes in sensation, shortness of breath. Denies any blood thinner use. no excessive NSAID use.

## 2023-08-26 NOTE — ED PROVIDER NOTE - NS ED ROS FT
Constitutional: no fevers, chills  HEENT: +HA, no vision changes, rhinorrhea, sore throat  Cardiac: no chest pain, +palpitations  Respiratory: +SOB, no cough or hemoptysis  GI: see hpi  : no dysuria, frequency, or hematuria  MSK: no joint pain, neck pain or back pain  Skin: no rashes, jaundice, pruritis  Neuro: no numbness/tingling, +gen weakness, unsteady gait  ROS otherwise negative except per HPI

## 2023-08-27 DIAGNOSIS — E87.8 OTHER DISORDERS OF ELECTROLYTE AND FLUID BALANCE, NOT ELSEWHERE CLASSIFIED: ICD-10-CM

## 2023-08-27 LAB
ALBUMIN SERPL ELPH-MCNC: 4.5 G/DL — SIGNIFICANT CHANGE UP (ref 3.3–5)
ALP SERPL-CCNC: 79 U/L — SIGNIFICANT CHANGE UP (ref 40–120)
ALT FLD-CCNC: 34 U/L — SIGNIFICANT CHANGE UP (ref 10–45)
ANION GAP SERPL CALC-SCNC: 21 MMOL/L — HIGH (ref 5–17)
APPEARANCE UR: CLEAR — SIGNIFICANT CHANGE UP
APTT BLD: 25.7 SEC — SIGNIFICANT CHANGE UP (ref 24.5–35.6)
AST SERPL-CCNC: 55 U/L — HIGH (ref 10–40)
B-OH-BUTYR SERPL-SCNC: 4.3 MMOL/L — HIGH
BACTERIA # UR AUTO: NEGATIVE — SIGNIFICANT CHANGE UP
BASOPHILS # BLD AUTO: 0.02 K/UL — SIGNIFICANT CHANGE UP (ref 0–0.2)
BASOPHILS NFR BLD AUTO: 0.2 % — SIGNIFICANT CHANGE UP (ref 0–2)
BILIRUB SERPL-MCNC: 1.6 MG/DL — HIGH (ref 0.2–1.2)
BILIRUB UR-MCNC: ABNORMAL
BUN SERPL-MCNC: 24 MG/DL — HIGH (ref 7–23)
CALCIUM SERPL-MCNC: 9.6 MG/DL — SIGNIFICANT CHANGE UP (ref 8.4–10.5)
CHLORIDE SERPL-SCNC: 99 MMOL/L — SIGNIFICANT CHANGE UP (ref 96–108)
CHOLEST SERPL-MCNC: 263 MG/DL — HIGH
CO2 SERPL-SCNC: 22 MMOL/L — SIGNIFICANT CHANGE UP (ref 22–31)
COLOR SPEC: SIGNIFICANT CHANGE UP
CREAT SERPL-MCNC: 1.01 MG/DL — SIGNIFICANT CHANGE UP (ref 0.5–1.3)
DIFF PNL FLD: ABNORMAL
EGFR: 84 ML/MIN/1.73M2 — SIGNIFICANT CHANGE UP
EOSINOPHIL # BLD AUTO: 0.03 K/UL — SIGNIFICANT CHANGE UP (ref 0–0.5)
EOSINOPHIL NFR BLD AUTO: 0.4 % — SIGNIFICANT CHANGE UP (ref 0–6)
EPI CELLS # UR: 1 /HPF — SIGNIFICANT CHANGE UP
ETHANOL SERPL-MCNC: <10 MG/DL — SIGNIFICANT CHANGE UP (ref 0–10)
GLUCOSE SERPL-MCNC: 122 MG/DL — HIGH (ref 70–99)
GLUCOSE UR QL: NEGATIVE — SIGNIFICANT CHANGE UP
HCT VFR BLD CALC: 39.1 % — SIGNIFICANT CHANGE UP (ref 39–50)
HDLC SERPL-MCNC: 81 MG/DL — SIGNIFICANT CHANGE UP
HGB BLD-MCNC: 13.2 G/DL — SIGNIFICANT CHANGE UP (ref 13–17)
HYALINE CASTS # UR AUTO: 0 /LPF — SIGNIFICANT CHANGE UP (ref 0–2)
IMM GRANULOCYTES NFR BLD AUTO: 0.4 % — SIGNIFICANT CHANGE UP (ref 0–0.9)
INR BLD: 1.11 RATIO — SIGNIFICANT CHANGE UP (ref 0.85–1.18)
KETONES UR-MCNC: ABNORMAL
LEUKOCYTE ESTERASE UR-ACNC: NEGATIVE — SIGNIFICANT CHANGE UP
LIPID PNL WITH DIRECT LDL SERPL: 165 MG/DL — HIGH
LYMPHOCYTES # BLD AUTO: 1.05 K/UL — SIGNIFICANT CHANGE UP (ref 1–3.3)
LYMPHOCYTES # BLD AUTO: 12.3 % — LOW (ref 13–44)
MAGNESIUM SERPL-MCNC: 2.9 MG/DL — HIGH (ref 1.6–2.6)
MCHC RBC-ENTMCNC: 29.5 PG — SIGNIFICANT CHANGE UP (ref 27–34)
MCHC RBC-ENTMCNC: 33.8 GM/DL — SIGNIFICANT CHANGE UP (ref 32–36)
MCV RBC AUTO: 87.3 FL — SIGNIFICANT CHANGE UP (ref 80–100)
MONOCYTES # BLD AUTO: 1.06 K/UL — HIGH (ref 0–0.9)
MONOCYTES NFR BLD AUTO: 12.4 % — SIGNIFICANT CHANGE UP (ref 2–14)
NEUTROPHILS # BLD AUTO: 6.38 K/UL — SIGNIFICANT CHANGE UP (ref 1.8–7.4)
NEUTROPHILS NFR BLD AUTO: 74.3 % — SIGNIFICANT CHANGE UP (ref 43–77)
NITRITE UR-MCNC: NEGATIVE — SIGNIFICANT CHANGE UP
NON HDL CHOLESTEROL: 182 MG/DL — HIGH
NRBC # BLD: 0 /100 WBCS — SIGNIFICANT CHANGE UP (ref 0–0)
PH UR: 7 — SIGNIFICANT CHANGE UP (ref 5–8)
PHOSPHATE SERPL-MCNC: 1.3 MG/DL — LOW (ref 2.5–4.5)
PLATELET # BLD AUTO: 161 K/UL — SIGNIFICANT CHANGE UP (ref 150–400)
POTASSIUM SERPL-MCNC: 3.3 MMOL/L — LOW (ref 3.5–5.3)
POTASSIUM SERPL-SCNC: 3.3 MMOL/L — LOW (ref 3.5–5.3)
PROT SERPL-MCNC: 8.1 G/DL — SIGNIFICANT CHANGE UP (ref 6–8.3)
PROT UR-MCNC: ABNORMAL
PROTHROM AB SERPL-ACNC: 11.6 SEC — SIGNIFICANT CHANGE UP (ref 9.5–13)
RBC # BLD: 4.48 M/UL — SIGNIFICANT CHANGE UP (ref 4.2–5.8)
RBC # FLD: 17 % — HIGH (ref 10.3–14.5)
RBC CASTS # UR COMP ASSIST: 80 /HPF — HIGH (ref 0–4)
SODIUM SERPL-SCNC: 142 MMOL/L — SIGNIFICANT CHANGE UP (ref 135–145)
SP GR SPEC: 1.04 — HIGH (ref 1.01–1.02)
TRIGL SERPL-MCNC: 100 MG/DL — SIGNIFICANT CHANGE UP
UROBILINOGEN FLD QL: NEGATIVE — SIGNIFICANT CHANGE UP
WBC # BLD: 8.57 K/UL — SIGNIFICANT CHANGE UP (ref 3.8–10.5)
WBC # FLD AUTO: 8.57 K/UL — SIGNIFICANT CHANGE UP (ref 3.8–10.5)
WBC UR QL: 2 /HPF — SIGNIFICANT CHANGE UP (ref 0–5)

## 2023-08-27 PROCEDURE — 99233 SBSQ HOSP IP/OBS HIGH 50: CPT

## 2023-08-27 RX ORDER — BACLOFEN 100 %
2.5 POWDER (GRAM) MISCELLANEOUS EVERY 8 HOURS
Refills: 0 | Status: COMPLETED | OUTPATIENT
Start: 2023-08-27 | End: 2023-08-27

## 2023-08-27 RX ORDER — LANOLIN ALCOHOL/MO/W.PET/CERES
5 CREAM (GRAM) TOPICAL ONCE
Refills: 0 | Status: COMPLETED | OUTPATIENT
Start: 2023-08-27 | End: 2023-08-27

## 2023-08-27 RX ORDER — POTASSIUM CHLORIDE 20 MEQ
20 PACKET (EA) ORAL
Refills: 0 | Status: COMPLETED | OUTPATIENT
Start: 2023-08-27 | End: 2023-08-27

## 2023-08-27 RX ORDER — DEXTROSE MONOHYDRATE, SODIUM CHLORIDE, AND POTASSIUM CHLORIDE 50; .745; 4.5 G/1000ML; G/1000ML; G/1000ML
1000 INJECTION, SOLUTION INTRAVENOUS
Refills: 0 | Status: DISCONTINUED | OUTPATIENT
Start: 2023-08-27 | End: 2023-08-29

## 2023-08-27 RX ORDER — POTASSIUM PHOSPHATE, MONOBASIC POTASSIUM PHOSPHATE, DIBASIC 236; 224 MG/ML; MG/ML
15 INJECTION, SOLUTION INTRAVENOUS ONCE
Refills: 0 | Status: COMPLETED | OUTPATIENT
Start: 2023-08-27 | End: 2023-08-27

## 2023-08-27 RX ORDER — POTASSIUM PHOSPHATE, MONOBASIC POTASSIUM PHOSPHATE, DIBASIC 236; 224 MG/ML; MG/ML
15 INJECTION, SOLUTION INTRAVENOUS ONCE
Refills: 0 | Status: DISCONTINUED | OUTPATIENT
Start: 2023-08-27 | End: 2023-08-27

## 2023-08-27 RX ORDER — LANOLIN ALCOHOL/MO/W.PET/CERES
3 CREAM (GRAM) TOPICAL ONCE
Refills: 0 | Status: COMPLETED | OUTPATIENT
Start: 2023-08-27 | End: 2023-08-27

## 2023-08-27 RX ADMIN — Medication 20 MILLIEQUIVALENT(S): at 07:33

## 2023-08-27 RX ADMIN — Medication 100 MILLIGRAM(S): at 07:33

## 2023-08-27 RX ADMIN — DEXTROSE MONOHYDRATE, SODIUM CHLORIDE, AND POTASSIUM CHLORIDE 100 MILLILITER(S): 50; .745; 4.5 INJECTION, SOLUTION INTRAVENOUS at 21:46

## 2023-08-27 RX ADMIN — Medication 20 MILLIEQUIVALENT(S): at 00:02

## 2023-08-27 RX ADMIN — Medication 2 MILLIGRAM(S): at 04:33

## 2023-08-27 RX ADMIN — POTASSIUM PHOSPHATE, MONOBASIC POTASSIUM PHOSPHATE, DIBASIC 62.5 MILLIMOLE(S): 236; 224 INJECTION, SOLUTION INTRAVENOUS at 06:21

## 2023-08-27 RX ADMIN — DEXTROSE MONOHYDRATE, SODIUM CHLORIDE, AND POTASSIUM CHLORIDE 100 MILLILITER(S): 50; .745; 4.5 INJECTION, SOLUTION INTRAVENOUS at 14:47

## 2023-08-27 RX ADMIN — Medication 2.5 MILLIGRAM(S): at 04:44

## 2023-08-27 RX ADMIN — Medication 5 MILLIGRAM(S): at 00:46

## 2023-08-27 RX ADMIN — ONDANSETRON 4 MILLIGRAM(S): 8 TABLET, FILM COATED ORAL at 00:46

## 2023-08-27 RX ADMIN — PANTOPRAZOLE SODIUM 40 MILLIGRAM(S): 20 TABLET, DELAYED RELEASE ORAL at 07:34

## 2023-08-27 RX ADMIN — Medication 1 MILLIGRAM(S): at 07:33

## 2023-08-27 RX ADMIN — Medication 1 TABLET(S): at 07:33

## 2023-08-27 RX ADMIN — Medication 3 MILLIGRAM(S): at 21:37

## 2023-08-27 RX ADMIN — SODIUM CHLORIDE 150 MILLILITER(S): 9 INJECTION, SOLUTION INTRAVENOUS at 00:07

## 2023-08-27 RX ADMIN — Medication 20 MILLIEQUIVALENT(S): at 06:16

## 2023-08-27 NOTE — PROGRESS NOTE ADULT - PROBLEM SELECTOR PLAN 1
Epigastric abdominal pain w/ lipase 633  - CT abd without acute abnormalities  - c/w D5LR at 150cc/hr  - Trial of CLD  - Pain control PRN  - Zofran for N/V.

## 2023-08-27 NOTE — PATIENT PROFILE ADULT - FALL HARM RISK - HARM RISK INTERVENTIONS

## 2023-08-27 NOTE — CONSULT NOTE ADULT - SUBJECTIVE AND OBJECTIVE BOX
DATE OF SERVICE: 08-27-23 @ 12:40    CHIEF COMPLAINT:Patient is a 63y old  Male who presents with a chief complaint of Pancreatitis (26 Aug 2023 22:16)      HISTORY OF PRESENT ILLNESS:HPI:  Pt is a 64yo M w/ PMH of HTN, GERD, EtOH abuse, hepatic steatosis and hx of pancreatitis pw abd pain, N/V.     Reports 4 days of intermittent epigastric abdominal pain w/out radiation rated as a 12/10 that's worsened w/ N/V w/ limited relief w/ Tylenol and Aleve. The nausea is described as being dark/black in color and limiting his oral intake and reports 2 episodes of dark/black stool this AM as well as an episode of dizziness c/b fall yesterday w/out LOC or head strike.     Although he took Aleve for this pain, does not reports chronic use, though does take ASA 81mg qd. He also reports to use of pepto bismol. With regards to EtOH consumption, reports 1-2 "drinks and not shots" of Tequila daily w/ last drink at start of symptoms about 4 days ago. He otherwise denies any CP, SOB, palpitations, F/C.    Had an EGD and colonoscopy on 11/08/22 d/t c/f hematochezia w/ findings of Hiatal hernia and non-bleeding internal hemorrhoids.     In the ED, tachycardic w/ labs notable for electrolyte derangements and Cr of 1.98 and lipase of 663. He was administered 2L LR along w/ Ativan and electrolyte repletion.   (26 Aug 2023 22:16)      PAST MEDICAL & SURGICAL HISTORY:  HTN (hypertension)      GERD (gastroesophageal reflux disease)      Hiatal hernia      Acute alcoholic pancreatitis      No significant past surgical history              MEDICATIONS:        acetaminophen     Tablet .. 650 milliGRAM(s) Oral every 6 hours PRN  LORazepam   Injectable 2 milliGRAM(s) IV Push every 2 hours PRN  ondansetron Injectable 4 milliGRAM(s) IV Push every 6 hours PRN    pantoprazole  Injectable 40 milliGRAM(s) IV Push daily      dextrose 5% + lactated ringers with potassium chloride 20 mEq/L 1000 milliLiter(s) IV Continuous <Continuous>  folic acid 1 milliGRAM(s) Oral daily  multivitamin 1 Tablet(s) Oral daily  thiamine 100 milliGRAM(s) Oral daily      FAMILY HISTORY:  FH: hypertension    FH: prostate cancer (Father)        Non-contributory    SOCIAL HISTORY:    [ ] Tobacco  [ ] Drugs  [ ] Alcohol    Allergies    No Known Allergies    Intolerances    	    REVIEW OF SYSTEMS:  CONSTITUTIONAL: No fever  EYES: No eye pain, visual disturbances, or discharge  ENMT:  No difficulty hearing, tinnitus  NECK: No pain or stiffness  RESPIRATORY: No cough, wheezing,  CARDIOVASCULAR: No chest pain, palpitations, passing out, dizziness, or leg swelling  GASTROINTESTINAL:  No nausea, vomiting, diarrhea or constipation. No melena.  GENITOURINARY: No dysuria, hematuria  NEUROLOGICAL: No stroke like symptoms  SKIN: No burning or lesions   ENDOCRINE: No heat or cold intolerance  MUSCULOSKELETAL: No joint pain or swelling  PSYCHIATRIC: No  anxiety, mood swings  HEME/LYMPH: No bleeding gums  ALLERGY AND IMMUNOLOGIC: No hives or eczema	    All other ROS negative    PHYSICAL EXAM:  T(C): 36.6 (08-27-23 @ 11:15), Max: 37.1 (08-27-23 @ 04:44)  HR: 97 (08-27-23 @ 11:15) (97 - 145)  BP: 152/99 (08-27-23 @ 11:15) (122/98 - 158/103)  RR: 18 (08-27-23 @ 11:15) (12 - 20)  SpO2: 97% (08-27-23 @ 11:15) (96% - 100%)  Wt(kg): --  I&O's Summary    26 Aug 2023 07:01  -  27 Aug 2023 07:00  --------------------------------------------------------  IN: 0 mL / OUT: 350 mL / NET: -350 mL        Appearance: Normal	  HEENT:   Normal oral mucosa, EOMI	  Cardiovascular:  S1 S2, No JVD,    Respiratory: Lungs clear to auscultation	  Psychiatry: Alert  Gastrointestinal:  Soft, Non-tender, + BS	  Skin: No rashes   Neurologic: Non-focal  Extremities:  No edema  Vascular: Peripheral pulses palpable    	    	  	  CARDIAC MARKERS:  Labs personally reviewed by me                                  13.2   8.57  )-----------( 161      ( 27 Aug 2023 05:07 )             39.1     08-27    142  |  99  |  24<H>  ----------------------------<  122<H>  3.3<L>   |  22  |  1.01    Ca    9.6      27 Aug 2023 05:07  Phos  1.3     08-27  Mg     2.9     08-27    TPro  8.1  /  Alb  4.5  /  TBili  1.6<H>  /  DBili  x   /  AST  55<H>  /  ALT  34  /  AlkPhos  79  08-27          EKG: Personally reviewed by me - Sinus tach 133bpm        Assessment /Plan:   64yo M w/ PMH of HTN, GERD, EtOH abuse, hepatic steatosis and hx of pancreatitis pw abd pain, N/V and c/f melena      Problem/Plan - 1:  ·  Problem: Acute alcoholic pancreatitis.   ·  Plan: - Epigastric abdominal pain w/ lipase 633  - s/p 2L LR in the ED  - c/w LR at 150cc/hr  - Keep NPO pending workup  - Pain control PRN     Problem/Plan - 2:  ·  Problem: GI bleed.   ·  Plan: - Presenting w/ c/o coffee ground emesis and melanotic stools  - Takes ASA 81mg qd and used Aleve in the past few days for pain control, though also uses Pepto Bismol  - s/p Protonix 80mg IVP in the ED, c/w Protinix 40mg IVP QD   - Keep NPO   - Monitor CBC  - Maintain active T&S and transfuse if hgb <7  - F/u GI recs    Problem/Plan - 3:  ·  Problem: Acute kidney injury superimposed on CKD.   ·  Plan: Most likely in setting of poor PO intake   - Fluids as above  - Trend BUN/Cr  - Avoid nephrotoxic medications  - Renally dose all medications.    Problem/Plan - 4:  ·  Problem: HTN (hypertension).   ·  Plan: - Hold home Losartan i/s/o low blood pressures and OFELIA.    Problem/Plan - 5:  ·  Problem: Prophylactic measure.   ·  Plan: DVT ppx: SCDs      Problem/Plan - 6:  Cardiac Risk Stratification   No chest pain/SOB  EKG nonischemic, tachycardia consistent with acute infectious process  Last TTE Nov 2022 wnl, preserved EF, no WMA  Not in decompensated HF   No hx of tachy/yuniel arrhythmia   Low risk for low-mod risk EGD. No contraindication to proceed        Differential diagnosis and plan of care discussed with patient after the evaluation. Counseling on diet, nutritional counseling, weight management, exercise and medication compliance was done.   Advanced care planning/advanced directives discussed with patient/family. DNR status including forceful chest compressions to attempt to restart the heart, ventilator support/artificial breathing, electric shock, artificial nutrition, health care proxy, Molst form all discussed with pt. Pt wishes to consider. More than fifteen minutes spent on discussing advanced directives.     EARL Chance DO [facility]  Cardiovascular Medicine  [address] [place] [address], [address]  Office [phone]  Available via call or text on Microsoft Teams  DATE OF SERVICE: 08-27-23 @ 12:40    CHIEF COMPLAINT:Patient is a 63y old  Male who presents with a chief complaint of Pancreatitis (26 Aug 2023 22:16)      HISTORY OF PRESENT ILLNESS:HPI:  Pt is a 62yo M w/ PMH of HTN, GERD, EtOH abuse, hepatic steatosis and hx of pancreatitis pw abd pain, N/V.     Reports 4 days of intermittent epigastric abdominal pain w/out radiation rated as a 12/10 that's worsened w/ N/V w/ limited relief w/ Tylenol and Aleve. The nausea is described as being dark/black in color and limiting his oral intake and reports 2 episodes of dark/black stool this AM as well as an episode of dizziness c/b fall yesterday w/out LOC or head strike.     Although he took Aleve for this pain, does not reports chronic use, though does take ASA 81mg qd. He also reports to use of pepto bismol. With regards to EtOH consumption, reports 1-2 "drinks and not shots" of Tequila daily w/ last drink at start of symptoms about 4 days ago. He otherwise denies any CP, SOB, palpitations, F/C.    Had an EGD and colonoscopy on 11/08/22 d/t c/f hematochezia w/ findings of Hiatal hernia and non-bleeding internal hemorrhoids.     In the ED, tachycardic w/ labs notable for electrolyte derangements and Cr of 1.98 and lipase of 663. He was administered 2L LR along w/ Ativan and electrolyte repletion.   (26 Aug 2023 22:16)      PAST MEDICAL & SURGICAL HISTORY:  HTN (hypertension)      GERD (gastroesophageal reflux disease)      Hiatal hernia      Acute alcoholic pancreatitis      No significant past surgical history              MEDICATIONS:        acetaminophen     Tablet .. 650 milliGRAM(s) Oral every 6 hours PRN  LORazepam   Injectable 2 milliGRAM(s) IV Push every 2 hours PRN  ondansetron Injectable 4 milliGRAM(s) IV Push every 6 hours PRN    pantoprazole  Injectable 40 milliGRAM(s) IV Push daily      dextrose 5% + lactated ringers with potassium chloride 20 mEq/L 1000 milliLiter(s) IV Continuous <Continuous>  folic acid 1 milliGRAM(s) Oral daily  multivitamin 1 Tablet(s) Oral daily  thiamine 100 milliGRAM(s) Oral daily      FAMILY HISTORY:  FH: hypertension    FH: prostate cancer (Father)        Non-contributory    SOCIAL HISTORY:    [ ] Tobacco  [ ] Drugs  [ ] Alcohol    Allergies    No Known Allergies    Intolerances    	    REVIEW OF SYSTEMS:  CONSTITUTIONAL: No fever  EYES: No eye pain, visual disturbances, or discharge  ENMT:  No difficulty hearing, tinnitus  NECK: No pain or stiffness  RESPIRATORY: No cough, wheezing,  CARDIOVASCULAR: No chest pain, palpitations, passing out, dizziness, or leg swelling  GASTROINTESTINAL:  No nausea, vomiting, diarrhea or constipation. No melena.  GENITOURINARY: No dysuria, hematuria  NEUROLOGICAL: No stroke like symptoms  SKIN: No burning or lesions   ENDOCRINE: No heat or cold intolerance  MUSCULOSKELETAL: No joint pain or swelling  PSYCHIATRIC: No  anxiety, mood swings  HEME/LYMPH: No bleeding gums  ALLERGY AND IMMUNOLOGIC: No hives or eczema	    All other ROS negative    PHYSICAL EXAM:  T(C): 36.6 (08-27-23 @ 11:15), Max: 37.1 (08-27-23 @ 04:44)  HR: 97 (08-27-23 @ 11:15) (97 - 145)  BP: 152/99 (08-27-23 @ 11:15) (122/98 - 158/103)  RR: 18 (08-27-23 @ 11:15) (12 - 20)  SpO2: 97% (08-27-23 @ 11:15) (96% - 100%)  Wt(kg): --  I&O's Summary    26 Aug 2023 07:01  -  27 Aug 2023 07:00  --------------------------------------------------------  IN: 0 mL / OUT: 350 mL / NET: -350 mL        Appearance: Normal	  HEENT:   Normal oral mucosa, EOMI	  Cardiovascular:  S1 S2, No JVD,    Respiratory: Lungs clear to auscultation	  Psychiatry: Alert  Gastrointestinal:  Soft, Non-tender, + BS	  Skin: No rashes   Neurologic: Non-focal  Extremities:  No edema  Vascular: Peripheral pulses palpable    	    	  	  CARDIAC MARKERS:  Labs personally reviewed by me                                  13.2   8.57  )-----------( 161      ( 27 Aug 2023 05:07 )             39.1     08-27    142  |  99  |  24<H>  ----------------------------<  122<H>  3.3<L>   |  22  |  1.01    Ca    9.6      27 Aug 2023 05:07  Phos  1.3     08-27  Mg     2.9     08-27    TPro  8.1  /  Alb  4.5  /  TBili  1.6<H>  /  DBili  x   /  AST  55<H>  /  ALT  34  /  AlkPhos  79  08-27          EKG: Personally reviewed by me - Sinus tach 133bpm        Assessment /Plan:   62yo M w/ PMH of HTN, GERD, EtOH abuse, hepatic steatosis and hx of pancreatitis pw abd pain, N/V and c/f melena      Problem/Plan - 1:  ·  Problem: Acute alcoholic pancreatitis.   ·  Plan: - Epigastric abdominal pain w/ lipase 633  - s/p 2L LR in the ED  - c/w LR      Problem/Plan - 2:  ·  Problem: GI bleed.   ·  Plan: - Presenting w/ c/o coffee ground emesis and melanotic stools  - Takes ASA 81mg qd and used Aleve in the past few days for pain control, though also uses Pepto Bismol  -May hold ASA from cardiac standpoint    Problem/Plan - 3:  ·  Problem: HTN (hypertension).   ·  Plan: - Hold home Losartan i/s/o low blood pressures and OFELIA.    Problem/Plan - 4:  Cardiac Risk Stratification   No chest pain/SOB  EKG nonischemic, tachycardia consistent with acute infectious process  Last TTE Nov 2022 wnl, preserved EF, no WMA  Not in decompensated HF   No hx of tachy/yuniel arrhythmia   Low risk for low-mod risk EGD. No contraindication to proceed        I will be covered by Sally De La Cruz from Monday August 28th to Friday September 1st. He can be reached at [phone]          Differential diagnosis and plan of care discussed with patient after the evaluation. Counseling on diet, nutritional counseling, weight management, exercise and medication compliance was done.   Advanced care planning/advanced directives discussed with patient/family. DNR status including forceful chest compressions to attempt to restart the heart, ventilator support/artificial breathing, electric shock, artificial nutrition, health care proxy, Molst form all discussed with pt. Pt wishes to consider. More than fifteen minutes spent on discussing advanced directives.     EARL Chance DO [facility]  Cardiovascular Medicine  [address] [place] [address], [address]  Office [phone]  Available via call or text on Microsoft Teams

## 2023-08-27 NOTE — CONSULT NOTE ADULT - SUBJECTIVE AND OBJECTIVE BOX
HPI:64yo M w/ PMH of HTN, GERD, EtOH abuse, hepatic steatosis and hx of pancreatitis pw abd pain, N/V.     Patient reports that he has approx 4-5 oz of tequila per day. Has not had seizures in the past. Last drink on Tuesday. Patient presented to hospital d/t 4 days of intermittent epigastric abdominal pain w/out radiation that's worsened w/ N/V w/ limited relief w/ Tylenol and Aleve. Patient reports that he has had one BM in the past 3 days. The BM was yesterday and black in color. Of note, patient has been taking PeptoBismol for the abd pain. Patient reports that he was vomiting every 15 minutes for 4 days but vomiting has improved while in the hospital. Emesis was black in color initially but has been improving.     Had an EGD and colonoscopy on 22 d/t c/f hematochezia w/ findings of Hiatal hernia and non-bleeding internal hemorrhoids.     Allergies:  No Known Allergies      Home Medications:    Hospital Medications:  acetaminophen     Tablet .. 650 milliGRAM(s) Oral every 6 hours PRN  dextrose 5% + lactated ringers with potassium chloride 20 mEq/L 1000 milliLiter(s) IV Continuous <Continuous>  folic acid 1 milliGRAM(s) Oral daily  LORazepam   Injectable 2 milliGRAM(s) IV Push every 2 hours PRN  multivitamin 1 Tablet(s) Oral daily  ondansetron Injectable 4 milliGRAM(s) IV Push every 6 hours PRN  pantoprazole  Injectable 40 milliGRAM(s) IV Push daily  thiamine 100 milliGRAM(s) Oral daily      PMHX/PSHX:  HTN (hypertension)    Acid reflux    GERD (gastroesophageal reflux disease)    Hiatal hernia    Acute alcoholic pancreatitis    No significant past surgical history        Family history:  No pertinent family history in first degree relatives    FH: hypertension    FH: prostate cancer (Father)        Denies family history of colon cancer/polyps, stomach cancer/polyps, pancreatic cancer/masses, liver cancer/disease, ovarian cancer and endometrial cancer.    Social History:   Tob: Denies  EtOH: Denies  Illicit Drugs: Denies    ROS:     General:  No wt loss, fevers, chills, night sweats, fatigue  Eyes:  Good vision, no reported pain  ENT:  No sore throat, pain, runny nose, dysphagia  CV:  No pain, palpitations, hypo/hypertension  Pulm:  No dyspnea, cough, tachypnea, wheezing  GI:  see HPI  :  No pain, bleeding, incontinence, nocturia  Muscle:  No pain, weakness  Neuro:  No weakness, tingling, memory problems  Psych:  No fatigue, insomnia, mood problems, depression  Endocrine:  No polyuria, polydipsia, cold/heat intolerance  Heme:  No petechiae, ecchymosis, easy bruisability  Skin:  No rash, tattoos, scars, edema    PHYSICAL EXAM:     GENERAL:  No acute distress, patient appears comfortable   HEENT:  NCAT, no scleral icterus   CHEST:  no respiratory distress  HEART:  Regular rate and rhythm  ABDOMEN:  Soft, non-tender, non-distended, no masses  EXTREMITIES: No edema  SKIN:  No rash/erythema/ecchymoses/petechiae/wounds/abscess/warm/dry  NEURO:  Alert and oriented x 3    Vital Signs:  Vital Signs Last 24 Hrs  T(C): 36.6 (27 Aug 2023 11:15), Max: 37.1 (27 Aug 2023 04:44)  T(F): 97.8 (27 Aug 2023 11:15), Max: 98.7 (27 Aug 2023 04:44)  HR: 97 (27 Aug 2023 11:15) (97 - 145)  BP: 152/99 (27 Aug 2023 11:15) (122/98 - 158/103)  BP(mean): 101 (26 Aug 2023 21:45) (101 - 117)  RR: 18 (27 Aug 2023 11:15) (12 - 20)  SpO2: 97% (27 Aug 2023 11:15) (96% - 100%)    Parameters below as of 27 Aug 2023 11:15  Patient On (Oxygen Delivery Method): room air      Daily Height in cm: 175.26 (26 Aug 2023 16:47)    Daily Weight in k.2 (27 Aug 2023 06:10)    LABS:                        13.2   8.57  )-----------( 161      ( 27 Aug 2023 05:07 )             39.1     Mean Cell Volume: 87.3 fl (23 @ 05:07)        142  |  99  |  24<H>  ----------------------------<  122<H>  3.3<L>   |  22  |  1.01    Ca    9.6      27 Aug 2023 05:07  Phos  1.3       Mg     2.9         TPro  8.1  /  Alb  4.5  /  TBili  1.6<H>  /  DBili  x   /  AST  55<H>  /  ALT  34  /  AlkPhos  79      LIVER FUNCTIONS - ( 27 Aug 2023 05:07 )  Alb: 4.5 g/dL / Pro: 8.1 g/dL / ALK PHOS: 79 U/L / ALT: 34 U/L / AST: 55 U/L / GGT: x           PT/INR - ( 27 Aug 2023 05:07 )   PT: 11.6 sec;   INR: 1.11 ratio         PTT - ( 27 Aug 2023 05:07 )  PTT:25.7 sec  Urinalysis Basic - ( 27 Aug 2023 05:07 )    Color: Light Yellow / Appearance: Clear / S.039 / pH: x  Gluc: 122 mg/dL / Ketone: Moderate  / Bili: Small / Urobili: Negative   Blood: x / Protein: Trace / Nitrite: Negative   Leuk Esterase: Negative / RBC: 80 /hpf / WBC 2 /HPF   Sq Epi: x / Non Sq Epi: x / Bacteria: Negative      Amylase Serum--      Lipase [id number]       Ammonia--                          13.2   8.57  )-----------( 161      ( 27 Aug 2023 05:07 )             39.1                         15.7   10.15 )-----------( 244      ( 26 Aug 2023 17:15 )             46.1       Imaging:

## 2023-08-27 NOTE — PROVIDER CONTACT NOTE (OTHER) - ASSESSMENT
pt A&O x4, denies any distress. c/o insomnia not relieved with Melatonin. c/o slight HA. /102, -130's on tele monitor. asymptomatic. afebrile

## 2023-08-27 NOTE — CONSULT NOTE ADULT - ATTENDING COMMENTS
62 yo M pmh obesity, HTN, etoh abuse c/b pancreatitis, recurrent withdrawals, CALDERON presenting with acute worsening of n/v with possible coffee ground emesis and melena.  Plan for endoscopic evaluation when medically optimized and no longer withdrawing.  IV PPI until this time

## 2023-08-27 NOTE — CONSULT NOTE ADULT - ASSESSMENT
Impression:     #UGIB - possibly upper GI bleed, ddx includes PUD, esophagitis/gastritis/duodenitis especially in the setting of NSAID use; less likely is masses or dieulafoy lesion.   - Hgb stable 13.2   - Had an EGD and colonoscopy on 11/08/22 d/t c/f hematochezia w/ findings of Hiatal hernia and non-bleeding internal hemorrhoids.     #elevated betahydroxybutyrate     Recommendations:   - pancreatitis per primary team   - no contraindication to continuing with asa   - avoid NSAIDS  - IV PPI BID   - can make NPO after midnight for possible EGD tomorrow   - please replete K today   - please obtain CMP, CBC tomorrow at 4am, replete electrolytes as needed   - rest of care per primary team     All recommendations are tentative until note is attested by attending.     Vero Santiago, PGY-5  Gastroenterology/Hepatology Fellow  Available on Microsoft Teams  [phone] ([facility] Short Range Pager)  [phone] ([facility] Long Range Pager)    After 5pm, please contact the on-call GI fellow. [phone] Impression:     #UGIB - possibly upper GI bleed, ddx includes PUD, esophagitis/gastritis/duodenitis especially in the setting of NSAID use; less likely is masses or dieulafoy lesion.   - Hgb stable 13.2   - Had an EGD and colonoscopy on 11/08/22 d/t c/f hematochezia w/ findings of Hiatal hernia and non-bleeding internal hemorrhoids.     #elevated betahydroxybutyrate    Recommendations:   - pancreatitis per primary team   - no contraindication to continuing with asa   - avoid NSAIDS  - IV PPI BID   - please replete K today   - please obtain CMP, CBC tomorrow at 4am, replete electrolytes as needed   - rest of care per primary team     All recommendations are tentative until note is attested by attending.     Vero Santiago, PGY-5  Gastroenterology/Hepatology Fellow  Available on Microsoft Teams  [phone] ([facility] Short Range Pager)  [phone] ([facility] Long Range Pager)    After 5pm, please contact the on-call GI fellow. [phone]

## 2023-08-28 DIAGNOSIS — R06.6 HICCOUGH: ICD-10-CM

## 2023-08-28 LAB
ALBUMIN SERPL ELPH-MCNC: 3.9 G/DL — SIGNIFICANT CHANGE UP (ref 3.3–5)
ALP SERPL-CCNC: 74 U/L — SIGNIFICANT CHANGE UP (ref 40–120)
ALT FLD-CCNC: 38 U/L — SIGNIFICANT CHANGE UP (ref 10–45)
ANION GAP SERPL CALC-SCNC: 14 MMOL/L — SIGNIFICANT CHANGE UP (ref 5–17)
AST SERPL-CCNC: 64 U/L — HIGH (ref 10–40)
B-OH-BUTYR SERPL-SCNC: 0.5 MMOL/L — HIGH
BASE EXCESS BLDV CALC-SCNC: 7.3 MMOL/L — HIGH (ref -2–3)
BILIRUB SERPL-MCNC: 1.7 MG/DL — HIGH (ref 0.2–1.2)
BLD GP AB SCN SERPL QL: NEGATIVE — SIGNIFICANT CHANGE UP
BUN SERPL-MCNC: 11 MG/DL — SIGNIFICANT CHANGE UP (ref 7–23)
CALCIUM SERPL-MCNC: 9.2 MG/DL — SIGNIFICANT CHANGE UP (ref 8.4–10.5)
CHLORIDE SERPL-SCNC: 101 MMOL/L — SIGNIFICANT CHANGE UP (ref 96–108)
CO2 BLDV-SCNC: 35 MMOL/L — HIGH (ref 22–26)
CO2 SERPL-SCNC: 24 MMOL/L — SIGNIFICANT CHANGE UP (ref 22–31)
CREAT SERPL-MCNC: 0.76 MG/DL — SIGNIFICANT CHANGE UP (ref 0.5–1.3)
CULTURE RESULTS: SIGNIFICANT CHANGE UP
EGFR: 102 ML/MIN/1.73M2 — SIGNIFICANT CHANGE UP
GAS PNL BLDV: SIGNIFICANT CHANGE UP
GLUCOSE SERPL-MCNC: 139 MG/DL — HIGH (ref 70–99)
HCO3 BLDV-SCNC: 34 MMOL/L — HIGH (ref 22–29)
HCT VFR BLD CALC: 37.7 % — LOW (ref 39–50)
HGB BLD-MCNC: 12.4 G/DL — LOW (ref 13–17)
LACTATE SERPL-SCNC: 2.3 MMOL/L — HIGH (ref 0.5–2)
MCHC RBC-ENTMCNC: 29.5 PG — SIGNIFICANT CHANGE UP (ref 27–34)
MCHC RBC-ENTMCNC: 32.9 GM/DL — SIGNIFICANT CHANGE UP (ref 32–36)
MCV RBC AUTO: 89.8 FL — SIGNIFICANT CHANGE UP (ref 80–100)
NRBC # BLD: 0 /100 WBCS — SIGNIFICANT CHANGE UP (ref 0–0)
PCO2 BLDV: 53 MMHG — SIGNIFICANT CHANGE UP (ref 42–55)
PH BLDV: 7.41 — SIGNIFICANT CHANGE UP (ref 7.32–7.43)
PHOSPHATE SERPL-MCNC: 1.4 MG/DL — LOW (ref 2.5–4.5)
PLATELET # BLD AUTO: 133 K/UL — LOW (ref 150–400)
PO2 BLDV: 26 MMHG — SIGNIFICANT CHANGE UP (ref 25–45)
POTASSIUM SERPL-MCNC: 3.8 MMOL/L — SIGNIFICANT CHANGE UP (ref 3.5–5.3)
POTASSIUM SERPL-SCNC: 3.8 MMOL/L — SIGNIFICANT CHANGE UP (ref 3.5–5.3)
PROT SERPL-MCNC: 7.1 G/DL — SIGNIFICANT CHANGE UP (ref 6–8.3)
RBC # BLD: 4.2 M/UL — SIGNIFICANT CHANGE UP (ref 4.2–5.8)
RBC # FLD: 17 % — HIGH (ref 10.3–14.5)
RH IG SCN BLD-IMP: POSITIVE — SIGNIFICANT CHANGE UP
SAO2 % BLDV: 37.1 % — LOW (ref 67–88)
SODIUM SERPL-SCNC: 139 MMOL/L — SIGNIFICANT CHANGE UP (ref 135–145)
SPECIMEN SOURCE: SIGNIFICANT CHANGE UP
WBC # BLD: 4.75 K/UL — SIGNIFICANT CHANGE UP (ref 3.8–10.5)
WBC # FLD AUTO: 4.75 K/UL — SIGNIFICANT CHANGE UP (ref 3.8–10.5)

## 2023-08-28 PROCEDURE — 99223 1ST HOSP IP/OBS HIGH 75: CPT

## 2023-08-28 PROCEDURE — 99233 SBSQ HOSP IP/OBS HIGH 50: CPT

## 2023-08-28 PROCEDURE — 76705 ECHO EXAM OF ABDOMEN: CPT | Mod: 26

## 2023-08-28 RX ORDER — POTASSIUM CHLORIDE 20 MEQ
20 PACKET (EA) ORAL ONCE
Refills: 0 | Status: COMPLETED | OUTPATIENT
Start: 2023-08-28 | End: 2023-08-28

## 2023-08-28 RX ORDER — LOSARTAN POTASSIUM 100 MG/1
50 TABLET, FILM COATED ORAL DAILY
Refills: 0 | Status: DISCONTINUED | OUTPATIENT
Start: 2023-08-28 | End: 2023-08-29

## 2023-08-28 RX ORDER — LANOLIN ALCOHOL/MO/W.PET/CERES
5 CREAM (GRAM) TOPICAL ONCE
Refills: 0 | Status: COMPLETED | OUTPATIENT
Start: 2023-08-28 | End: 2023-08-28

## 2023-08-28 RX ADMIN — Medication 63.75 MILLIMOLE(S): at 10:29

## 2023-08-28 RX ADMIN — LOSARTAN POTASSIUM 50 MILLIGRAM(S): 100 TABLET, FILM COATED ORAL at 16:17

## 2023-08-28 RX ADMIN — Medication 1 MILLIGRAM(S): at 11:28

## 2023-08-28 RX ADMIN — Medication 1 TABLET(S): at 11:28

## 2023-08-28 RX ADMIN — Medication 5 MILLIGRAM(S): at 23:12

## 2023-08-28 RX ADMIN — Medication 20 MILLIEQUIVALENT(S): at 16:16

## 2023-08-28 RX ADMIN — Medication 63.75 MILLIMOLE(S): at 16:14

## 2023-08-28 RX ADMIN — Medication 100 MILLIGRAM(S): at 11:28

## 2023-08-28 RX ADMIN — DEXTROSE MONOHYDRATE, SODIUM CHLORIDE, AND POTASSIUM CHLORIDE 100 MILLILITER(S): 50; .745; 4.5 INJECTION, SOLUTION INTRAVENOUS at 20:33

## 2023-08-28 RX ADMIN — PANTOPRAZOLE SODIUM 40 MILLIGRAM(S): 20 TABLET, DELAYED RELEASE ORAL at 11:28

## 2023-08-28 NOTE — DIETITIAN INITIAL EVALUATION ADULT - PROBLEM SELECTOR PLAN 5
Quality 431: Preventive Care And Screening: Unhealthy Alcohol Use - Screening: Patient screened for unhealthy alcohol use using a single question and scores less than 2 times per year Detail Level: Detailed - SBIRT c/s  - CIWA symptom triggered for now Quality 110: Preventive Care And Screening: Influenza Immunization: Influenza Immunization previously received during influenza season Name And Contact Information For Health Care Proxy: Livan Atkinson or Luisa Atkinson [phone] Quality 130: Documentation Of Current Medications In The Medical Record: Current Medications Documented Quality 47: Advance Care Plan: Advance Care Planning discussed and documented; advance care plan or surrogate decision maker documented in the medical record. Quality 226: Preventive Care And Screening: Tobacco Use: Screening And Cessation Intervention: Patient screened for tobacco use and is an ex/non-smoker Quality 111:Pneumonia Vaccination Status For Older Adults: Pneumococcal Vaccination Previously Received

## 2023-08-28 NOTE — DIETITIAN INITIAL EVALUATION ADULT - NS FNS DIET ORDER
Diet, NPO after Midnight:      NPO Start Date: 28-Aug-2023,   NPO Start Time: 23:59 (08-28-23 @ 11:23) [Active]  Diet, Clear Liquid (08-27-23 @ 11:55) [Active]

## 2023-08-28 NOTE — PROGRESS NOTE ADULT - PROBLEM SELECTOR PLAN 2
Presenting with concern for coffee ground emesis and melena  - H/H stable  - c/w pantoprazole 40mg IV daily   - GI consulted, recs appreciated  - plan for EGD 8/29  - clears today, NPO after midnight for EGD tomorrow 8/29  - monitor H/H on CBC

## 2023-08-28 NOTE — PROGRESS NOTE ADULT - PROBLEM SELECTOR PLAN 1
Epigastric abdominal pain w/ lipase 633  - CT abd without acute abnormalities  - c/w D5LR at 100cc/hr  - clear liquid diet today  - NPO after midnight for EGD  - c/w zofran PRN nausea  - c/w pain control PRN

## 2023-08-28 NOTE — SBIRT NOTE ADULT - NSSBIRTBRIEFINTDET_GEN_A_CORE
Patient reports drinking tequila/bourbon 5 out of 7 days and drinking 2 drinks when drinking. Patient reports being in court appointed AA following DWI in 2008. Patient provided T-SBIRT resource. Patient declines further resources at this time.

## 2023-08-28 NOTE — PROGRESS NOTE ADULT - TIME BILLING
- Ordering, reviewing, and interpreting labs, testing, and imaging.  - Independently obtaining a review of systems and performing a physical exam  - Reviewing consultant documentation/recommendations.  - Counselling and educating patient regarding interpretation of aforementioned items and plan of care.

## 2023-08-28 NOTE — DIETITIAN INITIAL EVALUATION ADULT - PERTINENT MEDS FT
MEDICATIONS  (STANDING):  dextrose 5% + lactated ringers with potassium chloride 20 mEq/L 1000 milliLiter(s) (100 mL/Hr) IV Continuous <Continuous>  folic acid 1 milliGRAM(s) Oral daily  multivitamin 1 Tablet(s) Oral daily  pantoprazole  Injectable 40 milliGRAM(s) IV Push daily  sodium phosphate 15 milliMole(s)/250 mL IVPB 15 milliMole(s) IV Intermittent once  thiamine 100 milliGRAM(s) Oral daily    MEDICATIONS  (PRN):  acetaminophen     Tablet .. 650 milliGRAM(s) Oral every 6 hours PRN Temp greater or equal to 38C (100.4F), Mild Pain (1 - 3)  LORazepam   Injectable 2 milliGRAM(s) IV Push every 2 hours PRN CIWA-Ar score increase by 2 points and a total score of 7 or less  ondansetron Injectable 4 milliGRAM(s) IV Push every 6 hours PRN Nausea and/or Vomiting

## 2023-08-28 NOTE — CHART NOTE - NSCHARTNOTEFT_GEN_A_CORE
Brief GI Note:     Instructions for endoscopic procedure (planned for 8/29):   - NPO at midnight  - please ensure morning labs are drawn at 4am, electrolytes repleted as necessary, and Hg>7  - rest of care per primary team    Vero Santiago MD, PGY5  GI Fellow

## 2023-08-28 NOTE — DIETITIAN INITIAL EVALUATION ADULT - PERTINENT LABORATORY DATA
08-28    139  |  101  |  11  ----------------------------<  139<H>  3.8   |  24  |  0.76    Ca    9.2      28 Aug 2023 05:01  Phos  1.4     08-28  Mg     2.9     08-27    TPro  7.1  /  Alb  3.9  /  TBili  1.7<H>  /  DBili  x   /  AST  64<H>  /  ALT  38  /  AlkPhos  74  08-28

## 2023-08-29 LAB
ALBUMIN SERPL ELPH-MCNC: 3.5 G/DL — SIGNIFICANT CHANGE UP (ref 3.3–5)
ALP SERPL-CCNC: 72 U/L — SIGNIFICANT CHANGE UP (ref 40–120)
ALT FLD-CCNC: 40 U/L — SIGNIFICANT CHANGE UP (ref 10–45)
ANION GAP SERPL CALC-SCNC: 14 MMOL/L — SIGNIFICANT CHANGE UP (ref 5–17)
AST SERPL-CCNC: 59 U/L — HIGH (ref 10–40)
BILIRUB SERPL-MCNC: 1.2 MG/DL — SIGNIFICANT CHANGE UP (ref 0.2–1.2)
BUN SERPL-MCNC: 7 MG/DL — SIGNIFICANT CHANGE UP (ref 7–23)
CALCIUM SERPL-MCNC: 9.1 MG/DL — SIGNIFICANT CHANGE UP (ref 8.4–10.5)
CHLORIDE SERPL-SCNC: 104 MMOL/L — SIGNIFICANT CHANGE UP (ref 96–108)
CO2 SERPL-SCNC: 21 MMOL/L — LOW (ref 22–31)
CREAT SERPL-MCNC: 0.64 MG/DL — SIGNIFICANT CHANGE UP (ref 0.5–1.3)
EGFR: 107 ML/MIN/1.73M2 — SIGNIFICANT CHANGE UP
GLUCOSE SERPL-MCNC: 112 MG/DL — HIGH (ref 70–99)
HCT VFR BLD CALC: 38.3 % — LOW (ref 39–50)
HGB BLD-MCNC: 12.5 G/DL — LOW (ref 13–17)
MAGNESIUM SERPL-MCNC: 1.8 MG/DL — SIGNIFICANT CHANGE UP (ref 1.6–2.6)
MCHC RBC-ENTMCNC: 30.1 PG — SIGNIFICANT CHANGE UP (ref 27–34)
MCHC RBC-ENTMCNC: 32.6 GM/DL — SIGNIFICANT CHANGE UP (ref 32–36)
MCV RBC AUTO: 92.3 FL — SIGNIFICANT CHANGE UP (ref 80–100)
NRBC # BLD: 0 /100 WBCS — SIGNIFICANT CHANGE UP (ref 0–0)
PHOSPHATE SERPL-MCNC: 2.1 MG/DL — LOW (ref 2.5–4.5)
PLATELET # BLD AUTO: 132 K/UL — LOW (ref 150–400)
POTASSIUM SERPL-MCNC: 4.2 MMOL/L — SIGNIFICANT CHANGE UP (ref 3.5–5.3)
POTASSIUM SERPL-SCNC: 4.2 MMOL/L — SIGNIFICANT CHANGE UP (ref 3.5–5.3)
PROT SERPL-MCNC: 6.8 G/DL — SIGNIFICANT CHANGE UP (ref 6–8.3)
RBC # BLD: 4.15 M/UL — LOW (ref 4.2–5.8)
RBC # FLD: 16.7 % — HIGH (ref 10.3–14.5)
SODIUM SERPL-SCNC: 139 MMOL/L — SIGNIFICANT CHANGE UP (ref 135–145)
WBC # BLD: 4.21 K/UL — SIGNIFICANT CHANGE UP (ref 3.8–10.5)
WBC # FLD AUTO: 4.21 K/UL — SIGNIFICANT CHANGE UP (ref 3.8–10.5)

## 2023-08-29 PROCEDURE — 43235 EGD DIAGNOSTIC BRUSH WASH: CPT

## 2023-08-29 PROCEDURE — 99232 SBSQ HOSP IP/OBS MODERATE 35: CPT

## 2023-08-29 RX ORDER — LOSARTAN POTASSIUM 100 MG/1
75 TABLET, FILM COATED ORAL DAILY
Refills: 0 | Status: DISCONTINUED | OUTPATIENT
Start: 2023-08-30 | End: 2023-08-30

## 2023-08-29 RX ORDER — LIDOCAINE HCL 20 MG/ML
4 VIAL (ML) INJECTION ONCE
Refills: 0 | Status: COMPLETED | OUTPATIENT
Start: 2023-08-29 | End: 2023-08-29

## 2023-08-29 RX ORDER — LOSARTAN POTASSIUM 100 MG/1
25 TABLET, FILM COATED ORAL ONCE
Refills: 0 | Status: COMPLETED | OUTPATIENT
Start: 2023-08-29 | End: 2023-08-29

## 2023-08-29 RX ORDER — MAGNESIUM SULFATE 500 MG/ML
2 VIAL (ML) INJECTION ONCE
Refills: 0 | Status: COMPLETED | OUTPATIENT
Start: 2023-08-29 | End: 2023-08-29

## 2023-08-29 RX ORDER — PANTOPRAZOLE SODIUM 20 MG/1
40 TABLET, DELAYED RELEASE ORAL
Refills: 0 | Status: DISCONTINUED | OUTPATIENT
Start: 2023-08-29 | End: 2023-08-30

## 2023-08-29 RX ADMIN — Medication 100 MILLIGRAM(S): at 12:18

## 2023-08-29 RX ADMIN — DEXTROSE MONOHYDRATE, SODIUM CHLORIDE, AND POTASSIUM CHLORIDE 100 MILLILITER(S): 50; .745; 4.5 INJECTION, SOLUTION INTRAVENOUS at 09:59

## 2023-08-29 RX ADMIN — Medication 1 TABLET(S): at 12:18

## 2023-08-29 RX ADMIN — LOSARTAN POTASSIUM 50 MILLIGRAM(S): 100 TABLET, FILM COATED ORAL at 05:22

## 2023-08-29 RX ADMIN — PANTOPRAZOLE SODIUM 40 MILLIGRAM(S): 20 TABLET, DELAYED RELEASE ORAL at 19:19

## 2023-08-29 RX ADMIN — Medication 25 GRAM(S): at 09:56

## 2023-08-29 RX ADMIN — LOSARTAN POTASSIUM 25 MILLIGRAM(S): 100 TABLET, FILM COATED ORAL at 16:08

## 2023-08-29 RX ADMIN — PANTOPRAZOLE SODIUM 40 MILLIGRAM(S): 20 TABLET, DELAYED RELEASE ORAL at 12:18

## 2023-08-29 RX ADMIN — Medication 1 MILLIGRAM(S): at 12:18

## 2023-08-29 RX ADMIN — Medication 4 MILLILITER(S): at 08:35

## 2023-08-29 NOTE — CHART NOTE - NSCHARTNOTEFT_GEN_A_CORE
s/p EGD today.    Findings:       LA Grade D (one or more mucosal breaks involving at least 75% of esophageal circumference)        esophagitis with bleeding was found extending 6 cm proximal to the esophagus. Concern for        underlying barretts esophagus.       Start of possible peptic stricture vs. schatzki ring seen in distal esopahgus       A 3 cm hiatal hernia was present, Hill Grade IV       The exam of the esophagus was otherwise normal.       A few localized, non-bleeding erosions were found in the gastric antrum. There were no    stigmata of recent bleeding.       The exam of the stomach was otherwise normal.       The duodenal bulb and second portion of the duodenum were normal.        Impression:          - LA Grade D esophagitis extending 6 cm proximal to LES. Possible Barretts                        esophagus underlying, likely cause of recent melena.                       - Widely patent and non-obstructing Schatzki ring vs early forming peptic                        stricture                       - 3 cm hiatal hernia, Hill Grade IV                       - Non-bleeding erosive gastropathy.                       - Otherwise normal exam.                       - Melena/coffee ground emesis likely 2/2 erosive esophagitis    Recommendation:      - Resume previous diet.                       - Use Protonix (pantoprazole) 40 mg PO BID for at least 2 months                       - Repeat upper endoscopy in 2 months to evaluate the response to therapy                        (follow with Jose Angel GERMAN)    Follow up in Gastroenterology Clinic with Dr. Walter: [phone] (Faculty Practice at [address] [place] [address])     We will sign off at this time. Please reconsult/page if questions.    Ania Bain MD  GI Fellow, PGY-6  Available via [employer] Teams    NON-URGENT CONSULTS:  Please email [email or web address]  AT NIGHT AND ON WEEKENDS:  Contact on-call GI fellow via answering service ([phone]) from 5pm-8am and on weekends/holidays  MONDAY-FRIDAY 8AM-5PM:  Pager# [phone] ([facility]) or [phone] ([facility])

## 2023-08-29 NOTE — PROGRESS NOTE ADULT - PROBLEM SELECTOR PLAN 1
Epigastric abdominal pain w/ lipase 633  - CT abd without acute abnormalities  - s/p EGD today, results pending  - c/w zofran PRN nausea  - c/w pain control PRN Epigastric abdominal pain w/ lipase 633  - CT abd without acute abnormalities  - improved. tolerating PO diet. EGD results as below  - c/w zofran PRN nausea  - c/w pain control PRN

## 2023-08-29 NOTE — PROGRESS NOTE ADULT - PROBLEM SELECTOR PLAN 2
Presenting with concern for coffee ground emesis and melena  - H/H stable  - c/w pantoprazole 40mg IV daily   - GI consulted, recs appreciated  - s/p EGD today, report pending  - monitor H/H on CBC Presenting with concern for coffee ground emesis and melena  - H/H stable  - GI consulted, recs appreciated  - s/p EGD on 8/29/23 with LA Grade D esophagitis; possible Barretts esophagus underlying - likely cause of recent melena. Non-bleeding erosive gastropathy  - as per GI recs: will transition to antoprazole 40mg PO BID x at least 2 months  - will need repeat EGD in 2 months  - monitor H/H on CBC

## 2023-08-29 NOTE — PRE PROCEDURE NOTE - PRE PROCEDURE EVALUATION
Attending Physician:  Dr. Walter                          Procedure: EGD    Indication for Procedure: melena  ________________________________________________________  PAST MEDICAL & SURGICAL HISTORY:  HTN (hypertension)      GERD (gastroesophageal reflux disease)      Hiatal hernia      Acute alcoholic pancreatitis      No significant past surgical history        ALLERGIES:  No Known Allergies    HOME MEDICATIONS:  aspirin 81 mg oral delayed release tablet: 1 tab(s) orally once a day  famotidine 40 mg oral tablet: 1 tab(s) orally once a day (at bedtime)  losartan 50 mg oral tablet: 1 tab(s) orally once a day  Vitamin D2 1.25 mg (50,000 intl units) oral capsule: 1 cap(s) orally once a week    AICD/PPM: [ ] yes   [x] no    PERTINENT LAB DATA:                        12.4   4.75  )-----------( 133      ( 28 Aug 2023 05:01 )             37.7     08-28    139  |  101  |  11  ----------------------------<  139<H>  3.8   |  24  |  0.76    Ca    9.2      28 Aug 2023 05:01  Phos  1.4     08-28    TPro  7.1  /  Alb  3.9  /  TBili  1.7<H>  /  DBili  x   /  AST  64<H>  /  ALT  38  /  AlkPhos  74  08-28                PHYSICAL EXAMINATION:    T(C): 36.9  HR: 102  BP: 154/111  RR: 18  SpO2: 96%    Constitutional: NAD    Respiratory: CTAB/L  Cardiovascular: S1 and S2  Gastrointestinal: BS+, soft, NT/ND  Extremities: No peripheral edema  Neurological: A/O x 3, no focal deficits        COMMENTS:    The patient is a suitable candidate for the planned procedure unless box checked [ ]  No, explain:

## 2023-08-30 ENCOUNTER — TRANSCRIPTION ENCOUNTER (OUTPATIENT)
Age: 63
End: 2023-08-30

## 2023-08-30 VITALS — HEART RATE: 104 BPM

## 2023-08-30 PROCEDURE — 83605 ASSAY OF LACTIC ACID: CPT

## 2023-08-30 PROCEDURE — 96375 TX/PRO/DX INJ NEW DRUG ADDON: CPT

## 2023-08-30 PROCEDURE — 85014 HEMATOCRIT: CPT

## 2023-08-30 PROCEDURE — 80307 DRUG TEST PRSMV CHEM ANLYZR: CPT

## 2023-08-30 PROCEDURE — 86900 BLOOD TYPING SEROLOGIC ABO: CPT

## 2023-08-30 PROCEDURE — 84100 ASSAY OF PHOSPHORUS: CPT

## 2023-08-30 PROCEDURE — 82330 ASSAY OF CALCIUM: CPT

## 2023-08-30 PROCEDURE — 83735 ASSAY OF MAGNESIUM: CPT

## 2023-08-30 PROCEDURE — 86850 RBC ANTIBODY SCREEN: CPT

## 2023-08-30 PROCEDURE — 82803 BLOOD GASES ANY COMBINATION: CPT

## 2023-08-30 PROCEDURE — 80061 LIPID PANEL: CPT

## 2023-08-30 PROCEDURE — 71045 X-RAY EXAM CHEST 1 VIEW: CPT

## 2023-08-30 PROCEDURE — 84295 ASSAY OF SERUM SODIUM: CPT

## 2023-08-30 PROCEDURE — 83690 ASSAY OF LIPASE: CPT

## 2023-08-30 PROCEDURE — 85027 COMPLETE CBC AUTOMATED: CPT

## 2023-08-30 PROCEDURE — 70450 CT HEAD/BRAIN W/O DYE: CPT | Mod: MA

## 2023-08-30 PROCEDURE — 82010 KETONE BODYS QUAN: CPT

## 2023-08-30 PROCEDURE — 81001 URINALYSIS AUTO W/SCOPE: CPT

## 2023-08-30 PROCEDURE — 94640 AIRWAY INHALATION TREATMENT: CPT

## 2023-08-30 PROCEDURE — 82962 GLUCOSE BLOOD TEST: CPT

## 2023-08-30 PROCEDURE — 84132 ASSAY OF SERUM POTASSIUM: CPT

## 2023-08-30 PROCEDURE — 99239 HOSP IP/OBS DSCHRG MGMT >30: CPT

## 2023-08-30 PROCEDURE — 80053 COMPREHEN METABOLIC PANEL: CPT

## 2023-08-30 PROCEDURE — 71260 CT THORAX DX C+: CPT | Mod: MA

## 2023-08-30 PROCEDURE — 86901 BLOOD TYPING SEROLOGIC RH(D): CPT

## 2023-08-30 PROCEDURE — 82947 ASSAY GLUCOSE BLOOD QUANT: CPT

## 2023-08-30 PROCEDURE — 85730 THROMBOPLASTIN TIME PARTIAL: CPT

## 2023-08-30 PROCEDURE — 85025 COMPLETE CBC W/AUTO DIFF WBC: CPT

## 2023-08-30 PROCEDURE — 76705 ECHO EXAM OF ABDOMEN: CPT

## 2023-08-30 PROCEDURE — 74177 CT ABD & PELVIS W/CONTRAST: CPT | Mod: MA

## 2023-08-30 PROCEDURE — 82435 ASSAY OF BLOOD CHLORIDE: CPT

## 2023-08-30 PROCEDURE — 96374 THER/PROPH/DIAG INJ IV PUSH: CPT

## 2023-08-30 PROCEDURE — 99285 EMERGENCY DEPT VISIT HI MDM: CPT | Mod: 25

## 2023-08-30 PROCEDURE — 36415 COLL VENOUS BLD VENIPUNCTURE: CPT

## 2023-08-30 PROCEDURE — 87086 URINE CULTURE/COLONY COUNT: CPT

## 2023-08-30 PROCEDURE — 85018 HEMOGLOBIN: CPT

## 2023-08-30 PROCEDURE — 85610 PROTHROMBIN TIME: CPT

## 2023-08-30 RX ORDER — ASPIRIN/CALCIUM CARB/MAGNESIUM 324 MG
1 TABLET ORAL
Refills: 0 | DISCHARGE

## 2023-08-30 RX ORDER — FAMOTIDINE 10 MG/ML
1 INJECTION INTRAVENOUS
Qty: 0 | Refills: 0 | DISCHARGE

## 2023-08-30 RX ORDER — PANTOPRAZOLE SODIUM 20 MG/1
1 TABLET, DELAYED RELEASE ORAL
Qty: 60 | Refills: 0
Start: 2023-08-30 | End: 2023-09-28

## 2023-08-30 RX ORDER — LOSARTAN POTASSIUM 100 MG/1
1 TABLET, FILM COATED ORAL
Qty: 0 | Refills: 0 | DISCHARGE

## 2023-08-30 RX ORDER — FOLIC ACID 0.8 MG
1 TABLET ORAL
Qty: 30 | Refills: 0
Start: 2023-08-30 | End: 2023-09-28

## 2023-08-30 RX ORDER — LOSARTAN POTASSIUM 100 MG/1
3 TABLET, FILM COATED ORAL
Qty: 90 | Refills: 0
Start: 2023-08-30 | End: 2023-09-28

## 2023-08-30 RX ADMIN — PANTOPRAZOLE SODIUM 40 MILLIGRAM(S): 20 TABLET, DELAYED RELEASE ORAL at 05:50

## 2023-08-30 RX ADMIN — LOSARTAN POTASSIUM 75 MILLIGRAM(S): 100 TABLET, FILM COATED ORAL at 05:50

## 2023-08-30 NOTE — PROGRESS NOTE ADULT - PROBLEM SELECTOR PLAN 3
Pt was on CIWA protocol, discontinued  - Last ETOH use was tuesday  - Interested in outpt ETOH cessation services, can start vivitrol on DC for cravings  -  consult for outpt ETOH cessation services
Hypokalemia, hypophosphatemia in setting of poor oral intake  - supplemented  - replete K to 4, Ph to 3, Mg to 2

## 2023-08-30 NOTE — DISCHARGE NOTE NURSING/CASE MANAGEMENT/SOCIAL WORK - NSDCVIVACCINE_GEN_ALL_CORE_FT
influenza, injectable, quadrivalent, preservative free; 10-Nov-2022 15:45; Lizz Pope (PARVEEN); [employer]; [id number] (Exp. Date: 30-Jun-2023); IntraMuscular; Deltoid Right.; 0.5 milliLiter(s); VIS (VIS Published: 06-Aug-2021, VIS Presented: 10-Nov-2022);

## 2023-08-30 NOTE — PROGRESS NOTE ADULT - PROBLEM SELECTOR PROBLEM 1
Acute alcoholic pancreatitis

## 2023-08-30 NOTE — PROGRESS NOTE ADULT - NSPROGADDITIONALINFOA_GEN_ALL_CORE
.  Johanna Alicia MD  Division of Hospital Medicine  [facility]   Available on Microsoft Teams - messages preferred prior to calls.    pending EGD report. BP above goal, losartan increased.    Plan discussed with patient and medicine NP Cl
Dispo: pending GI recs, if tolerating PO and resolution of metabolic derangement, anticipated for tues 8/29    52 minutes spent on total encounter. The necessity of the time spent during the encounter on this date of service was due to:     - preparing to see the patient (eg, review of tests, documents)  - performing a medically appropriate examination and/or evaluation  - referring and communicating with other health care professionals  - documenting clinical information in the electronic or other health record  - care coordination.
.  Johanna Alicia MD  Division of Hospital Medicine  [facility]   Available on Microsoft Teams - messages preferred prior to calls.    Medically clear for discharge home today 8/30/23 with outpatient follow-up with PCP and GI.  Will need to have repeat EGD in 2 months.  Discharge planning time spent: 40 minutes    Plan discussed with patient and medicine NP PK.
.  Johanna Alicia MD  Division of Hospital Medicine  [facility]   Available on Microsoft Teams - messages preferred prior to calls.    Plan discussed with patient and medicine NP Cl

## 2023-08-30 NOTE — DISCHARGE NOTE NURSING/CASE MANAGEMENT/SOCIAL WORK - PATIENT PORTAL LINK FT
You can access the FollowMyHealth Patient Portal offered by [facility] by registering at the following website: [email or web address]. By joining [facility] [employer]’s FollowMyHealth portal, you will also be able to view your health information using other applications (apps) compatible with our system.

## 2023-08-30 NOTE — PROGRESS NOTE ADULT - PROBLEM SELECTOR PLAN 2
Presenting with concern for coffee ground emesis and melena  - H/H stable  - GI consulted, recs appreciated  - s/p EGD on 8/29/23 with LA Grade D esophagitis; possible Barretts esophagus underlying - likely cause of recent melena. Non-bleeding erosive gastropathy  - as per GI recs: will transition to pantoprazole 40mg PO BID x at least 2 months  - will need repeat EGD in 2 months with GI Dr. Kamran Walter which patient is aware of  - counselled at bedside extensively importance of EtOH cessation  - monitor H/H on CBC

## 2023-08-30 NOTE — DISCHARGE NOTE PROVIDER - NSDCCPCAREPLAN_GEN_ALL_CORE_FT
PRINCIPAL DISCHARGE DIAGNOSIS  Diagnosis: GI bleed  Assessment and Plan of Treatment: - LA Grade D esophagitis extending 6 cm proximal to LES. Possible Barretts                        esophagus underlying, likely cause of recent melena.                       - Widely patent and non-obstructing Schatzki ring vs early forming peptic                        stricture                       - 3 cm hiatal hernia, Hill Grade IV                       - Non-bleeding erosive gastropathy.                       - Otherwise normal exam.                       - Melena/coffee ground emesis likely 2/2 erosive esophagitis  Recommendation:      - Resume previous diet.                       - Use Protonix (pantoprazole) 40 mg PO BID for at least 2 months                       - Repeat upper endoscopy in 2 months to evaluate the response to therapy                        (follow with Jose Angel GERMAN)  Follow up in Gastroenterology Clinic with Dr. Walter: [phone] (Faculty Practice at [address] [place] [address])        PRINCIPAL DISCHARGE DIAGNOSIS  Diagnosis: GI bleed  Assessment and Plan of Treatment: Presenting with concern for coffee ground emesis and melena  - Hemoglobin   - s/p EGD on 8/29/23 with LA Grade D esophagitis; possible Barretts esophagus underlying - likely cause of recent melena. Non-bleeding erosive gastropathy  - as per GI recs: will transition to Pantoprazole 40mg PO BID x at least 2 months  - will need repeat EGD in 2 months  Follow up in Gastroenterology Clinic with Dr. Walter: [phone] (Faculty Practice at [address] [place] [address])         SECONDARY DISCHARGE DIAGNOSES  Diagnosis: Mild alcohol use disorder  Assessment and Plan of Treatment: DC home on Vivitrol. Out patient alcohol rehab. Follow SW instructions      Diagnosis: HTN (hypertension)  Assessment and Plan of Treatment: Low salt diet  Activity as tolerated.  Take all medication as prescribed.  Follow up with your medical doctor for routine blood pressure monitoring at your next visit.  Notify your doctor if you have any of the following symptoms:   Dizziness, Lightheadedness, Blurry vision, Headache, Chest pain, Shortness of breath  - still above goal despite resuming home losartan   - increase losartan to 75mg PO daily.      Diagnosis: Acute kidney injury superimposed on CKD  Assessment and Plan of Treatment: Resolved with IV fluids.    Diagnosis: Acute alcoholic pancreatitis  Assessment and Plan of Treatment: Acute alcoholic pancreatitis.   ·  Plan: Epigastric abdominal pain w/ lipase 633  - CT abd without acute abnormalities  - improved. tolerating PO diet.        PRINCIPAL DISCHARGE DIAGNOSIS  Diagnosis: GI bleed  Assessment and Plan of Treatment: Presenting with concern for coffee ground emesis and melena  - Hemoglobin   - s/p EGD on 8/29/23 with LA Grade D esophagitis; possible Barretts esophagus underlying - likely cause of recent melena. Non-bleeding erosive gastropathy  - as per GI recs: will transition to Pantoprazole 40mg PO BID x at least 2 months  - will need repeat EGD in 2 months  Follow up in Gastroenterology Clinic with Dr. Walter: [phone] (Faculty Practice at [address] [place] [address])         SECONDARY DISCHARGE DIAGNOSES  Diagnosis: Acute alcoholic pancreatitis  Assessment and Plan of Treatment: Acute alcoholic pancreatitis.   ·  Plan: Epigastric abdominal pain w/ lipase 633  - CT abd without acute abnormalities  - improved. tolerating PO diet.       Diagnosis: Mild alcohol use disorder  Assessment and Plan of Treatment: Out patient alcohol rehab. Follow SW instructions      Diagnosis: HTN (hypertension)  Assessment and Plan of Treatment: Low salt diet  Activity as tolerated.  Take all medication as prescribed.  Follow up with your medical doctor for routine blood pressure monitoring at your next visit.  Notify your doctor if you have any of the following symptoms:   Dizziness, Lightheadedness, Blurry vision, Headache, Chest pain, Shortness of breath  - still above goal despite resuming home losartan   - increase losartan to 75mg PO daily.      Diagnosis: Acute kidney injury superimposed on CKD  Assessment and Plan of Treatment: Resolved with IV fluids.  Follow up with your PMD as an outpatient.     PRINCIPAL DISCHARGE DIAGNOSIS  Diagnosis: GI bleed  Assessment and Plan of Treatment: Presenting with concern for coffee ground emesis and melena  - Hemoglobin   - s/p EGD on 8/29/23 with LA Grade D esophagitis; possible Barretts esophagus underlying - likely cause of recent melena. Non-bleeding erosive gastropathy  - as per GI recs: will transition to Pantoprazole 40mg PO BID x at least 2 months  - will need repeat EGD in 2 months  Follow up in Gastroenterology Clinic with Dr. Walter: [phone] (Faculty Practice at [address] [place] [address])         SECONDARY DISCHARGE DIAGNOSES  Diagnosis: Acute alcoholic pancreatitis  Assessment and Plan of Treatment: Acute alcoholic pancreatitis.   ·  Plan: Epigastric abdominal pain w/ lipase 633  - CT abd without acute abnormalities  - improved. tolerating PO diet.       Diagnosis: Erosive esophagitis  Assessment and Plan of Treatment: You were found to have erosive esophagitis on EGD  Please continue to take pantoprazole twice daily for at least 2 months  Will need follow-up with GI Dr. Walter for repeat EGD in 2 months    Diagnosis: Mild alcohol use disorder  Assessment and Plan of Treatment: Out patient alcohol rehab. Follow SW instructions      Diagnosis: HTN (hypertension)  Assessment and Plan of Treatment: Low salt diet  Activity as tolerated.  Take all medication as prescribed.  Follow up with your medical doctor for routine blood pressure monitoring at your next visit.  Notify your doctor if you have any of the following symptoms:   Dizziness, Lightheadedness, Blurry vision, Headache, Chest pain, Shortness of breath  - still above goal despite resuming home losartan   - increase losartan to 75mg PO daily.      Diagnosis: Acute kidney injury superimposed on CKD  Assessment and Plan of Treatment: Resolved with IV fluids.  Follow up with your PMD as an outpatient.

## 2023-08-30 NOTE — PROGRESS NOTE ADULT - PROBLEM SELECTOR PLAN 4
Presenting w/ c/o coffee ground emesis and melenic stools  - H/H stable  - Cont protonix IV for now  - GI emailed by admitting physician
Pt was on [facility] protocol, discontinued  - Last ETOH use was Tues 8/22. out of the window for withdrawal  - Interested in outpt ETOH cessation services  -  consult for outpt ETOH cessation services
Pt was on CIWA protocol, discontinued  - Last ETOH use was Tues 8/22. out of the window for withdrawal  - Interested in outpt ETOH cessation services, can start vivitrol on DC for cravings  -  consult for outpt ETOH cessation services
Pt was on CIWA protocol, discontinued  - Last ETOH use was Tues 8/22. out of the window for withdrawal  - Interested in outpt ETOH cessation services, can start vivitrol on DC for cravings  -  consult for outpt ETOH cessation services

## 2023-08-30 NOTE — PROGRESS NOTE ADULT - SUBJECTIVE AND OBJECTIVE BOX
Johanna Alicia MD  Division of Hospital Medicine  [facility]   Available on Microsoft Teams (Mon-Fri 8am-5pm)    * messages preferred prior to calls  Other Times:  [phone]      Patient is a 63y old  Male who presents with a chief complaint of Pancreatitis (30 Aug 2023 10:40)      SUBJECTIVE / OVERNIGHT EVENTS: no acute events overnight. no fever, chills, chest pain, nor dyspnea. tolerating diet well with no issues.  ADDITIONAL REVIEW OF SYSTEMS:    MEDICATIONS  (STANDING):  folic acid 1 milliGRAM(s) Oral daily  losartan 75 milliGRAM(s) Oral daily  multivitamin 1 Tablet(s) Oral daily  pantoprazole    Tablet 40 milliGRAM(s) Oral two times a day    MEDICATIONS  (PRN):  acetaminophen     Tablet .. 650 milliGRAM(s) Oral every 6 hours PRN Temp greater or equal to 38C (100.4F), Mild Pain (1 - 3)  ondansetron Injectable 4 milliGRAM(s) IV Push every 6 hours PRN Nausea and/or Vomiting      CAPILLARY BLOOD GLUCOSE        I&O's Summary    29 Aug 2023 07:01  -  30 Aug 2023 07:00  --------------------------------------------------------  IN: 300 mL / OUT: 450 mL / NET: -150 mL        PHYSICAL EXAM:  Vital Signs Last 24 Hrs  T(C): 37.6 (30 Aug 2023 04:39), Max: 37.6 (30 Aug 2023 04:39)  T(F): 99.7 (30 Aug 2023 04:39), Max: 99.7 (30 Aug 2023 04:39)  HR: 104 (30 Aug 2023 05:00) (76 - 118)  BP: 140/87 (30 Aug 2023 04:39) (140/87 - 155/89)  BP(mean): --  RR: 18 (30 Aug 2023 04:39) (18 - 18)  SpO2: 95% (30 Aug 2023 04:39) (95% - 98%)    Parameters below as of 30 Aug 2023 04:39  Patient On (Oxygen Delivery Method): room air    CONSTITUTIONAL: NAD, well-developed, well-groomed  EYES: PERRLA; conjunctiva and sclera clear  ENMT: Moist oral mucosa, no pharyngeal injection or exudates; normal dentition  NECK: Supple, no palpable masses; no thyromegaly  RESPIRATORY: Normal respiratory effort; lungs are clear to auscultation bilaterally  CARDIOVASCULAR: Regular rate and rhythm, normal S1 and S2, no murmur/rub/gallop; No lower extremity edema  ABDOMEN: Soft, Nondistended, Nontender to palpation, normoactive bowel sounds  MUSCULOSKELETAL: No clubbing or cyanosis of digits; no joint swelling or tenderness to palpation  PSYCH: A+O to person, place, and time; affect appropriate  NEUROLOGY: CN 2-12 are intact and symmetric; no gross sensory deficits   SKIN: No rashes; no palpable lesions    LABS:                        12.5   4.21  )-----------( 132      ( 29 Aug 2023 07:36 )             38.3     08-29    139  |  104  |  7   ----------------------------<  112<H>  4.2   |  21<L>  |  0.64    Ca    9.1      29 Aug 2023 07:35  Phos  2.1     08-29  Mg     1.8     08-29    TPro  6.8  /  Alb  3.5  /  TBili  1.2  /  DBili  x   /  AST  59<H>  /  ALT  40  /  AlkPhos  72  08-29        Urinalysis Basic - ( 29 Aug 2023 07:35 )    Color: x / Appearance: x / SG: x / pH: x  Gluc: 112 mg/dL / Ketone: x  / Bili: x / Urobili: x   Blood: x / Protein: x / Nitrite: x   Leuk Esterase: x / RBC: x / WBC x   Sq Epi: x / Non Sq Epi: x / Bacteria: x      RADIOLOGY & ADDITIONAL TESTS:  Results Reviewed:   Imaging Personally Reviewed:  8/29/23 EGD:  Findings:       LA Grade D (one or more mucosal breaks involving at least 75% of esophageal circumference)        esophagitis with bleeding was found extending 6 cm proximal to the esophagus. Concern for        underlying barretts esophagus.       Start of possible peptic stricture vs. schatzki ring seen in distal esopahgus       A 3 cm hiatal hernia was present, Hill Grade IV       The exam of the esophagus was otherwise normal.       A few localized, non-bleeding erosions were found in the gastric antrum. There were no        stigmata of recent bleeding.       The exam of the stomach was otherwise normal.       The duodenal bulb and second portion of the duodenum were normal.                                                                                                        Impression:          - LA Grade D esophagitis extending 6 cm proximal to LES. Possible Barretts                        esophagus underlying, likely cause of recent melena.                       - Widely patent and non-obstructing Schatzki ring vs early forming peptic                        stricture                       - 3 cm hiatal hernia, Hill Grade IV                       - Non-bleeding erosive gastropathy.                       - Otherwise normal exam.                       - Melena/coffee ground emesis likely 2/2 erosive esophagitis  Recommendation:      - Resume previous diet.                       - Use Protonix (pantoprazole) 40 mg PO BID for at least 2 months                       - Repeat upper endoscopy in 2 months to evaluate the response to therapy                        (follow with Jose Angel GERMAN)    Electrocardiogram Personally Reviewed:    COORDINATION OF CARE:  Care Discussed with Consultants/Other Providers [Y]: medicine NP PK  Prior or Outpatient Records Reviewed [Y/N]:  
Johanna Alicia MD  Division of Hospital Medicine  [facility]   Available on Microsoft Teams (Mon-Fri 8am-5pm)    * messages preferred prior to calls  Other Times:  [phone]      Patient is a 63y old  Male who presents with a chief complaint of Pancreatitis (28 Aug 2023 15:25)      SUBJECTIVE / OVERNIGHT EVENTS: no acute events overnight. no fever, chills, chest pain, dyspnea, n/v. seen after EGD early this morning - report still pending.   ADDITIONAL REVIEW OF SYSTEMS:    Tele reviewed: SR/ST with HR 80-110s    MEDICATIONS  (STANDING):  folic acid 1 milliGRAM(s) Oral daily  losartan 25 milliGRAM(s) Oral once  multivitamin 1 Tablet(s) Oral daily  pantoprazole  Injectable 40 milliGRAM(s) IV Push daily    MEDICATIONS  (PRN):  acetaminophen     Tablet .. 650 milliGRAM(s) Oral every 6 hours PRN Temp greater or equal to 38C (100.4F), Mild Pain (1 - 3)  ondansetron Injectable 4 milliGRAM(s) IV Push every 6 hours PRN Nausea and/or Vomiting      CAPILLARY BLOOD GLUCOSE        I&O's Summary      PHYSICAL EXAM:  Vital Signs Last 24 Hrs  T(C): 36.8 (29 Aug 2023 12:36), Max: 37.3 (28 Aug 2023 16:21)  T(F): 98.3 (29 Aug 2023 12:36), Max: 99.2 (28 Aug 2023 16:21)  HR: 76 (29 Aug 2023 12:36) (76 - 102)  BP: 152/- (29 Aug 2023 12:36) (130/90 - 167/109)  BP(mean): 101 (29 Aug 2023 08:21) (101 - 101)  RR: 18 (29 Aug 2023 12:36) (17 - 20)  SpO2: 98% (29 Aug 2023 12:36) (94% - 100%)    Parameters below as of 29 Aug 2023 12:36  Patient On (Oxygen Delivery Method): room air    CONSTITUTIONAL: NAD, well-developed, well-groomed  EYES: PERRLA; conjunctiva and sclera clear  ENMT: Moist oral mucosa, no pharyngeal injection or exudates; normal dentition  NECK: Supple, no palpable masses; no thyromegaly  RESPIRATORY: Normal respiratory effort; lungs are clear to auscultation bilaterally  CARDIOVASCULAR: Regular rate and rhythm, normal S1 and S2, no murmur/rub/gallop; No lower extremity edema  ABDOMEN: Soft, Nondistended, Nontender to palpation, normoactive bowel sounds  MUSCULOSKELETAL: No clubbing or cyanosis of digits; no joint swelling or tenderness to palpation  PSYCH: A+O to person, place, and time; affect appropriate  NEUROLOGY: CN 2-12 are intact and symmetric; no gross sensory deficits   SKIN: No rashes; no palpable lesions    LABS:                        12.5   4.21  )-----------( 132      ( 29 Aug 2023 07:36 )             38.3     08-29    139  |  104  |  7   ----------------------------<  112<H>  4.2   |  21<L>  |  0.64    Ca    9.1      29 Aug 2023 07:35  Phos  2.1     08-29  Mg     1.8     08-29    TPro  6.8  /  Alb  3.5  /  TBili  1.2  /  DBili  x   /  AST  59<H>  /  ALT  40  /  AlkPhos  72  08-29        Urinalysis Basic - ( 29 Aug 2023 07:35 )    Color: x / Appearance: x / SG: x / pH: x  Gluc: 112 mg/dL / Ketone: x  / Bili: x / Urobili: x   Blood: x / Protein: x / Nitrite: x   Leuk Esterase: x / RBC: x / WBC x   Sq Epi: x / Non Sq Epi: x / Bacteria: x        Culture - Urine (collected 27 Aug 2023 05:07)  Source: Clean Catch Clean Catch (Midstream)  Final Report (28 Aug 2023 15:19):    <10,000 CFU/mL Normal Urogenital Connie        RADIOLOGY & ADDITIONAL TESTS:  Results Reviewed: hypomagnesemia repleted, H/H stable  Imaging Personally Reviewed:  Electrocardiogram Personally Reviewed:    COORDINATION OF CARE:  Care Discussed with Consultants/Other Providers [Y/N]:  Prior or Outpatient Records Reviewed [Y/N]:  
[facility] Division of Hospital Medicine  Jenelle Gardner MD  AVailable on TEAMS 8a-8p    Patient is a 63y old  Male who presents with a chief complaint of Pancreatitis (27 Aug 2023 12:39)      SUBJECTIVE / OVERNIGHT EVENTS: No acute events. Requesting PO  ADDITIONAL REVIEW OF SYSTEMS: +epigastric abdominal pain, otherwise negative    MEDICATIONS  (STANDING):  dextrose 5% + lactated ringers with potassium chloride 20 mEq/L 1000 milliLiter(s) (100 mL/Hr) IV Continuous <Continuous>  folic acid 1 milliGRAM(s) Oral daily  multivitamin 1 Tablet(s) Oral daily  pantoprazole  Injectable 40 milliGRAM(s) IV Push daily  thiamine 100 milliGRAM(s) Oral daily    MEDICATIONS  (PRN):  acetaminophen     Tablet .. 650 milliGRAM(s) Oral every 6 hours PRN Temp greater or equal to 38C (100.4F), Mild Pain (1 - 3)  LORazepam   Injectable 2 milliGRAM(s) IV Push every 2 hours PRN CIWA-Ar score increase by 2 points and a total score of 7 or less  ondansetron Injectable 4 milliGRAM(s) IV Push every 6 hours PRN Nausea and/or Vomiting      CAPILLARY BLOOD GLUCOSE      POCT Blood Glucose.: 162 mg/dL (26 Aug 2023 17:14)    I&O's Summary    26 Aug 2023 07:01  -  27 Aug 2023 07:00  --------------------------------------------------------  IN: 0 mL / OUT: 350 mL / NET: -350 mL        PHYSICAL EXAM:  Vital Signs Last 24 Hrs  T(C): 36.6 (27 Aug 2023 11:15), Max: 37.1 (27 Aug 2023 04:44)  T(F): 97.8 (27 Aug 2023 11:15), Max: 98.7 (27 Aug 2023 04:44)  HR: 97 (27 Aug 2023 11:15) (97 - 145)  BP: 152/99 (27 Aug 2023 11:15) (122/98 - 158/103)  BP(mean): 101 (26 Aug 2023 21:45) (101 - 117)  RR: 18 (27 Aug 2023 11:15) (12 - 20)  SpO2: 97% (27 Aug 2023 11:15) (96% - 100%)    Parameters below as of 27 Aug 2023 11:15  Patient On (Oxygen Delivery Method): room air      CONSTITUTIONAL: NAD, well-developed, well-groomed  EYES: PERRLA; conjunctiva and sclera clear  ENMT: Moist oral mucosa, no pharyngeal injection or exudates  NECK: Supple, no palpable masses; no thyromegaly  RESPIRATORY: Tachycardic, normal S1 and S2, no murmur/rub/gallop; No lower extremity edema  ABDOMEN: +epigastric TTP, normoactive bowel sounds, no rebound/guarding  MUSCULOSKELETAL:  No clubbing or cyanosis of digits; no joint swelling or tenderness to palpation  PSYCH: A+O to person, place, and time; affect appropriate  NEUROLOGY: CN 2-12 are intact and symmetric; no gross sensory deficits   SKIN: No rashes; no palpable lesions    LABS: reviewed                        13.2   8.57  )-----------( 161      ( 27 Aug 2023 05:07 )             39.1     08-    142  |  99  |  24<H>  ----------------------------<  122<H>  3.3<L>   |  22  |  1.01    Ca    9.6      27 Aug 2023 05:07  Phos  1.3       Mg     2.9         TPro  8.1  /  Alb  4.5  /  TBili  1.6<H>  /  DBili  x   /  AST  55<H>  /  ALT  34  /  AlkPhos  79  08-    PT/INR - ( 27 Aug 2023 05:07 )   PT: 11.6 sec;   INR: 1.11 ratio         PTT - ( 27 Aug 2023 05:07 )  PTT:25.7 sec      Urinalysis Basic - ( 27 Aug 2023 05:07 )    Color: Light Yellow / Appearance: Clear / S.039 / pH: x  Gluc: 122 mg/dL / Ketone: Moderate  / Bili: Small / Urobili: Negative   Blood: x / Protein: Trace / Nitrite: Negative   Leuk Esterase: Negative / RBC: 80 /hpf / WBC 2 /HPF   Sq Epi: x / Non Sq Epi: x / Bacteria: Negative        
Johanna Alicia MD  Division of Hospital Medicine  [facility]   Available on Microsoft Teams (Mon-Fri 8am-5pm)    * messages preferred prior to calls  Other Times:  [phone]      Patient is a 63y old  Male who presents with a chief complaint of Acute pancreatitis without infection or necrosis     (28 Aug 2023 14:06)      SUBJECTIVE / OVERNIGHT EVENTS: no acute events overnight. intractable hiccups and hungry, otherwise offers no complaints. no further episodes of hematemesis nor melena, mild nausea after abd US but resolved.   ADDITIONAL REVIEW OF SYSTEMS:    Tele reviewed: SR/ST with HR 90-100s    MEDICATIONS  (STANDING):  dextrose 5% + lactated ringers with potassium chloride 20 mEq/L 1000 milliLiter(s) (100 mL/Hr) IV Continuous <Continuous>  folic acid 1 milliGRAM(s) Oral daily  multivitamin 1 Tablet(s) Oral daily  pantoprazole  Injectable 40 milliGRAM(s) IV Push daily  sodium phosphate 15 milliMole(s)/250 mL IVPB 15 milliMole(s) IV Intermittent once  thiamine 100 milliGRAM(s) Oral daily    MEDICATIONS  (PRN):  acetaminophen     Tablet .. 650 milliGRAM(s) Oral every 6 hours PRN Temp greater or equal to 38C (100.4F), Mild Pain (1 - 3)  LORazepam   Injectable 2 milliGRAM(s) IV Push every 2 hours PRN CIWA-Ar score increase by 2 points and a total score of 7 or less  ondansetron Injectable 4 milliGRAM(s) IV Push every 6 hours PRN Nausea and/or Vomiting      CAPILLARY BLOOD GLUCOSE        I&O's Summary    27 Aug 2023 07:01  -  28 Aug 2023 07:00  --------------------------------------------------------  IN: 980 mL / OUT: 300 mL / NET: 680 mL        PHYSICAL EXAM:  Vital Signs Last 24 Hrs  T(C): 36.7 (28 Aug 2023 09:18), Max: 37.1 (28 Aug 2023 04:32)  T(F): 98 (28 Aug 2023 09:18), Max: 98.7 (28 Aug 2023 04:32)  HR: 101 (28 Aug 2023 09:18) (92 - 103)  BP: 170/99 (28 Aug 2023 09:18) (151/99 - 170/99)  BP(mean): --  RR: 18 (28 Aug 2023 09:18) (18 - 18)  SpO2: 98% (28 Aug 2023 09:18) (94% - 98%)    Parameters below as of 28 Aug 2023 09:18  Patient On (Oxygen Delivery Method): room air    CONSTITUTIONAL: NAD, well-developed, well-groomed  EYES: PERRLA; conjunctiva and sclera clear  ENMT: Moist oral mucosa, no pharyngeal injection or exudates; normal dentition  NECK: Supple, no palpable masses; no thyromegaly  RESPIRATORY: Normal respiratory effort; lungs are clear to auscultation bilaterally  CARDIOVASCULAR: Regular rate and rhythm, normal S1 and S2, no murmur/rub/gallop; No lower extremity edema  ABDOMEN: Soft, Nondistended, Nontender to palpation, normoactive bowel sounds  MUSCULOSKELETAL: No clubbing or cyanosis of digits; no joint swelling or tenderness to palpation  PSYCH: A+O to person, place, and time; affect appropriate  NEUROLOGY: CN 2-12 are intact and symmetric; no gross sensory deficits   SKIN: No rashes; no palpable lesions    LABS:                        12.4   4.75  )-----------( 133      ( 28 Aug 2023 05:01 )             37.7     08-28    139  |  101  |  11  ----------------------------<  139<H>  3.8   |  24  |  0.76    Ca    9.2      28 Aug 2023 05:01  Phos  1.4     08-28  Mg     2.9     08-27    TPro  7.1  /  Alb  3.9  /  TBili  1.7<H>  /  DBili  x   /  AST  64<H>  /  ALT  38  /  AlkPhos  74  08-28    PT/INR - ( 27 Aug 2023 05:07 )   PT: 11.6 sec;   INR: 1.11 ratio         PTT - ( 27 Aug 2023 05:07 )  PTT:25.7 sec      Urinalysis Basic - ( 28 Aug 2023 05:01 )    Color: x / Appearance: x / SG: x / pH: x  Gluc: 139 mg/dL / Ketone: x  / Bili: x / Urobili: x   Blood: x / Protein: x / Nitrite: x   Leuk Esterase: x / RBC: x / WBC x   Sq Epi: x / Non Sq Epi: x / Bacteria: x      Culture - Urine (collected 27 Aug 2023 05:07)  Source: Clean Catch Clean Catch (Midstream)  Final Report (28 Aug 2023 15:19):    <10,000 CFU/mL Normal Urogenital Connie      RADIOLOGY & ADDITIONAL TESTS:  Results Reviewed: hypophosphatemia repleted, LFTs improving  Imaging Personally Reviewed:  8/28/23 RUQ US:  FINDINGS:  Liver: Increased echogenicity. 1.9 cm right hepatic lobe cyst.  Bile ducts: Normal caliber. Common bile duct measures 5 mm.  Gallbladder: Within normal limits.  Pancreas: Poorly visualized due to obscuration by overlying bowel gas.  Right kidney: 11.4 cm. No hydronephrosis.  Ascites: None.  IVC: Visualized portions are within normal limits.    IMPRESSION:  No sonographic evidence of cholelithiasis or acute cholecystitis. No   evidence of biliary ductal dilatation.  Electrocardiogram Personally Reviewed:    COORDINATION OF CARE:  Care Discussed with Consultants/Other Providers [Y]: medicine NP Ruddy  Prior or Outpatient Records Reviewed [Y]: GI progress note

## 2023-08-30 NOTE — DISCHARGE NOTE NURSING/CASE MANAGEMENT/SOCIAL WORK - NSDCPEFALRISK_GEN_ALL_CORE
For information on Fall & Injury Prevention, visit: [email or web address] OR  [email or web address] OR  https://www.cdc.gov/steadi/patient.html

## 2023-08-30 NOTE — PROGRESS NOTE ADULT - PROBLEM SELECTOR PLAN 7
SCDs for now
resolved.  - can d/c baclofen
- start trial of baclofen 5mg TID
resolved.  - can d/c baclofen

## 2023-08-30 NOTE — DISCHARGE NOTE PROVIDER - HOSPITAL COURSE
63 y.o male with PMH of hepatic steatosis, alcohol abuse, hiatal hernia CKD and pancreatitis presents with a 4 day history of throwing up coffee ground vomit. States that his last drink was 4 days ago, usually 2-3 glasses of tequila.    Dx	Coffee ground emesis - s/p NPO, IVF, IV PPI  	Pancreatitis - NPO, LR at 150cc/hr  	ETOH - sx triggered CIWA  	OFELIA on CKD  s/p IVF resolved                  s/p EGD 8/29 : Erosive esophagitis # Acute alcoholic pancreatitis.   ·  Plan: Epigastric abdominal pain w/ lipase 633  - CT abd without acute abnormalities  - improved. tolerating PO diet.      # GI bleed.   ·  Plan: Presenting with concern for coffee ground emesis and melena  - H/H stable  - s/p EGD on 8/29/23 with LA Grade D esophagitis; possible Barretts esophagus underlying - likely cause of recent melena. Non-bleeding erosive gastropathy  - as per GI recs: will transition to Pantoprazole 40mg PO BID x at least 2 months  - will need repeat EGD in 2 months    # Mild alcohol use disorder.   ·  Plan: Pt was on CIWA protocol, discontinued  - Last ETOH use was Tues 8/22. out of the window for withdrawal  - Interested in outpt ETOH cessation services, can start vivitrol on DC for cravings  - SW consult for outpt ETOH cessation services.    # Acute kidney injury superimposed on CKD.   ·  Plan: Likely 2/2 ATN from intravascular depletion from pancreatitis, improved with IV hydration  - Anion gap metabolic acidosis likely 2/2 starvation ketosis    #HTN (hypertension).   ·  Plan: BP elevated  - still above goal despite resuming home losartan   - increase losartan to 75mg PO daily.     # Intractable hiccups.   ·  Plan: resolved.  - can d/c baclofen.  Dispo/Disxch/med rec DAVIS Alonso ________________. Medically cleared for Discharge.   62 yo male with PMH of HTN, GERD, EtOH abuse, hepatic steatosis and hx of pancreatitis who with abd pain admitted for acute alcoholic pancreatitis and concern for UGIB s/p EGD today - results pending.    # Acute alcoholic pancreatitis.   ·  Plan: Epigastric abdominal pain w/ lipase 633  - CT abd without acute abnormalities  - improved. tolerating PO diet.      # GI bleed.   ·  Plan: Presenting with concern for coffee ground emesis and melena  - H/H stable  - s/p EGD on 8/29/23 with LA Grade D esophagitis; possible Barretts esophagus underlying - likely cause of recent melena. Non-bleeding erosive gastropathy  - as per GI recs: will transition to Pantoprazole 40mg PO BID x at least 2 months  - will need repeat EGD in 2 months    # Mild alcohol use disorder.   ·  Plan: Pt was on CIWA protocol, discontinued  - Last ETOH use was Tues 8/22. out of the window for withdrawal  - Interested in outpt ETOH cessation services, can start vivitrol on DC for cravings  - SW consult for outpt ETOH cessation services.    # Acute kidney injury superimposed on CKD.   ·  Plan: Likely 2/2 ATN from intravascular depletion from pancreatitis, improved with IV hydration  - Anion gap metabolic acidosis likely 2/2 starvation ketosis    #HTN (hypertension).   ·  Plan: BP elevated  - still above goal despite resuming home losartan   - increase losartan to 75mg PO daily.     # Intractable hiccups.   ·  Plan: resolved.  - can d/c baclofen.  Dispo/Disxch/med rec DAVIS Alonso ________________. Medically cleared for Discharge.   64 yo male with PMH of HTN, GERD, EtOH abuse, hepatic steatosis and hx of pancreatitis who with abd pain admitted for acute alcoholic pancreatitis and concern for UGIB s/p EGD today - results pending.    # Acute alcoholic pancreatitis.   ·  Plan: Epigastric abdominal pain w/ lipase 633  - CT abd without acute abnormalities  - improved. tolerating PO diet.      # GI bleed.   ·  Plan: Presenting with concern for coffee ground emesis and melena  - H/H stable  - s/p EGD on 8/29/23 with LA Grade D esophagitis; possible Barretts esophagus underlying - likely cause of recent melena. Non-bleeding erosive gastropathy  - as per GI recs: will transition to Pantoprazole 40mg PO BID x at least 2 months  - will need repeat EGD in 2 months    # Mild alcohol use disorder.   ·  Plan: Pt was on CIWA protocol, discontinued  - Last ETOH use was Tues 8/22. out of the window for withdrawal  - Interested in outpt ETOH cessation services, can start vivitrol on DC for cravings  - SW consult for outpt ETOH cessation services.    # Acute kidney injury superimposed on CKD.   ·  Plan: Likely 2/2 ATN from intravascular depletion from pancreatitis, improved with IV hydration  - Anion gap metabolic acidosis likely 2/2 starvation ketosis    #HTN (hypertension).   ·  Plan: BP elevated  - still above goal despite resuming home losartan   - increase losartan to 75mg PO daily.     # Intractable hiccups.   ·  Plan: resolved.  - can d/c baclofen.    Dispo/Disxch/med rec DW Dr. Alicia Medically cleared for Discharge.

## 2023-08-30 NOTE — PROGRESS NOTE ADULT - PROBLEM SELECTOR PLAN 6
BP elevated  - still above goal despite resuming home losartan   - increase losartan to 75mg PO daily
BP elevated  - resume home losartan 50mg PO daily  - Monitor vitals
BP at goal  - Monitor vitals
BP now improved  - c/w increased losartan 75mg daily on discharge  - outpatient f/u with PCP for BP check

## 2023-08-30 NOTE — PROGRESS NOTE ADULT - PROBLEM SELECTOR PLAN 8
DVT ppx: SCDs    Dispo: home
DVT ppx: SCDs given concern for GI bleed    Dispo: home once medically clear
DVT ppx: SCDs given concern for GI bleed    Dispo: home once medically clear

## 2023-08-30 NOTE — DISCHARGE NOTE PROVIDER - NSDCMRMEDTOKEN_GEN_ALL_CORE_FT
aspirin 81 mg oral delayed release tablet: 1 tab(s) orally once a day  famotidine 40 mg oral tablet: 1 tab(s) orally once a day (at bedtime)  losartan 50 mg oral tablet: 1 tab(s) orally once a day  Vitamin D2 1.25 mg (50,000 intl units) oral capsule: 1 cap(s) orally once a week   folic acid 1 mg oral tablet: 1 tab(s) orally once a day  losartan 25 mg oral tablet: 3 tab(s) orally once a day  Multiple Vitamins oral tablet: 1 tab(s) orally once a day  pantoprazole 40 mg oral delayed release tablet: 1 tab(s) orally 2 times a day  Vitamin D2 1.25 mg (50,000 intl units) oral capsule: 1 cap(s) orally once a week

## 2023-08-30 NOTE — DISCHARGE NOTE PROVIDER - NSDCFUADDAPPT_GEN_ALL_CORE_FT
APPTS ARE READY TO BE MADE: [x ] YES    Best Family or Patient Contact (if needed):    Additional Information about above appointments (if needed):    1:   2:   3:     Other comments or requests:    Follow up with your PMD and Gastroenterologist as an outpatient.     APPTS ARE READY TO BE MADE: [x ] YES    Best Family or Patient Contact (if needed):    Additional Information about above appointments (if needed):    1:   2:   3:     Other comments or requests:    Follow up with your PMD and Gastroenterologist as an outpatient.     APPTS ARE READY TO BE MADE: [x] YES    Best Family or Patient Contact (if needed):    Additional Information about above appointments (if needed):    1: follow-up with GI for repeat EGD in 2 months  2:   3:     Other comments or requests:    Follow up with your PMD and Gastroenterologist as an outpatient.     APPTS ARE READY TO BE MADE: [x] YES    Best Family or Patient Contact (if needed):    Additional Information about above appointments (if needed):    1: follow-up with GI for repeat EGD in 2 months  2:   3:     Other comments or requests:     Outreached on 9/6, 9/7, and 9/8, which have been unsuccessful. Will await call back from patient/caregiver to coordinate follow up care.

## 2023-08-30 NOTE — PROGRESS NOTE ADULT - PROBLEM SELECTOR PLAN 1
Epigastric abdominal pain w/ lipase 633  - CT abd without acute abnormalities  - improved. tolerating PO diet. EGD results as below  - nausea and pain resolved

## 2023-08-30 NOTE — PROGRESS NOTE ADULT - PROBLEM SELECTOR PLAN 5
Likely 2/2 ATN from intravascular depletion from pancreatitis, improved with IV hydration  - Anion gap metabolic acidosis likely 2/2 starvation ketosis  - c/w IVF fluids  - Avoid nephrotoxins  - Monitor sCr
Likely 2/2 ATN from intravascular depletion from pancreatitis, improved with IV hydration  - Anion gap metabolic acidosis likely 2/2 starvation ketosis  - s/p IVF  - Avoid nephrotoxins  - Monitor sCr
Likely 2/2 ATN from intravascular depletion from pancreatitis, improved with IV hydration  - Anion gap metabolic acidosis likely 2/2 starvation ketosis  - c/w IVF fluids  - Avoid nephrotoxins  - Monitor sCr
Likely 2/2 ATN from intravascular depletion from pancreatitis, improved   - Anion gap metabolic acidosis likely 2/2 starvation ketosis  - Hold home losartan for now  - Cont IVF while NPO  - Avoid nephrotoxins  - Monitor sCr

## 2023-08-30 NOTE — PROGRESS NOTE ADULT - ASSESSMENT
62 yo male with PMH of HTN, GERD, EtOH abuse, hepatic steatosis and hx of pancreatitis who with abd pain admitted for acute alcoholic pancreatitis and concern for UGIB pending EGD.
62yo M w/ PMH of HTN, GERD, EtOH abuse, hepatic steatosis and hx of pancreatitis pw abd pain, N/V and c/f melena  
62 yo male with PMH of HTN, GERD, EtOH abuse, hepatic steatosis and hx of pancreatitis who with abd pain admitted for acute alcoholic pancreatitis and concern for UGIB s/p EGD today - results pending.
64 yo male with PMH of HTN, GERD, EtOH abuse, hepatic steatosis and hx of pancreatitis who with abd pain admitted for acute alcoholic pancreatitis and concern for UGIB s/p EGD with findings consistent with erosive esophagitis.

## 2023-08-30 NOTE — DISCHARGE NOTE PROVIDER - CARE PROVIDER_API CALL
Naeem Walter  Gastroenterology  [address] [place] [id number]  Phone: [phone]  Fax: [phone]  Follow Up Time:

## 2023-09-06 NOTE — CHART NOTE - NSCHARTNOTEFT_GEN_A_CORE
1 attempt was made to reach patient, which have been unsuccessful. Unable to leave voicemail on 09/06.

## 2023-09-11 PROBLEM — K21.9 GASTRO-ESOPHAGEAL REFLUX DISEASE WITHOUT ESOPHAGITIS: Chronic | Status: ACTIVE | Noted: 2023-08-26

## 2023-09-11 PROBLEM — K44.9 DIAPHRAGMATIC HERNIA WITHOUT OBSTRUCTION OR GANGRENE: Chronic | Status: ACTIVE | Noted: 2023-08-26

## 2023-09-11 PROBLEM — K85.20 ALCOHOL INDUCED ACUTE PANCREATITIS WITHOUT NECROSIS OR INFECTION: Chronic | Status: ACTIVE | Noted: 2023-08-26

## 2023-09-12 ENCOUNTER — APPOINTMENT (OUTPATIENT)
Dept: ORTHOPEDIC SURGERY | Facility: CLINIC | Age: 63
End: 2023-09-12
Payer: MEDICAID

## 2023-09-12 VITALS — WEIGHT: 239 LBS | HEIGHT: 69 IN | BODY MASS INDEX: 35.4 KG/M2

## 2023-09-12 DIAGNOSIS — M17.11 UNILATERAL PRIMARY OSTEOARTHRITIS, RIGHT KNEE: ICD-10-CM

## 2023-09-12 DIAGNOSIS — M17.12 UNILATERAL PRIMARY OSTEOARTHRITIS, LEFT KNEE: ICD-10-CM

## 2023-09-12 PROCEDURE — 20611 DRAIN/INJ JOINT/BURSA W/US: CPT | Mod: 50

## 2023-09-12 PROCEDURE — 99213 OFFICE O/P EST LOW 20 MIN: CPT | Mod: 25

## 2023-09-12 PROCEDURE — L1833: CPT | Mod: RT

## 2023-09-13 ENCOUNTER — NON-APPOINTMENT (OUTPATIENT)
Age: 63
End: 2023-09-13

## 2023-10-02 DIAGNOSIS — K22.10 ULCER OF ESOPHAGUS W/OUT BLEEDING: ICD-10-CM

## 2023-10-02 RX ORDER — OMEPRAZOLE 40 MG/1
40 CAPSULE, DELAYED RELEASE ORAL TWICE DAILY
Qty: 60 | Refills: 1 | Status: ACTIVE | COMMUNITY
Start: 2023-10-02 | End: 1900-01-01

## 2023-11-01 NOTE — ED ADULT NURSE NOTE - NS ED NURSE IV DC DT
Transfer to [address] [facility],[address] at [facility] with Dr. Acosta Iba accepting. Report to [phone] ext [phone] or [phone].   For questions, call care coordinator, Zamzam Platt, at [phone] ext [phone]     [place], [place]  11/01/23 1013 10-Dec-2017 21:51 None

## 2023-11-03 ENCOUNTER — OUTPATIENT (OUTPATIENT)
Dept: OUTPATIENT SERVICES | Facility: HOSPITAL | Age: 63
LOS: 1 days | End: 2023-11-03
Payer: MEDICAID

## 2023-11-03 ENCOUNTER — RESULT REVIEW (OUTPATIENT)
Age: 63
End: 2023-11-03

## 2023-11-03 ENCOUNTER — TRANSCRIPTION ENCOUNTER (OUTPATIENT)
Age: 63
End: 2023-11-03

## 2023-11-03 ENCOUNTER — APPOINTMENT (OUTPATIENT)
Dept: GASTROENTEROLOGY | Facility: HOSPITAL | Age: 63
End: 2023-11-03

## 2023-11-03 VITALS
HEART RATE: 66 BPM | RESPIRATION RATE: 21 BRPM | DIASTOLIC BLOOD PRESSURE: 95 MMHG | SYSTOLIC BLOOD PRESSURE: 158 MMHG | OXYGEN SATURATION: 100 %

## 2023-11-03 VITALS
OXYGEN SATURATION: 99 % | SYSTOLIC BLOOD PRESSURE: 156 MMHG | WEIGHT: 209 LBS | HEIGHT: 69 IN | TEMPERATURE: 97 F | RESPIRATION RATE: 16 BRPM | DIASTOLIC BLOOD PRESSURE: 89 MMHG | HEART RATE: 72 BPM

## 2023-11-03 DIAGNOSIS — Z98.890 OTHER SPECIFIED POSTPROCEDURAL STATES: Chronic | ICD-10-CM

## 2023-11-03 DIAGNOSIS — K44.9 DIAPHRAGMATIC HERNIA WITHOUT OBSTRUCTION OR GANGRENE: ICD-10-CM

## 2023-11-03 PROCEDURE — 88305 TISSUE EXAM BY PATHOLOGIST: CPT | Mod: 26

## 2023-11-03 PROCEDURE — 43239 EGD BIOPSY SINGLE/MULTIPLE: CPT

## 2023-11-03 PROCEDURE — 91040 ESOPH BALLOON DISTENSION TST: CPT | Mod: 26

## 2023-11-03 PROCEDURE — 88305 TISSUE EXAM BY PATHOLOGIST: CPT

## 2023-11-03 RX ORDER — SODIUM CHLORIDE 9 MG/ML
500 INJECTION INTRAMUSCULAR; INTRAVENOUS; SUBCUTANEOUS
Refills: 0 | Status: DISCONTINUED | OUTPATIENT
Start: 2023-11-03 | End: 2023-11-17

## 2023-11-03 RX ORDER — LIDOCAINE HCL 20 MG/ML
4 VIAL (ML) INJECTION ONCE
Refills: 0 | Status: DISCONTINUED | OUTPATIENT
Start: 2023-11-03 | End: 2023-11-17

## 2023-11-03 NOTE — ASU DISCHARGE PLAN (ADULT/PEDIATRIC) - NS MD DC FALL RISK RISK
For information on Fall & Injury Prevention, visit: [email or web address] OR  [email or web address] OR  https://www.cdc.gov/steadi/patient.html

## 2023-11-03 NOTE — PRE PROCEDURE NOTE - PRE PROCEDURE EVALUATION
Attending Physician:              Naeem Walter MD MSEd    Indication for Procedure      dyspahgia, gerd,           PAST MEDICAL & SURGICAL HISTORY:  HTN (hypertension)      GERD (gastroesophageal reflux disease)      Hiatal hernia      Acute alcoholic pancreatitis      H/O eye surgery  left      S/P foot surgery, left            See Allscripts Note for further details  ALLERGIES:  No Known Allergies    HOME MEDICATIONS:  omeprazole 20 mg oral delayed release capsule: 1 cap(s) orally once a day  Vitamin D2 1.25 mg (50,000 intl units) oral capsule: 1 cap(s) orally once a week      See Allscripts Note for further details    AICD/PPM: [ ] yes   [x ] no    PERTINENT LAB DATA:                      PHYSICAL EXAMINATION:    Height (cm): 175.3  Weight (kg): 94.8  BMI (kg/m2): 30.8  BSA (m2): 2.1T(C): --  HR: --  BP: --  RR: --  SpO2: --    Constitutional: NAD  HEENT: PERRLA, EOMI,    Neck:  No JVD  Respiratory: CTAB/L  Cardiovascular: S1 and S2  Gastrointestinal: BS+, soft, NT/ND  Extremities: No peripheral edema  Neurological: A/O x 3, no focal deficits  Psychiatric: Normal mood, normal affect  Skin: No rashes    ASA Class: I [ ]  II [ ]  III [x ]  IV [ ]    COMMENTS:    The patient is a suitable candidate for the planned procedure unless box checked [ ]  No, explain:

## 2023-11-07 LAB
SURGICAL PATHOLOGY STUDY: SIGNIFICANT CHANGE UP
SURGICAL PATHOLOGY STUDY: SIGNIFICANT CHANGE UP

## 2023-12-14 NOTE — PACU DISCHARGE NOTE - THE ANESTHESIA ORDERS USED IN THE PACU ORDER SET WILL BE DISCONTINUED UPON TRANSFER OF THIS PATIENT
Occupational Therapy   Co-Treatment    Name: Megan Cook  MRN: [id number]  Admitting Diagnosis:  Rheumatic aortic stenosis  6 Days Post-Op    Recommendations:     Discharge Recommendations: High Intensity Therapy  Discharge Equipment Recommendations:   (TBD closer to d/c)  Barriers to discharge:  None    Assessment:     Megan Cook is a 38 y.o. female with a medical diagnosis of Rheumatic aortic stenosis.  She presents with progress towards goals as evidenced by decreased assistance needed with grooming, toileting and transfers. Performance deficits affecting function are weakness, impaired self care skills, impaired balance, decreased safety awareness, impaired endurance, impaired functional mobility, gait instability, decreased upper extremity function, pain, impaired cardiopulmonary response to activity. Pt benefits from co-treatment with PT to accommodate pt's activity tolerance and progression with therapy.      Rehab Prognosis:  Good; patient would benefit from acute skilled OT services to address these deficits and reach maximum level of function.       Plan:     Patient to be seen 5 x/week to address the above listed problems via self-care/home management, therapeutic activities, therapeutic exercises  Plan of Care Expires: 12/27/23  Plan of Care Reviewed with: patient    Subjective     Chief Complaint: Need to have a BM  Patient/Family Comments/goals: to get better and go home  Pain/Comfort:  Pain Rating 1: 6/10  Location 1:  (CT site)  Pain Addressed 1: Reposition, Distraction    Objective:     Communicated with: RN prior to session.  Patient found supine with blood pressure cuff, pulse ox (continuous), telemetry, central line, Trialysis, chest tube, arterial line upon OT entry to room.    General Precautions: Standard, fall, sternal    Orthopedic Precautions:N/A  Braces: N/A  Respiratory Status: .5L nasal cannula     Occupational Performance:     Bed Mobility:    Patient completed Scooting/Bridging  with moderate assistance  Patient completed Supine to Sit with moderate assistance     Functional Mobility/Transfers:  Patient completed Sit <> Stand Transfer with contact guard assistance  with  no assistive device   Patient completed Bed <> Chair Transfer using Step Transfer technique with contact guard assistance with no assistive device  Functional Mobility: CGA x ~4 steps to bedside commode    Activities of Daily Living:  Grooming: moderate assistance for washing face with UE support  Upper Body Dressing: maximal assistance    Lower Body Dressing: total assistance        AMPAC 6 Click ADL: 13    Treatment & Education:  Pt ed on OT POC  Pt re-ed on sternal precautions during ADL  Pt ed on ROM ex's 3x daily for increased overall strength and endurance    Patient left  seated on bedside commode attempting to have BM  with all lines intact, call button in reach, RN notified, and RN agreeable to listen for call light and assist pt when finished    GOALS:   Multidisciplinary Problems       Occupational Therapy Goals          Problem: Occupational Therapy    Goal Priority Disciplines Outcome Interventions   Occupational Therapy Goal     OT, PT/OT Ongoing, Progressing    Description: Goals to be met by: 1/13/23    Patient will increase functional independence with ADLs by performing:    UE Dressing with Stand-by Assistance.  LE Dressing with Minimal Assistance.  Grooming while standing with Minimal Assistance.  Toileting from toilet with Minimal Assistance for hygiene and clothing management.   Supine to sit with Stand-by Assistance.  Step transfer with Minimal Assistance  Toilet transfer to toilet with Minimal Assistance.                         Time Tracking:     OT Date of Treatment: 12/14/23  OT Start Time: 0939  OT Stop Time: 1004  OT Total Time (min): 25 min    Billable Minutes:Self Care/Home Management 23 12/14/2023   Statement Selected

## 2024-02-12 ENCOUNTER — INPATIENT (INPATIENT)
Facility: HOSPITAL | Age: 64
LOS: 3 days | Discharge: HOME | DRG: 286 | End: 2024-02-16
Attending: INTERNAL MEDICINE | Admitting: INTERNAL MEDICINE
Payer: MEDICAID

## 2024-02-12 VITALS
DIASTOLIC BLOOD PRESSURE: 63 MMHG | OXYGEN SATURATION: 99 % | RESPIRATION RATE: 20 BRPM | HEIGHT: 64 IN | TEMPERATURE: 98 F | HEART RATE: 135 BPM | SYSTOLIC BLOOD PRESSURE: 93 MMHG | WEIGHT: 199.96 LBS

## 2024-02-12 DIAGNOSIS — Z87.19 PERSONAL HISTORY OF OTHER DISEASES OF THE DIGESTIVE SYSTEM: ICD-10-CM

## 2024-02-12 DIAGNOSIS — Z98.890 OTHER SPECIFIED POSTPROCEDURAL STATES: Chronic | ICD-10-CM

## 2024-02-12 DIAGNOSIS — R07.89 OTHER CHEST PAIN: ICD-10-CM

## 2024-02-12 DIAGNOSIS — F10.10 ALCOHOL ABUSE, UNCOMPLICATED: ICD-10-CM

## 2024-02-12 DIAGNOSIS — N17.9 ACUTE KIDNEY FAILURE, UNSPECIFIED: ICD-10-CM

## 2024-02-12 DIAGNOSIS — R11.2 NAUSEA WITH VOMITING, UNSPECIFIED: ICD-10-CM

## 2024-02-12 DIAGNOSIS — E87.8 OTHER DISORDERS OF ELECTROLYTE AND FLUID BALANCE, NOT ELSEWHERE CLASSIFIED: ICD-10-CM

## 2024-02-12 DIAGNOSIS — K92.1 MELENA: ICD-10-CM

## 2024-02-12 DIAGNOSIS — I10 ESSENTIAL (PRIMARY) HYPERTENSION: ICD-10-CM

## 2024-02-12 LAB
ALBUMIN SERPL ELPH-MCNC: 3.9 G/DL — SIGNIFICANT CHANGE UP (ref 3.3–5)
ALBUMIN SERPL ELPH-MCNC: 4.6 G/DL — SIGNIFICANT CHANGE UP (ref 3.3–5)
ALP SERPL-CCNC: 72 U/L — SIGNIFICANT CHANGE UP (ref 40–120)
ALP SERPL-CCNC: 85 U/L — SIGNIFICANT CHANGE UP (ref 40–120)
ALT FLD-CCNC: 27 U/L — SIGNIFICANT CHANGE UP (ref 10–45)
ALT FLD-CCNC: 32 U/L — SIGNIFICANT CHANGE UP (ref 10–45)
ANION GAP SERPL CALC-SCNC: 17 MMOL/L — SIGNIFICANT CHANGE UP (ref 5–17)
ANION GAP SERPL CALC-SCNC: 19 MMOL/L — HIGH (ref 5–17)
APTT BLD: 27 SEC — SIGNIFICANT CHANGE UP (ref 24.5–35.6)
AST SERPL-CCNC: 56 U/L — HIGH (ref 10–40)
AST SERPL-CCNC: 64 U/L — HIGH (ref 10–40)
BASE EXCESS BLDV CALC-SCNC: -2.2 MMOL/L — LOW (ref -2–3)
BASE EXCESS BLDV CALC-SCNC: -5.6 MMOL/L — LOW (ref -2–3)
BASOPHILS # BLD AUTO: 0.02 K/UL — SIGNIFICANT CHANGE UP (ref 0–0.2)
BASOPHILS NFR BLD AUTO: 0.4 % — SIGNIFICANT CHANGE UP (ref 0–2)
BILIRUB SERPL-MCNC: 0.8 MG/DL — SIGNIFICANT CHANGE UP (ref 0.2–1.2)
BILIRUB SERPL-MCNC: 1 MG/DL — SIGNIFICANT CHANGE UP (ref 0.2–1.2)
BUN SERPL-MCNC: 55 MG/DL — HIGH (ref 7–23)
BUN SERPL-MCNC: 59 MG/DL — HIGH (ref 7–23)
CA-I SERPL-SCNC: 0.73 MMOL/L — LOW (ref 1.15–1.33)
CA-I SERPL-SCNC: 1.17 MMOL/L — SIGNIFICANT CHANGE UP (ref 1.15–1.33)
CALCIUM SERPL-MCNC: 9 MG/DL — SIGNIFICANT CHANGE UP (ref 8.4–10.5)
CALCIUM SERPL-MCNC: 9.9 MG/DL — SIGNIFICANT CHANGE UP (ref 8.4–10.5)
CHLORIDE BLDV-SCNC: 96 MMOL/L — SIGNIFICANT CHANGE UP (ref 96–108)
CHLORIDE BLDV-SCNC: 99 MMOL/L — SIGNIFICANT CHANGE UP (ref 96–108)
CHLORIDE SERPL-SCNC: 94 MMOL/L — LOW (ref 96–108)
CHLORIDE SERPL-SCNC: 98 MMOL/L — SIGNIFICANT CHANGE UP (ref 96–108)
CO2 BLDV-SCNC: 21 MMOL/L — LOW (ref 22–26)
CO2 BLDV-SCNC: 24 MMOL/L — SIGNIFICANT CHANGE UP (ref 22–26)
CO2 SERPL-SCNC: 19 MMOL/L — LOW (ref 22–31)
CO2 SERPL-SCNC: 21 MMOL/L — LOW (ref 22–31)
CREAT SERPL-MCNC: 3.46 MG/DL — HIGH (ref 0.5–1.3)
CREAT SERPL-MCNC: 4.1 MG/DL — HIGH (ref 0.5–1.3)
EGFR: 16 ML/MIN/1.73M2 — LOW
EGFR: 19 ML/MIN/1.73M2 — LOW
EOSINOPHIL # BLD AUTO: 0.06 K/UL — SIGNIFICANT CHANGE UP (ref 0–0.5)
EOSINOPHIL NFR BLD AUTO: 1.1 % — SIGNIFICANT CHANGE UP (ref 0–6)
FLUAV AG NPH QL: SIGNIFICANT CHANGE UP
FLUBV AG NPH QL: SIGNIFICANT CHANGE UP
GAS PNL BLDV: 114 MMOL/L — CRITICAL LOW (ref 136–145)
GAS PNL BLDV: 131 MMOL/L — LOW (ref 136–145)
GAS PNL BLDV: SIGNIFICANT CHANGE UP
GLUCOSE BLDV-MCNC: 117 MG/DL — HIGH (ref 70–99)
GLUCOSE BLDV-MCNC: 141 MG/DL — HIGH (ref 70–99)
GLUCOSE SERPL-MCNC: 106 MG/DL — HIGH (ref 70–99)
GLUCOSE SERPL-MCNC: 139 MG/DL — HIGH (ref 70–99)
HCO3 BLDV-SCNC: 20 MMOL/L — LOW (ref 22–29)
HCO3 BLDV-SCNC: 23 MMOL/L — SIGNIFICANT CHANGE UP (ref 22–29)
HCT VFR BLD CALC: 36.7 % — LOW (ref 39–50)
HCT VFR BLDA CALC: 35 % — LOW (ref 39–51)
HCT VFR BLDA CALC: 38 % — LOW (ref 39–51)
HGB BLD CALC-MCNC: 11.7 G/DL — LOW (ref 12.6–17.4)
HGB BLD CALC-MCNC: 12.6 G/DL — SIGNIFICANT CHANGE UP (ref 12.6–17.4)
HGB BLD-MCNC: 12.4 G/DL — LOW (ref 13–17)
IMM GRANULOCYTES NFR BLD AUTO: 0.6 % — SIGNIFICANT CHANGE UP (ref 0–0.9)
INR BLD: 1.25 RATIO — HIGH (ref 0.85–1.18)
LACTATE BLDV-MCNC: 3.2 MMOL/L — HIGH (ref 0.5–2)
LACTATE BLDV-MCNC: 3.3 MMOL/L — HIGH (ref 0.5–2)
LIDOCAIN IGE QN: 188 U/L — HIGH (ref 7–60)
LYMPHOCYTES # BLD AUTO: 1.34 K/UL — SIGNIFICANT CHANGE UP (ref 1–3.3)
LYMPHOCYTES # BLD AUTO: 24.9 % — SIGNIFICANT CHANGE UP (ref 13–44)
MAGNESIUM SERPL-MCNC: 1 MG/DL — CRITICAL LOW (ref 1.6–2.6)
MCHC RBC-ENTMCNC: 30.2 PG — SIGNIFICANT CHANGE UP (ref 27–34)
MCHC RBC-ENTMCNC: 33.8 GM/DL — SIGNIFICANT CHANGE UP (ref 32–36)
MCV RBC AUTO: 89.5 FL — SIGNIFICANT CHANGE UP (ref 80–100)
MONOCYTES # BLD AUTO: 0.8 K/UL — SIGNIFICANT CHANGE UP (ref 0–0.9)
MONOCYTES NFR BLD AUTO: 14.9 % — HIGH (ref 2–14)
NEUTROPHILS # BLD AUTO: 3.13 K/UL — SIGNIFICANT CHANGE UP (ref 1.8–7.4)
NEUTROPHILS NFR BLD AUTO: 58.1 % — SIGNIFICANT CHANGE UP (ref 43–77)
NRBC # BLD: 0 /100 WBCS — SIGNIFICANT CHANGE UP (ref 0–0)
PCO2 BLDV: 39 MMHG — LOW (ref 42–55)
PCO2 BLDV: 41 MMHG — LOW (ref 42–55)
PH BLDV: 7.32 — SIGNIFICANT CHANGE UP (ref 7.32–7.43)
PH BLDV: 7.36 — SIGNIFICANT CHANGE UP (ref 7.32–7.43)
PHOSPHATE SERPL-MCNC: 0.8 MG/DL — CRITICAL LOW (ref 2.5–4.5)
PHOSPHATE SERPL-MCNC: 1.6 MG/DL — LOW (ref 2.5–4.5)
PLATELET # BLD AUTO: 122 K/UL — LOW (ref 150–400)
PO2 BLDV: 25 MMHG — SIGNIFICANT CHANGE UP (ref 25–45)
PO2 BLDV: 32 MMHG — SIGNIFICANT CHANGE UP (ref 25–45)
POTASSIUM BLDV-SCNC: 2.7 MMOL/L — CRITICAL LOW (ref 3.5–5.1)
POTASSIUM BLDV-SCNC: >10 MMOL/L — CRITICAL HIGH (ref 3.5–5.1)
POTASSIUM SERPL-MCNC: 3.2 MMOL/L — LOW (ref 3.5–5.3)
POTASSIUM SERPL-MCNC: 3.4 MMOL/L — LOW (ref 3.5–5.3)
POTASSIUM SERPL-SCNC: 3.2 MMOL/L — LOW (ref 3.5–5.3)
POTASSIUM SERPL-SCNC: 3.4 MMOL/L — LOW (ref 3.5–5.3)
PROT SERPL-MCNC: 7.4 G/DL — SIGNIFICANT CHANGE UP (ref 6–8.3)
PROT SERPL-MCNC: 8.6 G/DL — HIGH (ref 6–8.3)
PROTHROM AB SERPL-ACNC: 13 SEC — SIGNIFICANT CHANGE UP (ref 9.5–13)
RBC # BLD: 4.1 M/UL — LOW (ref 4.2–5.8)
RBC # FLD: 14.2 % — SIGNIFICANT CHANGE UP (ref 10.3–14.5)
RSV RNA NPH QL NAA+NON-PROBE: SIGNIFICANT CHANGE UP
SAO2 % BLDV: 39.2 % — LOW (ref 67–88)
SAO2 % BLDV: 44.8 % — LOW (ref 67–88)
SARS-COV-2 RNA SPEC QL NAA+PROBE: SIGNIFICANT CHANGE UP
SODIUM SERPL-SCNC: 134 MMOL/L — LOW (ref 135–145)
SODIUM SERPL-SCNC: 134 MMOL/L — LOW (ref 135–145)
TROPONIN T, HIGH SENSITIVITY RESULT: 19 NG/L — SIGNIFICANT CHANGE UP (ref 0–51)
TROPONIN T, HIGH SENSITIVITY RESULT: 30 NG/L — SIGNIFICANT CHANGE UP (ref 0–51)
WBC # BLD: 5.38 K/UL — SIGNIFICANT CHANGE UP (ref 3.8–10.5)
WBC # FLD AUTO: 5.38 K/UL — SIGNIFICANT CHANGE UP (ref 3.8–10.5)

## 2024-02-12 PROCEDURE — 71250 CT THORAX DX C-: CPT | Mod: 26

## 2024-02-12 PROCEDURE — 99222 1ST HOSP IP/OBS MODERATE 55: CPT | Mod: GC

## 2024-02-12 PROCEDURE — 99285 EMERGENCY DEPT VISIT HI MDM: CPT | Mod: 25

## 2024-02-12 PROCEDURE — 70450 CT HEAD/BRAIN W/O DYE: CPT | Mod: 26

## 2024-02-12 PROCEDURE — 71045 X-RAY EXAM CHEST 1 VIEW: CPT | Mod: 26

## 2024-02-12 PROCEDURE — 99223 1ST HOSP IP/OBS HIGH 75: CPT

## 2024-02-12 PROCEDURE — 74176 CT ABD & PELVIS W/O CONTRAST: CPT | Mod: 26

## 2024-02-12 PROCEDURE — 73590 X-RAY EXAM OF LOWER LEG: CPT | Mod: 26,LT,RT

## 2024-02-12 RX ORDER — POTASSIUM CHLORIDE 20 MEQ
40 PACKET (EA) ORAL ONCE
Refills: 0 | Status: COMPLETED | OUTPATIENT
Start: 2024-02-12 | End: 2024-02-12

## 2024-02-12 RX ORDER — MAGNESIUM SULFATE 500 MG/ML
2 VIAL (ML) INJECTION ONCE
Refills: 0 | Status: COMPLETED | OUTPATIENT
Start: 2024-02-12 | End: 2024-02-12

## 2024-02-12 RX ORDER — PANTOPRAZOLE SODIUM 20 MG/1
40 TABLET, DELAYED RELEASE ORAL
Refills: 0 | Status: DISCONTINUED | OUTPATIENT
Start: 2024-02-12 | End: 2024-02-16

## 2024-02-12 RX ORDER — ERGOCALCIFEROL 1.25 MG/1
1 CAPSULE ORAL
Qty: 0 | Refills: 0 | DISCHARGE

## 2024-02-12 RX ORDER — SODIUM CHLORIDE 9 MG/ML
1000 INJECTION, SOLUTION INTRAVENOUS ONCE
Refills: 0 | Status: COMPLETED | OUTPATIENT
Start: 2024-02-12 | End: 2024-02-12

## 2024-02-12 RX ORDER — POTASSIUM PHOSPHATE, MONOBASIC POTASSIUM PHOSPHATE, DIBASIC 236; 224 MG/ML; MG/ML
15 INJECTION, SOLUTION INTRAVENOUS ONCE
Refills: 0 | Status: COMPLETED | OUTPATIENT
Start: 2024-02-12 | End: 2024-02-12

## 2024-02-12 RX ORDER — AMLODIPINE BESYLATE 2.5 MG/1
5 TABLET ORAL DAILY
Refills: 0 | Status: DISCONTINUED | OUTPATIENT
Start: 2024-02-12 | End: 2024-02-14

## 2024-02-12 RX ORDER — MECLIZINE HCL 12.5 MG
1 TABLET ORAL
Refills: 0 | DISCHARGE

## 2024-02-12 RX ORDER — SODIUM,POTASSIUM PHOSPHATES 278-250MG
1 POWDER IN PACKET (EA) ORAL ONCE
Refills: 0 | Status: COMPLETED | OUTPATIENT
Start: 2024-02-12 | End: 2024-02-12

## 2024-02-12 RX ORDER — OMEPRAZOLE 10 MG/1
1 CAPSULE, DELAYED RELEASE ORAL
Refills: 0 | DISCHARGE

## 2024-02-12 RX ORDER — ERGOCALCIFEROL 1.25 MG/1
50000 CAPSULE ORAL DAILY
Refills: 0 | Status: DISCONTINUED | OUTPATIENT
Start: 2024-02-12 | End: 2024-02-12

## 2024-02-12 RX ORDER — SODIUM CHLORIDE 9 MG/ML
1000 INJECTION INTRAMUSCULAR; INTRAVENOUS; SUBCUTANEOUS
Refills: 0 | Status: DISCONTINUED | OUTPATIENT
Start: 2024-02-12 | End: 2024-02-16

## 2024-02-12 RX ORDER — PANTOPRAZOLE SODIUM 20 MG/1
80 TABLET, DELAYED RELEASE ORAL ONCE
Refills: 0 | Status: COMPLETED | OUTPATIENT
Start: 2024-02-12 | End: 2024-02-12

## 2024-02-12 RX ORDER — ONDANSETRON 8 MG/1
4 TABLET, FILM COATED ORAL EVERY 6 HOURS
Refills: 0 | Status: DISCONTINUED | OUTPATIENT
Start: 2024-02-12 | End: 2024-02-16

## 2024-02-12 RX ORDER — FOLIC ACID 0.8 MG
1 TABLET ORAL DAILY
Refills: 0 | Status: DISCONTINUED | OUTPATIENT
Start: 2024-02-12 | End: 2024-02-16

## 2024-02-12 RX ADMIN — SODIUM CHLORIDE 1000 MILLILITER(S): 9 INJECTION, SOLUTION INTRAVENOUS at 07:47

## 2024-02-12 RX ADMIN — SODIUM CHLORIDE 1000 MILLILITER(S): 9 INJECTION, SOLUTION INTRAVENOUS at 09:48

## 2024-02-12 RX ADMIN — Medication 1 TABLET(S): at 09:58

## 2024-02-12 RX ADMIN — SODIUM CHLORIDE 1000 MILLILITER(S): 9 INJECTION, SOLUTION INTRAVENOUS at 08:30

## 2024-02-12 RX ADMIN — Medication 2 GRAM(S): at 13:28

## 2024-02-12 RX ADMIN — Medication 40 MILLIEQUIVALENT(S): at 09:34

## 2024-02-12 RX ADMIN — POTASSIUM PHOSPHATE, MONOBASIC POTASSIUM PHOSPHATE, DIBASIC 62.5 MILLIMOLE(S): 236; 224 INJECTION, SOLUTION INTRAVENOUS at 22:40

## 2024-02-12 RX ADMIN — PANTOPRAZOLE SODIUM 80 MILLIGRAM(S): 20 TABLET, DELAYED RELEASE ORAL at 07:45

## 2024-02-12 RX ADMIN — Medication 25 GRAM(S): at 09:34

## 2024-02-12 RX ADMIN — SODIUM CHLORIDE 75 MILLILITER(S): 9 INJECTION INTRAMUSCULAR; INTRAVENOUS; SUBCUTANEOUS at 16:40

## 2024-02-12 RX ADMIN — PANTOPRAZOLE SODIUM 40 MILLIGRAM(S): 20 TABLET, DELAYED RELEASE ORAL at 21:18

## 2024-02-12 NOTE — ED PROVIDER NOTE - NEUROLOGICAL, MLM
Alert and oriented, no focal deficits, no motor or sensory deficits. Gait not assessed on initial exam. Pt able to stand to take pants off in exam room.

## 2024-02-12 NOTE — ED ADULT NURSE NOTE - NSFALLRISKINTERV_ED_ALL_ED

## 2024-02-12 NOTE — H&P ADULT - PROBLEM SELECTOR PLAN 7
Advised to stop drinking, Known h/o pancreatitis, erosive esophagitis. No s/s of ETOH withdrawal.  - SW eval, Folic acid BP was low 93/63, after IV hydration improved to 128/80  - will order Amlodipine at low dose with holding parameters

## 2024-02-12 NOTE — ED PROVIDER NOTE - OBJECTIVE STATEMENT
64yo M with PMH ETOH abuse, hepatic steatosis, pancreatitis, HTN, GERD, GI Bleed presenting with n/v/d and dizziness x 6 days. Reports he fell down half a flight of stairs at home the day symptoms started, injuring b/l lower legs, no head injury and no LOC. Reports persistent n/v and dark colored diarrhea every day and feels room-spinning dizziness. Reports he has had to crawl around his house due to dizziness. Reports he feels CP when he has diarrhea and reports he is "always short of breath." Admits to regular alcohol use, typically tequila but unclear frequency, last drink 5 days ago. Of note, pt with GI bleed in august 2023 with esophagitis on EGD. Denies fevers, HA, vision changes, neck pain, cough, abdominal pain, dysuria, numbness/tingling, focal weakness, blood thinner use. Denies any other injury from fall.

## 2024-02-12 NOTE — ED ADULT NURSE NOTE - CHIEF COMPLAINT QUOTE
dizziness, nausea, vomiting, mid sternal chest pain with no radiation beginning this AM. Pt also endorsing mechanical fall, denies head trauma/LOC

## 2024-02-12 NOTE — H&P ADULT - PROBLEM SELECTOR PLAN 3
Has been noticing black stool with clots. Known erosive esophagitis with bleeding on last EGD  - Hb 12.4, baseline 13  - appreciated GI eval- may need EGD once more stable  - PPI bid

## 2024-02-12 NOTE — H&P ADULT - GASTROINTESTINAL
Epigastric tenderness/soft/nondistended/normal active bowel sounds/no guarding/no organomegaly details…

## 2024-02-12 NOTE — CONSULT NOTE ADULT - SUBJECTIVE AND OBJECTIVE BOX
DATE OF SERVICE: 02-12-24 @ 13:37    CHIEF COMPLAINT:Patient is a 63y old  Male who presents with a chief complaint of chest pain, shortness of breath.    HISTORY OF PRESENT ILLNESS:  62yo M with PMH ETOH abuse, hepatic steatosis, pancreatitis, HTN, GERD, GI Bleed presenting with n/v/d and dizziness x 6 days. Reports he fell down half a flight of stairs at home the day symptoms started, injuring b/l lower legs, no head injury and no LOC. Reports persistent n/v and dark colored diarrhea every day and feels room-spinning dizziness. Reports he has had to crawl around his house due to dizziness. Reports he feels CP when he has diarrhea and reports he is "always short of breath." Admits to regular alcohol use, typically tequila but unclear frequency, last drink 5 days ago. Of note, pt with GI bleed in august 2023 with esophagitis on EGD. Denies fevers, HA, vision changes, neck pain, cough, abdominal pain, dysuria, numbness/tingling, focal weakness, blood thinner use. Denies any other injury from fall.      PAST MEDICAL & SURGICAL HISTORY:  HTN (hypertension)      GERD (gastroesophageal reflux disease)      Hiatal hernia      Acute alcoholic pancreatitis      H/O eye surgery  left      S/P foot surgery, left              MEDICATIONS:                  FAMILY HISTORY:  FH: hypertension    FH: prostate cancer (Father)        Non-contributory    SOCIAL HISTORY:    [- ] Tobacco  [- ] Drugs  [+ ] Alcohol- reports 4-5 drinks per week    Allergies    No Known Allergies    Intolerances    	    REVIEW OF SYSTEMS:  CONSTITUTIONAL: No fever  EYES: No eye pain, visual disturbances, or discharge  ENMT:  No difficulty hearing, tinnitus  NECK: No pain or stiffness  RESPIRATORY: No cough, wheezing,  CARDIOVASCULAR: + chest pain, No palpitations, passing out, + dizziness, or leg swelling  GASTROINTESTINAL:  + nausea, vomiting, No diarrhea or constipation. No melena.  GENITOURINARY: No dysuria, hematuria  NEUROLOGICAL: No stroke like symptoms  SKIN: No burning or lesions   ENDOCRINE: No heat or cold intolerance  MUSCULOSKELETAL: No joint pain or swelling  PSYCHIATRIC: No  anxiety, mood swings  HEME/LYMPH: No bleeding gums  ALLERGY AND IMMUNOLOGIC: No hives or eczema	    All other ROS negative    PHYSICAL EXAM:  T(C): 36.7 (02-12-24 @ 06:53), Max: 36.7 (02-12-24 @ 06:53)  HR: 114 (02-12-24 @ 12:25) (112 - 140)  BP: 128/89 (02-12-24 @ 12:25) (84/58 - 128/89)  RR: 16 (02-12-24 @ 12:25) (16 - 20)  SpO2: 100% (02-12-24 @ 12:25) (99% - 100%)  Wt(kg): --  I&O's Summary      Appearance: Normal	  HEENT:   Normal oral mucosa, EOMI	  Cardiovascular:  S1 S2, No JVD,    Respiratory: Lungs clear to auscultation	  Psychiatry: Alert  Gastrointestinal:  Soft, Non-tender, + BS	  Skin: No rashes   Neurologic: Non-focal  Extremities:  No edema  Vascular: Peripheral pulses palpable    	    	  	  CARDIAC MARKERS:  Labs personally reviewed by me                                  12.4   5.38  )-----------( 122      ( 12 Feb 2024 07:49 )             36.7     02-12    134<L>  |  98  |  55<H>  ----------------------------<  106<H>  3.4<L>   |  19<L>  |  3.46<H>    Ca    9.0      12 Feb 2024 08:53  Phos  1.6     02-12  Mg     1.0     02-12    TPro  7.4  /  Alb  3.9  /  TBili  0.8  /  DBili  x   /  AST  56<H>  /  ALT  27  /  AlkPhos  72  02-12          EKG: Personally reviewed by me - ST with occ PVCs at 136 bpm  Radiology: Personally reviewed by me -     < from: Xray Chest 1 View- PORTABLE-Urgent (Xray Chest 1 View- PORTABLE-Urgent .) (02.12.24 @ 08:29) >  IMPRESSION:    No radiographic evidence of acute cardiopulmonary disease and no change.    < end of copied text >  < from: TTE with Doppler (w/Cont) (11.07.22 @ 15:14) >  Conclusions:  1. Normal left ventricular internal dimensions and wall  thicknesses.  2. Normal left ventricular systolic function.   Endocardial  visualization enhanced with intravenous injection of  Ultrasonic Enhancing Agent (Lumason).  3. Normal right ventricular size and function.  *** No previous Echo exam.  ------------------------------------------------    < end of copied text >  < from: TTE with Doppler (w/Cont) (11.07.22 @ 15:14) >  Conclusions:  1. Normal left ventricular internal dimensions and wall  thicknesses.  2. Normal left ventricular systolic function.   Endocardial  visualization enhanced with intravenous injection of  Ultrasonic Enhancing Agent (Lumason).  3. Normal right ventricular size and function.  *** No previous Echo exam.    Assessment /Plan:     62yo M with PMH ETOH abuse, hepatic steatosis, pancreatitis, HTN, GERD, GI Bleed presenting with n/v/d and dizziness x 6 days. Reports he fell down half a flight of stairs at home the day symptoms started, injuring b/l lower legs, no head injury and no LOC. Reports persistent n/v and dark colored diarrhea every day and feels room-spinning dizziness. Reports he has had to crawl around his house due to dizziness. Reports he feels CP when he has diarrhea and reports he is "always short of breath." Admits to regular alcohol use, typically tequila but unclear frequency, last drink 5 days ago. Of note, pt with GI bleed in august 2023 with esophagitis on EGD. Denies fevers, HA, vision changes, neck pain, cough, abdominal pain, dysuria, numbness/tingling, focal weakness, blood thinner use. Denies any other injury from fall.    Problem/Plan - 1:  ·  Problem: Chest Pain  - ECG non ischemic but ST with PVCs  - Prior TTE from 2022 wnl  - States CP a/w episodes of vomitting  - Would repeat TTE  - Lipase elevated at 188 with hx of alcoholic pancreatitis. Recommend GI consult.  - Recommend repeat TTE  - C/w IVF repletion and correction of electrolyte dyscrasia    Problem/Plan - 2:  ·  Problem: Dizziness.   ·  Plan: c/w IV hydration and electrolye repletion  - Likely associated with limited PO intake and N/V x 4 days    Problem/Plan - 3:  ·  Problem: HTN (hypertension).   ·  Plan: hold losartan given OFELIA    Problem/Plan - 4:  ·  Problem: Sinus Tachycardia  ·  Plan: c/w IV hydration and electrolye repletion  - Likely 2/2 severe dehydartion  - Cont to monitor on tele    Differential diagnosis and plan of care discussed with patient after the evaluation. Counseling on diet, nutritional counseling, weight management, exercise and medication compliance was done.   Advanced care planning/advanced directives discussed with patient/family. DNR status including forceful chest compressions to attempt to restart the heart, ventilator support/artificial breathing, electric shock, artificial nutrition, health care proxy, Molst form all discussed with pt. Pt wishes to consider. More than fifteen minutes spent on discussing advanced directives.     Miladys Pena [facility]-BC  Pablo Marks DO [facility]  Cardiovascular Medicine  [address], [address]  Available for call or text via [employer]  Office [phone]   DATE OF SERVICE: 02-12-24 @ 13:37    CHIEF COMPLAINT:Patient is a 63y old  Male who presents with a chief complaint of chest pain, shortness of breath.    HISTORY OF PRESENT ILLNESS:  62yo M with PMH ETOH abuse, hepatic steatosis, pancreatitis, HTN, GERD, GI Bleed presenting with n/v/d and dizziness x 6 days. Reports he fell down half a flight of stairs at home the day symptoms started, injuring b/l lower legs, no head injury and no LOC. Reports persistent n/v and dark colored diarrhea every day and feels room-spinning dizziness. Reports he has had to crawl around his house due to dizziness. Reports he feels CP when he has diarrhea and reports he is "always short of breath." Admits to regular alcohol use, typically tequila but unclear frequency, last drink 5 days ago. Of note, pt with GI bleed in august 2023 with esophagitis on EGD. Denies fevers, HA, vision changes, neck pain, cough, abdominal pain, dysuria, numbness/tingling, focal weakness, blood thinner use. Denies any other injury from fall.      PAST MEDICAL & SURGICAL HISTORY:  HTN (hypertension)      GERD (gastroesophageal reflux disease)      Hiatal hernia      Acute alcoholic pancreatitis      H/O eye surgery  left      S/P foot surgery, left              MEDICATIONS:                  FAMILY HISTORY:  FH: hypertension    FH: prostate cancer (Father)        Non-contributory    SOCIAL HISTORY:    [- ] Tobacco  [- ] Drugs  [+ ] Alcohol- reports 4-5 drinks per week    Allergies    No Known Allergies    Intolerances    	    REVIEW OF SYSTEMS:  CONSTITUTIONAL: No fever  EYES: No eye pain, visual disturbances, or discharge  ENMT:  No difficulty hearing, tinnitus  NECK: No pain or stiffness  RESPIRATORY: No cough, wheezing,  CARDIOVASCULAR: + chest pain, No palpitations, passing out, + dizziness, or leg swelling  GASTROINTESTINAL:  + nausea, vomiting, No diarrhea or constipation. No melena.  GENITOURINARY: No dysuria, hematuria  NEUROLOGICAL: No stroke like symptoms  SKIN: No burning or lesions   ENDOCRINE: No heat or cold intolerance  MUSCULOSKELETAL: No joint pain or swelling  PSYCHIATRIC: No  anxiety, mood swings  HEME/LYMPH: No bleeding gums  ALLERGY AND IMMUNOLOGIC: No hives or eczema	    All other ROS negative    PHYSICAL EXAM:  T(C): 36.7 (02-12-24 @ 06:53), Max: 36.7 (02-12-24 @ 06:53)  HR: 114 (02-12-24 @ 12:25) (112 - 140)  BP: 128/89 (02-12-24 @ 12:25) (84/58 - 128/89)  RR: 16 (02-12-24 @ 12:25) (16 - 20)  SpO2: 100% (02-12-24 @ 12:25) (99% - 100%)  Wt(kg): --  I&O's Summary      Appearance: Normal	  HEENT:   Normal oral mucosa, EOMI	  Cardiovascular:  S1 S2, No JVD,    Respiratory: Lungs clear to auscultation	  Psychiatry: Alert  Gastrointestinal:  Soft, Non-tender, + BS	  Skin: No rashes   Neurologic: Non-focal  Extremities:  No edema  Vascular: Peripheral pulses palpable    	    	  	  CARDIAC MARKERS:  Labs personally reviewed by me                                  12.4   5.38  )-----------( 122      ( 12 Feb 2024 07:49 )             36.7     02-12    134<L>  |  98  |  55<H>  ----------------------------<  106<H>  3.4<L>   |  19<L>  |  3.46<H>    Ca    9.0      12 Feb 2024 08:53  Phos  1.6     02-12  Mg     1.0     02-12    TPro  7.4  /  Alb  3.9  /  TBili  0.8  /  DBili  x   /  AST  56<H>  /  ALT  27  /  AlkPhos  72  02-12          EKG: Personally reviewed by me - ST with occ PVCs at 136 bpm  Radiology: Personally reviewed by me -     < from: Xray Chest 1 View- PORTABLE-Urgent (Xray Chest 1 View- PORTABLE-Urgent .) (02.12.24 @ 08:29) >  IMPRESSION:    No radiographic evidence of acute cardiopulmonary disease and no change.    < end of copied text >  < from: TTE with Doppler (w/Cont) (11.07.22 @ 15:14) >  Conclusions:  1. Normal left ventricular internal dimensions and wall  thicknesses.  2. Normal left ventricular systolic function.   Endocardial  visualization enhanced with intravenous injection of  Ultrasonic Enhancing Agent (Lumason).  3. Normal right ventricular size and function.  *** No previous Echo exam.  ------------------------------------------------    < end of copied text >  < from: TTE with Doppler (w/Cont) (11.07.22 @ 15:14) >  Conclusions:  1. Normal left ventricular internal dimensions and wall  thicknesses.  2. Normal left ventricular systolic function.   Endocardial  visualization enhanced with intravenous injection of  Ultrasonic Enhancing Agent (Lumason).  3. Normal right ventricular size and function.  *** No previous Echo exam.    Assessment /Plan:     62yo M with PMH ETOH abuse, hepatic steatosis, pancreatitis, HTN, GERD, GI Bleed presenting with n/v/d and dizziness x 6 days. Reports he fell down half a flight of stairs at home the day symptoms started, injuring b/l lower legs, no head injury and no LOC. Reports persistent n/v and dark colored diarrhea every day and feels room-spinning dizziness. Reports he has had to crawl around his house due to dizziness. Reports he feels CP when he has diarrhea and reports he is "always short of breath." Admits to regular alcohol use, typically tequila but unclear frequency, last drink 5 days ago. Of note, pt with GI bleed in august 2023 with esophagitis on EGD. Denies fevers, HA, vision changes, neck pain, cough, abdominal pain, dysuria, numbness/tingling, focal weakness, blood thinner use. Denies any other injury from fall.    Problem/Plan - 1:  ·  Problem: Chest Pain  - ECG non ischemic but ST with PVCs  - Prior TTE from 2022 wnl  - States CP a/w episodes of vomiting  - Would repeat TTE  - Lipase elevated at 188 with hx of alcoholic pancreatitis. Recommend GI consult.  - Recommend repeat TTE  - C/w IVF repletion and correction of electrolyte dyscrasia    Problem/Plan - 2:  ·  Problem: Dizziness.   ·  Plan: c/w IV hydration and electrolye repletion  - Likely associated with limited PO intake and N/V x 4 days    Problem/Plan - 3:  ·  Problem: HTN (hypertension).   ·  Plan: hold losartan given OFELIA    Problem/Plan - 4:  ·  Problem: Sinus Tachycardia  ·  Plan: c/w IV hydration and electrolye repletion  - Likely 2/2 severe dehydartion  - IVF  - Cont to monitor on tele          Differential diagnosis and plan of care discussed with patient after the evaluation. Counseling on diet, nutritional counseling, weight management, exercise and medication compliance was done.   Advanced care planning/advanced directives discussed with patient/family. DNR status including forceful chest compressions to attempt to restart the heart, ventilator support/artificial breathing, electric shock, artificial nutrition, health care proxy, Molst form all discussed with pt. Pt wishes to consider. More than fifteen minutes spent on discussing advanced directives.     Miladys Pena [facility]-BC  Pablo Marks DO [facility]  Cardiovascular Medicine  [address], [address]  Available for call or text via [employer]  Office [phone]

## 2024-02-12 NOTE — H&P ADULT - PROBLEM SELECTOR PLAN 6
BP was low 93/63, after IV hydration improved to 128/80  - will order Amlodipine at low dose with holding parameters Still drinks alcohol, last drink 5 days ago  - SW eval  - c/w Folic acid

## 2024-02-12 NOTE — ED PROVIDER NOTE - NSICDXPASTSURGICALHX_GEN_ALL_CORE_FT
PAST SURGICAL HISTORY:  H/O eye surgery left    S/P foot surgery, left      Spine appears normal, range of motion is not limited, no muscle or joint tenderness

## 2024-02-12 NOTE — H&P ADULT - NEUROLOGICAL
details… AAO x 3, nonfocal/cranial nerves II-XII intact/sensation intact/responds to pain/responds to verbal commands/deep reflexes intact/cranial nerves intact/superficial reflexes intact

## 2024-02-12 NOTE — CONSULT NOTE ADULT - SUBJECTIVE AND OBJECTIVE BOX
HPI:  64yo M with PMH ETOH abuse, hepatic steatosis, pancreatitis, HTN, GERD, GI Bleed presenting with n/v and diarrhea associated with dizziness. reports being in his usual state of health until 1 week ago. Initially started seeing dark stool followed by simultaneous N?V and bad diarrhea. Reports having black loose stool but also reports seeing dark red this morning.  Denies any heavy alcohol use. Did reports  fall down half a flight of stairs at home but no LOC. Reports persistent n/v and dark colored diarrhea every day and feels room-spinning dizziness. Also reported having CP and SOB.      Of note, pt with GI bleed in august 2023 with esophagitis on EGD. Last EGD on 11/03/23 with 4 cm HH with resolution of erosive esophagitis. Normal EndoFLIP. Last colonoscopy was on 11/2022. Normal, non-bleeding internal hemorrhoids.     Allergies:  No Known Allergies        Hospital Medications:      PMHX/PSHX:  HTN (hypertension)    Acid reflux    GERD (gastroesophageal reflux disease)    Hiatal hernia    Acute alcoholic pancreatitis    No significant past surgical history    H/O eye surgery    S/P foot surgery, left        Family history:  No pertinent family history in first degree relatives    FH: hypertension    FH: prostate cancer (Father)        Social History: no smoking    ROS:   General:  No fevers, chills or night sweats  ENT:  No sore throat or dysphagia  CV:  No pain or palpitations  Resp:  No dyspnea, cough or  wheezing  GI:  as above  Skin:  No rash or edema  Neuro: no weakness   Hematologic: no bleeding  Musculoskeletal: no muscle pain or join pain  Psych: no agitation     : no dysuria      PHYSICAL EXAM:   GENERAL:  NAD, Appears stated age  HEENT:  NC/AT,  conjunctivae clear and pink, sclera -anicteric  CHEST:  CTA B/L, Normal effort  HEART:  RRR S1/S2,  ABDOMEN:  Soft, mild tenderness  EXTREMITIES:  No cyanosis or Edema  SKIN:  Warm & Dry. No rash or erythema  NEURO:  Alert, oriented, no focal deficit    Vital Signs:  Vital Signs Last 24 Hrs  T(C): 36.7 (12 Feb 2024 06:53), Max: 36.7 (12 Feb 2024 06:53)  T(F): 98.1 (12 Feb 2024 06:53), Max: 98.1 (12 Feb 2024 06:53)  HR: 114 (12 Feb 2024 12:25) (112 - 140)  BP: 128/89 (12 Feb 2024 12:25) (84/58 - 128/89)  BP(mean): --  RR: 16 (12 Feb 2024 12:25) (16 - 20)  SpO2: 100% (12 Feb 2024 12:25) (99% - 100%)    Parameters below as of 12 Feb 2024 12:25  Patient On (Oxygen Delivery Method): room air      Daily Height in cm: 162.56 (12 Feb 2024 06:53)    Daily     LABS:                        12.4   5.38  )-----------( 122      ( 12 Feb 2024 07:49 )             36.7     Mean Cell Volume: 89.5 fl (02-12-24 @ 07:49)    02-12    134<L>  |  98  |  55<H>  ----------------------------<  106<H>  3.4<L>   |  19<L>  |  3.46<H>    Ca    9.0      12 Feb 2024 08:53  Phos  1.6     02-12  Mg     1.0     02-12    TPro  7.4  /  Alb  3.9  /  TBili  0.8  /  DBili  x   /  AST  56<H>  /  ALT  27  /  AlkPhos  72  02-12    LIVER FUNCTIONS - ( 12 Feb 2024 08:53 )  Alb: 3.9 g/dL / Pro: 7.4 g/dL / ALK PHOS: 72 U/L / ALT: 27 U/L / AST: 56 U/L / GGT: x           PT/INR - ( 12 Feb 2024 07:49 )   PT: 13.0 sec;   INR: 1.25 ratio         PTT - ( 12 Feb 2024 07:49 )  PTT:27.0 sec  Urinalysis Basic - ( 12 Feb 2024 08:53 )    Color: x / Appearance: x / SG: x / pH: x  Gluc: 106 mg/dL / Ketone: x  / Bili: x / Urobili: x   Blood: x / Protein: x / Nitrite: x   Leuk Esterase: x / RBC: x / WBC x   Sq Epi: x / Non Sq Epi: x / Bacteria: x      Amylase Serum--      Lipase faive251       Ammonia--                          12.4   5.38  )-----------( 122      ( 12 Feb 2024 07:49 )             36.7     Imaging:

## 2024-02-12 NOTE — ED ADULT TRIAGE NOTE - CHIEF COMPLAINT QUOTE
Dr. Espinoza  Office [phone]  Cell [phone]  Lyndsey CALL  Cell [phone]      Patient is a 89y old  Male who presents with a chief complaint of Fever (26 Nov 2018 15:58)      Patient seen and examined at bedside. No chest pain/sob    VITALS:  T(F): 97.9 (11-26-18 @ 15:15), Max: 98.3 (11-26-18 @ 05:06)  HR: 75 (11-26-18 @ 15:15)  BP: 127/80 (11-26-18 @ 15:15)  RR: 18 (11-26-18 @ 15:15)  SpO2: 100% (11-26-18 @ 15:15)  Wt(kg): --        PHYSICAL EXAM:  Constitutional: NAD  Neck: No JVD  Respiratory: CTAB, no wheezes, rales or rhonchi  Cardiovascular: S1, S2, RRR  Gastrointestinal: BS+, soft, NT/ND  Extremities: No peripheral edema    Hospital Medications:   MEDICATIONS  (STANDING):  dextrose 5% + sodium chloride 0.9%. 1000 milliLiter(s) (75 mL/Hr) IV Continuous <Continuous>  heparin  Injectable 5000 Unit(s) SubCutaneous every 12 hours  piperacillin/tazobactam IVPB. 3.375 Gram(s) IV Intermittent every 12 hours      LABS:  11-26    145  |  114<H>  |  36<H>  ----------------------------<  81  3.6   |  23  |  2.24<H>    Ca    11.2<H>      26 Nov 2018 08:00  Phos  Test not performed SPECIMEN GROSSLY HEMOLYZED     11-24  Mg     2.1     11-24    TPro  7.8  /  Alb  2.5<L>  /  TBili  1.3<H>  /  DBili      /  AST  24  /  ALT  10  /  AlkPhos  82  11-25    Creatinine Trend: 2.24 <--, 2.54 <--, 2.52 <--                                12.7   8.52  )-----------( 130      ( 26 Nov 2018 08:00 )             39.6     Urine Studies:  Urinalysis - [11-24-18 @ 23:52]      Color YELLOW / Appearance Lt TURBID / SG 1.020 / pH 5.5      Gluc NEGATIVE / Ketone NEGATIVE  / Bili NEGATIVE / Urobili NORMAL       Blood NEGATIVE / Protein 70 / Leuk Est NEGATIVE / Nitrite NEGATIVE      RBC 11-25 / WBC 6-10 / Hyaline 0-2 / Gran  / Sq Epi FEW / Non Sq Epi  / Bacteria NEGATIVE      PTH 10.35 (Ca --)      [08-22-18 @ 06:04]   --  PTH 5.92 (Ca --)      [06-01-18 @ 06:10]   --  Vitamin D (25OH) 42.8      [06-01-18 @ 06:10]  HbA1c 7.3      [08-21-18 @ 06:00]  TSH 1.18      [06-04-18 @ 05:20]        RADIOLOGY & ADDITIONAL STUDIES:
[facility] [place]   MD RUDDY BACH MD RUORU WONG, PA    TEL:  OFFICE: [phone]  DR FRANCES CELL: [phone]  GENET BORREGO CELL: [phone]  DR. VILLALTA CELL: [phone]    RENAL FOLLOW UP NOTE  --------------------------------------------------------------------------------  HPI:      Pt seen and examined at bedside.       PAST HISTORY  --------------------------------------------------------------------------------  No significant changes to PMH, PSH, FHx, SHx, unless otherwise noted    ALLERGIES & MEDICATIONS  --------------------------------------------------------------------------------  Allergies    No Known Allergies    Intolerances      Standing Inpatient Medications  dextrose 5% 1000 milliLiter(s) IV Continuous <Continuous>  heparin  Injectable 5000 Unit(s) SubCutaneous every 12 hours  potassium acid phosphate/sodium acid phosphate tablet (K-PHOS No. 2) 1 Tablet(s) Oral four times a day with meals    PRN Inpatient Medications  acetaminophen   Tablet .. 650 milliGRAM(s) Oral every 8 hours PRN  aspirin Suppository 300 milliGRAM(s) Rectal every 12 hours PRN      REVIEW OF SYSTEMS  --------------------------------------------------------------------------------  General: no fever,  MSK: no edema     VITALS/PHYSICAL EXAM  --------------------------------------------------------------------------------  T(C): 36.4 (11-28-18 @ 04:49), Max: 36.8 (11-27-18 @ 13:38)  HR: 83 (11-28-18 @ 04:49) (78 - 94)  BP: 136/86 (11-28-18 @ 04:49) (114/71 - 137/82)  RR: 17 (11-28-18 @ 04:49) (16 - 18)  SpO2: 99% (11-28-18 @ 04:49) (99% - 100%)  Wt(kg): --        Physical Exam:  	Gen: NAD  	HEENT: MMM  	Pulm: CTA B/L  	CV: S1S2  	Abd: Soft, +BS  	Ext: No LE edema B/L                      Neuro: Awake   	Skin: Warm and Dry   	    LABS/STUDIES  --------------------------------------------------------------------------------              12.6   9.43  >-----------<  157      [11-27-18 @ 06:33]              38.7     148  |  120  |  17  ----------------------------<  85      [11-28-18 @ 07:46]  4.3   |  19  |  1.63        Ca     10.5     [11-28-18 @ 07:46]      Mg     1.6     [11-28-18 @ 07:46]      Phos  1.8     [11-28-18 @ 07:46]            Creatinine Trend:  SCr 1.63 [11-28 @ 07:46]  SCr 1.66 [11-27 @ 21:08]  SCr 1.67 [11-27 @ 19:10]  SCr 1.77 [11-27 @ 09:24]  SCr 1.86 [11-27 @ 06:33]    Urinalysis - [11-24-18 @ 23:52]      Color YELLOW / Appearance Lt TURBID / SG 1.020 / pH 5.5      Gluc NEGATIVE / Ketone NEGATIVE  / Bili NEGATIVE / Urobili NORMAL       Blood NEGATIVE / Protein 70 / Leuk Est NEGATIVE / Nitrite NEGATIVE      RBC 11-25 / WBC 6-10 / Hyaline 0-2 / Gran  / Sq Epi FEW / Non Sq Epi  / Bacteria NEGATIVE      PTH 10.35 (Ca --)      [08-22-18 @ 06:04]   --  PTH 5.92 (Ca --)      [06-01-18 @ 06:10]   --  Vitamin D (25OH) 42.8      [06-01-18 @ 06:10]  HbA1c 6.9      [11-27-18 @ 06:33]  TSH 1.18      [06-04-18 @ 05:20]
MD note-Patient seen by Hospitalist today.Chart and consult reviewed.  will fu from tomorrow.
[facility]   MD RUDDY BACH MD RUORU WONG, PA    TEL:  OFFICE: [phone]  DR FRANCES CELL: [phone]  GENET BORREGO CELL: [phone]  DR. VILLALTA CELL: [phone]    RENAL FOLLOW UP NOTE  --------------------------------------------------------------------------------  HPI:      Pt seen and examined at bedside.       PAST HISTORY  --------------------------------------------------------------------------------  No significant changes to PMH, PSH, FHx, SHx, unless otherwise noted    ALLERGIES & MEDICATIONS  --------------------------------------------------------------------------------  Allergies    No Known Allergies    Intolerances      Standing Inpatient Medications  heparin  Injectable 5000 Unit(s) SubCutaneous every 12 hours  lactated ringers. 1000 milliLiter(s) IV Continuous <Continuous>  piperacillin/tazobactam IVPB. 3.375 Gram(s) IV Intermittent every 12 hours  potassium chloride    Tablet ER 40 milliEquivalent(s) Oral every 4 hours    PRN Inpatient Medications  acetaminophen   Tablet .. 650 milliGRAM(s) Oral every 8 hours PRN  aspirin Suppository 300 milliGRAM(s) Rectal every 12 hours PRN      REVIEW OF SYSTEMS  --------------------------------------------------------------------------------  General: no fever  CVS: no chest pain  MSK: no edema     VITALS/PHYSICAL EXAM  --------------------------------------------------------------------------------  T(C): 36.6 (11-27-18 @ 04:21), Max: 36.6 (11-26-18 @ 15:15)  HR: 79 (11-27-18 @ 04:21) (69 - 79)  BP: 122/72 (11-27-18 @ 04:21) (118/64 - 127/80)  RR: 18 (11-27-18 @ 04:21) (18 - 18)  SpO2: 95% (11-27-18 @ 04:21) (95% - 100%)  Wt(kg): --        Physical Exam:  	Gen: NAD  	HEENT: MMM  	Pulm: CTA B/L  	CV: S1S2  	Abd: Soft, +BS  	Ext: No LE edema B/L                      Neuro: Awake   	Skin: Warm and Dry   	 no daly    LABS/STUDIES  --------------------------------------------------------------------------------              12.6   9.43  >-----------<  157      [11-27-18 @ 06:33]              38.7     148  |  118  |  27  ----------------------------<  115      [11-27-18 @ 09:24]  2.9   |  21  |  1.77        Ca     10.7     [11-27-18 @ 09:24]      Mg     1.7     [11-27-18 @ 09:24]      Phos  1.8     [11-27-18 @ 09:24]            Creatinine Trend:  SCr 1.77 [11-27 @ 09:24]  SCr 1.86 [11-27 @ 06:33]  SCr 2.24 [11-26 @ 08:00]  SCr 2.54 [11-25 @ 03:30]  SCr 2.52 [11-24 @ 22:11]    Urinalysis - [11-24-18 @ 23:52]      Color YELLOW / Appearance Lt TURBID / SG 1.020 / pH 5.5      Gluc NEGATIVE / Ketone NEGATIVE  / Bili NEGATIVE / Urobili NORMAL       Blood NEGATIVE / Protein 70 / Leuk Est NEGATIVE / Nitrite NEGATIVE      RBC 11-25 / WBC 6-10 / Hyaline 0-2 / Gran  / Sq Epi FEW / Non Sq Epi  / Bacteria NEGATIVE      PTH 10.35 (Ca --)      [08-22-18 @ 06:04]   --  PTH 5.92 (Ca --)      [06-01-18 @ 06:10]   --  Vitamin D (25OH) 42.8      [06-01-18 @ 06:10]  HbA1c 6.9      [11-27-18 @ 06:33]  TSH 1.18      [06-04-18 @ 05:20]
SUBJECTIVE / OVERNIGHT EVENTS: pt lethargic , unable to obtain ROS from pt - pts family next to pt      MEDICATIONS  (STANDING):  dextrose 5% + sodium chloride 0.9%. 1000 milliLiter(s) (75 mL/Hr) IV Continuous <Continuous>  heparin  Injectable 5000 Unit(s) SubCutaneous every 12 hours  piperacillin/tazobactam IVPB. 3.375 Gram(s) IV Intermittent every 12 hours    MEDICATIONS  (PRN):  acetaminophen   Tablet .. 650 milliGRAM(s) Oral every 8 hours PRN Temp greater or equal to 38C (100.4F)  aspirin Suppository 300 milliGRAM(s) Rectal every 12 hours PRN Temp over 38.5 C    Vital Signs Last 24 Hrs  T(C): 36.6 (2018 22:46), Max: 36.8 (2018 05:06)  T(F): 97.9 (2018 22:46), Max: 98.3 (2018 05:06)  HR: 69 (2018 22:46) (50 - 75)  BP: 118/64 (2018 22:46) (108/65 - 127/80)  BP(mean): --  RR: 18 (2018 22:46) (18 - 18)  SpO2: 100% (2018 22:46) (97% - 100%)    CAPILLARY BLOOD GLUCOSE      POCT Blood Glucose.: 102 mg/dL (2018 15:10)  POCT Blood Glucose.: 76 mg/dL (2018 07:37)  POCT Blood Glucose.: 90 mg/dL (2018 23:30)    I&O's Summary    unable to obtain ros from pt      PHYSICAL EXAM:    CHEST/LUNG: dec breath sounds at bases   HEART:  S1 , S2 +  ABDOMEN: soft, bs+  EXTREMITIES:  no edema  NEUROLOGY:pt lethargic       LABS:                        12.7   8.52  )-----------( 130      ( 2018 08:00 )             39.6     11-    145  |  114<H>  |  36<H>  ----------------------------<  81  3.6   |  23  |  2.24<H>    Ca    11.2<H>      2018 08:00    TPro  7.8  /  Alb  2.5<L>  /  TBili  1.3<H>  /  DBili  x   /  AST  24  /  ALT  10  /  AlkPhos  82  11-25    PT/INR - ( 2018 03:30 )   PT: 18.0 SEC;   INR: 1.56          PTT - ( 2018 03:30 )  PTT:30.1 SEC      Urinalysis Basic - ( 2018 23:52 )    Color: YELLOW / Appearance: Lt TURBID / S.020 / pH: 5.5  Gluc: NEGATIVE / Ketone: NEGATIVE  / Bili: NEGATIVE / Urobili: NORMAL   Blood: NEGATIVE / Protein: 70 / Nitrite: NEGATIVE   Leuk Esterase: NEGATIVE / RBC: 11-25 / WBC 6-10   Sq Epi: FEW / Non Sq Epi: x / Bacteria: NEGATIVE        RADIOLOGY & ADDITIONAL TESTS:    Imaging Personally Reviewed:    Consultant(s) Notes Reviewed:      Care Discussed with Consultants/Other Providers:
SUBJECTIVE / OVERNIGHT EVENTS: pt lethargic , unable to obtain ROS from pt - pts family next to pt    MEDICATIONS  (STANDING):  heparin  Injectable 5000 Unit(s) SubCutaneous every 12 hours  lactated ringers. 1000 milliLiter(s) (75 mL/Hr) IV Continuous <Continuous>  piperacillin/tazobactam IVPB. 3.375 Gram(s) IV Intermittent every 12 hours    MEDICATIONS  (PRN):  acetaminophen   Tablet .. 650 milliGRAM(s) Oral every 8 hours PRN Temp greater or equal to 38C (100.4F)  aspirin Suppository 300 milliGRAM(s) Rectal every 12 hours PRN Temp over 38.5 C    Vital Signs Last 24 Hrs  T(C): 36.6 (2018 18:05), Max: 36.8 (2018 13:38)  T(F): 97.9 (2018 18:05), Max: 98.2 (2018 13:38)  HR: 90 (2018 22:09) (69 - 94)  BP: 137/82 (2018 22:09) (114/71 - 137/82)  BP(mean): --  RR: 16 (2018 22:09) (16 - 18)  SpO2: 100% (2018 22:09) (95% - 100%)    unable to obtain ros from pt      PHYSICAL EXAM:    CHEST/LUNG: dec breath sounds at bases   HEART:  S1 , S2 +  ABDOMEN: soft, bs+  EXTREMITIES:  no edema  NEUROLOGY:pt lethargic       LABS:      149<H>  |  119<H>  |  20  ----------------------------<  122<H>  3.9   |  21<L>  |  1.66<H>    Ca    10.0      2018 21:08  Phos  2.3       Mg     1.6           Creatinine Trend: 1.66 <--, 1.67 <--, 1.77 <--, 1.86 <--, 2.24 <--, 2.54 <--, 2.52 <--                        12.6   9.43  )-----------( 157      ( 2018 06:33 )             38.7     Urine Studies:  Urinalysis Basic - ( 2018 23:52 )    Color: YELLOW / Appearance: Lt TURBID / S.020 / pH: 5.5  Gluc: NEGATIVE / Ketone: NEGATIVE  / Bili: NEGATIVE / Urobili: NORMAL   Blood: NEGATIVE / Protein: 70 / Nitrite: NEGATIVE   Leuk Esterase: NEGATIVE / RBC: 11-25 / WBC 6-10   Sq Epi: FEW / Non Sq Epi:  / Bacteria: NEGATIVE
dizziness, nausea, vomiting, mid sternal chest pain with no radiation beginning this AM. Pt also endorsing mechanical fall, denies head trauma/LOC

## 2024-02-12 NOTE — H&P ADULT - PROBLEM SELECTOR PLAN 8
Advised to stop drinking, Known h/o pancreatitis, erosive esophagitis. No s/s of ETOH withdrawal.  - SW eval, Folic acid

## 2024-02-12 NOTE — H&P ADULT - ASSESSMENT
The patient is a 63M with h/o ETOH abuse ( last drink 5 days ago), marijuana use, hepatic steatosis, pancreatitis, HTN, bleeding esophagitis came to ED with c/o having n/v and diarrhea with epigastric pain for last 5-6 days. He has been having poor appetite also. Diarrhea was mixed with blood clots and dark stool. He reports  fall down half a flight of stairs at home but no LOC. Now he feels room-spinning dizziness. on and off he feels chest pain and exertional SOB but no fever or recent travel.   In August 2023 with he had acute esophagitis on EGD. Last EGD on 11/03/23 with 4 cm HH with resolution of erosive esophagitis. Last colonoscopy was on 11/2022 was normal, non-bleeding internal hemorrhoids.     In ED BP was 93/63 and after 2L LR bolus /89. Scr 4.1 down to 3.4 ( baseline 0.7). K+ 3.2, Mg 1.0, Phos 1.6, lipase 188.

## 2024-02-12 NOTE — H&P ADULT - HISTORY OF PRESENT ILLNESS
The patient is a 63M with h/o ETOH abuse ( last drink 5 days ago), marijuana use, hepatic steatosis, pancreatitis, HTN, bleeding esophagitis came to ED with c/o having n/v and diarrhea with epigastric pain for last 5-6 days. He has been having poor appetite also. Diarrhea was mixed with blood clots and dark stool. He reports  fall down half a flight of stairs at home but no LOC. Now he feels room-spinning dizziness. on and off he feels chest pain and exertional SOB but no fever or recent travel.     In August 2023 with he had acute esophagitis on EGD. Last EGD on 11/03/23 with 4 cm HH with resolution of erosive esophagitis. Last colonoscopy was on 11/2022 was normal, non-bleeding internal hemorrhoids.

## 2024-02-12 NOTE — H&P ADULT - PROBLEM SELECTOR PLAN 2
Multifactorial- alcohol use, marijuana use, pancreatitis, esophagitis or viral GE  - IV hydration, PPI, antiemetics  - clears diet  - Lipase 188, CT abd/pelvis ordered

## 2024-02-12 NOTE — H&P ADULT - PROBLEM SELECTOR PLAN 5
Still drinks alcohol, last drink 5 days ago  - SW eval  - c/w Folic acid Low K 3.2, Mg & Phos low due to diarrhea, N/V  - all supplemented  - BMP, Phos Mg in am

## 2024-02-12 NOTE — ED PROVIDER NOTE - CARE PLAN
Principal Discharge DX:	Acute renal failure  Secondary Diagnosis:	Gastroenteritis  Secondary Diagnosis:	Near syncope   1

## 2024-02-12 NOTE — ED ADULT NURSE NOTE - OBJECTIVE STATEMENT
63 year 63 year old male pt presented to the ED co lightheadedness dizziness nausea vomiting and diarrhea, pt is ambulatory A&Ox3 states increasing SOB and chest pain intermittently with bouts of diarrhea, abd soft tender to mid abd lung field cta, GI Bleed presenting with n/v/d and dizziness x 6 days. Reports he fell down half a flight of stairs at home the day symptoms started, injuring b/l lower legs, no head injury and no LOC. dark colored diarrhea every day and feels room-spinning dizziness. Reports he has had to crawl around his house due to dizziness. Admits to regular alcohol use, typically tequila but unclear frequency, last drink 5 days ago. Denies fevers, HA, vision changes, neck pain, cough, abdominal pain, dysuria, numbness/tingling, focal weakness, blood thinner use. Denies any other injury from fall.

## 2024-02-12 NOTE — H&P ADULT - PROBLEM SELECTOR PLAN 1
Prerenal ATN due to N/V, diarrhea and low PO intake  Scr 4.1 ( baseline 0.7)  - s/p 2L IV LR, c/w IVF with NS @75ml/hr  - Renal US ordered  - Hold Losartan and PPI  - House Renal consult called  - Daily BMP Prerenal ATN due to N/V, diarrhea and low PO intake  Scr 4.1 ( baseline 0.7)  - s/p 2L IV LR, c/w IVF with NS @75ml/hr  - Renal US ordered  - Hold Losartan   - House Renal consult called  - Daily BMP

## 2024-02-12 NOTE — ED PROVIDER NOTE - CLINICAL SUMMARY MEDICAL DECISION MAKING FREE TEXT BOX
Dr. Weaver (Attending Physician)  63 year old male with ho erosive esophagitis, alcoholic pancreatitis pw 5 days of vomiting, diarrhea, lightheadedness, near syncope. Fell down a few steps with ant tibial pain bilateral legs. Denies chest pain, shortness of breath, abd. pain, vertigo, vision changes.    CN 2-12 intact. Strength 5/5 throughout. SILT throughout. Normal finger-to-nose. No pronator drift. Normal gait.  Abdomen soft, nontender, nondistended, no McBurney's point tenderness, negative Blackwood's, no CVAT.    DDx: Gastroenteritis, colitis, kirt, ACS, arrhythmia, dehydration, Sinus tachy to 130s with pvc's concern for electrolyte abnormalities. Since abdomen nontender will not CT scan. Send flu/covid, stool studies. Admit to tele.

## 2024-02-13 ENCOUNTER — RESULT REVIEW (OUTPATIENT)
Age: 64
End: 2024-02-13

## 2024-02-13 DIAGNOSIS — E87.8 OTHER DISORDERS OF ELECTROLYTE AND FLUID BALANCE, NOT ELSEWHERE CLASSIFIED: ICD-10-CM

## 2024-02-13 LAB
ANION GAP SERPL CALC-SCNC: 15 MMOL/L — SIGNIFICANT CHANGE UP (ref 5–17)
BUN SERPL-MCNC: 51 MG/DL — HIGH (ref 7–23)
CALCIUM SERPL-MCNC: 9 MG/DL — SIGNIFICANT CHANGE UP (ref 8.4–10.5)
CHLORIDE SERPL-SCNC: 107 MMOL/L — SIGNIFICANT CHANGE UP (ref 96–108)
CO2 SERPL-SCNC: 21 MMOL/L — LOW (ref 22–31)
CREAT SERPL-MCNC: 1.87 MG/DL — HIGH (ref 0.5–1.3)
CULTURE RESULTS: SIGNIFICANT CHANGE UP
EGFR: 40 ML/MIN/1.73M2 — LOW
GLUCOSE SERPL-MCNC: 110 MG/DL — HIGH (ref 70–99)
HCT VFR BLD CALC: 31.6 % — LOW (ref 39–50)
HGB BLD-MCNC: 10.6 G/DL — LOW (ref 13–17)
LIDOCAIN IGE QN: 132 U/L — HIGH (ref 7–60)
MAGNESIUM SERPL-MCNC: 1.5 MG/DL — LOW (ref 1.6–2.6)
MCHC RBC-ENTMCNC: 29.8 PG — SIGNIFICANT CHANGE UP (ref 27–34)
MCHC RBC-ENTMCNC: 33.5 GM/DL — SIGNIFICANT CHANGE UP (ref 32–36)
MCV RBC AUTO: 88.8 FL — SIGNIFICANT CHANGE UP (ref 80–100)
NRBC # BLD: 0 /100 WBCS — SIGNIFICANT CHANGE UP (ref 0–0)
PHOSPHATE SERPL-MCNC: 2.3 MG/DL — LOW (ref 2.5–4.5)
PLATELET # BLD AUTO: 95 K/UL — LOW (ref 150–400)
POTASSIUM SERPL-MCNC: 3.3 MMOL/L — LOW (ref 3.5–5.3)
POTASSIUM SERPL-SCNC: 3.3 MMOL/L — LOW (ref 3.5–5.3)
RBC # BLD: 3.56 M/UL — LOW (ref 4.2–5.8)
RBC # FLD: 14.1 % — SIGNIFICANT CHANGE UP (ref 10.3–14.5)
SODIUM SERPL-SCNC: 143 MMOL/L — SIGNIFICANT CHANGE UP (ref 135–145)
SPECIMEN SOURCE: SIGNIFICANT CHANGE UP
WBC # BLD: 3.31 K/UL — LOW (ref 3.8–10.5)
WBC # FLD AUTO: 3.31 K/UL — LOW (ref 3.8–10.5)

## 2024-02-13 PROCEDURE — 43235 EGD DIAGNOSTIC BRUSH WASH: CPT | Mod: GC

## 2024-02-13 PROCEDURE — 76775 US EXAM ABDO BACK WALL LIM: CPT | Mod: 26

## 2024-02-13 PROCEDURE — 99223 1ST HOSP IP/OBS HIGH 75: CPT | Mod: GC

## 2024-02-13 PROCEDURE — 93306 TTE W/DOPPLER COMPLETE: CPT | Mod: 26

## 2024-02-13 RX ORDER — NYSTATIN 500MM UNIT
500000 POWDER (EA) MISCELLANEOUS
Refills: 0 | Status: DISCONTINUED | OUTPATIENT
Start: 2024-02-13 | End: 2024-02-16

## 2024-02-13 RX ORDER — POTASSIUM CHLORIDE 20 MEQ
40 PACKET (EA) ORAL EVERY 4 HOURS
Refills: 0 | Status: COMPLETED | OUTPATIENT
Start: 2024-02-13 | End: 2024-02-13

## 2024-02-13 RX ORDER — MAGNESIUM SULFATE 500 MG/ML
2 VIAL (ML) INJECTION ONCE
Refills: 0 | Status: COMPLETED | OUTPATIENT
Start: 2024-02-13 | End: 2024-02-13

## 2024-02-13 RX ORDER — SODIUM,POTASSIUM PHOSPHATES 278-250MG
1 POWDER IN PACKET (EA) ORAL ONCE
Refills: 0 | Status: COMPLETED | OUTPATIENT
Start: 2024-02-13 | End: 2024-02-13

## 2024-02-13 RX ORDER — CHLORHEXIDINE GLUCONATE 213 G/1000ML
1 SOLUTION TOPICAL
Refills: 0 | Status: DISCONTINUED | OUTPATIENT
Start: 2024-02-13 | End: 2024-02-16

## 2024-02-13 RX ADMIN — Medication 40 MILLIEQUIVALENT(S): at 21:13

## 2024-02-13 RX ADMIN — Medication 1 MILLIGRAM(S): at 17:15

## 2024-02-13 RX ADMIN — Medication 500000 UNIT(S): at 17:13

## 2024-02-13 RX ADMIN — AMLODIPINE BESYLATE 5 MILLIGRAM(S): 2.5 TABLET ORAL at 05:22

## 2024-02-13 RX ADMIN — Medication 25 GRAM(S): at 12:24

## 2024-02-13 RX ADMIN — SODIUM CHLORIDE 75 MILLILITER(S): 9 INJECTION INTRAMUSCULAR; INTRAVENOUS; SUBCUTANEOUS at 05:23

## 2024-02-13 RX ADMIN — PANTOPRAZOLE SODIUM 40 MILLIGRAM(S): 20 TABLET, DELAYED RELEASE ORAL at 05:22

## 2024-02-13 RX ADMIN — Medication 1 PACKET(S): at 17:15

## 2024-02-13 RX ADMIN — PANTOPRAZOLE SODIUM 40 MILLIGRAM(S): 20 TABLET, DELAYED RELEASE ORAL at 17:15

## 2024-02-13 RX ADMIN — Medication 40 MILLIEQUIVALENT(S): at 17:14

## 2024-02-13 NOTE — PATIENT PROFILE ADULT - FALL HARM RISK - FALL HARM RISK
From: Sukhwinder Iglesias  To: Vinny Soria MD  Sent: 6/21/2020 8:30 AM CDT  Subject: Test Results Question    Good day doctor. I have an upcoming physical with Dr. Perez Warren. She has assigned me blood-work. I know you like to have a blood draw when we meet. Other

## 2024-02-13 NOTE — PHYSICAL THERAPY INITIAL EVALUATION ADULT - PERTINENT HX OF CURRENT PROBLEM, REHAB EVAL
63 y/oM admitted 2/12 with c/o n/v and diarrhea with epigastric pain for last 5-6 days. +OFELIA. He has been having poor appetite also. Diarrhea was mixed with blood clots and dark stool. He reports  fall down half a flight of stairs at home but no LOC. As per H&P, he feels room-spinning dizziness. Intermittent chest pain and exertional SOB. In August 2023 with he had acute esophagitis on EGD. Last EGD on 11/03/23 with 4 cm HH with resolution of erosive esophagitis. Last colonoscopy was on 11/2022 was normal, non-bleeding internal hemorrhoids. As per nephrology consult, OFELIA  likely in setting of hypovolemia from lack of po intake and questionable GI bleed/diarrhea. On admission, Scr of 4.10. Baseline Scr of 0.64 on 8/29/23, no labs in the interim. PMH ETOH abuse ( last drink 5 days ago), marijuana use, hepatic steatosis, pancreatitis, HTN, bleeding esophagitis.

## 2024-02-13 NOTE — CONSULT NOTE ADULT - ATTENDING SUPERVISION STATEMENT
S: Feeling better but still having nausea, states she is feeling much better and trying to eat more regularly.  Baby active.  Denies uterine cramping, vaginal bleeding or leaking of fluid  O: Vitals: /62 (BP Location: Right arm, Cuff Size: Adult Regular)   Wt 73.6 kg (162 lb 3.2 oz)   LMP 11/29/2021 (Within Weeks)   Breastfeeding No   BMI 30.65 kg/m    BMI= Body mass index is 30.65 kg/m .  Exam:  Constitutional: healthy, alert and no distress  Respiratory: respirations even and unlabored  Gastrointestinal: Abdomen soft, non-tender. Fundus measures appropriate for gestational age. Fetal heart tones hear without difficulty and within normal limits  : deferred  Psychiatric: mentation appears normal and affect normal/bright  A:     ICD-10-CM    1. Encounter for supervision of other normal pregnancy, third trimester  Z34.83      P: Discussed preparation for baby  Encouraged patient to call with any questions or concerns.  Return to clinic 2 weeks    WAN Eisenberg CNM                  
Fellow
Fellow

## 2024-02-13 NOTE — PATIENT PROFILE ADULT - NSPRESCRALCSIXMORE_GEN_A_NUR
Met with patient's family to discuss goals of care. Medical update provided by Dr. Vallecillo. Family understands patient is critically ill with multiorgan failure. Dr. Vallecillo explained that pt is requiring IV pressors and CRRT at this time and chances of meaningful recovery are minimal. We discussed options including continue all interventions vs transitioning care to symptom directed. Encouraged family to focus on what patient would find a meaningful/acceptable quality. Patient's wife requesting to continue treatment for now and to have f/u GOC discussion on Wednesday. All questions answered and emotional support provided.    DNR established prior.
Never

## 2024-02-13 NOTE — PATIENT PROFILE ADULT - FALL HARM RISK - HARM RISK INTERVENTIONS

## 2024-02-13 NOTE — PROVIDER CONTACT NOTE (OTHER) - SITUATION
Pt on clear liquid diet, refusing to eat only clears. Education was provided on the reason for clear liquid diet.

## 2024-02-13 NOTE — PRE-ANESTHESIA EVALUATION ADULT - NSANTHDIETYNSD_GEN_ALL_CORE
PROBLEM LIST INCLUDES:  1. Dysphagia    Sam is a 83 year old male with a known history of diabetes, atrial fibrillation, congestive heart failure, pulmonary hypertension, and gallstone pancreatitis who is presenting in consult at the request of Dr. Medrano for complaints of dysphagia. Most recent ECHO revealed LVEF 35%. Continues to be followed by pulmonology for a chronic infection per his report. Has been followed by GI at [facility], however trying to switch over all of his care to [place]. States dysphagia has worsened over the past few months to where he has problems swallowing most all solid foods. He needs to make multiple swallow attempts to get the food down. He reported undergoing a CT in May which revealed polyps in his esophagus and spots on his pancreas per patient report. He underwent a swallow study which revealed penetration. No report available. Denies reflux symptoms. Has not been taking acid reducers.     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    bypass   • Atrial fibrillation (CMS/HCC)     chronic, anticoagulated   • Chronic pain    • Congestive Heart Failure    • COPD (chronic obstructive pulmonary disease) (CMS/HCC)     O2 at night 2L   • Coronary Artery Disease 1989    CABG   • Degenerative joint disease    • Diabetes mellitus (CMS/HCC)     borderline only per pt   • Fracture    • Gallstone pancreatitis 10/2010   • Gout 8/2010   • Hypertension    • Mitral valve regurgitation     sp clipping procedure 8/14/09   • Myocardial infarction (CMS/HCC) 1989   • Pulmonary hypertension (CMS/HCC) 2012    severe   • Use of cane as ambulatory aid 2018   • Ventricular tachycardia, nonsustained (CMS/HCC)     during a hospitalization for CHF   • Wears dentures 2018    LOWERS   • Wears glasses          ALLERGIES:  No Known Allergies      Current Outpatient Prescriptions   Medication Sig  Dispense Refill   • torsemide (DEMADEX) 20 MG tablet Take 2 tablets by mouth daily. 90 tablet 0   • warfarin (COUMADIN) 5 MG tablet Take 2.5 mg by mouth 3 days a week. Take 1/2 tablet (=2.5mg) on Monday,Tuesday and Friday     • atorvastatin (LIPITOR) 40 MG tablet Take 40 mg by mouth nightly.     • allopurinol (ZYLOPRIM) 300 MG tablet Take 300 mg by mouth daily.      • potassium chloride (K-DUR,KLOR-CON) 20 MEQ CR tablet TAKE 2 TABLETS IN THE  MORNING AND 1 TABLET IN THE EVENING 270 tablet 3   • cholecalciferol (VITAMIN D3) 1000 UNITS tablet Take 1,000 Units by mouth 2 times daily.     • tiotropium (SPIRIVA HANDIHALER) 18 MCG inhalation capsule Place 18 mcg into inhaler and inhale 2 times daily.  30 capsule 12   • metFORMIN (GLUCOPHAGE) 500 MG tablet Take 500 mg by mouth 2 times daily (with meals).       • budesonide-formoterol (SYMBICORT) 160-4.5 MCG/ACT inhaler 2 puffs 2 times daily.      • COQ-10 150 MG PO CAPS 50mg in am. 150mg at hs 0 0   • MULTIVITAMINS PO TABS 1 TABLET DAILY 0 0   • CALCIUM & MAGNESIUM CARBONATES 311-232 MG PO CAPS one tablet daily 0 0   • metoPROLOL succinate (TOPROL-XL) 50 MG 24 hr tablet Take 1 tablet by mouth daily. 180 tablet 3   • warfarin (COUMADIN) 5 MG tablet Take 5 mg by mouth daily. Takes 1 Tablet (=5mg) on Sunday, Wednesday, Thursday and friday     • Amylase-Lipase-Protease (PANCRELIPASE PO) Take 36,000 Units by mouth 3 times daily.       Current Facility-Administered Medications   Medication Dose Route Frequency Provider Last Rate Last Dose   • cefTRIAXone (ROCEPHIN) injection 2 g  2 g Intravenous Once ROQUE Gustafson             Past Surgical History:   Procedure Laterality Date   • Cardiac catherization  2009    patent grafts   • Cardiac catherization  1989    CAD, CABG needed   • Cardiac defibrillator placement     • Cardiac surg procedure unlist  8/14/09    perc. repair mitral valve under Realism study prot. using [employer] MitraClip system.   • Cardiac surgery  1989     CABG   • Carpal tunnel release Right 2014   • Echocardiogram  9/2013    EF 35%   • Echocardiogram  05/2018    severe PHTN, EF 35%   • Fracture surgery  2016    L hip surgery   • Icd generator change Left 01/31/2017    Medtronic device, per Dr. Garcia   • Mitral valve repair  2012    mitral clip procedure   • Removal gallbladder  10/26/2010    Cholecystectomy (gallbladder)   • Right and left heart cath  05/16/2018       SOCIAL HISTORY:  Social History   Substance Use Topics   • Smoking status: Former Smoker     Packs/day: 0.50     Years: 50.00     Types: Cigarettes     Quit date: 1/12/2009   • Smokeless tobacco: Never Used   • Alcohol use No      Comment: rare         REVIEW OF SYSTEMS:  Review of Systems   Constitutional: Positive for malaise/fatigue and weight loss. Negative for chills, diaphoresis and fever.   Gastrointestinal: Positive for melena. Negative for abdominal pain, blood in stool, constipation, diarrhea, heartburn, nausea and vomiting.   Neurological: Positive for weakness.           PHYSICAL EXAMINATION:  Vitals:    07/12/18 1005   BP: 109/57   Pulse: 100     GENERAL:  Patient is alert, in no acute distress.  SKIN:  No open areas or rash noted.  HEENT:  Sclerae are nonicteric.  LUNGS: Diminished throughout   CORONARY: Irregular rhythm. S1, S2 present   ABDOMEN: Positive bowel sounds, soft, non-tender    ASSESSMENT AND PLAN:  Dysphagia- Patient with dysphagia that is worse with solid foods. Multiple chronic problems. Not a candidate for EGD at this time with pulmonary hypertension, EF of 35%. Underwent a recent swallow study which revealed penetration. Will attempt to get records. Advised to continue to work with speech pathology and use artificial saliva which may aid in swallowing.     Noemi Lee [facility]-BC. Scribed for Dr. Cazares. Seen in conjunction with Dr. TIFFANY Cazares MD.    Yes

## 2024-02-13 NOTE — CONSULT NOTE ADULT - PROBLEM SELECTOR RECOMMENDATION 9
Pt here with OFELIA likely in setting of hypovolemia from lack of po intake and questionable GI bleed/diarrhea. On admission, Scr of 4.10. Baseline Scr of 0.64 on 8/29/23, no labs in the interim. Pt with hypotension on admission with BP of 93/63. No IV Contrast given. Scr improved to 1.86 today with IVF. BP has improved. Avoid nephrotoxic agents. Dose meds per eGFR.    Please obtain UA, Upr/cr, Ulytes.

## 2024-02-13 NOTE — PRE PROCEDURE NOTE - PRE PROCEDURE EVALUATION
Attending Physician:  Dr Hancock                           Procedure: EGD    Indication for Procedure: GIB   ________________________________________________________  PAST MEDICAL & SURGICAL HISTORY:  HTN (hypertension)      GERD (gastroesophageal reflux disease)      Hiatal hernia      Acute alcoholic pancreatitis      H/O eye surgery  left      S/P foot surgery, left        ALLERGIES:  No Known Allergies    HOME MEDICATIONS:  meclizine 12.5 mg oral tablet: 1 tab(s) orally 3 times a day AS NEEDED  Vitamin D2 1.25 mg (50,000 intl units) oral capsule: 1 cap(s) orally once a week    AICD/PPM: [ ] yes   [X] no    PERTINENT LAB DATA:                        10.6   3.31  )-----------( 95       ( 13 Feb 2024 05:54 )             31.6     02-13    143  |  107  |  51<H>  ----------------------------<  110<H>  3.3<L>   |  21<L>  |  1.87<H>    Ca    9.0      13 Feb 2024 05:53  Phos  2.3     02-13  Mg     1.5     02-13    TPro  7.4  /  Alb  3.9  /  TBili  0.8  /  DBili  x   /  AST  56<H>  /  ALT  27  /  AlkPhos  72  02-12    PT/INR - ( 12 Feb 2024 07:49 )   PT: 13.0 sec;   INR: 1.25 ratio         PTT - ( 12 Feb 2024 07:49 )  PTT:27.0 sec            PHYSICAL EXAMINATION:    T(C): 36.6  HR: 105  BP: 130/88  RR: 18  SpO2: 100%    Constitutional: NAD    Neck:  No JVD  Respiratory: no respiratory distress   Cardiovascular: rrr  Extremities: No peripheral edema  Neurological: A/O x 4, no focal deficits        COMMENTS:    The patient is a suitable candidate for the planned procedure unless box checked [ ]  No, explain:

## 2024-02-13 NOTE — CONSULT NOTE ADULT - PROBLEM SELECTOR RECOMMENDATION 2
Hypokalemia- likely from diarrhea. Please replete with at least 60meq  Hypomag- Please replete with 2g of IV mag    If you have any questions, please feel free to contact me  Brian Pacheco  Nephrology Fellow  [phone]; Prefer [employer] TEAMS  (After 5pm or on weekends please page the on-call fellow).

## 2024-02-13 NOTE — PROGRESS NOTE ADULT - SUBJECTIVE AND OBJECTIVE BOX
DATE OF SERVICE: 02-13-24 @ 13:08    Patient is a 63y old  Male who presents with a chief complaint of Nausea, vomiting, diarrhea, poor appetite (13 Feb 2024 09:49)      INTERVAL HISTORY: Feels ok.     REVIEW OF SYSTEMS:  CONSTITUTIONAL: No weakness  EYES/ENT: No visual changes;  No throat pain   NECK: No pain or stiffness  RESPIRATORY: No cough, wheezing; No shortness of breath  CARDIOVASCULAR: No chest pain or palpitations  GASTROINTESTINAL: No abdominal  pain. No nausea, vomiting, or hematemesis  GENITOURINARY: No dysuria, frequency or hematuria  NEUROLOGICAL: No stroke like symptoms  SKIN: No rashes    TELEMETRY Personally reviewed: SR 80-90s  	  MEDICATIONS:  amLODIPine   Tablet 5 milliGRAM(s) Oral daily        PHYSICAL EXAM:  T(C): 36.6 (02-13-24 @ 05:02), Max: 36.8 (02-12-24 @ 20:00)  HR: 105 (02-13-24 @ 11:49) (94 - 109)  BP: 130/88 (02-13-24 @ 11:44) (104/69 - 130/88)  RR: 18 (02-13-24 @ 05:02) (18 - 18)  SpO2: 100% (02-13-24 @ 11:49) (98% - 100%)  Wt(kg): --  I&O's Summary    12 Feb 2024 07:01  -  13 Feb 2024 07:00  --------------------------------------------------------  IN: 900 mL / OUT: 351 mL / NET: 549 mL    13 Feb 2024 07:01  -  13 Feb 2024 13:08  --------------------------------------------------------  IN: 0 mL / OUT: 700 mL / NET: -700 mL          Appearance: In no distress	  HEENT:    PERRL, EOMI	  Cardiovascular:  S1 S2, No JVD  Respiratory: Lungs clear to auscultation	  Gastrointestinal:  Soft, Non-tender, + BS	  Vascularature:  No edema of LE  Psychiatric: Appropriate affect   Neuro: no acute focal deficits                               10.6   3.31  )-----------( 95       ( 13 Feb 2024 05:54 )             31.6     02-13    143  |  107  |  51<H>  ----------------------------<  110<H>  3.3<L>   |  21<L>  |  1.87<H>    Ca    9.0      13 Feb 2024 05:53  Phos  2.3     02-13  Mg     1.5     02-13    TPro  7.4  /  Alb  3.9  /  TBili  0.8  /  DBili  x   /  AST  56<H>  /  ALT  27  /  AlkPhos  72  02-12        Labs personally reviewed  < from: TTE W or WO Ultrasound Enhancing Agent (02.13.24 @ 09:58) >      1. Left ventricular cavity is normal. Left ventricular wall thickness is normal. Left ventricular systolic function is severely decreased with an ejection fraction of 30 % by Green's method of disks. Global left ventricular hypokinesis.   2. There is mild (grade 1) left ventricular diastolic dysfunction, with normal filling pressure.   3. Mildly enlarged right ventricular cavity size, wall thickness, and reduced systolic function. Tricuspid annular plane systolic excursion (TAPSE) is 1.3 cm (normal >=1.7 cm).   4. Normal left and right atrial size.   5. No significant valvular disease.   6. No pericardial effusion seen.   7. Compared to the transthoracic echocardiogram performed on 11/7/2022, there has been a deline to LV/RV systolic function.   8. There is no evidence of a left ventricular thrombus.      ASSESSMENT/PLAN: 	      64yo M with PMH ETOH abuse, hepatic steatosis, pancreatitis, HTN, GERD, GI Bleed presenting with n/v/d and dizziness x 6 days. Reports he fell down half a flight of stairs at home the day symptoms started, injuring b/l lower legs, no head injury and no LOC. Reports persistent n/v and dark colored diarrhea every day and feels room-spinning dizziness. Reports he has had to crawl around his house due to dizziness. Reports he feels CP when he has diarrhea and reports he is "always short of breath." Admits to regular alcohol use, typically tequila but unclear frequency, last drink 5 days ago. Of note, pt with GI bleed in august 2023 with esophagitis on EGD. Denies fevers, HA, vision changes, neck pain, cough, abdominal pain, dysuria, numbness/tingling, focal weakness, blood thinner use. Denies any other injury from fall.    Problem/Plan - 1:  ·  Problem: Chest Pain  - ECG non ischemic but ST with PVCs  - Prior TTE from 2022 wnl  - States CP a/w episodes of vomiting  - Lipase elevated at 188 with hx of alcoholic pancreatitis. GI consult appreciated.   - TTE shows newly reduced EF 30%, gloabl LV hypokinesis, grade I DD, reduced RV systolic function  - C/w IVF repletion and correction of electrolyte dyscrasia  - Will plan for cath to r/i ischemia    Problem/Plan - 2:  ·  Problem: Dizziness.   ·  Plan: c/w IV hydration and electrolye repletion  - Likely associated with limited PO intake and N/V x 4 days  - Dizziness now improved    Problem/Plan - 3:  ·  Problem: HTN (hypertension).   ·  Plan: hold losartan given OFELIA  - Will need sHF GDMT    Problem/Plan - 4:  ·  Problem: Sinus Tachycardia  ·  Plan: c/w IV hydration and electrolye repletion  - Likely 2/2 severe dehydartion  - Cont to monitor on tele  - Improved with IVF    Problem/Plan - 4:  ·  Problem: Heart Failure with Reduced Ejection Fraction  ·  Plan: Cath r/o Ischemia; sHF GDMT  - Will start Toprol 25mg PO daily   - Plan for initiation of Arb vs ARNI when OFELIA resolved  - Cath when OFELIA resolves  - Currently euvolemic      Miladys Pena, IRASEMA-NP   Pablo Marks DO [facility]  Cardiovascular Medicine  [address], [address]  Available through call or text on [employer] TEAMs  Office: [phone]   DATE OF SERVICE: 02-13-24 @ 13:08    Patient is a 63y old  Male who presents with a chief complaint of Nausea, vomiting, diarrhea, poor appetite (13 Feb 2024 09:49)      INTERVAL HISTORY: Feels ok.     REVIEW OF SYSTEMS:  CONSTITUTIONAL: No weakness  EYES/ENT: No visual changes;  No throat pain   NECK: No pain or stiffness  RESPIRATORY: No cough, wheezing; No shortness of breath  CARDIOVASCULAR: No chest pain or palpitations  GASTROINTESTINAL: No abdominal  pain. No nausea, vomiting, or hematemesis  GENITOURINARY: No dysuria, frequency or hematuria  NEUROLOGICAL: No stroke like symptoms  SKIN: No rashes    TELEMETRY Personally reviewed: SR 80-90s  	  MEDICATIONS:  amLODIPine   Tablet 5 milliGRAM(s) Oral daily        PHYSICAL EXAM:  T(C): 36.6 (02-13-24 @ 05:02), Max: 36.8 (02-12-24 @ 20:00)  HR: 105 (02-13-24 @ 11:49) (94 - 109)  BP: 130/88 (02-13-24 @ 11:44) (104/69 - 130/88)  RR: 18 (02-13-24 @ 05:02) (18 - 18)  SpO2: 100% (02-13-24 @ 11:49) (98% - 100%)  Wt(kg): --  I&O's Summary    12 Feb 2024 07:01  -  13 Feb 2024 07:00  --------------------------------------------------------  IN: 900 mL / OUT: 351 mL / NET: 549 mL    13 Feb 2024 07:01  -  13 Feb 2024 13:08  --------------------------------------------------------  IN: 0 mL / OUT: 700 mL / NET: -700 mL          Appearance: In no distress	  HEENT:    PERRL, EOMI	  Cardiovascular:  S1 S2, No JVD  Respiratory: Lungs clear to auscultation	  Gastrointestinal:  Soft, Non-tender, + BS	  Vascularature:  No edema of LE  Psychiatric: Appropriate affect   Neuro: no acute focal deficits                               10.6   3.31  )-----------( 95       ( 13 Feb 2024 05:54 )             31.6     02-13    143  |  107  |  51<H>  ----------------------------<  110<H>  3.3<L>   |  21<L>  |  1.87<H>    Ca    9.0      13 Feb 2024 05:53  Phos  2.3     02-13  Mg     1.5     02-13    TPro  7.4  /  Alb  3.9  /  TBili  0.8  /  DBili  x   /  AST  56<H>  /  ALT  27  /  AlkPhos  72  02-12        Labs personally reviewed  < from: TTE W or WO Ultrasound Enhancing Agent (02.13.24 @ 09:58) >      1. Left ventricular cavity is normal. Left ventricular wall thickness is normal. Left ventricular systolic function is severely decreased with an ejection fraction of 30 % by Green's method of disks. Global left ventricular hypokinesis.   2. There is mild (grade 1) left ventricular diastolic dysfunction, with normal filling pressure.   3. Mildly enlarged right ventricular cavity size, wall thickness, and reduced systolic function. Tricuspid annular plane systolic excursion (TAPSE) is 1.3 cm (normal >=1.7 cm).   4. Normal left and right atrial size.   5. No significant valvular disease.   6. No pericardial effusion seen.   7. Compared to the transthoracic echocardiogram performed on 11/7/2022, there has been a deline to LV/RV systolic function.   8. There is no evidence of a left ventricular thrombus.      ASSESSMENT/PLAN: 	      64yo M with PMH ETOH abuse, hepatic steatosis, pancreatitis, HTN, GERD, GI Bleed presenting with n/v/d and dizziness x 6 days. Reports he fell down half a flight of stairs at home the day symptoms started, injuring b/l lower legs, no head injury and no LOC. Reports persistent n/v and dark colored diarrhea every day and feels room-spinning dizziness. Reports he has had to crawl around his house due to dizziness. Reports he feels CP when he has diarrhea and reports he is "always short of breath." Admits to regular alcohol use, typically tequila but unclear frequency, last drink 5 days ago. Of note, pt with GI bleed in august 2023 with esophagitis on EGD. Denies fevers, HA, vision changes, neck pain, cough, abdominal pain, dysuria, numbness/tingling, focal weakness, blood thinner use. Denies any other injury from fall.    Problem/Plan - 1:  ·  Problem: Chest Pain  - ECG non ischemic but ST with PVCs  - Prior TTE from 2022 wnl  - States CP a/w episodes of vomiting  - Lipase elevated at 188 with hx of alcoholic pancreatitis. GI consult appreciated.   - TTE shows newly reduced EF 30%, gloabl LV hypokinesis, grade I DD, reduced RV systolic function  - C/w IVF repletion and correction of electrolyte dyscrasia  - Will plan for cath to r/i ischemia    Problem/Plan - 2:  ·  Problem: Dizziness.   ·  Plan: c/w IV hydration and electrolye repletion  - Likely associated with limited PO intake and N/V x 4 days  - Dizziness now improved    Problem/Plan - 3:  ·  Problem: HTN (hypertension).   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  - sHF GDMT  - Will start Toprol 25mg PO daily   - Plan for initiation of Arb vs ARNI when OFELIA resolved  - Cath when OFELIA resolves  - Currently euvolemic      Miladys Pena, IRASEMA-NP   Pablo Marks DO [facility]  Cardiovascular Medicine  [address] [facility], [address]  Available through call or text on [employer] TEAMs  Office: [phone]

## 2024-02-13 NOTE — PHYSICAL THERAPY INITIAL EVALUATION ADULT - ADDITIONAL COMMENTS
Pt lives alone in basement apartment, 7 stairs to enter. Owns cane, only uses when doesn't feel well, otherwise ambulates with no device. Takes cabs to appointments.

## 2024-02-13 NOTE — CONSULT NOTE ADULT - SUBJECTIVE AND OBJECTIVE BOX
[facility] DIVISION OF KIDNEY DISEASES AND HYPERTENSION -- [phone]  -- INITIAL CONSULT NOTE  --------------------------------------------------------------------------------  HPI:  63 y.o M w/ PMHx of ETOH abuse, hepatic steatosis, pancreatitis, HTN, GERD, GI bleed here with N/V/D and dizziness X6 days. Patient report falling down at home as well after he felt nauseous and had a room-spinning dizziness. Patient also complaining of dark colored diarrhea. He reports no previous history of kidney disease. Reports that he has taken NSAIDs in past but was told to stop taking it, but recently was taking it to help him sleep. Pt. denies any blood in urine or foamy urine. Denies any headaches, fevers/chills, chest pain, palpitations, SOB, and leg swelling. ON admission, Scr of 4.10, improved to 1.86 with IVF.      PAST HISTORY  --------------------------------------------------------------------------------  PAST MEDICAL & SURGICAL HISTORY:  HTN (hypertension)      GERD (gastroesophageal reflux disease)      Hiatal hernia      Acute alcoholic pancreatitis      H/O eye surgery  left      S/P foot surgery, left        FAMILY HISTORY:  FH: hypertension    FH: prostate cancer (Father)      PAST SOCIAL HISTORY:    ALLERGIES & MEDICATIONS  --------------------------------------------------------------------------------  Allergies    No Known Allergies    Intolerances      Standing Inpatient Medications  amLODIPine   Tablet 5 milliGRAM(s) Oral daily  folic acid 1 milliGRAM(s) Oral daily  magnesium sulfate  IVPB 2 Gram(s) IV Intermittent once  pantoprazole  Injectable 40 milliGRAM(s) IV Push two times a day  potassium chloride    Tablet ER 40 milliEquivalent(s) Oral every 4 hours  potassium phosphate / sodium phosphate Powder (PHOS-NaK) 1 Packet(s) Oral once  sodium chloride 0.9%. 1000 milliLiter(s) IV Continuous <Continuous>    PRN Inpatient Medications  ondansetron Injectable 4 milliGRAM(s) IV Push every 6 hours PRN      REVIEW OF SYSTEMS  --------------------------------------------------------------------------------  per above    VITALS/PHYSICAL EXAM  --------------------------------------------------------------------------------  T(C): 36.6 (02-13-24 @ 05:02), Max: 36.8 (02-12-24 @ 20:00)  HR: 94 (02-13-24 @ 05:02) (94 - 114)  BP: 118/81 (02-13-24 @ 05:02) (104/69 - 128/89)  RR: 18 (02-13-24 @ 05:02) (16 - 18)  SpO2: 98% (02-13-24 @ 05:02) (98% - 100%)  Wt(kg): --  Height (cm): 162.6 (02-12-24 @ 06:53)  Weight (kg): 90.7 (02-12-24 @ 06:53)  BMI (kg/m2): 34.3 (02-12-24 @ 06:53)  BSA (m2): 1.96 (02-12-24 @ 06:53)      02-12-24 @ 07:01 - 02-13-24 @ 07:00  --------------------------------------------------------  IN: 900 mL / OUT: 351 mL / NET: 549 mL        Physical Exam:  	Gen: NAD  	HEENT: Anicteric  	Pulm: CTA B/L  	CV: S1S2+  	Abd: Soft, +BS           	Ext: No LE edema B/L  	Neuro: Awake        	Skin: Warm and dry    LABS/STUDIES  --------------------------------------------------------------------------------              10.6   3.31  >-----------<  95       [02-13-24 @ 05:54]              31.6     143  |  107  |  51  ----------------------------<  110      [02-13-24 @ 05:53]  3.3   |  21  |  1.87        Ca     9.0     [02-13-24 @ 05:53]      Mg     1.5     [02-13-24 @ 05:53]      Phos  2.3     [02-13-24 @ 05:53]    TPro  7.4  /  Alb  3.9  /  TBili  0.8  /  DBili  x   /  AST  56  /  ALT  27  /  AlkPhos  72  [02-12-24 @ 08:53]    PT/INR: PT 13.0 , INR 1.25       [02-12-24 @ 07:49]  PTT: 27.0       [02-12-24 @ 07:49]      Creatinine Trend:  SCr 1.87 [02-13 @ 05:53]  SCr 3.46 [02-12 @ 08:53]  SCr 4.10 [02-12 @ 07:49]    Urinalysis - [02-13-24 @ 05:53]      Color  / Appearance  / SG  / pH       Gluc 110 / Ketone   / Bili  / Urobili        Blood  / Protein  / Leuk Est  / Nitrite       RBC  / WBC  / Hyaline  / Gran  / Sq Epi  / Non Sq Epi  / Bacteria       HCV 0.06, Nonreact      [04-11-19 @ 09:35]      Tacrolimus  Cyclosporine  Sirolimus  Mycophenolate  BK PCR  CMV PCR  Parvo PCR  EBV PCR

## 2024-02-13 NOTE — CONSULT NOTE ADULT - ATTENDING COMMENTS
melena  likely egd when medically stabilized
acute kidney injury - pre-renal azotemia.   creatinine 4> 1.86 with fluids   check urinalysis with microscopy and urine protein/creatinine ratio   please make sure he has follow up with us     florian hicks  nephrology attending   please contact me on TEAMS   Office- [phone]

## 2024-02-14 LAB
ANION GAP SERPL CALC-SCNC: 13 MMOL/L — SIGNIFICANT CHANGE UP (ref 5–17)
APPEARANCE UR: CLEAR — SIGNIFICANT CHANGE UP
BACTERIA # UR AUTO: ABNORMAL /HPF
BILIRUB SERPL-MCNC: 0.5 MG/DL — SIGNIFICANT CHANGE UP (ref 0.2–1.2)
BILIRUB UR-MCNC: NEGATIVE — SIGNIFICANT CHANGE UP
BUN SERPL-MCNC: 34 MG/DL — HIGH (ref 7–23)
CALCIUM SERPL-MCNC: 9.6 MG/DL — SIGNIFICANT CHANGE UP (ref 8.4–10.5)
CAST: 1 /LPF — SIGNIFICANT CHANGE UP (ref 0–4)
CHLORIDE SERPL-SCNC: 106 MMOL/L — SIGNIFICANT CHANGE UP (ref 96–108)
CO2 SERPL-SCNC: 22 MMOL/L — SIGNIFICANT CHANGE UP (ref 22–31)
COLOR SPEC: YELLOW — SIGNIFICANT CHANGE UP
CREAT ?TM UR-MCNC: 84 MG/DL — SIGNIFICANT CHANGE UP
CREAT SERPL-MCNC: 1.26 MG/DL — SIGNIFICANT CHANGE UP (ref 0.5–1.3)
CULTURE RESULTS: SIGNIFICANT CHANGE UP
DIFF PNL FLD: ABNORMAL
EGFR: 64 ML/MIN/1.73M2 — SIGNIFICANT CHANGE UP
GI PCR PANEL: SIGNIFICANT CHANGE UP
GLUCOSE SERPL-MCNC: 112 MG/DL — HIGH (ref 70–99)
GLUCOSE UR QL: NEGATIVE MG/DL — SIGNIFICANT CHANGE UP
HCT VFR BLD CALC: 34.4 % — LOW (ref 39–50)
HGB BLD-MCNC: 11.4 G/DL — LOW (ref 13–17)
INR BLD: 1.19 RATIO — HIGH (ref 0.85–1.18)
KETONES UR-MCNC: NEGATIVE MG/DL — SIGNIFICANT CHANGE UP
LEUKOCYTE ESTERASE UR-ACNC: NEGATIVE — SIGNIFICANT CHANGE UP
MAGNESIUM SERPL-MCNC: 1.3 MG/DL — LOW (ref 1.6–2.6)
MCHC RBC-ENTMCNC: 30.2 PG — SIGNIFICANT CHANGE UP (ref 27–34)
MCHC RBC-ENTMCNC: 33.1 GM/DL — SIGNIFICANT CHANGE UP (ref 32–36)
MCV RBC AUTO: 91 FL — SIGNIFICANT CHANGE UP (ref 80–100)
MELD SCORE WITH DIALYSIS: 22 POINTS — SIGNIFICANT CHANGE UP
MELD SCORE WITHOUT DIALYSIS: 11 POINTS — SIGNIFICANT CHANGE UP
MRSA PCR RESULT.: SIGNIFICANT CHANGE UP
NITRITE UR-MCNC: NEGATIVE — SIGNIFICANT CHANGE UP
NRBC # BLD: 0 /100 WBCS — SIGNIFICANT CHANGE UP (ref 0–0)
OSMOLALITY UR: 390 MOS/KG — SIGNIFICANT CHANGE UP (ref 300–900)
PH UR: 6.5 — SIGNIFICANT CHANGE UP (ref 5–8)
PHOSPHATE SERPL-MCNC: 1.9 MG/DL — LOW (ref 2.5–4.5)
PLATELET # BLD AUTO: 116 K/UL — LOW (ref 150–400)
POTASSIUM SERPL-MCNC: 4.3 MMOL/L — SIGNIFICANT CHANGE UP (ref 3.5–5.3)
POTASSIUM SERPL-SCNC: 4.3 MMOL/L — SIGNIFICANT CHANGE UP (ref 3.5–5.3)
POTASSIUM UR-SCNC: 12 MMOL/L — SIGNIFICANT CHANGE UP
PROT ?TM UR-MCNC: 8 MG/DL — SIGNIFICANT CHANGE UP (ref 0–12)
PROT UR-MCNC: NEGATIVE MG/DL — SIGNIFICANT CHANGE UP
PROT/CREAT UR-RTO: 0.1 RATIO — SIGNIFICANT CHANGE UP (ref 0–0.2)
PROTHROM AB SERPL-ACNC: 13 SEC — SIGNIFICANT CHANGE UP (ref 9.5–13)
RBC # BLD: 3.78 M/UL — LOW (ref 4.2–5.8)
RBC # FLD: 14.7 % — HIGH (ref 10.3–14.5)
RBC CASTS # UR COMP ASSIST: 21 /HPF — HIGH (ref 0–4)
S AUREUS DNA NOSE QL NAA+PROBE: DETECTED
SODIUM SERPL-SCNC: 141 MMOL/L — SIGNIFICANT CHANGE UP (ref 135–145)
SODIUM UR-SCNC: 40 MMOL/L — SIGNIFICANT CHANGE UP
SP GR SPEC: 1.01 — SIGNIFICANT CHANGE UP (ref 1–1.03)
SPECIMEN SOURCE: SIGNIFICANT CHANGE UP
SQUAMOUS # UR AUTO: 1 /HPF — SIGNIFICANT CHANGE UP (ref 0–5)
UROBILINOGEN FLD QL: 0.2 MG/DL — SIGNIFICANT CHANGE UP (ref 0.2–1)
UUN UR-MCNC: 687 MG/DL — SIGNIFICANT CHANGE UP
WBC # BLD: 3.96 K/UL — SIGNIFICANT CHANGE UP (ref 3.8–10.5)
WBC # FLD AUTO: 3.96 K/UL — SIGNIFICANT CHANGE UP (ref 3.8–10.5)
WBC UR QL: 2 /HPF — SIGNIFICANT CHANGE UP (ref 0–5)

## 2024-02-14 PROCEDURE — 99232 SBSQ HOSP IP/OBS MODERATE 35: CPT | Mod: GC

## 2024-02-14 RX ORDER — THIAMINE MONONITRATE (VIT B1) 100 MG
100 TABLET ORAL DAILY
Refills: 0 | Status: DISCONTINUED | OUTPATIENT
Start: 2024-02-14 | End: 2024-02-16

## 2024-02-14 RX ORDER — SACUBITRIL AND VALSARTAN 24; 26 MG/1; MG/1
1 TABLET, FILM COATED ORAL
Refills: 0 | Status: DISCONTINUED | OUTPATIENT
Start: 2024-02-14 | End: 2024-02-15

## 2024-02-14 RX ORDER — METOPROLOL TARTRATE 50 MG
25 TABLET ORAL DAILY
Refills: 0 | Status: DISCONTINUED | OUTPATIENT
Start: 2024-02-14 | End: 2024-02-15

## 2024-02-14 RX ORDER — MAGNESIUM SULFATE 500 MG/ML
2 VIAL (ML) INJECTION ONCE
Refills: 0 | Status: COMPLETED | OUTPATIENT
Start: 2024-02-14 | End: 2024-02-14

## 2024-02-14 RX ORDER — SODIUM,POTASSIUM PHOSPHATES 278-250MG
1 POWDER IN PACKET (EA) ORAL ONCE
Refills: 0 | Status: COMPLETED | OUTPATIENT
Start: 2024-02-14 | End: 2024-02-14

## 2024-02-14 RX ADMIN — CHLORHEXIDINE GLUCONATE 1 APPLICATION(S): 213 SOLUTION TOPICAL at 05:01

## 2024-02-14 RX ADMIN — PANTOPRAZOLE SODIUM 40 MILLIGRAM(S): 20 TABLET, DELAYED RELEASE ORAL at 17:39

## 2024-02-14 RX ADMIN — Medication 25 GRAM(S): at 12:18

## 2024-02-14 RX ADMIN — SACUBITRIL AND VALSARTAN 1 TABLET(S): 24; 26 TABLET, FILM COATED ORAL at 18:53

## 2024-02-14 RX ADMIN — PANTOPRAZOLE SODIUM 40 MILLIGRAM(S): 20 TABLET, DELAYED RELEASE ORAL at 05:11

## 2024-02-14 RX ADMIN — Medication 500000 UNIT(S): at 17:39

## 2024-02-14 RX ADMIN — Medication 1 TABLET(S): at 12:13

## 2024-02-14 RX ADMIN — AMLODIPINE BESYLATE 5 MILLIGRAM(S): 2.5 TABLET ORAL at 05:10

## 2024-02-14 RX ADMIN — Medication 100 MILLIGRAM(S): at 12:18

## 2024-02-14 RX ADMIN — Medication 1 MILLIGRAM(S): at 12:14

## 2024-02-14 RX ADMIN — Medication 1 PACKET(S): at 12:18

## 2024-02-14 RX ADMIN — Medication 500000 UNIT(S): at 05:10

## 2024-02-14 NOTE — PROGRESS NOTE ADULT - SUBJECTIVE AND OBJECTIVE BOX
DATE OF SERVICE: 02-14-24 @ 11:57    Patient is a 63y old  Male who presents with a chief complaint of Nausea, vomiting, diarrhea, poor appetite (13 Feb 2024 13:08)      INTERVAL HISTORY: Feels well, pending EGD today    REVIEW OF SYSTEMS:  CONSTITUTIONAL: No weakness  EYES/ENT: No visual changes;  No throat pain   NECK: No pain or stiffness  RESPIRATORY: No cough, wheezing; No shortness of breath  CARDIOVASCULAR: No chest pain or palpitations  GASTROINTESTINAL: No abdominal  pain. No nausea, vomiting, or hematemesis  GENITOURINARY: No dysuria, frequency or hematuria  NEUROLOGICAL: No stroke like symptoms  SKIN: No rashes    TELEMETRY Personally reviewed: SR   	  MEDICATIONS:  amLODIPine   Tablet 5 milliGRAM(s) Oral daily        PHYSICAL EXAM:  T(C): 37 (02-14-24 @ 04:03), Max: 37.2 (02-13-24 @ 21:00)  HR: 86 (02-14-24 @ 04:03) (79 - 105)  BP: 130/84 (02-14-24 @ 04:03) (114/65 - 169/95)  RR: 18 (02-14-24 @ 04:03) (16 - 20)  SpO2: 100% (02-14-24 @ 04:03) (96% - 100%)  Wt(kg): --  I&O's Summary    13 Feb 2024 07:01  -  14 Feb 2024 07:00  --------------------------------------------------------  IN: 360 mL / OUT: 1100 mL / NET: -740 mL      Height (cm): 162.6 (02-13 @ 14:36)  Weight (kg): 90.7 (02-13 @ 14:36)  BMI (kg/m2): 34.3 (02-13 @ 14:36)  BSA (m2): 1.96 (02-13 @ 14:36)    Appearance: In no distress	  HEENT:    PERRL, EOMI	  Cardiovascular:  S1 S2, No JVD  Respiratory: Lungs clear to auscultation	  Gastrointestinal:  Soft, Non-tender, + BS	  Vascularature:  No edema of LE  Psychiatric: Appropriate affect   Neuro: no acute focal deficits                               11.4   3.96  )-----------( 116      ( 14 Feb 2024 06:32 )             34.4     02-14    141  |  106  |  34<H>  ----------------------------<  112<H>  4.3   |  22  |  1.26    Ca    9.6      14 Feb 2024 06:32  Phos  1.9     02-14  Mg     1.3     02-14    TPro  x   /  Alb  x   /  TBili  0.5  /  DBili  x   /  AST  x   /  ALT  x   /  AlkPhos  x   02-14        Labs personally reviewed      ASSESSMENT/PLAN: 	  64yo M with PMH ETOH abuse, hepatic steatosis, pancreatitis, HTN, GERD, GI Bleed presenting with n/v/d and dizziness x 6 days. Reports he fell down half a flight of stairs at home the day symptoms started, injuring b/l lower legs, no head injury and no LOC. Reports persistent n/v and dark colored diarrhea every day and feels room-spinning dizziness. Reports he has had to crawl around his house due to dizziness. Reports he feels CP when he has diarrhea and reports he is "always short of breath." Admits to regular alcohol use, typically tequila but unclear frequency, last drink 5 days ago. Of note, pt with GI bleed in august 2023 with esophagitis on EGD. Denies fevers, HA, vision changes, neck pain, cough, abdominal pain, dysuria, numbness/tingling, focal weakness, blood thinner use. Denies any other injury from fall.    Problem/Plan - 1:  ·  Problem: Chest Pain  - ECG non ischemic but ST with PVCs  - Prior TTE from 2022 wnl  - States CP a/w episodes of vomiting  - Lipase elevated at 188 with hx of alcoholic pancreatitis. GI consult appreciated.   - TTE shows newly reduced EF 30%, gloabl LV hypokinesis, grade I DD, reduced RV systolic function  - C/w IVF repletion and correction of electrolyte dyscrasia  - Will plan for cath to r/i ischemia    Problem/Plan - 2:  ·  Problem: Dizziness.   ·  Plan: c/w IV hydration and electrolye repletion  - Likely associated with limited PO intake and N/V x 4 days  - Dizziness now improved    Problem/Plan - 3:  ·  Problem: HTN (hypertension).   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  - sHF GDMT  - Will start Toprol 25mg PO daily   - Plan for initiation of Arb vs ARNI when OFELIA resolved  - Cath when OFELIA resolves  - Currently euvolemic          MICA Barnhart DO [facility]  Cardiovascular Medicine  [address] [place] [address], [address]  Available via call or text on [employer]  Office: [phone]   DATE OF SERVICE: 02-14-24 @ 11:57    Patient is a 63y old  Male who presents with a chief complaint of Nausea, vomiting, diarrhea, poor appetite (13 Feb 2024 13:08)      INTERVAL HISTORY: Feels well, pending EGD today    REVIEW OF SYSTEMS:  CONSTITUTIONAL: No weakness  EYES/ENT: No visual changes;  No throat pain   NECK: No pain or stiffness  RESPIRATORY: No cough, wheezing; No shortness of breath  CARDIOVASCULAR: No chest pain or palpitations  GASTROINTESTINAL: No abdominal  pain. No nausea, vomiting, or hematemesis  GENITOURINARY: No dysuria, frequency or hematuria  NEUROLOGICAL: No stroke like symptoms  SKIN: No rashes    TELEMETRY Personally reviewed: SR   	  MEDICATIONS:  amLODIPine   Tablet 5 milliGRAM(s) Oral daily        PHYSICAL EXAM:  T(C): 37 (02-14-24 @ 04:03), Max: 37.2 (02-13-24 @ 21:00)  HR: 86 (02-14-24 @ 04:03) (79 - 105)  BP: 130/84 (02-14-24 @ 04:03) (114/65 - 169/95)  RR: 18 (02-14-24 @ 04:03) (16 - 20)  SpO2: 100% (02-14-24 @ 04:03) (96% - 100%)  Wt(kg): --  I&O's Summary    13 Feb 2024 07:01  -  14 Feb 2024 07:00  --------------------------------------------------------  IN: 360 mL / OUT: 1100 mL / NET: -740 mL      Height (cm): 162.6 (02-13 @ 14:36)  Weight (kg): 90.7 (02-13 @ 14:36)  BMI (kg/m2): 34.3 (02-13 @ 14:36)  BSA (m2): 1.96 (02-13 @ 14:36)    Appearance: In no distress	  HEENT:    PERRL, EOMI	  Cardiovascular:  S1 S2, No JVD  Respiratory: Lungs clear to auscultation	  Gastrointestinal:  Soft, Non-tender, + BS	  Vascularature:  No edema of LE  Psychiatric: Appropriate affect   Neuro: no acute focal deficits                               11.4   3.96  )-----------( 116      ( 14 Feb 2024 06:32 )             34.4     02-14    141  |  106  |  34<H>  ----------------------------<  112<H>  4.3   |  22  |  1.26    Ca    9.6      14 Feb 2024 06:32  Phos  1.9     02-14  Mg     1.3     02-14    TPro  x   /  Alb  x   /  TBili  0.5  /  DBili  x   /  AST  x   /  ALT  x   /  AlkPhos  x   02-14        Labs personally reviewed      ASSESSMENT/PLAN: 	  64yo M with PMH ETOH abuse, hepatic steatosis, pancreatitis, HTN, GERD, GI Bleed presenting with n/v/d and dizziness x 6 days. Reports he fell down half a flight of stairs at home the day symptoms started, injuring b/l lower legs, no head injury and no LOC. Reports persistent n/v and dark colored diarrhea every day and feels room-spinning dizziness. Reports he has had to crawl around his house due to dizziness. Reports he feels CP when he has diarrhea and reports he is "always short of breath." Admits to regular alcohol use, typically tequila but unclear frequency, last drink 5 days ago. Of note, pt with GI bleed in august 2023 with esophagitis on EGD. Denies fevers, HA, vision changes, neck pain, cough, abdominal pain, dysuria, numbness/tingling, focal weakness, blood thinner use. Denies any other injury from fall.    Problem/Plan - 1:  ·  Problem: Chest Pain  - ECG non ischemic but ST with PVCs  - Prior TTE from 2022 wnl  - States CP a/w episodes of vomiting  - Lipase elevated at 188 with hx of alcoholic pancreatitis. GI consult appreciated.   - TTE shows newly reduced EF 30%, gloabl LV hypokinesis, grade I DD, reduced RV systolic function  - Will plan for cath thursday     Problem/Plan - 2:  ·  Problem: Dizziness.   ·  Plan: c/w IV hydration and electrolye repletion  - Likely associated with limited PO intake and N/V x 4 days  - Dizziness now improved    Problem/Plan - 3:  ·  Problem: HTN (hypertension).   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

## 2024-02-14 NOTE — PROGRESS NOTE ADULT - SUBJECTIVE AND OBJECTIVE BOX
Name of Patient : SARAHY DIAZ  MRN: [id number]  Date of visit: 24 @ 17:15      Subjective: Patient seen and examined. No new events except as noted.   Patient seen earlier this AM. S/P EGD yesterday.   Denies any melanotic stool.  States that his dyspnea on exertion is overall improving, however still present.     REVIEW OF SYSTEMS:    CONSTITUTIONAL: Generalized weakness   EYES/ENT: No visual changes;  No vertigo or throat pain   NECK: No pain or stiffness  RESPIRATORY: + Dyspnea on exertion   CARDIOVASCULAR: No chest pain or palpitations  GASTROINTESTINAL: + Denies melena; No abdominal or epigastric pain. No nausea, vomiting, or hematemesis; No diarrhea or constipation   GENITOURINARY:  No dysuria, frequency or hematuria  NEUROLOGICAL: No numbness or weakness  SKIN: No itching, burning, rashes, or lesions   All other review of systems is negative unless indicated above.    MEDICATIONS:  MEDICATIONS  (STANDING):  amLODIPine   Tablet 5 milliGRAM(s) Oral daily  chlorhexidine 2% Cloths 1 Application(s) Topical <User Schedule>  folic acid 1 milliGRAM(s) Oral daily  metoprolol succinate ER 25 milliGRAM(s) Oral daily  multivitamin 1 Tablet(s) Oral daily  nystatin    Suspension 497889 Unit(s) Oral two times a day  pantoprazole  Injectable 40 milliGRAM(s) IV Push two times a day  sodium chloride 0.9%. 1000 milliLiter(s) (75 mL/Hr) IV Continuous <Continuous>  thiamine 100 milliGRAM(s) Oral daily      PHYSICAL EXAM:  T(C): 37 (24 @ 04:03), Max: 37.2 (24 @ 21:00)  HR: 86 (24 @ 04:03) (86 - 100)  BP: 130/84 (24 @ 04:03) (116/73 - 130/84)  RR: 18 (24 @ 04:03) (18 - 18)  SpO2: 100% (24 @ 04:03) (100% - 100%)  Wt(kg): --  I&O's Summary    2024 07:01  -  2024 07:00  --------------------------------------------------------  IN: 360 mL / OUT: 1100 mL / NET: -740 mL          Appearance: Awake, lying down in bed 	  HEENT:  Eyes are open; Glasses   Lymphatic: No lymphadenopathy grossly   Cardiovascular: Normal S1 S2   Respiratory: normal effort , clear  Gastrointestinal:  Soft, Non-tender  Skin: No rashes,  warm to touch  Psychiatry:  Mood & affect appropriate  Musculoskeletal: No edema        24 @ 07:01  -  24 @ 07:00  --------------------------------------------------------  IN: 360 mL / OUT: 1100 mL / NET: -740 mL                                  11.4   3.96  )-----------( 116      ( 2024 06:32 )             34.4                   141  |  106  |  34<H>  ----------------------------<  112<H>  4.3   |  22  |  1.26    Ca    9.6      2024 06:32  Phos  1.9       Mg     1.3         TPro  x   /  Alb  x   /  TBili  0.5  /  DBili  x   /  AST  x   /  ALT  x   /  AlkPhos  x       PT/INR - ( 2024 06:32 )   PT: 13.0 sec;   INR: 1.19 ratio                            Urinalysis Basic - ( 2024 07:19 )    Color: Yellow / Appearance: Clear / S.012 / pH: x  Gluc: x / Ketone: Negative mg/dL  / Bili: Negative / Urobili: 0.2 mg/dL   Blood: x / Protein: Negative mg/dL / Nitrite: Negative   Leuk Esterase: Negative / RBC: 21 /HPF / WBC 2 /HPF   Sq Epi: x / Non Sq Epi: 1 /HPF / Bacteria: Few /HPF      < from: CT Abdomen and Pelvis No Cont (24 @ 18:08) >  INTERPRETATION:  CLINICAL INFORMATION: Status post fall. Acute kidney   failure.    COMPARISON: 2023.    CONTRAST/COMPLICATIONS:  IV Contrast: NONE  Oral Contrast: NONE  Complications: None reported at time of study completion    PROCEDURE:  CT of the Chest, Abdomen and Pelvis was performed.  Sagittal and coronal reformats were performed.    FINDINGS:  CHEST:  LUNGS AND LARGE AIRWAYS: Patent central airways. No pulmonary nodules.  PLEURA: No pleural effusion.  VESSELS: Within normal limits.  HEART: Heart size is normal. No pericardial effusion.  MEDIASTINUM AND HAMIDA: No lymphadenopathy.  CHEST WALL AND LOWER NECK: Within normal limits.    ABDOMEN AND PELVIS:  LIVER: 2.2 cm fluid attenuation lesion consistent with cysts versus   hemangioma. Nodular liver contour.  BILE DUCTS: Normal caliber.  GALLBLADDER: Withinnormal limits.  SPLEEN: Within normal limits.  PANCREAS: Within normal limits.  ADRENALS: Within normal limits.  KIDNEYS/URETERS: Nonobstructing left renal stones, largest measuring 1.0   cm in the lower pole. No hydronephrosis.    BLADDER: Within normal limits.  REPRODUCTIVE ORGANS: Prostate within normal limits.    BOWEL: No bowel obstruction. Appendix is not visualized.  PERITONEUM: No ascites.  VESSELS: Within normal limits.  RETROPERITONEUM/LYMPH NODES: No lymphadenopathy.  ABDOMINAL WALL: Fat-containing umbilical hernia. Minimal superficial   subcutaneous stranding in the left anterior chest wall.  BONES: Degenerative changes. Bilateral L5 spondylolysis.    IMPRESSION:  No evidence of acute traumatic abnormality in the chest, abdomen, or   pelvis.    Cirrhotic morphology of the liver.            --- End of Report ---            RACHEL RICHARDSON MD; Attending Radiologist  This document has been electronically signed. 2024  7:23AM    < end of copied text >        < from: TTE W or WO Ultrasound Enhancing Agent (24 @ 09:58) >     CONCLUSIONS:      1. Left ventricular cavity is normal. Left ventricular wall thickness is normal. Left ventricular systolic function is severely decreased with an ejection fraction of 30 % by Green's method of disks. Global left ventricular hypokinesis.   2. There is mild (grade 1) left ventricular diastolic dysfunction, with normal filling pressure.   3. Mildly enlarged right ventricular cavity size, wall thickness, and reduced systolic function. Tricuspid annular plane systolic excursion (TAPSE) is 1.3 cm (normal >=1.7 cm).   4. Normal left and right atrial size.   5. No significant valvular disease.   6. No pericardial effusion seen.   7. Compared to the transthoracic echocardiogram performed on 2022, there has been a deline to LV/RV systolic function.   8. There is no evidence of a left ventricular thrombus.    < end of copied text >        < from: US Renal (24 @ 17:51) >  IMPRESSION:  No obstructive uropathy    < end of copied text >      < from: Upper Endoscopy (24 @ 14:58) >                                                                                                        Impression:          - No active bleeding. No potential source of bleeding identified.                       - 3 cm hiatal hernia.                       - Otherwise normal endoscopy.                       - No specimens collected.  Recommendation:      - Return patient to [facility] for ongoing care.                       - Advance diet as tolerated.                       - Use a proton pump inhibitor PO daily.                       - Monitor H/H and stool color for clinical signs of bleeding    < end of copied text >        Ova and Parasites (24 @ 11:01)   Culture Results:   No Protozoa seen by trichrome stain   No Helminths or Protozoa seen in formalin concentrate   performed by iodine stain     Culture - Stool (24 @ 11:01)   Specimen Source: .Stool Feces  Culture Results:   No enteric pathogens isolated.

## 2024-02-14 NOTE — PROGRESS NOTE ADULT - ATTENDING COMMENTS
negative - no vomiting, no diarrhea
esophagitis  anemia  consider outpatient capsule study
negative...

## 2024-02-14 NOTE — PROGRESS NOTE ADULT - SUBJECTIVE AND OBJECTIVE BOX
Interval Events:   -S/p EGD yesterday which was normal.   -Hb has remained stable. Denies any melena/hematochezia    Hospital Medications:  amLODIPine   Tablet 5 milliGRAM(s) Oral daily  chlorhexidine 2% Cloths 1 Application(s) Topical <User Schedule>  folic acid 1 milliGRAM(s) Oral daily  multivitamin 1 Tablet(s) Oral daily  nystatin    Suspension 917318 Unit(s) Oral two times a day  ondansetron Injectable 4 milliGRAM(s) IV Push every 6 hours PRN  pantoprazole  Injectable 40 milliGRAM(s) IV Push two times a day  sodium chloride 0.9%. 1000 milliLiter(s) (75 mL/Hr) IV Continuous <Continuous>  thiamine 100 milliGRAM(s) Oral daily        nystatin    Suspension: 488800 Unit(s) Oral (24 @ 17:13)  potassium chloride    Tablet ER: 40 milliEquivalent(s) Oral (24 @ 17:14)  folic acid: 1 milliGRAM(s) Oral (24 @ 17:15)  pantoprazole  Injectable: 40 milliGRAM(s) IV Push (24 @ 17:15)  potassium phosphate / sodium phosphate Powder (PHOS-NaK): 1 Packet(s) Oral (24 @ 17:15)  potassium chloride    Tablet ER: 40 milliEquivalent(s) Oral (24 @ 21:13)  chlorhexidine 2% Cloths: 1 Application(s) Topical (24 @ 05:01)  amLODIPine   Tablet: 5 milliGRAM(s) Oral (24 @ 05:10)  nystatin    Suspension: [id number] Unit(s) Oral (24 @ 05:10)  pantoprazole  Injectable: 40 milliGRAM(s) IV Push (24 @ 05:11)  multivitamin: 1 Tablet(s) Oral (24 @ 12:13)  folic acid: 1 milliGRAM(s) Oral (24 @ 12:14)  magnesium sulfate  IVPB: 25 mL/Hr IV Intermittent (24 @ 12:18)  potassium phosphate / sodium phosphate Powder (PHOS-NaK): 1 Packet(s) Oral (02-14-24 @ 12:18)  thiamine: 100 milliGRAM(s) Oral (24 @ 12:18)        US Renal:   ACC: [id number] EXAM:  US KIDNEY(S)   ORDERED BY: LEVI CHAPA <TRUNCATED> (24 @ 17:51)      24 @ 07:01  -  24 @ 07:00  --------------------------------------------------------  IN: 360 mL / OUT: 0 mL / NET: 360 mL          PHYSICAL EXAM:   Vital Signs:  Vital Signs Last 24 Hrs  T(C): 37 (2024 04:03), Max: 37.2 (2024 21:00)  T(F): 98.6 (2024 04:03), Max: 99 (2024 21:00)  HR: 86 (2024 04:03) (79 - 101)  BP: 130/84 (2024 04:03) (114/65 - 156/84)  BP(mean): --  RR: 18 (2024 04:03) (16 - 20)  SpO2: 100% (2024 04:03) (96% - 100%)    Parameters below as of 2024 04:03  Patient On (Oxygen Delivery Method): room air      Daily     Daily   GENERAL:  NAD, Appears stated age  HEENT:  NC/AT,  conjunctivae clear and pink, sclera -anicteric  CHEST:  Normal Effort, no signs of resp distress  HEART:  RRR, HD stable  ABDOMEN:  Soft, non-tender, non-distended  EXTREMITIES:  no cyanosis or edema  SKIN:  Warm & Dry. No rash or erythema  NEURO:  Alert, oriented, no focal deficit  LABS:                        11.4   3.96  )-----------( 116      ( 2024 06:32 )             34.4     Last Hb:Hemoglobin: 11.4 g/dL (24 @ 06:32)  Hemoglobin: 10.6 g/dL (24 @ 05:54)  Hemoglobin: 12.4 g/dL (24 @ 07:49)               141   |  106   |  34                 Ca: 9.6    BMP:   ----------------------------< 112    M.3   (24 @ 06:32)             4.3    |  22    | 1.26               Ph: 1.9      LFT:     TPro: x / Alb: x / TBili: 0.5 / DBili: x / AST: x / ALT: x / AlkPhos: x   (24 @ 06:32)    Creatinine: 1.26 mg/dL  Creatinine: 1.87 mg/dL  Creatinine: 3.46 mg/dL        PT/INR - ( 2024 06:32 )   PT: 13.0 sec;   INR: 1.19 ratio           Urinalysis Basic - ( 2024 07:19 )    Color: Yellow / Appearance: Clear / S.012 / pH: x  Gluc: x / Ketone: Negative mg/dL  / Bili: Negative / Urobili: 0.2 mg/dL   Blood: x / Protein: Negative mg/dL / Nitrite: Negative   Leuk Esterase: Negative / RBC: 21 /HPF / WBC 2 /HPF   Sq Epi: x / Non Sq Epi: 1 /HPF / Bacteria: Few /HPF        Culture - Stool (collected 24 @ 11:01)  Source: .Stool Feces  Preliminary Report (24 @ 17:03):    No enteric pathogens to date: Final culture pending          < from: Upper Endoscopy (24 @ 14:58) >  Findings:       The esophagus was normal.       A 3 cm hiatal hernia was present.       The exam of the stomach was otherwise normal.       The examined duodenum was normal.                                                                                                        Impression:          - No active bleeding. No potential source of bleeding identified.                       - 3 cm hiatal hernia.                       - Otherwise normal endoscopy.                       - No specimens collected.  Recommendation:      - Return patient to [facility] for ongoing care.                       - Advance diet as tolerated.                       - Use a proton pump inhibitor PO daily.                       - Monitor H/H and stool color for clinical signs of bleeding                        < end of copied text >

## 2024-02-15 ENCOUNTER — TRANSCRIPTION ENCOUNTER (OUTPATIENT)
Age: 64
End: 2024-02-15

## 2024-02-15 LAB
ANION GAP SERPL CALC-SCNC: 15 MMOL/L — SIGNIFICANT CHANGE UP (ref 5–17)
BUN SERPL-MCNC: 24 MG/DL — HIGH (ref 7–23)
CALCIUM SERPL-MCNC: 9.5 MG/DL — SIGNIFICANT CHANGE UP (ref 8.4–10.5)
CHLORIDE SERPL-SCNC: 103 MMOL/L — SIGNIFICANT CHANGE UP (ref 96–108)
CO2 SERPL-SCNC: 20 MMOL/L — LOW (ref 22–31)
CREAT SERPL-MCNC: 1.04 MG/DL — SIGNIFICANT CHANGE UP (ref 0.5–1.3)
EGFR: 81 ML/MIN/1.73M2 — SIGNIFICANT CHANGE UP
GLUCOSE SERPL-MCNC: 151 MG/DL — HIGH (ref 70–99)
HCT VFR BLD CALC: 33.7 % — LOW (ref 39–50)
HGB BLD-MCNC: 11.1 G/DL — LOW (ref 13–17)
LIDOCAIN IGE QN: 191 U/L — HIGH (ref 7–60)
MCHC RBC-ENTMCNC: 30 PG — SIGNIFICANT CHANGE UP (ref 27–34)
MCHC RBC-ENTMCNC: 32.9 GM/DL — SIGNIFICANT CHANGE UP (ref 32–36)
MCV RBC AUTO: 91.1 FL — SIGNIFICANT CHANGE UP (ref 80–100)
NRBC # BLD: 0 /100 WBCS — SIGNIFICANT CHANGE UP (ref 0–0)
PLATELET # BLD AUTO: 151 K/UL — SIGNIFICANT CHANGE UP (ref 150–400)
POTASSIUM SERPL-MCNC: 3.8 MMOL/L — SIGNIFICANT CHANGE UP (ref 3.5–5.3)
POTASSIUM SERPL-SCNC: 3.8 MMOL/L — SIGNIFICANT CHANGE UP (ref 3.5–5.3)
RBC # BLD: 3.7 M/UL — LOW (ref 4.2–5.8)
RBC # FLD: 14.8 % — HIGH (ref 10.3–14.5)
SODIUM SERPL-SCNC: 138 MMOL/L — SIGNIFICANT CHANGE UP (ref 135–145)
WBC # BLD: 3.47 K/UL — LOW (ref 3.8–10.5)
WBC # FLD AUTO: 3.47 K/UL — LOW (ref 3.8–10.5)

## 2024-02-15 PROCEDURE — 99152 MOD SED SAME PHYS/QHP 5/>YRS: CPT

## 2024-02-15 PROCEDURE — 93454 CORONARY ARTERY ANGIO S&I: CPT | Mod: 26

## 2024-02-15 RX ORDER — DAPAGLIFLOZIN 10 MG/1
10 TABLET, FILM COATED ORAL DAILY
Refills: 0 | Status: DISCONTINUED | OUTPATIENT
Start: 2024-02-15 | End: 2024-02-16

## 2024-02-15 RX ORDER — SACUBITRIL AND VALSARTAN 24; 26 MG/1; MG/1
1 TABLET, FILM COATED ORAL
Refills: 0 | Status: DISCONTINUED | OUTPATIENT
Start: 2024-02-15 | End: 2024-02-16

## 2024-02-15 RX ORDER — CARVEDILOL PHOSPHATE 80 MG/1
12.5 CAPSULE, EXTENDED RELEASE ORAL EVERY 12 HOURS
Refills: 0 | Status: DISCONTINUED | OUTPATIENT
Start: 2024-02-15 | End: 2024-02-16

## 2024-02-15 RX ORDER — SACUBITRIL AND VALSARTAN 24; 26 MG/1; MG/1
1 TABLET, FILM COATED ORAL
Qty: 60 | Refills: 0
Start: 2024-02-15 | End: 2024-03-15

## 2024-02-15 RX ADMIN — Medication 500000 UNIT(S): at 05:23

## 2024-02-15 RX ADMIN — SACUBITRIL AND VALSARTAN 1 TABLET(S): 24; 26 TABLET, FILM COATED ORAL at 05:22

## 2024-02-15 RX ADMIN — Medication 1 MILLIGRAM(S): at 11:48

## 2024-02-15 RX ADMIN — PANTOPRAZOLE SODIUM 40 MILLIGRAM(S): 20 TABLET, DELAYED RELEASE ORAL at 18:14

## 2024-02-15 RX ADMIN — SACUBITRIL AND VALSARTAN 1 TABLET(S): 24; 26 TABLET, FILM COATED ORAL at 18:14

## 2024-02-15 RX ADMIN — Medication 25 MILLIGRAM(S): at 05:22

## 2024-02-15 RX ADMIN — Medication 500000 UNIT(S): at 18:14

## 2024-02-15 RX ADMIN — CHLORHEXIDINE GLUCONATE 1 APPLICATION(S): 213 SOLUTION TOPICAL at 06:04

## 2024-02-15 RX ADMIN — PANTOPRAZOLE SODIUM 40 MILLIGRAM(S): 20 TABLET, DELAYED RELEASE ORAL at 05:23

## 2024-02-15 RX ADMIN — CARVEDILOL PHOSPHATE 12.5 MILLIGRAM(S): 80 CAPSULE, EXTENDED RELEASE ORAL at 18:15

## 2024-02-15 RX ADMIN — Medication 1 TABLET(S): at 11:48

## 2024-02-15 RX ADMIN — Medication 100 MILLIGRAM(S): at 11:48

## 2024-02-15 NOTE — DISCHARGE NOTE PROVIDER - PROVIDER TOKENS
PROVIDER:[TOKEN:[[id number]]],PROVIDER:[TOKEN:[[id number]]],PROVIDER:[TOKEN:[[id number]]]

## 2024-02-15 NOTE — DISCHARGE NOTE PROVIDER - NSDCMRMEDTOKEN_GEN_ALL_CORE_FT
folic acid 1 mg oral tablet: 1 tab(s) orally once a day  losartan 25 mg oral tablet: 3 tab(s) orally once a day  meclizine 12.5 mg oral tablet: 1 tab(s) orally 3 times a day AS NEEDED  pantoprazole 40 mg oral delayed release tablet: 1 tab(s) orally 2 times a day  sacubitril-valsartan 49 mg-51 mg oral tablet: 1 tab(s) orally 2 times a day  Vitamin D2 1.25 mg (50,000 intl units) oral capsule: 1 cap(s) orally once a week   dapagliflozin 10 mg oral tablet: 1 tab(s) orally once a day  folic acid 1 mg oral tablet: 1 tab(s) orally once a day  meclizine 12.5 mg oral tablet: 1 tab(s) orally 3 times a day AS NEEDED  pantoprazole 40 mg oral delayed release tablet: 1 tab(s) orally 2 times a day  sacubitril-valsartan 49 mg-51 mg oral tablet: 1 tab(s) orally 2 times a day  Vitamin D2 1.25 mg (50,000 intl units) oral capsule: 1 cap(s) orally once a week   folic acid 1 mg oral tablet: 1 tab(s) orally once a day  Jardiance 10 mg oral tablet: 1 tab(s) orally once a day  meclizine 12.5 mg oral tablet: 1 tab(s) orally 3 times a day AS NEEDED  pantoprazole 40 mg oral delayed release tablet: 1 tab(s) orally 2 times a day  sacubitril-valsartan 49 mg-51 mg oral tablet: 1 tab(s) orally 2 times a day  Vitamin D2 1.25 mg (50,000 intl units) oral capsule: 1 cap(s) orally once a week   carvedilol 12.5 mg oral tablet: 1 tab(s) orally every 12 hours  folic acid 1 mg oral tablet: 1 tab(s) orally once a day  meclizine 12.5 mg oral tablet: 1 tab(s) orally 3 times a day AS NEEDED  Multiple Vitamins oral tablet: 1 tab(s) orally once a day  nystatin 100,000 units/mL oral suspension: 5 milliliter(s) orally 2 times a day  pantoprazole 40 mg oral delayed release tablet: 1 tab(s) orally 2 times a day  sacubitril-valsartan 49 mg-51 mg oral tablet: 1 tab(s) orally 2 times a day  thiamine 100 mg oral tablet: 1 tab(s) orally once a day  Vitamin D2 1.25 mg (50,000 intl units) oral capsule: 1 cap(s) orally once a week

## 2024-02-15 NOTE — CONSULT NOTE ADULT - SUBJECTIVE AND OBJECTIVE BOX
Chief Complaint:  Patient is a 63y old  Male who presents with a chief complaint of Nausea, vomiting, diarrhea, poor appetite (15 Feb 2024 16:20)      HPI: Patient with history of ETOH abuse. Asked to see patient for leucopenia. He actually as pancytopenia. The cytopenias have bene chronic and date back at least Jan 2022. He has possible cirrhosis on imaging. No splenomegaly. He has a history of GIB.     Medications:  carvedilol 12.5 milliGRAM(s) Oral every 12 hours  chlorhexidine 2% Cloths 1 Application(s) Topical <User Schedule>  folic acid 1 milliGRAM(s) Oral daily  multivitamin 1 Tablet(s) Oral daily  nystatin    Suspension 552397 Unit(s) Oral two times a day  ondansetron Injectable 4 milliGRAM(s) IV Push every 6 hours PRN  pantoprazole  Injectable 40 milliGRAM(s) IV Push two times a day  sacubitril 49 mG/valsartan 51 mG 1 Tablet(s) Oral two times a day  sodium chloride 0.9%. 1000 milliLiter(s) IV Continuous <Continuous>  thiamine 100 milliGRAM(s) Oral daily        Allergies:  No Known Allergies    FAMILY HISTORY:  prostate cancer (Father)        Social history: No IVDA or tobacco    PAST MEDICAL & SURGICAL HISTORY:  HTN (hypertension)      GERD (gastroesophageal reflux disease)      Hiatal hernia      Acute alcoholic pancreatitis      H/O eye surgery  left      S/P foot surgery, left    REVIEW OF SYSTEMS      General: Appetite is improving. No unexplained weight loss. Some fatigue. No fevers or sweats	    Skin/Breast: No rash, itch, bruising  	  ENMT:	NO hearing loss, nosebleeds, sore throat    Respiratory and Thorax: No cough, wheeze, SOB, hemoptysis  	  Cardiovascular:	No CP    Gastrointestinal:	No abd pain, N/V/D, BRBPR    Genitourinary:	no dysuria or hematuria    Musculoskeletal:	 No leg pain or swelling. Some back pain from fall    Neurological:	No HA or dizziness    Vitals:  Vital Signs Last 24 Hrs  T(C): 36.8 (15 Feb 2024 20:45), Max: 36.8 (14 Feb 2024 22:16)  T(F): 98.3 (15 Feb 2024 20:45), Max: 98.3 (14 Feb 2024 22:16)  HR: 92 (15 Feb 2024 20:45) (70 - 100)  BP: 122/90 (15 Feb 2024 20:45) (122/59 - 160/95)  BP(mean): --  RR: 18 (15 Feb 2024 20:45) (16 - 18)  SpO2: 97% (15 Feb 2024 20:45) (95% - 100%)    Parameters below as of 15 Feb 2024 20:45  Patient On (Oxygen Delivery Method): room air        Pex:  alert NAD  EOMI anicteric sclera  Neck No LNA  Cv s1 S2 RRR  Lungs clear B/L  abd soft NT ND +BS  No LE edema or tenderness  Skin psoriasis type lesions on legs (he plans to see dermatology)    Labs:                        11.1   3.47  )-----------( 151      ( 15 Feb 2024 07:22 )             33.7     CBC Full  -  ( 15 Feb 2024 07:22 )  WBC Count : 3.47 K/uL  RBC Count : 3.70 M/uL  Hemoglobin : 11.1 g/dL  Hematocrit : 33.7 %  Platelet Count - Automated : 151 K/uL  Mean Cell Volume : 91.1 fl  Mean Cell Hemoglobin : 30.0 pg  Mean Cell Hemoglobin Concentration : 32.9 gm/dL  Auto Neutrophil # : x  Auto Lymphocyte # : x  Auto Monocyte # : x  Auto Eosinophil # : x  Auto Basophil # : x  Auto Neutrophil % : x  Auto Lymphocyte % : x  Auto Monocyte % : x  Auto Eosinophil % : x  Auto Basophil % : x    02-15    138  |  103  |  24<H>  ----------------------------<  151<H>  3.8   |  20<L>  |  1.04    Ca    9.5      15 Feb 2024 07:22  Phos  1.9     02-14  Mg     1.3     02-14    TPro  x   /  Alb  x   /  TBili  0.5  /  DBili  x   /  AST  x   /  ALT  x   /  AlkPhos  x   02-14    PT/INR - ( 14 Feb 2024 06:32 )   PT: 13.0 sec;   INR: 1.19 ratio           [id number]

## 2024-02-15 NOTE — DISCHARGE NOTE PROVIDER - HOSPITAL COURSE
HPI:  The patient is a 63M with h/o ETOH abuse ( last drink 5 days ago), marijuana use, hepatic steatosis, pancreatitis, HTN, bleeding esophagitis came to ED with c/o having n/v and diarrhea with epigastric pain for last 5-6 days. He has been having poor appetite also. Diarrhea was mixed with blood clots and dark stool. He reports  fall down half a flight of stairs at home but no LOC. Now he feels room-spinning dizziness. on and off he feels chest pain and exertional SOB but no fever or recent travel.   In August 2023 with he had acute esophagitis on EGD. Last EGD on 11/03/23 with 4 cm HH with resolution of erosive esophagitis. Last colonoscopy was on 11/2022 was normal, non-bleeding internal hemorrhoids.      (12 Feb 2024 15:52)    Hospital Course:       Pt presenting with spinning, n/v and diarrhea. Seen by Gi, underwent EGD 2/13/24  - No active bleeding. No potential source of bleeding identified. Use a proton pump inhibitor PO daily.- Lipase elevated at 188 with hx of alcoholic pancreatitis.   Noted creat on admission, Scr of 4.10.Seen by Renal, baseline Scr of 0.64 on 8/29/23, no labs in the interim. Pt with hypotension on admission with BP of 93/63. No IV Contrast given. Scr improved to 1.86 today with IVF. BP has improved. Avoid nephrotoxic agents. Dose meds per eGFR.- Pt with CP a/w episodes of vomiting, TTE shows newly reduced EF 30%, gloabl LV hypokinesis, grade I DD, reduced RV systolic function. Undwerwent cardiac cath 2/15/24 via R radial with patent coronaries.      Noted cytopenias, seen by hemeon, Etiology of the cytopenias likely multifactorial and related to ETOH and bone marrow suppression, maybe sequestration, maybe ethnic leucopenia. maybe autoimmune process, AOCD, history of GIB. Would want to evaluate these abnormalities further with labs tests, but as he is going home, would prefer to do as out-pt. Cytopenias are mild and not needing immediate intervention. He could follow up as out-pt for further management. Patient made aware of office location and messaged Dr. Marks with the plan.     No skilled patient needs. Discharged home     Important Medication Changes and Reason:    Active or Pending Issues Requiring Follow-up:    Advanced Directives:   [ ] Full code  [ ] DNR  [ ] Hospice    Discharge Diagnoses:         HPI:  The patient is a 63M with h/o ETOH abuse ( last drink 5 days ago), marijuana use, hepatic steatosis, pancreatitis, HTN, bleeding esophagitis came to ED with c/o having n/v and diarrhea with epigastric pain for last 5-6 days. He has been having poor appetite also. Diarrhea was mixed with blood clots and dark stool. He reports  fall down half a flight of stairs at home but no LOC. Now he feels room-spinning dizziness. on and off he feels chest pain and exertional SOB but no fever or recent travel.   In August 2023 with he had acute esophagitis on EGD. Last EGD on 11/03/23 with 4 cm HH with resolution of erosive esophagitis. Last colonoscopy was on 11/2022 was normal, non-bleeding internal hemorrhoids.      (12 Feb 2024 15:52)    Hospital Course:       Pt presenting with spinning, n/v and diarrhea. Seen by Gi, underwent EGD 2/13/24  - No active bleeding. No potential source of bleeding identified. Use a proton pump inhibitor PO daily.- Lipase elevated at 188 with hx of alcoholic pancreatitis.   Noted creat on admission, Scr of 4.10.Seen by Renal, baseline Scr of 0.64 on 8/29/23, no labs in the interim. Pt with hypotension on admission with BP of 93/63. No IV Contrast given. Scr improved to 1.86 today with IVF. BP has improved. Avoid nephrotoxic agents. Dose meds per eGFR.- Pt with CP a/w episodes of vomiting, TTE shows newly reduced EF 30%, gloabl LV hypokinesis, grade I DD, reduced RV systolic function. Undwerwent cardiac cath 2/15/24 via R radial with patent coronaries.      Noted cytopenias, seen by hemeon, Etiology of the cytopenias likely multifactorial and related to ETOH and bone marrow suppression, maybe sequestration, maybe ethnic leucopenia. maybe autoimmune process, AOCD, history of GIB. Would want to evaluate these abnormalities further with labs tests, but as he is going home, would prefer to do as out-pt. Cytopenias are mild and not needing immediate intervention. He could follow up as out-pt for further management. Patient made aware of office location and messaged Dr. Marks with the plan.     No skilled patient needs. Discharged home     Med rec reviewed and revised with Dr. Marks on day of discharge. Farxiga and Jardiance require WILDA Marks aware- can follow up outpatient for medications, no prescribed on DC.     Important Medication Changes and Reason:  Nystatin   Carvedilol, Entresto for GDMT per cardiology  Farxiga and Jardiance require WILDA Marks aware- can follow up outpatient for medications    Active or Pending Issues Requiring Follow-up:  PCP  Cardiology    Advanced Directives:   [X ] Full code  [ ] DNR  [ ] Hospice    Discharge Diagnoses:  Near Syncope   AoC CHF  Gastroenteritis  OFELIA

## 2024-02-15 NOTE — DISCHARGE NOTE PROVIDER - CARE PROVIDER_API CALL
Pablo Marks  Cardiovascular Disease  [address], [address]  [place] [address]  Phone: [phone]  Fax: [phone]  Follow Up Time:     Venice Baer  Internal Medicine    Samantha CHUNG,    Phone: ()-  Fax: ()-  Follow Up Time:     Rafy Treviño  Hematology  [address], [address]  [place] [address]  Phone: [phone]  Fax: [phone]  Follow Up Time:

## 2024-02-15 NOTE — DISCHARGE NOTE PROVIDER - NSDCCPCAREPLAN_GEN_ALL_CORE_FT
PRINCIPAL DISCHARGE DIAGNOSIS  Diagnosis: Near syncope  Assessment and Plan of Treatment: HOME CARE INSTRUCTIONS  Have someone stay with you until you feel stable.  Do not drive, operate machinery, or play sports until your caregiver says it is okay.  Keep all follow-up appointments as directed by your caregiver.   Lie down right away if you start feeling like you might faint. Breathe deeply and steadily. Wait until all the symptoms have passed.Drink enough fluids to keep your urine clear or pale yellow.  If you are taking blood pressure or heart medicine, get up slowly, taking several minutes to sit and then stand. This can reduce dizziness.  SEEK IMMEDIATE MEDICAL CARE IF:  You have a severe headache.  You have unusual pain in the chest, abdomen, or back.  You are bleeding from the mouth or rectum, or you have black or tarry stool.  You have an irregular or very fast heartbeat.  You have pain with breathing.  You have repeated fainting or seizure-like jerking during an episode.  You faint when sitting or lying down.  You have confusion.  You have difficulty walking.  You have severe weakness.  You have vision problems.        SECONDARY DISCHARGE DIAGNOSES  Diagnosis: Gastroenteritis  Assessment and Plan of Treatment: Supportive care  Undewent endoscopy 2/13/24  - No active bleeding. No potential source of bleeding identified.  Use a proton pump inhibitor PO daily.                       Follow up with PMD and gastroenterology as needed    Diagnosis: OFELIA (acute kidney injury)  Assessment and Plan of Treatment: Avoid taking (NSAIDs) - (ex: Ibuprofen, Advil, Celebrex, Naprosyn)  Avoid taking any nephrotoxic agents (can harm kidneys) - Intravenous contrast for diagnostic testing, combination cold medications.  Have all medications adjusted for your renal function by your Health Care Provider.  Blood pressure control is important.  Take all medication as prescribed.      Diagnosis: Acute on chronic systolic congestive heart failure  Assessment and Plan of Treatment: Weigh yourself daily.  If you gain 3lbs in 3 days, or 5lbs in a week call your Health Care Provider.  Do not eat or drink foods containing more than 2000mg of salt (sodium) in your diet every day.  Call your Health Care Provider if you have any swelling or increased swelling in your feet, ankles, and/or stomach.  Take all of your medication as directed.  If you become dizzy call your Health Care Provider.      Diagnosis: Pancreatitis  Assessment and Plan of Treatment: Follow up with Gastroenterology as needed    Diagnosis: Atypical chest pain  Assessment and Plan of Treatment: 2/15/23 underwent cardiac cath, patent coronaries   Monitor site of procedure for any redness, swelling, bleeding and notify your doctor. no heavy lifting over 5 lbs for 1 week, no strenuous activity for 3 weeks, if area becomes red looks bruised or swollen call MD or come to emergency department.  Monitor site of procedure and notify your doctor for any redness/swelling/drainage.  You may shower but no baths or swimming for one week or until skin is healed.      Diagnosis: Cytopenia  Assessment and Plan of Treatment: Follow up with Dr. Mosley for outpatient workup     PRINCIPAL DISCHARGE DIAGNOSIS  Diagnosis: Near syncope  Assessment and Plan of Treatment: You were admitted   Imaging negative for fracture from the fall   Underwent diagnostic Left heart cath   Continue your current cardiac regimen as d/w Dr. Marks and follow up with cardiology on discharge  FARXIGA AND JARDIANCE REQUIRE PRIOR AUTH- PLEASE FOLLOW UP WITH CARDIOLOGY ON DISCHARGE FOR MED MANAGEMENT.   HOME CARE INSTRUCTIONS  Have someone stay with you until you feel stable.  Do not drive, operate machinery, or play sports until your caregiver says it is okay.  Keep all follow-up appointments as directed by your caregiver.   Lie down right away if you start feeling like you might faint. Breathe deeply and steadily. Wait until all the symptoms have passed.Drink enough fluids to keep your urine clear or pale yellow.  If you are taking blood pressure or heart medicine, get up slowly, taking several minutes to sit and then stand. This can reduce dizziness.  SEEK IMMEDIATE MEDICAL CARE IF:  You have a severe headache.  You have unusual pain in the chest, abdomen, or back.  You are bleeding from the mouth or rectum, or you have black or tarry stool.  You have an irregular or very fast heartbeat.  You have pain with breathing.  You have repeated fainting or seizure-like jerking during an episode.  You faint when sitting or lying down.  You have confusion.  You have difficulty walking.  You have severe weakness.  You have vision problems.        SECONDARY DISCHARGE DIAGNOSES  Diagnosis: Gastroenteritis  Assessment and Plan of Treatment: Supportive care  Undewent endoscopy 2/13/24  - No active bleeding. No potential source of bleeding identified.  Use a proton pump inhibitor PO daily.                       Follow up with PMD and gastroenterology as needed    Diagnosis: OFELIA (acute kidney injury)  Assessment and Plan of Treatment: Avoid taking (NSAIDs) - (ex: Ibuprofen, Advil, Celebrex, Naprosyn)  Avoid taking any nephrotoxic agents (can harm kidneys) - Intravenous contrast for diagnostic testing, combination cold medications.  Have all medications adjusted for your renal function by your Health Care Provider.  Blood pressure control is important.  Take all medication as prescribed.      Diagnosis: Acute on chronic systolic congestive heart failure  Assessment and Plan of Treatment: Weigh yourself daily.  If you gain 3lbs in 3 days, or 5lbs in a week call your Health Care Provider.  Do not eat or drink foods containing more than 2000mg of salt (sodium) in your diet every day.  Call your Health Care Provider if you have any swelling or increased swelling in your feet, ankles, and/or stomach.  Take all of your medication as directed.  If you become dizzy call your Health Care Provider.      Diagnosis: Pancreatitis  Assessment and Plan of Treatment: Follow up with Gastroenterology as needed    Diagnosis: Atypical chest pain  Assessment and Plan of Treatment: 2/15/23 underwent cardiac cath, patent coronaries   Monitor site of procedure for any redness, swelling, bleeding and notify your doctor. no heavy lifting over 5 lbs for 1 week, no strenuous activity for 3 weeks, if area becomes red looks bruised or swollen call MD or come to emergency department.  Monitor site of procedure and notify your doctor for any redness/swelling/drainage.  You may shower but no baths or swimming for one week or until skin is healed.      Diagnosis: Cytopenia  Assessment and Plan of Treatment: Follow up with Dr. Mosley for outpatient workup

## 2024-02-15 NOTE — CONSULT NOTE ADULT - ASSESSMENT
63 y.o M w/ PMHx of ETOH abuse, hepatic steatosis, pancreatitis, HTN, GERD, GI bleed here with N/V/D and dizziness X6 days. Nephrology consulted for OFELIA
63-year-old male here s/p fall. He has pancytopenia. These appear to be chronic and likely related to his ETOH abuse. He has possible cirrhosis. He has had GIB. He had OFELIA on admission and that has resolved. CBC done today and thrombocytopenia resolved (albeit platelets low normal) and leucopenia and anemia are mild. He says that he is going home tomorrow.   Etiology of the cytopenias likely multifactorial and related to ETOH and bone marrow suppression, maybe sequestration, maybe ethnic leucopenia. maybe autoimmune process, AOCD, history of GIB. Would want to evaluate these abnormalities further with labs tests, but as he is going home, would prefer to do as out-pt. Cytopenias are mild and not needing immediate intervention. He could follow up as out-pt for further management. Patient made aware of office location and messaged Dr. Marks with the plan. 
64yo M with PMH ETOH abuse, hepatic steatosis, pancreatitis, HTN, GERD, GI Bleed presenting with n/v and diarrhea associated with dizziness.      #N/V  #Acute diarrhea  #Reported melena and bloody BM  given patient acuity, will need to rule out infectious etiology. Patient work up revealing stable Hb (12.4) and OFELIA with Cr of 4.1. Lipase elevated to 188 bt was 600s last year. Patient fall/dizziness seems to be mostly in setting of hypotension given improvement after IVF. Patient reporting dark stool which suspect its from esophagitis from multiple episodes of vomiting although patient reports having dark stool previous to vomiting.   -Given reported black stool with dark red, reasonable to obtain endoscopic evaluation after cardiology wrok up for chest pain (pending echo).   -In the meantime, would manage medically.   -Of note, pt with GI bleed in august 2023 with esophagitis on EGD. Last EGD on 11/03/23 with 4 cm HH with resolution of erosive esophagitis. Normal EndoFLIP. Last colonoscopy was on 11/2022. Normal, non-bleeding internal hemorrhoids.       Recommendations:  -Obtain stool PCR  -PPI BID  -supportive care with IVF and antiemetics  -Follow up cardiology work up  -reasonable to obtain endoscopic evaluation pending cardiology clearance and infectious work up  -If still having ongoing vomiting despite above, would obtain CT scan of the abd/pelvis with IV contrast and PO contrast to rule out organic pathology   -Clear liquid diet for now    Recommendations preliminary until signed by attending.     Yunior Paez MD  Gastroenterology/Hepatology Fellow  1st option: [phone] (text or call), ONLY available from 7:00 am to 5:00 pm.   **Contact on-call GI fellow via answering service ([phone]) from 5pm-7am AND on weekends/holidays**  2nd option: Available via [employer] Teams  3rd option: Pager: [phone]

## 2024-02-15 NOTE — DISCHARGE NOTE PROVIDER - CARE PROVIDERS DIRECT ADDRESSES
[email or web address]

## 2024-02-15 NOTE — PROGRESS NOTE ADULT - SUBJECTIVE AND OBJECTIVE BOX
DATE OF SERVICE: 02-15-24 @ 16:01    Patient is a 63y old  Male who presents with a chief complaint of Nausea, vomiting, diarrhea, poor appetite (15 Feb 2024 13:49)      INTERVAL HISTORY: Feels ok s/p cath.    REVIEW OF SYSTEMS:  CONSTITUTIONAL: No weakness  EYES/ENT: No visual changes;  No throat pain   NECK: No pain or stiffness  RESPIRATORY: No cough, wheezing; No shortness of breath  CARDIOVASCULAR: No chest pain or palpitations  GASTROINTESTINAL: No abdominal  pain. No nausea, vomiting, or hematemesis  GENITOURINARY: No dysuria, frequency or hematuria  NEUROLOGICAL: No stroke like symptoms  SKIN: No rashes    TELEMETRY Personally reviewed: SR   	  MEDICATIONS:  metoprolol succinate ER 25 milliGRAM(s) Oral daily  sacubitril 49 mG/valsartan 51 mG 1 Tablet(s) Oral two times a day        PHYSICAL EXAM:  T(C): 36.6 (02-15-24 @ 13:20), Max: 36.8 (02-14-24 @ 22:16)  HR: 87 (02-15-24 @ 13:20) (70 - 100)  BP: 149/61 (02-15-24 @ 13:20) (122/59 - 160/95)  RR: 18 (02-15-24 @ 13:20) (16 - 18)  SpO2: 95% (02-15-24 @ 13:20) (95% - 100%)  Wt(kg): --  I&O's Summary        Appearance: In no distress	  HEENT:    PERRL, EOMI	  Cardiovascular:  S1 S2, No JVD  Respiratory: Lungs clear to auscultation	  Gastrointestinal:  Soft, Non-tender, + BS	  Vascularature:  No edema of LE  Psychiatric: Appropriate affect   Neuro: no acute focal deficits                               11.1   3.47  )-----------( 151      ( 15 Feb 2024 07:22 )             33.7     02-15    138  |  103  |  24<H>  ----------------------------<  151<H>  3.8   |  20<L>  |  1.04    Ca    9.5      15 Feb 2024 07:22  Phos  1.9     02-14  Mg     1.3     02-14    TPro  x   /  Alb  x   /  TBili  0.5  /  DBili  x   /  AST  x   /  ALT  x   /  AlkPhos  x   02-14        Labs personally reviewed      ASSESSMENT/PLAN: 	    64yo M with PMH ETOH abuse, hepatic steatosis, pancreatitis, HTN, GERD, GI Bleed presenting with n/v/d and dizziness x 6 days. Reports he fell down half a flight of stairs at home the day symptoms started, injuring b/l lower legs, no head injury and no LOC. Reports persistent n/v and dark colored diarrhea every day and feels room-spinning dizziness. Reports he has had to crawl around his house due to dizziness. Reports he feels CP when he has diarrhea and reports he is "always short of breath." Admits to regular alcohol use, typically tequila but unclear frequency, last drink 5 days ago. Of note, pt with GI bleed in august 2023 with esophagitis on EGD. Denies fevers, HA, vision changes, neck pain, cough, abdominal pain, dysuria, numbness/tingling, focal weakness, blood thinner use. Denies any other injury from fall.    Problem/Plan - 1:  ·  Problem: Chest Pain  - ECG non ischemic but ST with PVCs  - Prior TTE from 2022 wnl  - States CP a/w episodes of vomiting  - Lipase elevated at 188 with hx of alcoholic pancreatitis. GI consult appreciated.   - TTE shows newly reduced EF 30%, gloabl LV hypokinesis, grade I DD, reduced RV systolic function  - s/p diagnostic cath 2/15    Problem/Plan - 2:  ·  Problem: Dizziness.   ·  Plan: c/w IV hydration and electrolye repletion  - Likely associated with limited PO intake and N/V x 4 days  - Dizziness now improved    Problem/Plan - 3:  ·  Problem: HTN (hypertension).   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    REVIEW OF SYSTEMS:  CONSTITUTIONAL: No weakness  EYES/ENT: No visual changes;  No throat pain   NECK: No pain or stiffness  RESPIRATORY: No cough, wheezing; No shortness of breath  CARDIOVASCULAR: No chest pain or palpitations  GASTROINTESTINAL: No abdominal  pain. No nausea, vomiting, or hematemesis  GENITOURINARY: No dysuria, frequency or hematuria  NEUROLOGICAL: No stroke like symptoms  SKIN: No rashes    TELEMETRY Personally reviewed: SR   	  MEDICATIONS:  metoprolol succinate ER 25 milliGRAM(s) Oral daily  sacubitril 49 mG/valsartan 51 mG 1 Tablet(s) Oral two times a day        PHYSICAL EXAM:  T(C): 36.6 (02-15-24 @ 13:20), Max: 36.8 (02-14-24 @ 22:16)  HR: 87 (02-15-24 @ 13:20) (70 - 100)  BP: 149/61 (02-15-24 @ 13:20) (122/59 - 160/95)  RR: 18 (02-15-24 @ 13:20) (16 - 18)  SpO2: 95% (02-15-24 @ 13:20) (95% - 100%)  Wt(kg): --  I&O's Summary        Appearance: In no distress	  HEENT:    PERRL, EOMI	  Cardiovascular:  S1 S2, No JVD  Respiratory: Lungs clear to auscultation	  Gastrointestinal:  Soft, Non-tender, + BS	  Vascularature:  No edema of LE  Psychiatric: Appropriate affect   Neuro: no acute focal deficits                               11.1   3.47  )-----------( 151      ( 15 Feb 2024 07:22 )             33.7     02-15    138  |  103  |  24<H>  ----------------------------<  151<H>  3.8   |  20<L>  |  1.04    Ca    9.5      15 Feb 2024 07:22  Phos  1.9     02-14  Mg     1.3     02-14    TPro  x   /  Alb  x   /  TBili  0.5  /  DBili  x   /  AST  x   /  ALT  x   /  AlkPhos  x   02-14        Labs personally reviewed      ASSESSMENT/PLAN: 	    62yo M with PMH ETOH abuse, hepatic steatosis, pancreatitis, HTN, GERD, GI Bleed presenting with n/v/d and dizziness x 6 days. Reports he fell down half a flight of stairs at home the day symptoms started, injuring b/l lower legs, no head injury and no LOC. Reports persistent n/v and dark colored diarrhea every day and feels room-spinning dizziness. Reports he has had to crawl around his house due to dizziness. Reports he feels CP when he has diarrhea and reports he is "always short of breath." Admits to regular alcohol use, typically tequila but unclear frequency, last drink 5 days ago. Of note, pt with GI bleed in august 2023 with esophagitis on EGD. Denies fevers, HA, vision changes, neck pain, cough, abdominal pain, dysuria, numbness/tingling, focal weakness, blood thinner use. Denies any other injury from fall.    Problem/Plan - 1:  ·  Problem: Chest Pain  - ECG non ischemic but ST with PVCs  - Prior TTE from 2022 wnl  - States CP a/w episodes of vomiting  - Lipase elevated at 188 with hx of alcoholic pancreatitis. GI consult appreciated.   - TTE shows newly reduced EF 30%, gloabl LV hypokinesis, grade I DD, reduced RV systolic function  - s/p diagnostic cath 2/15 with patent cors    Problem/Plan - 2:  ·  Problem: Dizziness.   ·  Plan: c/w IV hydration and electrolye repletion  - Likely associated with limited PO intake and N/V x 4 days  - Dizziness now improved    Problem/Plan - 3:  ·  Problem: HTN (hypertension).   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

## 2024-02-15 NOTE — PROGRESS NOTE ADULT - SUBJECTIVE AND OBJECTIVE BOX
Name of Patient : SARAHY DIAZ  MRN: [id number]  Date of visit: 02-15-24 @ 16:20      Subjective: Patient seen and examined. No new events except as noted.   Patient seen S/P Cardiac cath.     REVIEW OF SYSTEMS:    CONSTITUTIONAL: Generalized weakness   EYES/ENT: No visual changes;  No vertigo or throat pain   NECK: No pain or stiffness  RESPIRATORY: + Dyspnea on exertion   CARDIOVASCULAR: No chest pain or palpitations  GASTROINTESTINAL: + Denies melena; No abdominal or epigastric pain. No nausea, vomiting, or hematemesis; No diarrhea or constipation   GENITOURINARY:  No dysuria, frequency or hematuria  NEUROLOGICAL: No numbness or weakness  SKIN: No itching, burning, rashes, or lesions   All other review of systems is negative unless indicated above.    MEDICATIONS:  MEDICATIONS  (STANDING):  chlorhexidine 2% Cloths 1 Application(s) Topical <User Schedule>  folic acid 1 milliGRAM(s) Oral daily  metoprolol succinate ER 25 milliGRAM(s) Oral daily  multivitamin 1 Tablet(s) Oral daily  nystatin    Suspension 548675 Unit(s) Oral two times a day  pantoprazole  Injectable 40 milliGRAM(s) IV Push two times a day  sacubitril 49 mG/valsartan 51 mG 1 Tablet(s) Oral two times a day  sodium chloride 0.9%. 1000 milliLiter(s) (75 mL/Hr) IV Continuous <Continuous>  thiamine 100 milliGRAM(s) Oral daily      PHYSICAL EXAM:  T(C): 36.6 (02-15-24 @ 13:20), Max: 36.8 (02-14-24 @ 22:16)  HR: 87 (02-15-24 @ 13:20) (70 - 100)  BP: 149/61 (02-15-24 @ 13:20) (122/59 - 160/95)  RR: 18 (02-15-24 @ 13:20) (16 - 18)  SpO2: 95% (02-15-24 @ 13:20) (95% - 100%)  Wt(kg): --  I&O's Summary        Appearance: Awake, lying down in bed 	  HEENT:  Eyes are open; Glasses   Lymphatic: No lymphadenopathy grossly   Cardiovascular: Normal S1 S2   Respiratory: normal effort , clear  Gastrointestinal:  Soft, Non-tender  Skin: No rashes,  warm to touch  Psychiatry:  Mood & affect appropriate  Musculoskeletal: No edema                                  11.1   3.47  )-----------( 151      ( 15 Feb 2024 07:22 )             33.7               02-15    138  |  103  |  24<H>  ----------------------------<  151<H>  3.8   |  20<L>  |  1.04    Ca    9.5      15 Feb 2024 07:22  Phos  1.9     02-14  Mg     1.3     02-14    TPro  x   /  Alb  x   /  TBili  0.5  /  DBili  x   /  AST  x   /  ALT  x   /  AlkPhos  x   02-14    PT/INR - ( 14 Feb 2024 06:32 )   PT: 13.0 sec;   INR: 1.19 ratio                            Urinalysis Basic - ( 15 Feb 2024 07:22 )    Color: x / Appearance: x / SG: x / pH: x  Gluc: 151 mg/dL / Ketone: x  / Bili: x / Urobili: x   Blood: x / Protein: x / Nitrite: x   Leuk Esterase: x / RBC: x / WBC x   Sq Epi: x / Non Sq Epi: x / Bacteria: x

## 2024-02-15 NOTE — DISCHARGE NOTE PROVIDER - NSDCFUADDAPPT_GEN_ALL_CORE_FT
APPTS ARE READY TO BE MADE: [ X] YES    Best Family or Patient Contact (if needed):    Additional Information about above appointments (if needed):    1: PCP in 1 week  2: Cardiology   3: Hematology for outpatient f/u    Other comments or requests:    APPTS ARE READY TO BE MADE: [ X] YES    Best Family or Patient Contact (if needed):    Additional Information about above appointments (if needed):    1: PCP in 1 week  2: Cardiology   3: Hematology for outpatient f/u    Other comments or requests:   Prior appt scheduled with Dr. Baer on 2/26.  Prior appt scheduled with Dr. Marks on 04/01.

## 2024-02-16 ENCOUNTER — TRANSCRIPTION ENCOUNTER (OUTPATIENT)
Age: 64
End: 2024-02-16

## 2024-02-16 VITALS
OXYGEN SATURATION: 100 % | RESPIRATION RATE: 18 BRPM | DIASTOLIC BLOOD PRESSURE: 88 MMHG | HEART RATE: 79 BPM | TEMPERATURE: 98 F | SYSTOLIC BLOOD PRESSURE: 148 MMHG

## 2024-02-16 LAB
ANION GAP SERPL CALC-SCNC: 14 MMOL/L — SIGNIFICANT CHANGE UP (ref 5–17)
BUN SERPL-MCNC: 22 MG/DL — SIGNIFICANT CHANGE UP (ref 7–23)
CALCIUM SERPL-MCNC: 10 MG/DL — SIGNIFICANT CHANGE UP (ref 8.4–10.5)
CHLORIDE SERPL-SCNC: 105 MMOL/L — SIGNIFICANT CHANGE UP (ref 96–108)
CO2 SERPL-SCNC: 21 MMOL/L — LOW (ref 22–31)
CREAT SERPL-MCNC: 0.87 MG/DL — SIGNIFICANT CHANGE UP (ref 0.5–1.3)
EGFR: 98 ML/MIN/1.73M2 — SIGNIFICANT CHANGE UP
GLUCOSE SERPL-MCNC: 116 MG/DL — HIGH (ref 70–99)
HCT VFR BLD CALC: 33.4 % — LOW (ref 39–50)
HGB BLD-MCNC: 10.8 G/DL — LOW (ref 13–17)
MCHC RBC-ENTMCNC: 29.7 PG — SIGNIFICANT CHANGE UP (ref 27–34)
MCHC RBC-ENTMCNC: 32.3 GM/DL — SIGNIFICANT CHANGE UP (ref 32–36)
MCV RBC AUTO: 91.8 FL — SIGNIFICANT CHANGE UP (ref 80–100)
NRBC # BLD: 0 /100 WBCS — SIGNIFICANT CHANGE UP (ref 0–0)
PLATELET # BLD AUTO: 191 K/UL — SIGNIFICANT CHANGE UP (ref 150–400)
POTASSIUM SERPL-MCNC: 4.3 MMOL/L — SIGNIFICANT CHANGE UP (ref 3.5–5.3)
POTASSIUM SERPL-SCNC: 4.3 MMOL/L — SIGNIFICANT CHANGE UP (ref 3.5–5.3)
RBC # BLD: 3.64 M/UL — LOW (ref 4.2–5.8)
RBC # FLD: 14.9 % — HIGH (ref 10.3–14.5)
SODIUM SERPL-SCNC: 140 MMOL/L — SIGNIFICANT CHANGE UP (ref 135–145)
WBC # BLD: 3.01 K/UL — LOW (ref 3.8–10.5)
WBC # FLD AUTO: 3.01 K/UL — LOW (ref 3.8–10.5)

## 2024-02-16 PROCEDURE — 36415 COLL VENOUS BLD VENIPUNCTURE: CPT

## 2024-02-16 PROCEDURE — 84484 ASSAY OF TROPONIN QUANT: CPT

## 2024-02-16 PROCEDURE — 82803 BLOOD GASES ANY COMBINATION: CPT

## 2024-02-16 PROCEDURE — 96365 THER/PROPH/DIAG IV INF INIT: CPT

## 2024-02-16 PROCEDURE — 82570 ASSAY OF URINE CREATININE: CPT

## 2024-02-16 PROCEDURE — 93454 CORONARY ARTERY ANGIO S&I: CPT

## 2024-02-16 PROCEDURE — 93306 TTE W/DOPPLER COMPLETE: CPT

## 2024-02-16 PROCEDURE — 73590 X-RAY EXAM OF LOWER LEG: CPT

## 2024-02-16 PROCEDURE — 83935 ASSAY OF URINE OSMOLALITY: CPT

## 2024-02-16 PROCEDURE — 83605 ASSAY OF LACTIC ACID: CPT

## 2024-02-16 PROCEDURE — 87177 OVA AND PARASITES SMEARS: CPT

## 2024-02-16 PROCEDURE — 85027 COMPLETE CBC AUTOMATED: CPT

## 2024-02-16 PROCEDURE — 87507 IADNA-DNA/RNA PROBE TQ 12-25: CPT

## 2024-02-16 PROCEDURE — 85025 COMPLETE CBC W/AUTO DIFF WBC: CPT

## 2024-02-16 PROCEDURE — 80048 BASIC METABOLIC PNL TOTAL CA: CPT

## 2024-02-16 PROCEDURE — 86850 RBC ANTIBODY SCREEN: CPT

## 2024-02-16 PROCEDURE — 87641 MR-STAPH DNA AMP PROBE: CPT

## 2024-02-16 PROCEDURE — 99285 EMERGENCY DEPT VISIT HI MDM: CPT

## 2024-02-16 PROCEDURE — 84540 ASSAY OF URINE/UREA-N: CPT

## 2024-02-16 PROCEDURE — 96361 HYDRATE IV INFUSION ADD-ON: CPT

## 2024-02-16 PROCEDURE — 87637 SARSCOV2&INF A&B&RSV AMP PRB: CPT

## 2024-02-16 PROCEDURE — 84295 ASSAY OF SERUM SODIUM: CPT

## 2024-02-16 PROCEDURE — 87640 STAPH A DNA AMP PROBE: CPT

## 2024-02-16 PROCEDURE — 81001 URINALYSIS AUTO W/SCOPE: CPT

## 2024-02-16 PROCEDURE — 80053 COMPREHEN METABOLIC PANEL: CPT

## 2024-02-16 PROCEDURE — C1887: CPT

## 2024-02-16 PROCEDURE — 83735 ASSAY OF MAGNESIUM: CPT

## 2024-02-16 PROCEDURE — 85018 HEMOGLOBIN: CPT

## 2024-02-16 PROCEDURE — 86900 BLOOD TYPING SEROLOGIC ABO: CPT

## 2024-02-16 PROCEDURE — 84156 ASSAY OF PROTEIN URINE: CPT

## 2024-02-16 PROCEDURE — 85610 PROTHROMBIN TIME: CPT

## 2024-02-16 PROCEDURE — 76775 US EXAM ABDO BACK WALL LIM: CPT

## 2024-02-16 PROCEDURE — 96375 TX/PRO/DX INJ NEW DRUG ADDON: CPT

## 2024-02-16 PROCEDURE — 84133 ASSAY OF URINE POTASSIUM: CPT

## 2024-02-16 PROCEDURE — 87045 FECES CULTURE AEROBIC BACT: CPT

## 2024-02-16 PROCEDURE — 97162 PT EVAL MOD COMPLEX 30 MIN: CPT

## 2024-02-16 PROCEDURE — 82947 ASSAY GLUCOSE BLOOD QUANT: CPT

## 2024-02-16 PROCEDURE — 85014 HEMATOCRIT: CPT

## 2024-02-16 PROCEDURE — 83690 ASSAY OF LIPASE: CPT

## 2024-02-16 PROCEDURE — C1894: CPT

## 2024-02-16 PROCEDURE — 86901 BLOOD TYPING SEROLOGIC RH(D): CPT

## 2024-02-16 PROCEDURE — 87077 CULTURE AEROBIC IDENTIFY: CPT

## 2024-02-16 PROCEDURE — 84300 ASSAY OF URINE SODIUM: CPT

## 2024-02-16 PROCEDURE — 93005 ELECTROCARDIOGRAM TRACING: CPT

## 2024-02-16 PROCEDURE — 84100 ASSAY OF PHOSPHORUS: CPT

## 2024-02-16 PROCEDURE — 82330 ASSAY OF CALCIUM: CPT

## 2024-02-16 PROCEDURE — 71045 X-RAY EXAM CHEST 1 VIEW: CPT

## 2024-02-16 PROCEDURE — 70450 CT HEAD/BRAIN W/O DYE: CPT

## 2024-02-16 PROCEDURE — 74176 CT ABD & PELVIS W/O CONTRAST: CPT

## 2024-02-16 PROCEDURE — 84132 ASSAY OF SERUM POTASSIUM: CPT

## 2024-02-16 PROCEDURE — 82435 ASSAY OF BLOOD CHLORIDE: CPT

## 2024-02-16 PROCEDURE — 71250 CT THORAX DX C-: CPT

## 2024-02-16 PROCEDURE — 85730 THROMBOPLASTIN TIME PARTIAL: CPT

## 2024-02-16 PROCEDURE — C1769: CPT

## 2024-02-16 PROCEDURE — 87046 STOOL CULTR AEROBIC BACT EA: CPT

## 2024-02-16 RX ORDER — EMPAGLIFLOZIN 10 MG/1
1 TABLET, FILM COATED ORAL
Qty: 30 | Refills: 0
Start: 2024-02-16 | End: 2024-03-16

## 2024-02-16 RX ORDER — DAPAGLIFLOZIN 10 MG/1
1 TABLET, FILM COATED ORAL
Qty: 30 | Refills: 0
Start: 2024-02-16 | End: 2024-03-16

## 2024-02-16 RX ORDER — NYSTATIN 500MM UNIT
5 POWDER (EA) MISCELLANEOUS
Qty: 70 | Refills: 0
Start: 2024-02-16 | End: 2024-02-22

## 2024-02-16 RX ORDER — THIAMINE MONONITRATE (VIT B1) 100 MG
1 TABLET ORAL
Qty: 30 | Refills: 0
Start: 2024-02-16 | End: 2024-03-16

## 2024-02-16 RX ORDER — CARVEDILOL PHOSPHATE 80 MG/1
1 CAPSULE, EXTENDED RELEASE ORAL
Qty: 60 | Refills: 0
Start: 2024-02-16 | End: 2024-03-16

## 2024-02-16 RX ADMIN — SACUBITRIL AND VALSARTAN 1 TABLET(S): 24; 26 TABLET, FILM COATED ORAL at 05:11

## 2024-02-16 RX ADMIN — DAPAGLIFLOZIN 10 MILLIGRAM(S): 10 TABLET, FILM COATED ORAL at 12:50

## 2024-02-16 RX ADMIN — CARVEDILOL PHOSPHATE 12.5 MILLIGRAM(S): 80 CAPSULE, EXTENDED RELEASE ORAL at 18:18

## 2024-02-16 RX ADMIN — SACUBITRIL AND VALSARTAN 1 TABLET(S): 24; 26 TABLET, FILM COATED ORAL at 18:18

## 2024-02-16 RX ADMIN — PANTOPRAZOLE SODIUM 40 MILLIGRAM(S): 20 TABLET, DELAYED RELEASE ORAL at 18:41

## 2024-02-16 RX ADMIN — Medication 1 TABLET(S): at 12:50

## 2024-02-16 RX ADMIN — CARVEDILOL PHOSPHATE 12.5 MILLIGRAM(S): 80 CAPSULE, EXTENDED RELEASE ORAL at 05:10

## 2024-02-16 RX ADMIN — PANTOPRAZOLE SODIUM 40 MILLIGRAM(S): 20 TABLET, DELAYED RELEASE ORAL at 05:13

## 2024-02-16 RX ADMIN — Medication 500000 UNIT(S): at 05:10

## 2024-02-16 RX ADMIN — Medication 100 MILLIGRAM(S): at 12:50

## 2024-02-16 RX ADMIN — Medication 500000 UNIT(S): at 18:20

## 2024-02-16 RX ADMIN — CHLORHEXIDINE GLUCONATE 1 APPLICATION(S): 213 SOLUTION TOPICAL at 05:11

## 2024-02-16 RX ADMIN — Medication 1 MILLIGRAM(S): at 12:50

## 2024-02-16 NOTE — PROGRESS NOTE ADULT - NS ATTEND AMEND GEN_ALL_CORE FT
Patient care and plan discussed and reviewed with Advanced Care Provider. Plan as outlined above edited by me to reflect our discussion.
Patient care and plan discussed and reviewed with Advanced Care Provider. Plan as outlined above edited by me to reflect our discussion. I had a prolonged conversation with the patient/family regarding hospital course, differential diagnosis and results of diagnostic tests.  Plan of care discussed with patient/family after the evaluation. Patient/family express clear understanding and satisfaction with the plan of care.  Fifty one minutes spent on encounter, of which more than fifty percent of the encounter was spent on counseling and/or coordinating care by the attending physician.
Pt care and plan discussed and reviewed with PA. Plan as outlined above edited by me to reflect our discussion. Advanced care planning/advanced directives discussed with patient/family. DNR status including forceful chest compressions to attempt to restart the heart, ventilator support/artificial breathing, electric shock, artificial nutrition, health care proxy, Molst form all discussed with pt. More than 50% of the visit was spent counseling and/or coordinating care by the attending physician.
Patient care and plan discussed and reviewed with Advanced Care Provider. Plan as outlined above edited by me to reflect our discussion.
Patient care and plan discussed and reviewed with Advanced Care Provider. Plan as outlined above edited by me to reflect our discussion. I had a prolonged conversation with the patient/family regarding hospital course, differential diagnosis and results of diagnostic tests.  Plan of care discussed with patient/family after the evaluation. Patient/family express clear understanding and satisfaction with the plan of care.  Sixty five minutes spent on encounter, of which more than fifty percent of the encounter was spent on counseling and/or coordinating care by the attending physician.

## 2024-02-16 NOTE — DISCHARGE NOTE NURSING/CASE MANAGEMENT/SOCIAL WORK - NSDCFUADDAPPT_GEN_ALL_CORE_FT
APPTS ARE READY TO BE MADE: [ X] YES    Best Family or Patient Contact (if needed):    Additional Information about above appointments (if needed):    1: PCP in 1 week  2: Cardiology   3: Hematology for outpatient f/u    Other comments or requests:

## 2024-02-16 NOTE — DISCHARGE NOTE NURSING/CASE MANAGEMENT/SOCIAL WORK - PATIENT PORTAL LINK FT
You can access the FollowMyHealth Patient Portal offered by [facility] by registering at the following website: [email or web address]. By joining [facility]’s FollowMyHealth portal, you will also be able to view your health information using other applications (apps) compatible with our system.

## 2024-02-16 NOTE — PROGRESS NOTE ADULT - SUBJECTIVE AND OBJECTIVE BOX
Name of Patient : SARAHY DIAZ  MRN: [id number]  Date of visit: 02-16-24       Subjective: Patient seen and examined. No new events except as noted.   doing okay     REVIEW OF SYSTEMS:    CONSTITUTIONAL: No weakness, fevers or chills  EYES/ENT: No visual changes;  No vertigo or throat pain   NECK: No pain or stiffness  RESPIRATORY: No cough, wheezing, hemoptysis; No shortness of breath  CARDIOVASCULAR: No chest pain or palpitations  GASTROINTESTINAL: No abdominal or epigastric pain. No nausea, vomiting, or hematemesis; No diarrhea or constipation. No melena or hematochezia.  GENITOURINARY: No dysuria, frequency or hematuria  NEUROLOGICAL: No numbness or weakness  SKIN: No itching, burning, rashes, or lesions   All other review of systems is negative unless indicated above.    MEDICATIONS:  MEDICATIONS  (STANDING):  carvedilol 12.5 milliGRAM(s) Oral every 12 hours  chlorhexidine 2% Cloths 1 Application(s) Topical <User Schedule>  dapagliflozin 10 milliGRAM(s) Oral daily  folic acid 1 milliGRAM(s) Oral daily  multivitamin 1 Tablet(s) Oral daily  nystatin    Suspension 264600 Unit(s) Oral two times a day  pantoprazole  Injectable 40 milliGRAM(s) IV Push two times a day  sacubitril 49 mG/valsartan 51 mG 1 Tablet(s) Oral two times a day  sodium chloride 0.9%. 1000 milliLiter(s) (75 mL/Hr) IV Continuous <Continuous>  thiamine 100 milliGRAM(s) Oral daily      PHYSICAL EXAM:  T(C): 36.7 (02-16-24 @ 21:07), Max: 36.9 (02-16-24 @ 11:21)  HR: 79 (02-16-24 @ 21:07) (79 - 98)  BP: 148/88 (02-16-24 @ 21:07) (107/64 - 148/88)  RR: 18 (02-16-24 @ 21:07) (18 - 18)  SpO2: 100% (02-16-24 @ 21:07) (96% - 100%)  Wt(kg): --  I&O's Summary        Appearance: Normal	  HEENT:  PERRLA   Lymphatic: No lymphadenopathy   Cardiovascular: Normal S1 S2, no JVD  Respiratory: normal effort , clear  Gastrointestinal:  Soft, Non-tender  Skin: No rashes,  warm to touch  Psychiatry:  Mood & affect appropriate  Musculuskeletal: No edema    recent labs, Imaging and EKGs personally reviewed                           10.8   3.01  )-----------( 191      ( 16 Feb 2024 07:25 )             33.4               02-16    140  |  105  |  22  ----------------------------<  116<H>  4.3   |  21<L>  |  0.87    Ca    10.0      16 Feb 2024 07:17                         Urinalysis Basic - ( 16 Feb 2024 07:17 )    Color: x / Appearance: x / SG: x / pH: x  Gluc: 116 mg/dL / Ketone: x  / Bili: x / Urobili: x   Blood: x / Protein: x / Nitrite: x   Leuk Esterase: x / RBC: x / WBC x   Sq Epi: x / Non Sq Epi: x / Bacteria: x

## 2024-02-16 NOTE — ED ADULT NURSE REASSESSMENT NOTE - NSIMPLEMENTINTERV_GEN_ALL_ED
Name: Nancy Zamora    MRN: [id number]         : 1957    Ms. Zamora 
Implemented All Universal Safety Interventions:  [place] to call system. Call bell, personal items and telephone within reach. Instruct patient to call for assistance. Room bathroom lighting operational. Non-slip footwear when patient is off stretcher. Physically safe environment: no spills, clutter or unnecessary equipment. Stretcher in lowest position, wheels locked, appropriate side rails in place.

## 2024-02-16 NOTE — PROGRESS NOTE ADULT - ASSESSMENT
The patient is a 63M with h/o ETOH abuse ( last drink 5 days ago), marijuana use, hepatic steatosis, pancreatitis, HTN, bleeding esophagitis came to ED with c/o having n/v and diarrhea with epigastric pain for last 5-6 days. He has been having poor appetite also. Diarrhea was mixed with blood clots and dark stool. He reports  fall down half a flight of stairs at home but no LOC. Now he feels room-spinning dizziness. on and off he feels chest pain and exertional SOB but no fever or recent travel.   In August 2023 with he had acute esophagitis on EGD. Last EGD on 11/03/23 with 4 cm HH with resolution of erosive esophagitis. Last colonoscopy was on 11/2022 was normal, non-bleeding internal hemorrhoids.     In ED BP was 93/63 and after 2L LR bolus /89. Scr 4.1 down to 3.4 ( baseline 0.7). K+ 3.2, Mg 1.0, Phos 1.6, lipase 188.       OFELIA (acute kidney injury).   - Likely prerenal ATN due to N/V, diarrhea and low PO intake  - Cr now down-trended to WNL   - S/P IVF. PO hydration encouraged   - Renal US  w/ No obstructive uropathy   - Losartan on hold. Resume GDMT as per cardiology  --> Now on Toprol XL 25 PO Qd, Entresto 24/26, monitor Cr   - Renal eval appreciated; F/u recs      Nausea vomiting and diarrhea.   - Multifactorial- alcohol use, marijuana use, pancreatitis, esophagitis or viral GE  - CT C/A/P w 2.2 CM lesion in liver consistent with cysts versus hemangioma non-obstructing L renal stone, cirrhotic liver morphology   - S/P IVF, PO hydration, PPI, antiemetics  - Diet per GI, advance as tolerated   - Lipase elevated, F/u GI   - Stool cultures negative   - Antiemetics PRN   - GI eval appreciated; F/u recs     Melena.   - Has been noticing black stool with clots. Known erosive esophagitis with bleeding on last EGD  - Hb 12.4, baseline 13  - S/P EGD 2/13 w/ GI negative for active bleed w/ hiatal hernia   - C/w PPI    - Trend CBC daily   - GI eval appreciated; F/u recs      Chest pain.   - Atypical chest pain with exertional dyspnea  - ECG non ischemic, tachycardia with PVCs, TTE from 2022 normal LV function. Low suspicion for ACS  - Trops 16 &19  - TTE w/ EF of 30%, LV hypokinesis Mild grade 1 diastolic dysfunction, no LV thrombus  - GDMT as per cardio --> On Toprol XL and Entresto; Repeat TTE in 3 months outpatient   - Cardio eval appreciated; F/u recs --> S/P Cath 2/15. F/u report       Electrolyte imbalance.   - Likely due to diarrhea, N/V  - Aggressive electrolyte supplementation   - Renal eval appreciated; F/u recs     H/O acute pancreatitis.   - Still drinks alcohol, last drink 5 days PTA  - SW eval  - C/w Folic acid, thiamine, MVI     HTN (hypertension).   - Losartan on hold 2/2 OFELIA, Norvasc DC'd  - Now on medications as above for GDMT. Monitor BP      Chronic alcohol abuse.   - Advised to stop drinking, Known h/o pancreatitis, erosive esophagitis. No s/s of ETOH withdrawal.  - SW eval, Folic acid.    2.2 CM Liver lesion  - Outpatient GI follow up upon DC    PPX        Discussed with Attending   
64yo M with PMH AUD, hepatic steatosis, pancreatitis, HTN, GERD, GI Bleed presenting with n/v and diarrhea associated with dizziness.      #N/V  #Acute diarrhea  #Reported melena and bloody BM  Presented with acute diarrhea and reported melena. Now s/p EGD on 2/13 that was normal. Hb has remained stable and pt w/o recurrent melena. Possible that dark stool was 2/2 mild esopaghitis from recurrent N/V that has resolved. Remains HD stable.  -Of note, pt with GI bleed in august 2023 with esophagitis on EGD. Last EGD on 11/03/23 with 4 cm HH with resolution of erosive esophagitis. Normal EndoFLIP. Last colonoscopy was on 11/2022. Normal, non-bleeding internal hemorrhoids.     Recommendations:  -PPI daily   -Trend hb; maintain Hb>7  -No further inpatient GI workup recommended  -GI will sign off at this time. Please call with questions    Note incomplete until finalized by attending signature/attestation.    Dahlia Batres  GI/Hepatology Fellow PGY5    NON-URGENT CONSULTS:  Please email [email or web address]  AT NIGHT AND ON WEEKENDS:  Available on [employer] Teams  [phone] (Long Range Pager)    After 5pm, please contact the on-call GI fellow. [phone]
The patient is a 63M with h/o ETOH abuse ( last drink 5 days ago), marijuana use, hepatic steatosis, pancreatitis, HTN, bleeding esophagitis came to ED with c/o having n/v and diarrhea with epigastric pain for last 5-6 days. He has been having poor appetite also. Diarrhea was mixed with blood clots and dark stool. He reports  fall down half a flight of stairs at home but no LOC. Now he feels room-spinning dizziness. on and off he feels chest pain and exertional SOB but no fever or recent travel.   In August 2023 with he had acute esophagitis on EGD. Last EGD on 11/03/23 with 4 cm HH with resolution of erosive esophagitis. Last colonoscopy was on 11/2022 was normal, non-bleeding internal hemorrhoids.     In ED BP was 93/63 and after 2L LR bolus /89. Scr 4.1 down to 3.4 ( baseline 0.7). K+ 3.2, Mg 1.0, Phos 1.6, lipase 188.       OFELIA (acute kidney injury).   - Likely prerenal ATN due to N/V, diarrhea and low PO intake  - Cr now down-trended to WNL   - S/P IVF. PO hydration encouraged   - Renal US  w/ No obstructive uropathy   - Losartan on hold. Resume GDMT as per cardiology   - Renal eval appreciated; F/u recs      Nausea vomiting and diarrhea.   - Multifactorial- alcohol use, marijuana use, pancreatitis, esophagitis or viral GE  - CT C/A/P w 22 CM lesion in liver consistent with cysts versus hemangioma non-obstructing L renal stone, cirrhotic liver morphology   - S/P IVF, PO hydration, PPI, antiemetics  - Diet per GI, advance as tolerated   - Lipase 188 --> 132, trend   - Stool cultures negative   - Antiemetics PRN   - GI eval appreciated; F/u recs     Melena.   - Has been noticing black stool with clots. Known erosive esophagitis with bleeding on last EGD  - Hb 12.4, baseline 13  - S/P EGD 2/13 w/ GI negative for active bleed w/ hiatal hernia   - C/w PPI    - Trend CBC daily   - GI eval appreciated; F/u recs      Chest pain.   -  Atypical chest pain with exertional dyspnea  - ECG non ischemic, tachycardia with PVCs, TTE from 2022 normal LV function. Low suspicion for ACS  - Trops 16 &19  - TTE w/ EF of 30%, LV hypokinesis Mild grade 1 diastolic dysfunction, no LV thrombus  - Cardio eval appreciated; F/u recs --> Planned for cath to R/O Ischemia      Electrolyte imbalance.   - Likely due to diarrhea, N/V  - Aggressive electrolyte supplementation   - Renal eval appreciated; F/u recs     H/O acute pancreatitis.   - Still drinks alcohol, last drink 5 days ago  - SW eval  - C/w Folic acid, thiamine, MVI     HTN (hypertension).   - BP was low 93/63, after IV hydration improved to 128/80  - Losartan on hold 2/2 OFELIA  - On Norvasc and Toprol 25      Chronic alcohol abuse.   - Advised to stop drinking, Known h/o pancreatitis, erosive esophagitis. No s/s of ETOH withdrawal.  - SW eval, Folic acid.    2.2 CM Liver lesion  - Outpatient GI follow up upon DC    PPX        Discussed with Attending   
The patient is a 63M with h/o ETOH abuse ( last drink 5 days ago), marijuana use, hepatic steatosis, pancreatitis, HTN, bleeding esophagitis came to ED with c/o having n/v and diarrhea with epigastric pain for last 5-6 days. He has been having poor appetite also. Diarrhea was mixed with blood clots and dark stool. He reports  fall down half a flight of stairs at home but no LOC. Now he feels room-spinning dizziness. on and off he feels chest pain and exertional SOB but no fever or recent travel.   In August 2023 with he had acute esophagitis on EGD. Last EGD on 11/03/23 with 4 cm HH with resolution of erosive esophagitis. Last colonoscopy was on 11/2022 was normal, non-bleeding internal hemorrhoids.     In ED BP was 93/63 and after 2L LR bolus /89. Scr 4.1 down to 3.4 ( baseline 0.7). K+ 3.2, Mg 1.0, Phos 1.6, lipase 188.       OFELIA (acute kidney injury).   - Likely prerenal ATN due to N/V, diarrhea and low PO intake  - Cr now down-trended to WNL   - S/P IVF. PO hydration encouraged   - Renal US  w/ No obstructive uropathy   - Losartan on hold. Resume GDMT as per cardiology  --> Now on Toprol XL 25 PO Qd, Entresto 24/26, monitor Cr   - Renal eval appreciated; F/u recs      Nausea vomiting and diarrhea.   - Multifactorial- alcohol use, marijuana use, pancreatitis, esophagitis or viral GE  - CT C/A/P w 2.2 CM lesion in liver consistent with cysts versus hemangioma non-obstructing L renal stone, cirrhotic liver morphology   - S/P IVF, PO hydration, PPI, antiemetics  - Diet per GI, advance as tolerated   - Lipase elevated, F/u GI   - Stool cultures negative   - Antiemetics PRN   - GI eval appreciated; F/u recs     Melena.   - Has been noticing black stool with clots. Known erosive esophagitis with bleeding on last EGD  - Hb 12.4, baseline 13  - S/P EGD 2/13 w/ GI negative for active bleed w/ hiatal hernia   - C/w PPI    - Trend CBC daily   - GI eval appreciated; F/u recs      Chest pain.   - Atypical chest pain with exertional dyspnea  - ECG non ischemic, tachycardia with PVCs, TTE from 2022 normal LV function. Low suspicion for ACS  - Trops 16 &19  - TTE w/ EF of 30%, LV hypokinesis Mild grade 1 diastolic dysfunction, no LV thrombus  - GDMT as per cardio --> On Toprol XL and Entresto; Repeat TTE in 3 months outpatient   - Cardio eval appreciated; F/u recs --> S/P Cath 2/15. F/u report       Electrolyte imbalance.   - Likely due to diarrhea, N/V  - Aggressive electrolyte supplementation   - Renal eval appreciated; F/u recs     H/O acute pancreatitis.   - Still drinks alcohol, last drink 5 days PTA  - SW eval  - C/w Folic acid, thiamine, MVI     HTN (hypertension).   - Losartan on hold 2/2 OFELIA, Norvasc DC'd  - Now on medications as above for GDMT. Monitor BP      Chronic alcohol abuse.   - Advised to stop drinking, Known h/o pancreatitis, erosive esophagitis. No s/s of ETOH withdrawal.  - SW eval, Folic acid.    2.2 CM Liver lesion  - Outpatient GI follow up upon DC    PPX

## 2024-02-16 NOTE — DISCHARGE NOTE NURSING/CASE MANAGEMENT/SOCIAL WORK - NSDCVIVACCINE_GEN_ALL_CORE_FT
influenza, injectable, quadrivalent, preservative free; 10-Nov-2022 15:45; Lizz Pope (PARVEEN); [employer]; [id number] (Exp. Date: 30-Jun-2023); IntraMuscular; Deltoid Right.; 0.5 milliLiter(s); VIS (VIS Published: 06-Aug-2021, VIS Presented: 10-Nov-2022);

## 2024-02-16 NOTE — PROGRESS NOTE ADULT - REASON FOR ADMISSION
Nausea, vomiting, diarrhea, poor appetite

## 2024-02-16 NOTE — PROGRESS NOTE ADULT - PROVIDER SPECIALTY LIST ADULT
Cardiology
Internal Medicine
Cardiology
Cardiology
Internal Medicine
Cardiology
Gastroenterology
Internal Medicine

## 2024-02-16 NOTE — PHARMACOTHERAPY INTERVENTION NOTE - COMMENTS
Counseled patient  on the following inpatient/discharge medications names (brand/generic), indication, and possible side effects:  carvedilol 12.5mg twice a day  Entresto 24/26mg twice a day  Do not take previous medication for losartan.     Farxiga & Jardiance both requires PA. As per Dr. Marks, will initiate OP.   Cost of Entresto at [facility] is $0. Patient would like medications delivered at bedside prior to discharge.    Patient was provided with a medication card for their new medication. Patient questions and concerns were answered and addressed. Patient demonstrated understanding.    Nicole De La Torre, PharmD, [facility]  Clinical Pharmacy Specialist  Teams (preferred) or [phone]

## 2024-02-16 NOTE — PROGRESS NOTE ADULT - SUBJECTIVE AND OBJECTIVE BOX
DATE OF SERVICE: 02-16-24 @ 16:49    Patient is a 63y old  Male who presents with a chief complaint of Nausea, vomiting, diarrhea, poor appetite (15 Feb 2024 21:07)      INTERVAL HISTORY: Feels ok.     REVIEW OF SYSTEMS:  CONSTITUTIONAL: No weakness  EYES/ENT: No visual changes;  No throat pain   NECK: No pain or stiffness  RESPIRATORY: No cough, wheezing; No shortness of breath  CARDIOVASCULAR: No chest pain or palpitations  GASTROINTESTINAL: No abdominal  pain. No nausea, vomiting, or hematemesis  GENITOURINARY: No dysuria, frequency or hematuria  NEUROLOGICAL: No stroke like symptoms  SKIN: No rashes    TELEMETRY Personally reviewed: SR/ST   	  MEDICATIONS:  carvedilol 12.5 milliGRAM(s) Oral every 12 hours  sacubitril 49 mG/valsartan 51 mG 1 Tablet(s) Oral two times a day        PHYSICAL EXAM:  T(C): 36.9 (02-16-24 @ 11:21), Max: 36.9 (02-16-24 @ 11:21)  HR: 97 (02-16-24 @ 11:21) (88 - 98)  BP: 107/64 (02-16-24 @ 11:21) (107/64 - 122/90)  RR: 18 (02-16-24 @ 11:21) (18 - 18)  SpO2: 96% (02-16-24 @ 11:21) (96% - 100%)  Wt(kg): --  I&O's Summary        Appearance: In no distress	  HEENT:    PERRL, EOMI	  Cardiovascular:  S1 S2, No JVD  Respiratory: Lungs clear to auscultation	  Gastrointestinal:  Soft, Non-tender, + BS	  Vascularature:  No edema of LE  Psychiatric: Appropriate affect   Neuro: no acute focal deficits                               10.8   3.01  )-----------( 191      ( 16 Feb 2024 07:25 )             33.4     02-16    140  |  105  |  22  ----------------------------<  116<H>  4.3   |  21<L>  |  0.87    Ca    10.0      16 Feb 2024 07:17          Labs personally reviewed      ASSESSMENT/PLAN: 	    62yo M with PMH ETOH abuse, hepatic steatosis, pancreatitis, HTN, GERD, GI Bleed presenting with n/v/d and dizziness x 6 days. Reports he fell down half a flight of stairs at home the day symptoms started, injuring b/l lower legs, no head injury and no LOC. Reports persistent n/v and dark colored diarrhea every day and feels room-spinning dizziness. Reports he has had to crawl around his house due to dizziness. Reports he feels CP when he has diarrhea and reports he is "always short of breath." Admits to regular alcohol use, typically tequila but unclear frequency, last drink 5 days ago. Of note, pt with GI bleed in august 2023 with esophagitis on EGD. Denies fevers, HA, vision changes, neck pain, cough, abdominal pain, dysuria, numbness/tingling, focal weakness, blood thinner use. Denies any other injury from fall.    Problem/Plan - 1:  ·  Problem: Chest Pain  - ECG non ischemic but ST with PVCs  - Prior TTE from 2022 wnl  - States CP a/w episodes of vomiting  - Lipase elevated at 188 with hx of alcoholic pancreatitis. GI consult appreciated.   - TTE shows newly reduced EF 30%, gloabl LV hypokinesis, grade I DD, reduced RV systolic function  - s/p diagnostic cath 2/15 with patent cors    Problem/Plan - 2:  ·  Problem: Dizziness.   ·  Plan: c/w IV hydration and electrolye repletion  - Likely associated with limited PO intake and N/V x 4 days  - Dizziness now improved    Problem/Plan - 3:  ·  Problem: HTN (hypertension).   ·  Plan: hold losartan given OFELIA  - Will need sHF GDMT    Problem/Plan - 4:  ·  Problem: Heart Failure with Reduced Ejection Fraction  ·  Plan: TTE LVEF is severely decreased with an ejection fraction of 30 %  - Cath ruled out Ischemia, likely ETOH induced CM  - sHF GDMT  - Continue carvedilol 12.5mg PO BID, Entresto 49/51 BID, Farxiga 10mg daily  - d/c Amlodipine to allow room for GDMT        IRASEMA Nichole-RENE Marks DO [facility]  Cardiovascular Medicine  [address], [address]  Available through call or text on [employer] TEAMs  Office: [phone]   DATE OF SERVICE: 02-16-24 @ 16:49    Patient is a 63y old  Male who presents with a chief complaint of Nausea, vomiting, diarrhea, poor appetite (15 Feb 2024 21:07)      INTERVAL HISTORY: Feels ok.     REVIEW OF SYSTEMS:  CONSTITUTIONAL: No weakness  EYES/ENT: No visual changes;  No throat pain   NECK: No pain or stiffness  RESPIRATORY: No cough, wheezing; No shortness of breath  CARDIOVASCULAR: No chest pain or palpitations  GASTROINTESTINAL: No abdominal  pain. No nausea, vomiting, or hematemesis  GENITOURINARY: No dysuria, frequency or hematuria  NEUROLOGICAL: No stroke like symptoms  SKIN: No rashes    TELEMETRY Personally reviewed: SR/ST   	  MEDICATIONS:  carvedilol 12.5 milliGRAM(s) Oral every 12 hours  sacubitril 49 mG/valsartan 51 mG 1 Tablet(s) Oral two times a day        PHYSICAL EXAM:  T(C): 36.9 (02-16-24 @ 11:21), Max: 36.9 (02-16-24 @ 11:21)  HR: 97 (02-16-24 @ 11:21) (88 - 98)  BP: 107/64 (02-16-24 @ 11:21) (107/64 - 122/90)  RR: 18 (02-16-24 @ 11:21) (18 - 18)  SpO2: 96% (02-16-24 @ 11:21) (96% - 100%)  Wt(kg): --  I&O's Summary        Appearance: In no distress	  HEENT:    PERRL, EOMI	  Cardiovascular:  S1 S2, No JVD  Respiratory: Lungs clear to auscultation	  Gastrointestinal:  Soft, Non-tender, + BS	  Vascularature:  No edema of LE  Psychiatric: Appropriate affect   Neuro: no acute focal deficits                               10.8   3.01  )-----------( 191      ( 16 Feb 2024 07:25 )             33.4     02-16    140  |  105  |  22  ----------------------------<  116<H>  4.3   |  21<L>  |  0.87    Ca    10.0      16 Feb 2024 07:17          Labs personally reviewed      ASSESSMENT/PLAN: 	    64yo M with PMH ETOH abuse, hepatic steatosis, pancreatitis, HTN, GERD, GI Bleed presenting with n/v/d and dizziness x 6 days. Reports he fell down half a flight of stairs at home the day symptoms started, injuring b/l lower legs, no head injury and no LOC. Reports persistent n/v and dark colored diarrhea every day and feels room-spinning dizziness. Reports he has had to crawl around his house due to dizziness. Reports he feels CP when he has diarrhea and reports he is "always short of breath." Admits to regular alcohol use, typically tequila but unclear frequency, last drink 5 days ago. Of note, pt with GI bleed in august 2023 with esophagitis on EGD. Denies fevers, HA, vision changes, neck pain, cough, abdominal pain, dysuria, numbness/tingling, focal weakness, blood thinner use. Denies any other injury from fall.    Problem/Plan - 1:  ·  Problem: Chest Pain  - ECG non ischemic but ST with PVCs  - Prior TTE from 2022 wnl  - States CP a/w episodes of vomiting  - Lipase elevated at 188 with hx of alcoholic pancreatitis. GI consult appreciated.   - TTE shows newly reduced EF 30%, gloabl LV hypokinesis, grade I DD, reduced RV systolic function  - s/p diagnostic cath 2/15 with patent cors    Problem/Plan - 2:  ·  Problem: Dizziness.   ·  Plan: c/w IV hydration and electrolye repletion  - Likely associated with limited PO intake and N/V x 4 days  - Dizziness now improved    Problem/Plan - 3:  ·  Problem: HTN (hypertension).   ·  Plan: hold losartan given OFELIA  - Will need sHF GDMT    Problem/Plan - 4:  ·  Problem: Heart Failure with Reduced Ejection Fraction  ·  Plan: TTE LVEF is severely decreased with an ejection fraction of 30 %  - Cath ruled out Ischemia, likely ETOH induced CM  - sHF GDMT  - Continue carvedilol 12.5mg PO BID, Entresto 49/51 BID, Farxiga 10mg daily when insurance approved  - d/c Amlodipine to allow room for GDMT        IRASEMA Nichole-NP   Pablo Marks DO [facility]  Cardiovascular Medicine  [address], [address]  Available through call or text on [employer] TEAMs  Office: [phone]

## 2024-03-30 NOTE — PRE-ANESTHESIA EVALUATION ADULT - BMI (KG/M2)
Cheyanne Rubio discharged at 11:46 AM  and 03/30/24   Patient discharged home via private transport.  Discharge education and teaching completed with Cheyanne Rubio.  RN signature: CONCEPCIÓN Lopez  
Finished breast and abdomen and proceeded to re prep and drape for arm surgery at 1621.  Started on left arm at 1712.  
Wound vac's in bilateral arms from surgery which are continuous at 125  
30.9

## 2024-06-12 NOTE — ED ADULT TRIAGE NOTE - BMI (KG/M2)
[FreeTextEntry1] : Pt is a 64-year-old female with history of morbid obesity, HTN, HLD, and anxiety who came to the clinic for follow up visit. Pt was last seen on 2/14/24.    Kidney History: On review of [facility] HIE/[employer], Scr noted to be elevated since 2022. Scr was elevated at 1.49 on 9/2/22. Scr subsequently was elevated at 1.8 on 6/12/23. Scr up trended to elevated at 2.26 on 8/2/23. Last Scr was elevated/ stable at 1.90 on 2/14/24. UA done on 6/12/23 showed trace proteinuria. UPCR was WNL on 8/2/23. Serum INDIRA was negative on 8/7/23. Hep Bs Ag were non-reactive on 8/7/23.   Kidney US done on 8/8/23 showed right kidney of 9.1 cm in size, Multiple large right renal non obstructing calculi with largest being 14mm in size at the mid-pole of right kidney, 2 9mm calculi one at upper pole and other at mid pole and 8 mm calculi at the lower pole. The left kidney is small in size at 8.7 cm in size with 6x8mm cortical based cyst. There is No renal masses, hydronephrosis seen.   Pt. currently feels well. Pt says she is following an Endocrinologist for weight loss. Pt. denies any chest pain, SOB, nausea, dysuria, weakness. Pt is following with Dr. Brad Chin (Obesity Medicine) regarding her weight issues.  
30.1

## 2024-11-07 NOTE — ED PROVIDER NOTE - INTERPRETATION
Received call from Lennie oleary tech at [facility] ([place]). They do not have Cardizem CD 240mg capsule available, only Cardizem ER however she confirmed this is still an extended-release capsule and same dose of 240mg. They will supply to pt. Advised will forward information to AB.   sinus tachy

## 2024-11-20 NOTE — DISCHARGE NOTE NURSING/CASE MANAGEMENT/SOCIAL WORK - NSTOBACCONEVERSMOKERY/N_GEN_A
No You can access the FollowMyHealth Patient Portal offered by [facility] by registering at the following website: [email or web address]. By joining [employer]’s FollowMyHealth portal, you will also be able to view your health information using other applications (apps) compatible with our system.

## 2024-12-25 PROBLEM — F10.90 ALCOHOL USE: Status: ACTIVE | Noted: 2023-02-14

## 2024-12-25 PROBLEM — F10.90 ALCOHOL USE: Status: RESOLVED | Noted: 2023-02-14 | Resolved: 2024-12-25

## 2025-01-04 ENCOUNTER — INPATIENT (INPATIENT)
Facility: HOSPITAL | Age: 65
LOS: 1 days | Discharge: HOME | DRG: 439 | End: 2025-01-06
Attending: INTERNAL MEDICINE | Admitting: INTERNAL MEDICINE
Payer: MEDICAID

## 2025-01-04 VITALS
HEIGHT: 69 IN | HEART RATE: 100 BPM | OXYGEN SATURATION: 100 % | TEMPERATURE: 98 F | RESPIRATION RATE: 18 BRPM | WEIGHT: 235.01 LBS | SYSTOLIC BLOOD PRESSURE: 114 MMHG | DIASTOLIC BLOOD PRESSURE: 83 MMHG

## 2025-01-04 DIAGNOSIS — Z98.890 OTHER SPECIFIED POSTPROCEDURAL STATES: Chronic | ICD-10-CM

## 2025-01-04 DIAGNOSIS — K85.90 ACUTE PANCREATITIS WITHOUT NECROSIS OR INFECTION, UNSPECIFIED: ICD-10-CM

## 2025-01-04 LAB
ADD ON TEST-SPECIMEN IN LAB: SIGNIFICANT CHANGE UP
ALBUMIN SERPL ELPH-MCNC: 4.1 G/DL — SIGNIFICANT CHANGE UP (ref 3.3–5)
ALBUMIN SERPL ELPH-MCNC: 4.4 G/DL — SIGNIFICANT CHANGE UP (ref 3.3–5)
ALP SERPL-CCNC: 85 U/L — SIGNIFICANT CHANGE UP (ref 40–120)
ALP SERPL-CCNC: 90 U/L — SIGNIFICANT CHANGE UP (ref 40–120)
ALT FLD-CCNC: 28 U/L — SIGNIFICANT CHANGE UP (ref 10–45)
ALT FLD-CCNC: 29 U/L — SIGNIFICANT CHANGE UP (ref 10–45)
ANION GAP SERPL CALC-SCNC: 15 MMOL/L — SIGNIFICANT CHANGE UP (ref 5–17)
ANION GAP SERPL CALC-SCNC: 19 MMOL/L — HIGH (ref 5–17)
APPEARANCE UR: ABNORMAL
AST SERPL-CCNC: 51 U/L — HIGH (ref 10–40)
AST SERPL-CCNC: 51 U/L — HIGH (ref 10–40)
BACTERIA # UR AUTO: NEGATIVE /HPF — SIGNIFICANT CHANGE UP
BASOPHILS # BLD AUTO: 0 K/UL — SIGNIFICANT CHANGE UP (ref 0–0.2)
BASOPHILS NFR BLD AUTO: 0 % — SIGNIFICANT CHANGE UP (ref 0–2)
BILIRUB SERPL-MCNC: 0.6 MG/DL — SIGNIFICANT CHANGE UP (ref 0.2–1.2)
BILIRUB SERPL-MCNC: 0.7 MG/DL — SIGNIFICANT CHANGE UP (ref 0.2–1.2)
BILIRUB UR-MCNC: NEGATIVE — SIGNIFICANT CHANGE UP
BUN SERPL-MCNC: 34 MG/DL — HIGH (ref 7–23)
BUN SERPL-MCNC: 38 MG/DL — HIGH (ref 7–23)
CALCIUM SERPL-MCNC: 10.2 MG/DL — SIGNIFICANT CHANGE UP (ref 8.4–10.5)
CALCIUM SERPL-MCNC: 8.8 MG/DL — SIGNIFICANT CHANGE UP (ref 8.4–10.5)
CAST: 1 /LPF — SIGNIFICANT CHANGE UP (ref 0–4)
CHLORIDE SERPL-SCNC: 100 MMOL/L — SIGNIFICANT CHANGE UP (ref 96–108)
CHLORIDE SERPL-SCNC: 95 MMOL/L — LOW (ref 96–108)
CO2 SERPL-SCNC: 20 MMOL/L — LOW (ref 22–31)
CO2 SERPL-SCNC: 22 MMOL/L — SIGNIFICANT CHANGE UP (ref 22–31)
COD CRY URNS QL: PRESENT
COLOR SPEC: SIGNIFICANT CHANGE UP
CREAT SERPL-MCNC: 1.78 MG/DL — HIGH (ref 0.5–1.3)
CREAT SERPL-MCNC: 2.35 MG/DL — HIGH (ref 0.5–1.3)
DIFF PNL FLD: ABNORMAL
EGFR: 30 ML/MIN/1.73M2 — LOW
EGFR: 42 ML/MIN/1.73M2 — LOW
EOSINOPHIL # BLD AUTO: 0.08 K/UL — SIGNIFICANT CHANGE UP (ref 0–0.5)
EOSINOPHIL NFR BLD AUTO: 1.7 % — SIGNIFICANT CHANGE UP (ref 0–6)
GAS PNL BLDV: SIGNIFICANT CHANGE UP
GLUCOSE SERPL-MCNC: 154 MG/DL — HIGH (ref 70–99)
GLUCOSE SERPL-MCNC: 155 MG/DL — HIGH (ref 70–99)
GLUCOSE UR QL: 250 MG/DL
HCT VFR BLD CALC: 22.9 % — LOW (ref 39–50)
HCT VFR BLD CALC: 32.5 % — LOW (ref 39–50)
HCT VFR BLD CALC: 33.8 % — LOW (ref 39–50)
HGB BLD-MCNC: 11.1 G/DL — LOW (ref 13–17)
HGB BLD-MCNC: 11.3 G/DL — LOW (ref 13–17)
HGB BLD-MCNC: 7.6 G/DL — LOW (ref 13–17)
KETONES UR-MCNC: ABNORMAL MG/DL
LEUKOCYTE ESTERASE UR-ACNC: ABNORMAL
LIDOCAIN IGE QN: 629 U/L — HIGH (ref 7–60)
LYMPHOCYTES # BLD AUTO: 1.06 K/UL — SIGNIFICANT CHANGE UP (ref 1–3.3)
LYMPHOCYTES # BLD AUTO: 23.7 % — SIGNIFICANT CHANGE UP (ref 13–44)
MAGNESIUM SERPL-MCNC: 1.7 MG/DL — SIGNIFICANT CHANGE UP (ref 1.6–2.6)
MANUAL SMEAR VERIFICATION: SIGNIFICANT CHANGE UP
MCHC RBC-ENTMCNC: 30.2 PG — SIGNIFICANT CHANGE UP (ref 27–34)
MCHC RBC-ENTMCNC: 31.1 PG — SIGNIFICANT CHANGE UP (ref 27–34)
MCHC RBC-ENTMCNC: 31.2 PG — SIGNIFICANT CHANGE UP (ref 27–34)
MCHC RBC-ENTMCNC: 32.8 G/DL — SIGNIFICANT CHANGE UP (ref 32–36)
MCHC RBC-ENTMCNC: 33.2 G/DL — SIGNIFICANT CHANGE UP (ref 32–36)
MCHC RBC-ENTMCNC: 34.8 G/DL — SIGNIFICANT CHANGE UP (ref 32–36)
MCV RBC AUTO: 89.8 FL — SIGNIFICANT CHANGE UP (ref 80–100)
MCV RBC AUTO: 91.8 FL — SIGNIFICANT CHANGE UP (ref 80–100)
MCV RBC AUTO: 93.9 FL — SIGNIFICANT CHANGE UP (ref 80–100)
MONOCYTES # BLD AUTO: 0.55 K/UL — SIGNIFICANT CHANGE UP (ref 0–0.9)
MONOCYTES NFR BLD AUTO: 12.3 % — SIGNIFICANT CHANGE UP (ref 2–14)
NEUTROPHILS # BLD AUTO: 2.76 K/UL — SIGNIFICANT CHANGE UP (ref 1.8–7.4)
NEUTROPHILS NFR BLD AUTO: 61.4 % — SIGNIFICANT CHANGE UP (ref 43–77)
NITRITE UR-MCNC: NEGATIVE — SIGNIFICANT CHANGE UP
NRBC # BLD: 0 /100 WBCS — SIGNIFICANT CHANGE UP (ref 0–0)
NRBC # BLD: 0 /100 WBCS — SIGNIFICANT CHANGE UP (ref 0–0)
PH UR: 5.5 — SIGNIFICANT CHANGE UP (ref 5–8)
PLAT MORPH BLD: NORMAL — SIGNIFICANT CHANGE UP
PLATELET # BLD AUTO: 103 K/UL — LOW (ref 150–400)
PLATELET # BLD AUTO: 153 K/UL — SIGNIFICANT CHANGE UP (ref 150–400)
PLATELET # BLD AUTO: 159 K/UL — SIGNIFICANT CHANGE UP (ref 150–400)
POTASSIUM SERPL-MCNC: 2.8 MMOL/L — CRITICAL LOW (ref 3.5–5.3)
POTASSIUM SERPL-MCNC: 4.6 MMOL/L — SIGNIFICANT CHANGE UP (ref 3.5–5.3)
POTASSIUM SERPL-SCNC: 2.8 MMOL/L — CRITICAL LOW (ref 3.5–5.3)
POTASSIUM SERPL-SCNC: 4.6 MMOL/L — SIGNIFICANT CHANGE UP (ref 3.5–5.3)
PROT SERPL-MCNC: 8 G/DL — SIGNIFICANT CHANGE UP (ref 6–8.3)
PROT SERPL-MCNC: 8.3 G/DL — SIGNIFICANT CHANGE UP (ref 6–8.3)
PROT UR-MCNC: 100 MG/DL
RBC # BLD: 2.44 M/UL — LOW (ref 4.2–5.8)
RBC # BLD: 3.62 M/UL — LOW (ref 4.2–5.8)
RBC # BLD: 3.68 M/UL — LOW (ref 4.2–5.8)
RBC # FLD: 14.6 % — HIGH (ref 10.3–14.5)
RBC # FLD: 15 % — HIGH (ref 10.3–14.5)
RBC # FLD: 15.2 % — HIGH (ref 10.3–14.5)
RBC BLD AUTO: SIGNIFICANT CHANGE UP
RBC CASTS # UR COMP ASSIST: 195 /HPF — HIGH (ref 0–4)
REVIEW: SIGNIFICANT CHANGE UP
SODIUM SERPL-SCNC: 135 MMOL/L — SIGNIFICANT CHANGE UP (ref 135–145)
SODIUM SERPL-SCNC: 136 MMOL/L — SIGNIFICANT CHANGE UP (ref 135–145)
SP GR SPEC: 1.03 — SIGNIFICANT CHANGE UP (ref 1–1.03)
SQUAMOUS # UR AUTO: 3 /HPF — SIGNIFICANT CHANGE UP (ref 0–5)
UROBILINOGEN FLD QL: 1 MG/DL — SIGNIFICANT CHANGE UP (ref 0.2–1)
VARIANT LYMPHS # BLD: 0.9 % — SIGNIFICANT CHANGE UP (ref 0–6)
WBC # BLD: 3.24 K/UL — LOW (ref 3.8–10.5)
WBC # BLD: 3.8 K/UL — SIGNIFICANT CHANGE UP (ref 3.8–10.5)
WBC # BLD: 4.49 K/UL — SIGNIFICANT CHANGE UP (ref 3.8–10.5)
WBC # FLD AUTO: 3.24 K/UL — LOW (ref 3.8–10.5)
WBC # FLD AUTO: 3.8 K/UL — SIGNIFICANT CHANGE UP (ref 3.8–10.5)
WBC # FLD AUTO: 4.49 K/UL — SIGNIFICANT CHANGE UP (ref 3.8–10.5)
WBC UR QL: 20 /HPF — HIGH (ref 0–5)

## 2025-01-04 PROCEDURE — 74176 CT ABD & PELVIS W/O CONTRAST: CPT | Mod: 26,MC

## 2025-01-04 PROCEDURE — 76705 ECHO EXAM OF ABDOMEN: CPT | Mod: 26

## 2025-01-04 PROCEDURE — 99291 CRITICAL CARE FIRST HOUR: CPT | Mod: 25

## 2025-01-04 RX ORDER — POTASSIUM CHLORIDE 600 MG/1
10 TABLET, FILM COATED, EXTENDED RELEASE ORAL
Refills: 0 | Status: DISCONTINUED | OUTPATIENT
Start: 2025-01-04 | End: 2025-01-06

## 2025-01-04 RX ORDER — SODIUM CHLORIDE 9 MG/ML
1000 INJECTION, SOLUTION INTRAMUSCULAR; INTRAVENOUS; SUBCUTANEOUS ONCE
Refills: 0 | Status: COMPLETED | OUTPATIENT
Start: 2025-01-04 | End: 2025-01-04

## 2025-01-04 RX ORDER — SODIUM CHLORIDE 9 MG/ML
1000 INJECTION, SOLUTION INTRAVENOUS
Refills: 0 | Status: DISCONTINUED | OUTPATIENT
Start: 2025-01-04 | End: 2025-01-06

## 2025-01-04 RX ORDER — THIAMINE HYDROCHLORIDE 100 MG/ML
100 INJECTION, SOLUTION INTRAMUSCULAR; INTRAVENOUS DAILY
Refills: 0 | Status: DISCONTINUED | OUTPATIENT
Start: 2025-01-04 | End: 2025-01-06

## 2025-01-04 RX ORDER — POTASSIUM CHLORIDE 600 MG/1
20 TABLET, FILM COATED, EXTENDED RELEASE ORAL
Refills: 0 | Status: DISCONTINUED | OUTPATIENT
Start: 2025-01-04 | End: 2025-01-04

## 2025-01-04 RX ORDER — SODIUM CHLORIDE 9 MG/ML
1000 INJECTION, SOLUTION INTRAVENOUS ONCE
Refills: 0 | Status: DISCONTINUED | OUTPATIENT
Start: 2025-01-04 | End: 2025-01-04

## 2025-01-04 RX ORDER — ACETAMINOPHEN 80 MG/.8ML
1000 SOLUTION/ DROPS ORAL ONCE
Refills: 0 | Status: COMPLETED | OUTPATIENT
Start: 2025-01-04 | End: 2025-01-04

## 2025-01-04 RX ORDER — PIPERACILLIN AND TAZOBACTAM 3; .375 G/15ML; G/15ML
3.38 INJECTION, POWDER, LYOPHILIZED, FOR SOLUTION INTRAVENOUS ONCE
Refills: 0 | Status: COMPLETED | OUTPATIENT
Start: 2025-01-04 | End: 2025-01-04

## 2025-01-04 RX ORDER — SODIUM PHOSPHATE, MONOBASIC, MONOHYDRATE AND SODIUM PHOSPHATE, DIBASIC ANHYDROUS 142; 276 MG/ML; MG/ML
30 INJECTION, SOLUTION INTRAVENOUS ONCE
Refills: 0 | Status: DISCONTINUED | OUTPATIENT
Start: 2025-01-04 | End: 2025-01-04

## 2025-01-04 RX ORDER — FAMOTIDINE 20 MG/1
20 TABLET, FILM COATED ORAL ONCE
Refills: 0 | Status: COMPLETED | OUTPATIENT
Start: 2025-01-04 | End: 2025-01-04

## 2025-01-04 RX ORDER — PIPERACILLIN AND TAZOBACTAM 3; .375 G/15ML; G/15ML
3.38 INJECTION, POWDER, LYOPHILIZED, FOR SOLUTION INTRAVENOUS EVERY 8 HOURS
Refills: 0 | Status: DISCONTINUED | OUTPATIENT
Start: 2025-01-04 | End: 2025-01-06

## 2025-01-04 RX ORDER — PANTOPRAZOLE 40 MG/1
40 TABLET, DELAYED RELEASE ORAL
Refills: 0 | Status: DISCONTINUED | OUTPATIENT
Start: 2025-01-04 | End: 2025-01-06

## 2025-01-04 RX ORDER — POTASSIUM CHLORIDE 600 MG/1
40 TABLET, FILM COATED, EXTENDED RELEASE ORAL EVERY 4 HOURS
Refills: 0 | Status: DISCONTINUED | OUTPATIENT
Start: 2025-01-04 | End: 2025-01-06

## 2025-01-04 RX ORDER — VITAMIN A 10000 UNIT
1 TABLET ORAL DAILY
Refills: 0 | Status: DISCONTINUED | OUTPATIENT
Start: 2025-01-04 | End: 2025-01-06

## 2025-01-04 RX ORDER — CARVEDILOL 25 MG/1
12.5 TABLET, FILM COATED ORAL EVERY 12 HOURS
Refills: 0 | Status: DISCONTINUED | OUTPATIENT
Start: 2025-01-04 | End: 2025-01-06

## 2025-01-04 RX ORDER — SOD PHOS DI, MONO/K PHOS MONO 250 MG
1 TABLET ORAL ONCE
Refills: 0 | Status: COMPLETED | OUTPATIENT
Start: 2025-01-04 | End: 2025-01-04

## 2025-01-04 RX ADMIN — SODIUM CHLORIDE 1000 MILLILITER(S): 9 INJECTION, SOLUTION INTRAMUSCULAR; INTRAVENOUS; SUBCUTANEOUS at 09:22

## 2025-01-04 RX ADMIN — PIPERACILLIN AND TAZOBACTAM 25 GRAM(S): 3; .375 INJECTION, POWDER, LYOPHILIZED, FOR SOLUTION INTRAVENOUS at 22:50

## 2025-01-04 RX ADMIN — FAMOTIDINE 20 MILLIGRAM(S): 20 TABLET, FILM COATED ORAL at 06:17

## 2025-01-04 RX ADMIN — PIPERACILLIN AND TAZOBACTAM 25 GRAM(S): 3; .375 INJECTION, POWDER, LYOPHILIZED, FOR SOLUTION INTRAVENOUS at 18:25

## 2025-01-04 RX ADMIN — SODIUM CHLORIDE 125 MILLILITER(S): 9 INJECTION, SOLUTION INTRAVENOUS at 14:27

## 2025-01-04 RX ADMIN — POTASSIUM CHLORIDE 100 MILLIEQUIVALENT(S): 600 TABLET, FILM COATED, EXTENDED RELEASE ORAL at 07:52

## 2025-01-04 RX ADMIN — SODIUM CHLORIDE 1000 MILLILITER(S): 9 INJECTION, SOLUTION INTRAMUSCULAR; INTRAVENOUS; SUBCUTANEOUS at 06:17

## 2025-01-04 RX ADMIN — PIPERACILLIN AND TAZOBACTAM 200 GRAM(S): 3; .375 INJECTION, POWDER, LYOPHILIZED, FOR SOLUTION INTRAVENOUS at 14:27

## 2025-01-04 RX ADMIN — THIAMINE HYDROCHLORIDE 100 MILLIGRAM(S): 100 INJECTION, SOLUTION INTRAMUSCULAR; INTRAVENOUS at 18:34

## 2025-01-04 RX ADMIN — SODIUM CHLORIDE 125 MILLILITER(S): 9 INJECTION, SOLUTION INTRAVENOUS at 22:50

## 2025-01-04 RX ADMIN — POTASSIUM CHLORIDE 100 MILLIEQUIVALENT(S): 600 TABLET, FILM COATED, EXTENDED RELEASE ORAL at 09:22

## 2025-01-04 RX ADMIN — ACETAMINOPHEN 400 MILLIGRAM(S): 80 SOLUTION/ DROPS ORAL at 06:49

## 2025-01-04 RX ADMIN — Medication 1 PACKET(S): at 08:02

## 2025-01-04 RX ADMIN — PANTOPRAZOLE 40 MILLIGRAM(S): 40 TABLET, DELAYED RELEASE ORAL at 18:34

## 2025-01-04 RX ADMIN — POTASSIUM CHLORIDE 40 MILLIEQUIVALENT(S): 600 TABLET, FILM COATED, EXTENDED RELEASE ORAL at 14:01

## 2025-01-04 RX ADMIN — POTASSIUM CHLORIDE 40 MILLIEQUIVALENT(S): 600 TABLET, FILM COATED, EXTENDED RELEASE ORAL at 06:49

## 2025-01-04 RX ADMIN — Medication 1 MILLIGRAM(S): at 18:34

## 2025-01-04 NOTE — ED PROVIDER NOTE - CLINICAL SUMMARY MEDICAL DECISION MAKING FREE TEXT BOX
63M w/ PMHx ETOH abuse, hepatic steatosis, pancreatitis, HTN, GERD, GI Bleed presenting with n/v/d and dizziness x 6 days. Patient states that he had acute onset of vomiting, diarrhea, fever, chills approx 6 days ago. He states that his 63M w/ PMHx ETOH abuse, hepatic steatosis, pancreatitis, HTN, GERD, GI Bleed presenting with n/v/d and dizziness x 6 days. Patient states that he had acute onset of vomiting, diarrhea, fever, chills approx 6 days ago. He states his vomit was initially dark color, and over the next few days became green. Also states that stool was initially bloody as well as black which also resolved over the course of the next several days. In addition to these complaints patient concerned about dry skin. Patient with ETOH history, endorses drinking "2 1/5ths per week," last drink on 12/31. Denies sick contacts, recent travel, SOB, dysuria, hematuria.    VSS on arrival, afebrile. Physical exam with epigastric and RUQ tenderness. Will obtain CBC, CMP, VBG w/ lytes, Lipase, RUQ US, supportive care.

## 2025-01-04 NOTE — ED ADULT NURSE NOTE - OBJECTIVE STATEMENT
Patient is a 64 year old male complaining of dizziness. Patient reports n/v/d and dizziness x 6 days, vomit was initially dark color, and over the next few days became green. Also states that stool was initially bloody as well as black which also resolved over the course of the next several day. Last drink 12/31 normally drinks "2 1/5ths per week." On assessment A&Ox4, PERRL, sensory grossly intact, able to move all extremities well, bilateral equal hand grasp, clear speech, breathing comfortably on room air, unlabored, no retractions, no nasal flaring, no accessory muscle use, no cough, chest rise and fall equal bilaterally, NSR on the cardiac monitor, no JVD, no edema noted, strong bilateral peripheral pulses, abdomen is soft, symmetric, and non-tender without distention, skin is excepted color for ethnicity without lesions or rashes, skin warm and dry, capillary refill is less than 3 seconds, normal skin turgor with no tenting. PMH ETOH abuse, hepatic steatosis, pancreatitis, HTN, GERD, GI Bleed.

## 2025-01-04 NOTE — H&P ADULT - NSHPREVIEWOFSYSTEMS_GEN_ALL_CORE
REVIEW OF SYSTEMS:    CONSTITUTIONAL: No weakness, fevers or chills  EYES/ENT: No visual changes;  No vertigo or throat pain   NECK: No pain or stiffness  RESPIRATORY: No cough, wheezing, hemoptysis; No shortness of breath  CARDIOVASCULAR: No chest pain or palpitations  GASTROINTESTINAL: N/V/, abdominal pain, bleeding   GENITOURINARY: No dysuria, frequency or hematuria  NEUROLOGICAL: No numbness or weakness  SKIN: No itching, burning, rashes, or lesions   All other review of systems is negative unless indicated above.

## 2025-01-04 NOTE — PATIENT PROFILE ADULT - FUNCTIONAL ASSESSMENT - BASIC MOBILITY 6.
3-calculated by average/Not able to assess (calculate score using [facility] averaging method)

## 2025-01-04 NOTE — ED PROVIDER NOTE - CARE PLAN
1 Principal Discharge DX:	Pancreatitis  Secondary Diagnosis:	Electrolyte imbalance  Secondary Diagnosis:	OFELIA (acute kidney injury)

## 2025-01-04 NOTE — ED PROVIDER NOTE - ATTENDING CONTRIBUTION TO CARE
I have personally provided the amount of critical care time documented below, excluding time spent on separate procedures: Multiple electrolyte derangements requiring IV repletion.     I have personally performed a face to face medical and diagnostic evaluation of the patient. I have discussed with and reviewed the Resident's note and agree with the History, ROS, Physical Exam and MDM unless otherwise indicated. A brief summary of my personal evaluation and impression can be found below.     63-year-old male, past medical history of alcohol use, pancreatitis, hypertension, GERD presenting with chief complaint of nausea, vomiting, diarrhea x 6 days.  States that he was completely sober until July, now does not drink alcohol every day but has started to drink again with last drink being on New Year's Kori.  Has been having symptoms persistently since.  On exam, vital signs stable, reproducible upper abdominal pain.  Clinically dehydrated.  Symptoms could be secondary to alcoholic gastritis versus gastroenteritis versus possible pancreatitis.  Plan for labs, CT, ultrasound.  Plan for fluid hydration, reassess to dispo. Agus Cochran, ED Attending

## 2025-01-04 NOTE — ED ADULT TRIAGE NOTE - CHIEF COMPLAINT QUOTE
abdominal pain with nausea and vomit began 8 days ago; dizziness x 3-4 days; took meclazine with no help; reports dark stools; vomit is bile colored; denies use of blood thinners

## 2025-01-04 NOTE — H&P ADULT - HISTORY OF PRESENT ILLNESS
Patient is a 62 Y/O Male with PMHx of ETOH abuse, hepatic steatosis, pancreatitis, HTN, GERD, GI Bleed presenting with n/v/d and dizziness x 6 days. Patient states that he had acute onset of vomiting, diarrhea, fever, chills approx 6 days ago. He states his vomit was initially dark color, and over the next few days became green. Also states that stool was initially bloody as well as black which also resolved over the course of the next several days. In addition to these complaints patient concerned about dry skin. Patient with ETOH history, endorses drinking "2 1/5ths per week," last drink on 12/31. Denies sick contacts, recent travel, SOB, dysuria, hematuria.

## 2025-01-04 NOTE — ED ADULT NURSE REASSESSMENT NOTE - NS ED NURSE REASSESS COMMENT FT1
Received patient from RN, patient at A&Ox4 mental status, able to make needs known, NAD, VSS, patient agreeable to plan of care, comfort and safety provided.

## 2025-01-04 NOTE — H&P ADULT - ASSESSMENT
Patient is a 62 Y/O Male with PMHx of ETOH abuse, hepatic steatosis, pancreatitis, HTN, GERD, GI Bleed presenting with n/v/d and dizziness x 6 days. Patient states that he had acute onset of vomiting, diarrhea, fever, chills approx 6 days ago. He states his vomit was initially dark color, and over the next few days became green. Also states that stool was initially bloody as well as black which also resolved over the course of the next several days. In addition to these complaints patient concerned about dry skin. Patient with ETOH history, endorses drinking "2 1/5ths per week," last drink on 12/31. Denies sick contacts, recent travel, SOB, dysuria, hematuria.      # Acute pancreatitis   elevate dlipase  aggressive hydration  monitor and trend lipase  NPO   pain control   CT noted   GI eval called   trend LFTS     # GI bleed   monitor hgb level  CBC Q12 hrs  GI eval called  PPI     # HTN   BP control   card eval called   check echo   watch for volume overload     DVT and gi PPX  Patient is a 62 Y/O Male with PMHx of ETOH abuse, hepatic steatosis, pancreatitis, HTN, GERD, GI Bleed presenting with n/v/d and dizziness x 6 days. Patient states that he had acute onset of vomiting, diarrhea, fever, chills approx 6 days ago. He states his vomit was initially dark color, and over the next few days became green. Also states that stool was initially bloody as well as black which also resolved over the course of the next several days. In addition to these complaints patient concerned about dry skin. Patient with ETOH history, endorses drinking "2 1/5ths per week," last drink on 12/31. Denies sick contacts, recent travel, SOB, dysuria, hematuria.      # Acute pancreatitis   elevate dlipase  aggressive hydration  monitor and trend lipase  NPO   pain control   CT noted   GI eval called   trend LFTS     # GI bleed   monitor hgb level  CBC Q12 hrs  GI eval called  PPI       # OFELIA   check renal US  IV Hydration   avoid nephrotoxic medicaitons  montor and replete electrolytes     # UTI   check UA   awaiting CX   cont rocpehin  IV hydration     # HTN   BP control   card eval called   check echo   watch for volume overload     DVT and gi PPX  Patient is a 62 Y/O Male with PMHx of ETOH abuse, hepatic steatosis, pancreatitis, HTN, GERD, GI Bleed presenting with n/v/d and dizziness x 6 days. Patient states that he had acute onset of vomiting, diarrhea, fever, chills approx 6 days ago. He states his vomit was initially dark color, and over the next few days became green. Also states that stool was initially bloody as well as black which also resolved over the course of the next several days. In addition to these complaints patient concerned about dry skin. Patient with ETOH history, endorses drinking "2 1/5ths per week," last drink on 12/31. Denies sick contacts, recent travel, SOB, dysuria, hematuria.      # Acute pancreatitis   elevate dlipase  aggressive hydration  monitor and trend lipase  NPO   pain control   CT noted   GI eval called   trend LFTS     # GI bleed   monitor hgb level  CBC Q12 hrs  GI eval called  PPI       # OFELIA   check renal US  IV Hydration   avoid nephrotoxic medicaitons  montor and replete electrolytes     # UTI   check UA   awaiting CX   cont Rocephin  IV hydration       # Heart Failure with Reduced Ejection Fraction   TTE LVEF is severely decreased with an ejection fraction of 30 %   Cath ruled out Ischemia, likely ETOH induced CM  hold Entresto and Jardiance given  OFELIA  c/w carvedilol  BID  Monitor strict5 I&Os, daily weights  TTE pending      # HTN   BP control   card eval called   check echo   watch for volume overload     DVT and gi PPX

## 2025-01-04 NOTE — CONSULT NOTE ADULT - NS ATTEND AMEND GEN_ALL_CORE FT
Patient care and plan discussed and reviewed with Advanced Care Provider. Plan as outlined above edited by me to reflect our discussion.   In addition, I participated in    - Ordering, reviewing, and interpreting labs, testing, and imaging.  - Reviewing prior hospitalization and where necessary, outpatient records.  - Counselling and educating patient and/or family regarding interpretation of aforementioned items and plan of care.

## 2025-01-04 NOTE — PATIENT PROFILE ADULT - OVER THE PAST TWO WEEKS, HAVE YOU FELT LITTLE INTEREST OR PLEASURE IN DOING THINGS?
[facility] Dermatology Visit    Thank you for allowing us to participate in your care. Your findings, instructions and follow-up plan are as follows:         When should I call my doctor?  If you are worsening or not improving, please, contact us or seek urgent care as noted below.     Who should I call with questions (adults)?  [facility] (adult and pediatric): [phone]  [facility] (adult): [phone]  For urgent needs outside of business hours call the [facility] at [phone] and ask for the dermatology resident on call  If this is a medical emergency and you are unable to reach an ER, Call 911    Who should I call with questions (pediatric)?  [facility]- Pediatric Dermatology  Dr. Mallika Augustin, Dr. Maddie Selby, Dr. Amaya Walter, Jana Wilson, PA  Dr. Giulia Bermudez, Dr. Mariama Desouza & Dr. Shaun Urbina  Non Urgent  Nurse Triage Line; [phone]- Rosemary and Jennifer RN Care Coordinators   Veronica (/Complex ) [phone]    If you need a prescription refill, please contact your pharmacy. Refills are approved or denied by our physicians during normal business hours, Monday through Fridays  Per office policy, refills will not be granted if you have not been seen within the past year (or sooner depending on your child's condition).    Scheduling Information:  Pediatric Appointment Scheduling and Call Center [phone]  Radiology Scheduling- [phone]  Sedation Unit Scheduling- [phone]  [place] Scheduling- General [phone]; Pediatric Dermatology [phone]  Main  Services: [phone]  Polish: [phone]  Yemeni: [phone]  Hmong/Jf/Kuwaiti: [phone]  Preadmission Nursing Department Fax Number: [phone] (fax all pre-operative paperwork to this number)    For urgent matters arising during evenings, weekends, or  holidays that cannot wait for normal business hours please call [phone] and ask for the dermatology resident on call to be paged.    no

## 2025-01-04 NOTE — ED PROVIDER NOTE - NS ED ROS FT
REVIEW OF SYSTEMS:    CONSTITUTIONAL: fevers, chills  EYES/ENT: No visual changes;  No vertigo or throat pain   NECK: No pain or stiffness  RESPIRATORY: No cough, wheezing, hemoptysis; No shortness of breath  CARDIOVASCULAR: No chest pain or palpitations  GASTROINTESTINAL: abdominal, epigastric pain. nausea, vomiting, No hematemesis; No diarrhea or constipation. No melena or hematochezia.  GENITOURINARY: No dysuria, frequency or hematuria  NEUROLOGICAL: No numbness or weakness  SKIN: No itching, rashes

## 2025-01-04 NOTE — H&P ADULT - NSHPPHYSICALEXAM_GEN_ALL_CORE
Vital Signs Last 24 Hrs  T(C): 36.5 (04 Jan 2025 05:41), Max: 37.2 (04 Jan 2025 04:22)  T(F): 97.7 (04 Jan 2025 05:41), Max: 98.9 (04 Jan 2025 04:22)  HR: 91 (04 Jan 2025 05:41) (91 - 100)  BP: 144/89 (04 Jan 2025 05:41) (106/75 - 144/89)  BP(mean): --  RR: 20 (04 Jan 2025 05:41) (16 - 20)  SpO2: 99% (04 Jan 2025 05:41) (99% - 100%) Vital Signs Last 24 Hrs  T(C): 36.5 (04 Jan 2025 05:41), Max: 37.2 (04 Jan 2025 04:22)  T(F): 97.7 (04 Jan 2025 05:41), Max: 98.9 (04 Jan 2025 04:22)  HR: 91 (04 Jan 2025 05:41) (91 - 100)  BP: 144/89 (04 Jan 2025 05:41) (106/75 - 144/89)  BP(mean): --  RR: 20 (04 Jan 2025 05:41) (16 - 20)  SpO2: 99% (04 Jan 2025 05:41) (99% - 100%)        Appearance: Normal	  HEENT:   Normal oral mucosa, PERRL, EOMI	  Lymphatic: No lymphadenopathy , no edema  Cardiovascular: Normal S1 S2, No JVD, No murmurs , Peripheral pulses palpable 2+ bilaterally  Respiratory: Lungs clear to auscultation, normal effort 	  Gastrointestinal:  Soft, epigastric pain on palpation   Skin: No rashes, No ecchymoses, No cyanosis, warm to touch  Musculoskeletal: Normal range of motion, normal strength  Psychiatry:  Mood & affect appropriate  Ext: No edema

## 2025-01-04 NOTE — PATIENT PROFILE ADULT - FALL HARM RISK - HARM RISK INTERVENTIONS
Assistance with ambulation/Assistance OOB with selected safe patient handling equipment/Communicate Risk of Fall with Harm to all staff/Discuss with provider need for PT consult/Monitor gait and stability/Provide patient with walking aids - walker, cane, crutches/Reinforce activity limits and safety measures with patient and family/Sit up slowly, dangle for a short time, stand at bedside before walking/Tailored Fall Risk Interventions/Visual Cue: Yellow wristband and red socks/Bed in lowest position, wheels locked, appropriate side rails in place/Call bell, personal items and telephone in reach/Instruct patient to call for assistance before getting out of bed or chair/Non-slip footwear when patient is out of bed/[place] to call system/Physically safe environment - no spills, clutter or unnecessary equipment/Purposeful Proactive Rounding/Room/bathroom lighting operational, light cord in reach

## 2025-01-04 NOTE — ED PROVIDER NOTE - PROGRESS NOTE DETAILS
Regan GERMAN: Patient in sign out. 63 male Alcohol abuse, pancreatitis, cirrhosis, GERD, with nausea/vomiting. Patient HD stable, labs concerning for OFELIA, pancreatitis, electrolyte imb - low K, PO4. Patient being hydrated, electrolytes being repleted. Pending CT scan. Likely hosp adm. Regan GERMAN: Patient in sign out. 63 male Alcohol abuse, pancreatitis, cirrhosis, GERD, with dizziness. At baseline reports having oncoing nausea/vomiting, abd pain, blood in stool. Patient HD stable, labs concerning for OFELIA, pancreatitis, electrolyte imb - low K, PO4. Patient being hydrated, electrolytes being repleted. Pending CT scan. Likely hosp adm.

## 2025-01-04 NOTE — PATIENT PROFILE ADULT - SAFE PLACE TO LIVE
Verified name and .    Patient requests that no MyChart messages or notifications be sent to her because she does not have access to check her MyChart.    This RN deactivated patient's MyChart.   no

## 2025-01-04 NOTE — CONSULT NOTE ADULT - SUBJECTIVE AND OBJECTIVE BOX
DATE OF SERVICE: 01-04-25 @ 15:00    CHIEF COMPLAINT:Patient is a 64y old  Male who presents with a chief complaint of abdominal pain    HISTORY OF PRESENT ILLNESS:   Problem/Plan - 1:  ·  Problem: Chest Pain  - ECG non ischemic but ST with PVCs  - Prior TTE from 2022 wnl  - States CP a/w episodes of vomiting  - Lipase elevated at 188 with hx of alcoholic pancreatitis. GI consult appreciated.   - TTE shows newly reduced EF 30%, gloabl LV hypokinesis, grade I DD, reduced RV systolic function  - s/p diagnostic cath 2/15 with patent cors    Problem/Plan - 2:  ·  Problem: Dizziness.   ·  Plan: c/w IV hydration and electrolye repletion  - Likely associated with limited PO intake and N/V x 4 days  - Dizziness now improved    Problem/Plan - 3:  ·  Problem: HTN (hypertension).   ·  Plan: hold losartan given OFELIA  - Will need sHF GDMT    Problem/Plan - 4:  ·  Problem: Heart Failure with Reduced Ejection Fraction  ·  Plan: TTE LVEF is severely decreased with an ejection fraction of 30 %  - Cath ruled out Ischemia, likely ETOH induced CM  - sHF GDMT  - Continue carvedilol 12.5mg PO BID, Entresto 49/51 BID, Farxiga 10mg daily when insurance approved  - d/c Amlodipine to allow room for GDMT      PAST MEDICAL & SURGICAL HISTORY:  HTN (hypertension)      GERD (gastroesophageal reflux disease)      Hiatal hernia      Acute alcoholic pancreatitis      H/O eye surgery  left      S/P foot surgery, left              MEDICATIONS:  carvedilol 12.5 milliGRAM(s) Oral every 12 hours    piperacillin/tazobactam IVPB.- 3.375 Gram(s) IV Intermittent once  piperacillin/tazobactam IVPB.. 3.375 Gram(s) IV Intermittent every 8 hours        pantoprazole    Tablet 40 milliGRAM(s) Oral before breakfast      folic acid 1 milliGRAM(s) Oral daily  lactated ringers. 1000 milliLiter(s) IV Continuous <Continuous>  potassium chloride    Tablet ER 40 milliEquivalent(s) Oral every 4 hours  potassium chloride  10 mEq/100 mL IVPB 10 milliEquivalent(s) IV Intermittent every 1 hour  thiamine 100 milliGRAM(s) Oral daily      FAMILY HISTORY:  FH: hypertension    FH: prostate cancer (Father)        Non-contributory    SOCIAL HISTORY:    [ ] Tobacco  [ ] Drugs  [ ] Alcohol    Allergies    No Known Allergies    Intolerances    	    REVIEW OF SYSTEMS:  CONSTITUTIONAL: No fever  EYES: No eye pain, visual disturbances, or discharge  ENMT:  No difficulty hearing, tinnitus  NECK: No pain or stiffness  RESPIRATORY: No cough, wheezing,  CARDIOVASCULAR: No chest pain, palpitations, passing out, dizziness, or leg swelling  GASTROINTESTINAL:  No nausea, vomiting, diarrhea or constipation. No melena.  GENITOURINARY: No dysuria, hematuria  NEUROLOGICAL: No stroke like symptoms  SKIN: No burning or lesions   ENDOCRINE: No heat or cold intolerance  MUSCULOSKELETAL: No joint pain or swelling  PSYCHIATRIC: No  anxiety, mood swings  HEME/LYMPH: No bleeding gums  ALLERGY AND IMMUNOLOGIC: No hives or eczema	    All other ROS negative    PHYSICAL EXAM:  T(C): 36.5 (01-04-25 @ 14:05), Max: 37.2 (01-04-25 @ 04:22)  HR: 99 (01-04-25 @ 14:05) (91 - 100)  BP: 115/84 (01-04-25 @ 14:05) (106/75 - 144/89)  RR: 19 (01-04-25 @ 14:05) (16 - 20)  SpO2: 99% (01-04-25 @ 14:05) (99% - 100%)  Wt(kg): --  I&O's Summary      Appearance: Normal	  HEENT:   Normal oral mucosa, EOMI	  Cardiovascular:  S1 S2, No JVD,    Respiratory: Lungs clear to auscultation	  Psychiatry: Alert  Gastrointestinal:  Soft, Non-tender, + BS	  Skin: No rashes   Neurologic: Non-focal  Extremities:  No edema  Vascular: Peripheral pulses palpable    	    	  	  CARDIAC MARKERS:  Labs personally reviewed by me                                  7.6    3.24  )-----------( 103      ( 04 Jan 2025 14:06 )             22.9     01-04    135  |  100  |  34[H]  ----------------------------<  155[H]  4.6   |  20[L]  |  1.78[H]    Ca    8.8      04 Jan 2025 14:06  Phos  1.0     01-04  Mg     1.7     01-04    TPro  8.0  /  Alb  4.1  /  TBili  0.7  /  DBili  x   /  AST  51[H]  /  ALT  28  /  AlkPhos  85  01-04          EKG: Personally reviewed by me -   Radiology: Personally reviewed by me -       Assessment /Plan:       Differential diagnosis and plan of care discussed with patient after the evaluation. Counseling on diet, nutritional counseling, weight management, exercise and medication compliance was done.   Advanced care planning/advanced directives discussed with patient/family. DNR status including forceful chest compressions to attempt to restart the heart, ventilator support/artificial breathing, electric shock, artificial nutrition, health care proxy, Molst form all discussed with pt. Pt wishes to consider. More than fifteen minutes spent on discussing advanced directives.       Pablo Marks DO [facility]  Cardiovascular Medicine  [address], [address]  Available for call or text via [employer] TE[employer]  Office [phone]   DATE OF SERVICE: 01-04-25 @ 15:00    CHIEF COMPLAINT:Patient is a 64y old  Male who presents with a chief complaint of abdominal pain    HISTORY OF PRESENT ILLNESS:  63M w/ PMHx ETOH abuse, hepatic steatosis, pancreatitis, HTN, GERD, GI Bleed presenting with n/v/d and dizziness x 6 days. Patient states that he had acute onset of vomiting, diarrhea, fever, chills approx 6 days ago. He states his vomit was initially dark color, and over the next few days became green. Also states that stool was initially bloody as well as black which also resolved over the course of the next several days. In addition to these complaints patient concerned about dry skin. Patient with ETOH history, endorses drinking "2 1/5ths per week," last drink on 12/31. Denies sick contacts, recent travel, SOB, dysuria, hematuria.  PAST MEDICAL & SURGICAL HISTORY:  HTN (hypertension)      GERD (gastroesophageal reflux disease)      Hiatal hernia      Acute alcoholic pancreatitis      H/O eye surgery  left      S/P foot surgery, left              MEDICATIONS:  carvedilol 12.5 milliGRAM(s) Oral every 12 hours    piperacillin/tazobactam IVPB.- 3.375 Gram(s) IV Intermittent once  piperacillin/tazobactam IVPB.. 3.375 Gram(s) IV Intermittent every 8 hours        pantoprazole    Tablet 40 milliGRAM(s) Oral before breakfast      folic acid 1 milliGRAM(s) Oral daily  lactated ringers. 1000 milliLiter(s) IV Continuous <Continuous>  potassium chloride    Tablet ER 40 milliEquivalent(s) Oral every 4 hours  potassium chloride  10 mEq/100 mL IVPB 10 milliEquivalent(s) IV Intermittent every 1 hour  thiamine 100 milliGRAM(s) Oral daily      FAMILY HISTORY:  FH: hypertension    FH: prostate cancer (Father)        Non-contributory    SOCIAL HISTORY:    [ ] Tobacco  [ ] Drugs  [ + ] Alcohol    Allergies    No Known Allergies    Intolerances    	    REVIEW OF SYSTEMS:  CONSTITUTIONAL: No fever, + dizziness   EYES: No eye pain, visual disturbances, or discharge  ENMT:  No difficulty hearing, tinnitus  NECK: No pain or stiffness  RESPIRATORY: No cough, wheezing,  CARDIOVASCULAR: No chest pain, palpitations, passing out, dizziness, or leg swelling  GASTROINTESTINAL:  + nausea, vomiting, no diarrhea or constipation. No melena.  GENITOURINARY: No dysuria, hematuria  NEUROLOGICAL: No stroke like symptoms  SKIN: No burning or lesions   ENDOCRINE: No heat or cold intolerance  MUSCULOSKELETAL: No joint pain or swelling  PSYCHIATRIC: No  anxiety, mood swings  HEME/LYMPH: No bleeding gums  ALLERGY AND IMMUNOLOGIC: No hives or eczema	    All other ROS negative    PHYSICAL EXAM:  T(C): 36.5 (01-04-25 @ 14:05), Max: 37.2 (01-04-25 @ 04:22)  HR: 99 (01-04-25 @ 14:05) (91 - 100)  BP: 115/84 (01-04-25 @ 14:05) (106/75 - 144/89)  RR: 19 (01-04-25 @ 14:05) (16 - 20)  SpO2: 99% (01-04-25 @ 14:05) (99% - 100%)  Wt(kg): --  I&O's Summary      Appearance: Normal	  HEENT:   Normal oral mucosa, EOMI	  Cardiovascular:  S1 S2, No JVD,    Respiratory: Lungs clear to auscultation	  Psychiatry: Alert  Gastrointestinal:  Soft, Non-tender, + BS	  Skin: No rashes   Neurologic: Non-focal  Extremities:  No edema  Vascular: Peripheral pulses palpable    	    	  	  CARDIAC MARKERS:  Labs personally reviewed by me                                  7.6    3.24  )-----------( 103      ( 04 Jan 2025 14:06 )             22.9     01-04    135  |  100  |  34[H]  ----------------------------<  155[H]  4.6   |  20[L]  |  1.78[H]    Ca    8.8      04 Jan 2025 14:06  Phos  1.0     01-04  Mg     1.7     01-04    TPro  8.0  /  Alb  4.1  /  TBili  0.7  /  DBili  x   /  AST  51[H]  /  ALT  28  /  AlkPhos  85  01-04          EKG: Personally reviewed by me - Sinus arrhythmia at 75bpm  Radiology: Personally reviewed by me -   < from: Xray Chest 1 View- PORTABLE-Urgent (Xray Chest 1 View- PORTABLE-Urgent .) (02.12.24 @ 08:29) >  IMPRESSION:    No radiographic evidence of acute cardiopulmonary disease and no change.    < end of copied text >  < from: CT Abdomen and Pelvis No Cont (01.04.25 @ 10:46) >    IMPRESSION:    Acute pancreatitis.    < end of copied text >  < from: TTE W or WO Ultrasound Enhancing Agent (02.13.24 @ 09:58) >   1. Left ventricular cavity is normal. Left ventricular wall thickness is normal. Left ventricular systolic function is severely decreased with an ejection fraction of 30 % by Green's method of disks. Global left ventricular hypokinesis.   2. There is mild (grade 1) left ventricular diastolic dysfunction, with normal filling pressure.   3. Mildly enlarged right ventricular cavity size, wall thickness, and reduced systolic function. Tricuspid annular plane systolic excursion (TAPSE) is 1.3 cm (normal >=1.7 cm).   4. Normal left and right atrial size.   5. No significant valvular disease.   6. No pericardial effusion seen.   7. Compared to the transthoracic echocardiogram performed on 11/7/2022, there has been a deline to LV/RV systolic function.   8. There is no evidence of a left ventricular thrombus.    < end of copied text >  < from: Cardiac Catheterization (02.15.24 @ 09:22) >  Diagnostic Conclusions:     Mild nonobstructive CAD       Assessment /Plan:     63M w/ PMHx ETOH abuse, hepatic steatosis, pancreatitis, HTN, GERD, GI Bleed presenting with n/v/d and dizziness x 6 days. Patient states that he had acute onset of vomiting, diarrhea, fever, chills approx 6 days ago. He states his vomit was initially dark color, and over the next few days became green. Also states that stool was initially bloody as well as black which also resolved over the course of the next several days. In addition to these complaints patient concerned about dry skin. Patient with ETOH history, endorses drinking "2 1/5ths per week," last drink on 12/31. Denies sick contacts, recent travel, SOB, dysuria, hematuria. CT reveals acute pancreatitis.     Problem/Plan - 1:  ·  Problem: Acute Pancreatitis  - p/w episodes of nausea and vomiting  - Lipase elevated at 629  - CT A/P reveals acute pancreatitis  - Appreciate GI recs      Problem/Plan - 2:  ·  Problem: Dizziness.   ·  Plan:   - Likely associated with limited PO intake and N/V   - c/w LR at 125mL/hr (observe closely for s/sx of volume overload)    Problem/Plan - 3:  ·  Problem: HTN (hypertension).   ·  Plan: hold Entresto and Jardiance given  OFELIA  - c/w carvedilol 12.5mg PO BID    Problem/Plan - 4:  ·  Problem: Heart Failure with Reduced Ejection Fraction  ·  Plan: TTE LVEF is severely decreased with an ejection fraction of 30 %  - Cath ruled out Ischemia, likely ETOH induced CM  - sHF GDMT  -- hold Entresto and Jardiance given  OFELIA  -- c/w carvedilol 12.5mg PO BID  - Monitor strict5 I&Os, daily weights  - TTE pending    Differential diagnosis and plan of care discussed with patient after the evaluation. Counseling on diet, nutritional counseling, weight management, exercise and medication compliance was done.   Advanced care planning/advanced directives discussed with patient/family. DNR status including forceful chest compressions to attempt to restart the heart, ventilator support/artificial breathing, electric shock, artificial nutrition, health care proxy, Molst form all discussed with pt. Pt wishes to consider. More than fifteen minutes spent on discussing advanced directives.       Miladys Pena [facility]-BC  Pablo Marks DO [facility]  Cardiovascular Medicine  [address] [place] [address] Dr, [address]  Available for call or text via [employer] TE[employer]  Office [phone]   DATE OF SERVICE: 01-04-25 @ 15:00    CHIEF COMPLAINT:Patient is a 64y old  Male who presents with a chief complaint of abdominal pain    HISTORY OF PRESENT ILLNESS:  63M w/ PMHx ETOH abuse, hepatic steatosis, pancreatitis, HTN, GERD, GI Bleed presenting with n/v/d and dizziness x 6 days. Patient states that he had acute onset of vomiting, diarrhea, fever, chills approx 6 days ago. He states his vomit was initially dark color, and over the next few days became green. Also states that stool was initially bloody as well as black which also resolved over the course of the next several days. In addition to these complaints patient concerned about dry skin. Patient with ETOH history, endorses drinking "2 1/5ths per week," last drink on 12/31. Denies sick contacts, recent travel, SOB, dysuria, hematuria.  PAST MEDICAL & SURGICAL HISTORY:  HTN (hypertension)      GERD (gastroesophageal reflux disease)      Hiatal hernia      Acute alcoholic pancreatitis      H/O eye surgery  left      S/P foot surgery, left              MEDICATIONS:  carvedilol 12.5 milliGRAM(s) Oral every 12 hours    piperacillin/tazobactam IVPB.- 3.375 Gram(s) IV Intermittent once  piperacillin/tazobactam IVPB.. 3.375 Gram(s) IV Intermittent every 8 hours        pantoprazole    Tablet 40 milliGRAM(s) Oral before breakfast      folic acid 1 milliGRAM(s) Oral daily  lactated ringers. 1000 milliLiter(s) IV Continuous <Continuous>  potassium chloride    Tablet ER 40 milliEquivalent(s) Oral every 4 hours  potassium chloride  10 mEq/100 mL IVPB 10 milliEquivalent(s) IV Intermittent every 1 hour  thiamine 100 milliGRAM(s) Oral daily      FAMILY HISTORY:  FH: hypertension    FH: prostate cancer (Father)        Non-contributory    SOCIAL HISTORY:    [ ] Tobacco  [ ] Drugs  [ + ] Alcohol    Allergies    No Known Allergies    Intolerances    	    REVIEW OF SYSTEMS:  CONSTITUTIONAL: No fever, + dizziness   EYES: No eye pain, visual disturbances, or discharge  ENMT:  No difficulty hearing, tinnitus  NECK: No pain or stiffness  RESPIRATORY: No cough, wheezing,  CARDIOVASCULAR: No chest pain, palpitations, passing out, dizziness, or leg swelling  GASTROINTESTINAL:  + nausea, vomiting, no diarrhea or constipation. No melena.  GENITOURINARY: No dysuria, hematuria  NEUROLOGICAL: No stroke like symptoms  SKIN: No burning or lesions   ENDOCRINE: No heat or cold intolerance  MUSCULOSKELETAL: No joint pain or swelling  PSYCHIATRIC: No  anxiety, mood swings  HEME/LYMPH: No bleeding gums  ALLERGY AND IMMUNOLOGIC: No hives or eczema	    All other ROS negative    PHYSICAL EXAM:  T(C): 36.5 (01-04-25 @ 14:05), Max: 37.2 (01-04-25 @ 04:22)  HR: 99 (01-04-25 @ 14:05) (91 - 100)  BP: 115/84 (01-04-25 @ 14:05) (106/75 - 144/89)  RR: 19 (01-04-25 @ 14:05) (16 - 20)  SpO2: 99% (01-04-25 @ 14:05) (99% - 100%)  Wt(kg): --  I&O's Summary      Appearance: Normal	  HEENT:   Normal oral mucosa, EOMI	  Cardiovascular:  S1 S2, No JVD,    Respiratory: Lungs clear to auscultation	  Psychiatry: Alert  Gastrointestinal:  Soft, Non-tender, + BS	  Skin: No rashes   Neurologic: Non-focal  Extremities:  No edema  Vascular: Peripheral pulses palpable    	    	  	  CARDIAC MARKERS:  Labs personally reviewed by me                                  7.6    3.24  )-----------( 103      ( 04 Jan 2025 14:06 )             22.9     01-04    135  |  100  |  34[H]  ----------------------------<  155[H]  4.6   |  20[L]  |  1.78[H]    Ca    8.8      04 Jan 2025 14:06  Phos  1.0     01-04  Mg     1.7     01-04    TPro  8.0  /  Alb  4.1  /  TBili  0.7  /  DBili  x   /  AST  51[H]  /  ALT  28  /  AlkPhos  85  01-04          EKG: Personally reviewed by me - Sinus arrhythmia at 75bpm  Radiology: Personally reviewed by me -   < from: Xray Chest 1 View- PORTABLE-Urgent (Xray Chest 1 View- PORTABLE-Urgent .) (02.12.24 @ 08:29) >  IMPRESSION:    No radiographic evidence of acute cardiopulmonary disease and no change.    < end of copied text >  < from: CT Abdomen and Pelvis No Cont (01.04.25 @ 10:46) >    IMPRESSION:    Acute pancreatitis.    < end of copied text >  < from: TTE W or WO Ultrasound Enhancing Agent (02.13.24 @ 09:58) >   1. Left ventricular cavity is normal. Left ventricular wall thickness is normal. Left ventricular systolic function is severely decreased with an ejection fraction of 30 % by Green's method of disks. Global left ventricular hypokinesis.   2. There is mild (grade 1) left ventricular diastolic dysfunction, with normal filling pressure.   3. Mildly enlarged right ventricular cavity size, wall thickness, and reduced systolic function. Tricuspid annular plane systolic excursion (TAPSE) is 1.3 cm (normal >=1.7 cm).   4. Normal left and right atrial size.   5. No significant valvular disease.   6. No pericardial effusion seen.   7. Compared to the transthoracic echocardiogram performed on 11/7/2022, there has been a deline to LV/RV systolic function.   8. There is no evidence of a left ventricular thrombus.    < end of copied text >  < from: Cardiac Catheterization (02.15.24 @ 09:22) >  Diagnostic Conclusions:     Mild nonobstructive CAD           Assessment /Plan:     63M w/ PMHx ETOH abuse, hepatic steatosis, pancreatitis, HTN, GERD, GI Bleed presenting with n/v/d and dizziness x 6 days. Patient states that he had acute onset of vomiting, diarrhea, fever, chills approx 6 days ago. He states his vomit was initially dark color, and over the next few days became green. Also states that stool was initially bloody as well as black which also resolved over the course of the next several days. In addition to these complaints patient concerned about dry skin. Patient with ETOH history, endorses drinking "2 1/5ths per week," last drink on 12/31. Denies sick contacts, recent travel, SOB, dysuria, hematuria. CT reveals acute pancreatitis.     Problem/Plan - 1:  ·  Problem: Acute Pancreatitis  - p/w episodes of nausea and vomiting  - Lipase elevated at 629  - CT A/P reveals acute pancreatitis  - Appreciate GI recs      Problem/Plan - 2:  ·  Problem: Dizziness.   ·  Plan:   - Likely associated with limited PO intake and N/V   - c/w LR at 125mL/hr (observe closely for s/sx of volume overload)    Problem/Plan - 3:  ·  Problem: HTN (hypertension).   ·  Plan: hold Entresto and Jardiance given  OFELIA  - c/w carvedilol 12.5mg PO BID    Problem/Plan - 4:  ·  Problem: Heart Failure with Reduced Ejection Fraction  ·  Plan: TTE LVEF is severely decreased with an ejection fraction of 30 %  - Cath ruled out Ischemia, likely ETOH induced CM  - sHF GDMT  -- hold Entresto and Jardiance given  OFELIA  -- c/w carvedilol 12.5mg PO BID  - Monitor strict5 I&Os, daily weights  - TTE pending          Differential diagnosis and plan of care discussed with patient after the evaluation. Counseling on diet, nutritional counseling, weight management, exercise and medication compliance was done.   Advanced care planning/advanced directives discussed with patient/family. DNR status including forceful chest compressions to attempt to restart the heart, ventilator support/artificial breathing, electric shock, artificial nutrition, health care proxy, Molst form all discussed with pt. Pt wishes to consider. More than fifteen minutes spent on discussing advanced directives.       Miladys Pena [facility]  Pablo Marks DO [facility]  Cardiovascular Medicine  [address] [place] [address] Dr, [address]  Available for call or text via [employer] TE[employer]  Office [phone]

## 2025-01-04 NOTE — ED PROVIDER NOTE - PHYSICAL EXAMINATION
PHYSICAL EXAM:  GENERAL: NAD, lying in bed comfortably  HEENT: NC/AT  NECK: Supple, No JVD  CHEST/LUNG: CTAB, no increased WOB  HEART: RRR, no m/r/g  ABDOMEN: mildly TTP diffusely, soft, ND, BS+  EXTREMITIES:  2+ peripheral pulses, no edema  NERVOUS SYSTEM:  A&Ox3  MSK: FROM all 4 extremities, full and equal strength  SKIN: No rashes or lesions

## 2025-01-04 NOTE — ED ADULT TRIAGE NOTE - TEMPERATURE IN FAHRENHEIT (DEGREES F)
Received call from Nataliia, Out Patient Infusion . She calls to report patient Yecenia Tabares has cancelled her txt of IV Iron today 1/24/22 due to   POSITIVE COVID TEST  Main per Nataliia for txt Feb 2022, see in apts.   
98

## 2025-01-04 NOTE — ED ADULT NURSE REASSESSMENT NOTE - NS ED NURSE REASSESS COMMENT FT1
Pt does not want IV Potassium due to burning sensation. MD Rose aware. Pt does not want IV Potassium due to burning sensation. MD Rose aware. Will give PO Potassium per order.

## 2025-01-05 LAB
ALBUMIN SERPL ELPH-MCNC: 3.4 G/DL — SIGNIFICANT CHANGE UP (ref 3.3–5)
ALP SERPL-CCNC: 70 U/L — SIGNIFICANT CHANGE UP (ref 40–120)
ALT FLD-CCNC: 26 U/L — SIGNIFICANT CHANGE UP (ref 10–45)
ANION GAP SERPL CALC-SCNC: 13 MMOL/L — SIGNIFICANT CHANGE UP (ref 5–17)
AST SERPL-CCNC: 49 U/L — HIGH (ref 10–40)
BASOPHILS # BLD AUTO: 0.03 K/UL — SIGNIFICANT CHANGE UP (ref 0–0.2)
BASOPHILS NFR BLD AUTO: 1 % — SIGNIFICANT CHANGE UP (ref 0–2)
BILIRUB SERPL-MCNC: 0.6 MG/DL — SIGNIFICANT CHANGE UP (ref 0.2–1.2)
BUN SERPL-MCNC: 24 MG/DL — HIGH (ref 7–23)
CALCIUM SERPL-MCNC: 8.8 MG/DL — SIGNIFICANT CHANGE UP (ref 8.4–10.5)
CHLORIDE SERPL-SCNC: 106 MMOL/L — SIGNIFICANT CHANGE UP (ref 96–108)
CHOLEST SERPL-MCNC: 162 MG/DL — SIGNIFICANT CHANGE UP
CO2 SERPL-SCNC: 21 MMOL/L — LOW (ref 22–31)
CREAT SERPL-MCNC: 1.58 MG/DL — HIGH (ref 0.5–1.3)
CULTURE RESULTS: NO GROWTH — SIGNIFICANT CHANGE UP
EGFR: 49 ML/MIN/1.73M2 — LOW
EOSINOPHIL # BLD AUTO: 0.1 K/UL — SIGNIFICANT CHANGE UP (ref 0–0.5)
EOSINOPHIL NFR BLD AUTO: 3.4 % — SIGNIFICANT CHANGE UP (ref 0–6)
GLUCOSE SERPL-MCNC: 99 MG/DL — SIGNIFICANT CHANGE UP (ref 70–99)
HCT VFR BLD CALC: 30.4 % — LOW (ref 39–50)
HDLC SERPL-MCNC: 51 MG/DL — SIGNIFICANT CHANGE UP
HGB BLD-MCNC: 10.1 G/DL — LOW (ref 13–17)
LIDOCAIN IGE QN: 270 U/L — HIGH (ref 7–60)
LIPID PNL WITH DIRECT LDL SERPL: 99 MG/DL — SIGNIFICANT CHANGE UP
LYMPHOCYTES # BLD AUTO: 0.74 K/UL — LOW (ref 1–3.3)
LYMPHOCYTES # BLD AUTO: 25.4 % — SIGNIFICANT CHANGE UP (ref 13–44)
MCHC RBC-ENTMCNC: 30.8 PG — SIGNIFICANT CHANGE UP (ref 27–34)
MCHC RBC-ENTMCNC: 33.2 G/DL — SIGNIFICANT CHANGE UP (ref 32–36)
MCV RBC AUTO: 92.7 FL — SIGNIFICANT CHANGE UP (ref 80–100)
MONOCYTES # BLD AUTO: 0.8 K/UL — SIGNIFICANT CHANGE UP (ref 0–0.9)
MONOCYTES NFR BLD AUTO: 27.5 % — HIGH (ref 2–14)
NEUTROPHILS # BLD AUTO: 1.24 K/UL — LOW (ref 1.8–7.4)
NEUTROPHILS NFR BLD AUTO: 42.7 % — LOW (ref 43–77)
NON HDL CHOLESTEROL: 111 MG/DL — SIGNIFICANT CHANGE UP
NRBC # BLD: 0 /100 WBCS — SIGNIFICANT CHANGE UP (ref 0–0)
PLATELET # BLD AUTO: 148 K/UL — LOW (ref 150–400)
POTASSIUM SERPL-MCNC: 3.8 MMOL/L — SIGNIFICANT CHANGE UP (ref 3.5–5.3)
POTASSIUM SERPL-SCNC: 3.8 MMOL/L — SIGNIFICANT CHANGE UP (ref 3.5–5.3)
PROT SERPL-MCNC: 6.7 G/DL — SIGNIFICANT CHANGE UP (ref 6–8.3)
RBC # BLD: 3.28 M/UL — LOW (ref 4.2–5.8)
RBC # FLD: 15.3 % — HIGH (ref 10.3–14.5)
SODIUM SERPL-SCNC: 140 MMOL/L — SIGNIFICANT CHANGE UP (ref 135–145)
SPECIMEN SOURCE: SIGNIFICANT CHANGE UP
TRIGL SERPL-MCNC: 61 MG/DL — SIGNIFICANT CHANGE UP
TSH SERPL-MCNC: 1.65 UIU/ML — SIGNIFICANT CHANGE UP (ref 0.27–4.2)
WBC # BLD: 2.91 K/UL — LOW (ref 3.8–10.5)
WBC # FLD AUTO: 2.91 K/UL — LOW (ref 3.8–10.5)

## 2025-01-05 PROCEDURE — 76770 US EXAM ABDO BACK WALL COMP: CPT | Mod: 26

## 2025-01-05 RX ADMIN — PIPERACILLIN AND TAZOBACTAM 25 GRAM(S): 3; .375 INJECTION, POWDER, LYOPHILIZED, FOR SOLUTION INTRAVENOUS at 06:11

## 2025-01-05 RX ADMIN — Medication 1 MILLIGRAM(S): at 13:03

## 2025-01-05 RX ADMIN — CARVEDILOL 12.5 MILLIGRAM(S): 25 TABLET, FILM COATED ORAL at 18:04

## 2025-01-05 RX ADMIN — CARVEDILOL 12.5 MILLIGRAM(S): 25 TABLET, FILM COATED ORAL at 06:10

## 2025-01-05 RX ADMIN — PANTOPRAZOLE 40 MILLIGRAM(S): 40 TABLET, DELAYED RELEASE ORAL at 06:10

## 2025-01-05 RX ADMIN — THIAMINE HYDROCHLORIDE 100 MILLIGRAM(S): 100 INJECTION, SOLUTION INTRAMUSCULAR; INTRAVENOUS at 13:03

## 2025-01-05 RX ADMIN — SODIUM CHLORIDE 125 MILLILITER(S): 9 INJECTION, SOLUTION INTRAVENOUS at 21:55

## 2025-01-05 RX ADMIN — PIPERACILLIN AND TAZOBACTAM 25 GRAM(S): 3; .375 INJECTION, POWDER, LYOPHILIZED, FOR SOLUTION INTRAVENOUS at 13:26

## 2025-01-05 RX ADMIN — PIPERACILLIN AND TAZOBACTAM 25 GRAM(S): 3; .375 INJECTION, POWDER, LYOPHILIZED, FOR SOLUTION INTRAVENOUS at 21:55

## 2025-01-05 NOTE — PROGRESS NOTE ADULT - SUBJECTIVE AND OBJECTIVE BOX
Name of Patient : SARAHY DIAZ  MRN: [id number]  Date of visit: 01-05-25       Subjective: Patient seen and examined. No new events except as noted.   Doing okay   tolerating oral feeding  no vomiting       REVIEW OF SYSTEMS:    CONSTITUTIONAL: No weakness, fevers or chills  EYES/ENT: No visual changes;  No vertigo or throat pain   NECK: No pain or stiffness  RESPIRATORY: No cough, wheezing, hemoptysis; No shortness of breath  CARDIOVASCULAR: No chest pain or palpitations  GASTROINTESTINAL: No abdominal or epigastric pain. No nausea, vomiting, or hematemesis; No diarrhea or constipation. No melena or hematochezia.  GENITOURINARY: No dysuria, frequency or hematuria  NEUROLOGICAL: No numbness or weakness  SKIN: No itching, burning, rashes, or lesions   All other review of systems is negative unless indicated above.    MEDICATIONS:  MEDICATIONS  (STANDING):  carvedilol 12.5 milliGRAM(s) Oral every 12 hours  folic acid 1 milliGRAM(s) Oral daily  lactated ringers. 1000 milliLiter(s) (125 mL/Hr) IV Continuous <Continuous>  pantoprazole    Tablet 40 milliGRAM(s) Oral before breakfast  piperacillin/tazobactam IVPB.. 3.375 Gram(s) IV Intermittent every 8 hours  potassium chloride    Tablet ER 40 milliEquivalent(s) Oral every 4 hours  potassium chloride  10 mEq/100 mL IVPB 10 milliEquivalent(s) IV Intermittent every 1 hour  thiamine 100 milliGRAM(s) Oral daily      PHYSICAL EXAM:  T(C): 36.5 (01-05-25 @ 21:10), Max: 36.9 (01-05-25 @ 16:31)  HR: 86 (01-05-25 @ 21:10) (77 - 89)  BP: 119/80 (01-05-25 @ 21:10) (104/69 - 144/94)  RR: 18 (01-05-25 @ 21:10) (18 - 18)  SpO2: 97% (01-05-25 @ 21:10) (97% - 99%)  Wt(kg): --  I&O's Summary    04 Jan 2025 07:01  -  05 Jan 2025 07:00  --------------------------------------------------------  IN: 480 mL / OUT: 750 mL / NET: -270 mL    05 Jan 2025 07:01  -  05 Jan 2025 23:12  --------------------------------------------------------  IN: 1770 mL / OUT: 950 mL / NET: 820 mL          Appearance: Normal	  HEENT:  PERRLA   Lymphatic: No lymphadenopathy   Cardiovascular: Normal S1 S2, no JVD  Respiratory: normal effort , clear  Gastrointestinal:  Soft, Non-tender  Skin: No rashes,  warm to touch  Psychiatry:  Mood & affect appropriate  Musculuskeletal: No edema    recent labs, Imaging and EKGs personally reviewed   CODE status discussed with the patient in detail    01-04-25 @ 07:01  -  01-05-25 @ 07:00  --------------------------------------------------------  IN: 480 mL / OUT: 750 mL / NET: -270 mL    01-05-25 @ 07:01 - 01-05-25 @ 23:12  --------------------------------------------------------  IN: 1770 mL / OUT: 950 mL / NET: 820 mL                          10.1   2.91  )-----------( 148      ( 05 Jan 2025 07:06 )             30.4               01-05    140  |  106  |  24[H]  ----------------------------<  99  3.8   |  21[L]  |  1.58[H]    Ca    8.8      05 Jan 2025 06:57  Phos  1.0     01-04  Mg     1.7     01-04    TPro  6.7  /  Alb  3.4  /  TBili  0.6  /  DBili  x   /  AST  49[H]  /  ALT  26  /  AlkPhos  70  01-05                       Urinalysis Basic - ( 05 Jan 2025 06:57 )    Color: x / Appearance: x / SG: x / pH: x  Gluc: 99 mg/dL / Ketone: x  / Bili: x / Urobili: x   Blood: x / Protein: x / Nitrite: x   Leuk Esterase: x / RBC: x / WBC x   Sq Epi: x / Non Sq Epi: x / Bacteria: x

## 2025-01-05 NOTE — PROGRESS NOTE ADULT - SUBJECTIVE AND OBJECTIVE BOX
DATE OF SERVICE: 01-05-25 @ 12:00    Patient is a 64y old  Male who presents with a chief complaint of abdominal pain, nausea, vomiting , acute pancreatitis (04 Jan 2025 15:00)      INTERVAL HISTORY: Feels ok.     REVIEW OF SYSTEMS:  CONSTITUTIONAL: No weakness  EYES/ENT: No visual changes;  No throat pain   NECK: No pain or stiffness  RESPIRATORY: No cough, wheezing; No shortness of breath  CARDIOVASCULAR: No chest pain or palpitations  GASTROINTESTINAL: No abdominal  pain. No nausea, vomiting, or hematemesis  GENITOURINARY: No dysuria, frequency or hematuria  NEUROLOGICAL: No stroke like symptoms  SKIN: No rashes      	  MEDICATIONS:  carvedilol 12.5 milliGRAM(s) Oral every 12 hours        PHYSICAL EXAM:  T(C): 36.7 (01-05-25 @ 09:52), Max: 36.9 (01-04-25 @ 16:28)  HR: 77 (01-05-25 @ 09:52) (77 - 99)  BP: 104/69 (01-05-25 @ 09:52) (98/66 - 144/94)  RR: 18 (01-05-25 @ 09:52) (17 - 19)  SpO2: 99% (01-05-25 @ 09:52) (96% - 100%)  Wt(kg): --  I&O's Summary    04 Jan 2025 07:01  -  05 Jan 2025 07:00  --------------------------------------------------------  IN: 480 mL / OUT: 750 mL / NET: -270 mL    05 Jan 2025 07:01  -  05 Jan 2025 12:00  --------------------------------------------------------  IN: 340 mL / OUT: 200 mL / NET: 140 mL          Appearance: In no distress	  HEENT:    PERRL, EOMI	  Cardiovascular:  S1 S2, No JVD  Respiratory: Lungs clear to auscultation	  Gastrointestinal:  Soft, Non-tender, + BS	  Vascularature:  No edema of LE  Psychiatric: Appropriate affect   Neuro: no acute focal deficits                               10.1   2.91  )-----------( 148      ( 05 Jan 2025 07:06 )             30.4     01-05    140  |  106  |  24[H]  ----------------------------<  99  3.8   |  21[L]  |  1.58[H]    Ca    8.8      05 Jan 2025 06:57  Phos  1.0     01-04  Mg     1.7     01-04    TPro  6.7  /  Alb  3.4  /  TBili  0.6  /  DBili  x   /  AST  49[H]  /  ALT  26  /  AlkPhos  70  01-05        Labs personally reviewed      ASSESSMENT/PLAN: 	        63M w/ PMHx ETOH abuse, hepatic steatosis, pancreatitis, HTN, GERD, GI Bleed presenting with n/v/d and dizziness x 6 days. Patient states that he had acute onset of vomiting, diarrhea, fever, chills approx 6 days ago. He states his vomit was initially dark color, and over the next few days became green. Also states that stool was initially bloody as well as black which also resolved over the course of the next several days. In addition to these complaints patient concerned about dry skin. Patient with ETOH history, endorses drinking "2 1/5ths per week," last drink on 12/31. Denies sick contacts, recent travel, SOB, dysuria, hematuria. CT reveals acute pancreatitis.     Problem/Plan - 1:  ·  Problem: Acute Pancreatitis  - p/w episodes of nausea and vomiting  - Lipase elevated at 629  - CT A/P reveals acute pancreatitis  - Appreciate GI recs      Problem/Plan - 2:  ·  Problem: Dizziness.   ·  Plan:   - Likely associated with limited PO intake and N/V   - would recommend DC LR as diet advances    Problem/Plan - 3:  ·  Problem: HTN (hypertension).   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     REVIEW OF SYSTEMS:  CONSTITUTIONAL: No weakness  EYES/ENT: No visual changes;  No throat pain   NECK: No pain or stiffness  RESPIRATORY: No cough, wheezing; No shortness of breath  CARDIOVASCULAR: No chest pain or palpitations  GASTROINTESTINAL: No abdominal  pain. No nausea, vomiting, or hematemesis  GENITOURINARY: No dysuria, frequency or hematuria  NEUROLOGICAL: No stroke like symptoms  SKIN: No rashes      	  MEDICATIONS:  carvedilol 12.5 milliGRAM(s) Oral every 12 hours        PHYSICAL EXAM:  T(C): 36.7 (01-05-25 @ 09:52), Max: 36.9 (01-04-25 @ 16:28)  HR: 77 (01-05-25 @ 09:52) (77 - 99)  BP: 104/69 (01-05-25 @ 09:52) (98/66 - 144/94)  RR: 18 (01-05-25 @ 09:52) (17 - 19)  SpO2: 99% (01-05-25 @ 09:52) (96% - 100%)  Wt(kg): --  I&O's Summary    04 Jan 2025 07:01  -  05 Jan 2025 07:00  --------------------------------------------------------  IN: 480 mL / OUT: 750 mL / NET: -270 mL    05 Jan 2025 07:01  -  05 Jan 2025 12:00  --------------------------------------------------------  IN: 340 mL / OUT: 200 mL / NET: 140 mL          Appearance: In no distress	  HEENT:    PERRL, EOMI	  Cardiovascular:  S1 S2, No JVD  Respiratory: Lungs clear to auscultation	  Gastrointestinal:  Soft, Non-tender, + BS	  Vascularature:  No edema of LE  Psychiatric: Appropriate affect   Neuro: no acute focal deficits                               10.1   2.91  )-----------( 148      ( 05 Jan 2025 07:06 )             30.4     01-05    140  |  106  |  24[H]  ----------------------------<  99  3.8   |  21[L]  |  1.58[H]    Ca    8.8      05 Jan 2025 06:57  Phos  1.0     01-04  Mg     1.7     01-04    TPro  6.7  /  Alb  3.4  /  TBili  0.6  /  DBili  x   /  AST  49[H]  /  ALT  26  /  AlkPhos  70  01-05        Labs personally reviewed      ASSESSMENT/PLAN: 	      63M w/ PMHx ETOH abuse, hepatic steatosis, pancreatitis, HTN, GERD, GI Bleed presenting with n/v/d and dizziness x 6 days. Patient states that he had acute onset of vomiting, diarrhea, fever, chills approx 6 days ago. He states his vomit was initially dark color, and over the next few days became green. Also states that stool was initially bloody as well as black which also resolved over the course of the next several days. In addition to these complaints patient concerned about dry skin. Patient with ETOH history, endorses drinking "2 1/5ths per week," last drink on 12/31. Denies sick contacts, recent travel, SOB, dysuria, hematuria. CT reveals acute pancreatitis.     Problem/Plan - 1:  ·  Problem: Acute Pancreatitis  - p/w episodes of nausea and vomiting  - Lipase elevated at 629  - CT A/P reveals acute pancreatitis  - Appreciate GI recs      Problem/Plan - 2:  ·  Problem: Dizziness.   ·  Plan:   - Likely associated with limited PO intake and N/V   - would recommend DC LR as diet advances    Problem/Plan - 3:  ·  Problem: HTN (hypertension).   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

## 2025-01-06 ENCOUNTER — TRANSCRIPTION ENCOUNTER (OUTPATIENT)
Age: 65
End: 2025-01-06

## 2025-01-06 ENCOUNTER — RESULT REVIEW (OUTPATIENT)
Age: 65
End: 2025-01-06

## 2025-01-06 VITALS
OXYGEN SATURATION: 98 % | TEMPERATURE: 98 F | SYSTOLIC BLOOD PRESSURE: 135 MMHG | DIASTOLIC BLOOD PRESSURE: 88 MMHG | HEART RATE: 70 BPM | RESPIRATION RATE: 18 BRPM

## 2025-01-06 LAB
ANION GAP SERPL CALC-SCNC: 12 MMOL/L — SIGNIFICANT CHANGE UP (ref 5–17)
BUN SERPL-MCNC: 16 MG/DL — SIGNIFICANT CHANGE UP (ref 7–23)
CALCIUM SERPL-MCNC: 9.1 MG/DL — SIGNIFICANT CHANGE UP (ref 8.4–10.5)
CHLORIDE SERPL-SCNC: 108 MMOL/L — SIGNIFICANT CHANGE UP (ref 96–108)
CO2 SERPL-SCNC: 21 MMOL/L — LOW (ref 22–31)
CREAT SERPL-MCNC: 1.29 MG/DL — SIGNIFICANT CHANGE UP (ref 0.5–1.3)
EGFR: 62 ML/MIN/1.73M2 — SIGNIFICANT CHANGE UP
GLUCOSE SERPL-MCNC: 104 MG/DL — HIGH (ref 70–99)
HCT VFR BLD CALC: 29.6 % — LOW (ref 39–50)
HGB BLD-MCNC: 9.8 G/DL — LOW (ref 13–17)
MCHC RBC-ENTMCNC: 30.2 PG — SIGNIFICANT CHANGE UP (ref 27–34)
MCHC RBC-ENTMCNC: 33.1 G/DL — SIGNIFICANT CHANGE UP (ref 32–36)
MCV RBC AUTO: 91.4 FL — SIGNIFICANT CHANGE UP (ref 80–100)
NRBC # BLD: 0 /100 WBCS — SIGNIFICANT CHANGE UP (ref 0–0)
PLATELET # BLD AUTO: 167 K/UL — SIGNIFICANT CHANGE UP (ref 150–400)
POTASSIUM SERPL-MCNC: 4.3 MMOL/L — SIGNIFICANT CHANGE UP (ref 3.5–5.3)
POTASSIUM SERPL-SCNC: 4.3 MMOL/L — SIGNIFICANT CHANGE UP (ref 3.5–5.3)
RBC # BLD: 3.24 M/UL — LOW (ref 4.2–5.8)
RBC # FLD: 15.5 % — HIGH (ref 10.3–14.5)
SODIUM SERPL-SCNC: 141 MMOL/L — SIGNIFICANT CHANGE UP (ref 135–145)
WBC # BLD: 2.72 K/UL — LOW (ref 3.8–10.5)
WBC # FLD AUTO: 2.72 K/UL — LOW (ref 3.8–10.5)

## 2025-01-06 PROCEDURE — 84100 ASSAY OF PHOSPHORUS: CPT

## 2025-01-06 PROCEDURE — 82803 BLOOD GASES ANY COMBINATION: CPT

## 2025-01-06 PROCEDURE — 82947 ASSAY GLUCOSE BLOOD QUANT: CPT

## 2025-01-06 PROCEDURE — 84132 ASSAY OF SERUM POTASSIUM: CPT

## 2025-01-06 PROCEDURE — 83735 ASSAY OF MAGNESIUM: CPT

## 2025-01-06 PROCEDURE — 80048 BASIC METABOLIC PNL TOTAL CA: CPT

## 2025-01-06 PROCEDURE — 96376 TX/PRO/DX INJ SAME DRUG ADON: CPT

## 2025-01-06 PROCEDURE — 99285 EMERGENCY DEPT VISIT HI MDM: CPT | Mod: 25

## 2025-01-06 PROCEDURE — 83690 ASSAY OF LIPASE: CPT

## 2025-01-06 PROCEDURE — 81001 URINALYSIS AUTO W/SCOPE: CPT

## 2025-01-06 PROCEDURE — 80061 LIPID PANEL: CPT

## 2025-01-06 PROCEDURE — 80053 COMPREHEN METABOLIC PANEL: CPT

## 2025-01-06 PROCEDURE — 76705 ECHO EXAM OF ABDOMEN: CPT

## 2025-01-06 PROCEDURE — 85025 COMPLETE CBC W/AUTO DIFF WBC: CPT

## 2025-01-06 PROCEDURE — C8929: CPT

## 2025-01-06 PROCEDURE — 84295 ASSAY OF SERUM SODIUM: CPT

## 2025-01-06 PROCEDURE — 93306 TTE W/DOPPLER COMPLETE: CPT | Mod: 26

## 2025-01-06 PROCEDURE — 99223 1ST HOSP IP/OBS HIGH 75: CPT

## 2025-01-06 PROCEDURE — 96375 TX/PRO/DX INJ NEW DRUG ADDON: CPT

## 2025-01-06 PROCEDURE — 74176 CT ABD & PELVIS W/O CONTRAST: CPT | Mod: MC

## 2025-01-06 PROCEDURE — 85018 HEMOGLOBIN: CPT

## 2025-01-06 PROCEDURE — 84443 ASSAY THYROID STIM HORMONE: CPT

## 2025-01-06 PROCEDURE — 83605 ASSAY OF LACTIC ACID: CPT

## 2025-01-06 PROCEDURE — 85027 COMPLETE CBC AUTOMATED: CPT

## 2025-01-06 PROCEDURE — 96374 THER/PROPH/DIAG INJ IV PUSH: CPT

## 2025-01-06 PROCEDURE — 82435 ASSAY OF BLOOD CHLORIDE: CPT

## 2025-01-06 PROCEDURE — 85014 HEMATOCRIT: CPT

## 2025-01-06 PROCEDURE — 82330 ASSAY OF CALCIUM: CPT

## 2025-01-06 PROCEDURE — 76770 US EXAM ABDO BACK WALL COMP: CPT

## 2025-01-06 PROCEDURE — 87086 URINE CULTURE/COLONY COUNT: CPT

## 2025-01-06 RX ORDER — PANTOPRAZOLE 40 MG/1
1 TABLET, DELAYED RELEASE ORAL
Qty: 60 | Refills: 0
Start: 2025-01-06 | End: 2025-02-04

## 2025-01-06 RX ADMIN — PIPERACILLIN AND TAZOBACTAM 25 GRAM(S): 3; .375 INJECTION, POWDER, LYOPHILIZED, FOR SOLUTION INTRAVENOUS at 05:22

## 2025-01-06 RX ADMIN — PANTOPRAZOLE 40 MILLIGRAM(S): 40 TABLET, DELAYED RELEASE ORAL at 05:22

## 2025-01-06 RX ADMIN — CARVEDILOL 12.5 MILLIGRAM(S): 25 TABLET, FILM COATED ORAL at 05:22

## 2025-01-06 NOTE — CONSULT NOTE ADULT - REASON FOR ADMISSION
abdominal pain, nausea, vomiting , acute pancreatitis
abdominal pain, nausea, vomiting , acute pancreatitis

## 2025-01-06 NOTE — CONSULT NOTE ADULT - ASSESSMENT
62 Y/O Male with PMHx of ETOH abuse, hepatic steatosis, pancreatitis, HTN, GERD, hx GI Bleed presenting with n/v/d and dizziness x 6 days. He states his vomit was initially dark color, and over the next few days became green. Stools initially with streaks of blood and clear mucus; found to have acute pancreatitis    Of note, pt with prior hx GI bleed in august 2023 with esophagitis on EGD; subsequent EGD on 11/03/23 with 4 cm HH with resolution of erosive esophagitis. Normal EndoFLIP.   Last EGD 2/13/24 (indication: dark stool/reported melena): normal esophagus, 3cm HH, normal stomach and duodenum.  Last colonoscopy 11/2022. Normal, non-bleeding internal hemorrhoids.     CT +nodular hepatic contour, +steatosis, normal caliber bile ducts, GB WNL, mild aleshia-pancreatic inflammatory changes, no collection    #acute pancreatitis, likely 2/2 ETOH - clinically improved  #hiatal hernia, s/p coffee ground emesis  likely 2/2 esophagitis i/s/o nausea/vomiting 2/2 pancreatitis  Hg at baseline, HD stable  #ETOH abuse  #Cardiomyopathy ?2/2 ETOH; management per Cardiology    RECS:  -importance of ETOH abstinance discussed with pt. Pt expresses understanding and willingness to comply. Discussed options of ETOH support groups etc w/ pt  -PPI PO BID; take on empty stomach 30 minutes before breakfast and dinner  -no indication/plans for repeat endoscopic evaluation at this time. Can follow up with primary GI (Dr Walter) as outpt  -PO low fat diet   -no GI indication for Abx; defer to primary team  -can DC IV fluids    Further care per primary team  Discussed with pt; all questions answered  Discussed with Medicine attending and ACP    Damion Condon PA-C  [facility]  After hours and weekend coverage [phone]

## 2025-01-06 NOTE — DISCHARGE NOTE NURSING/CASE MANAGEMENT/SOCIAL WORK - PATIENT PORTAL LINK FT
You can access the FollowMyHealth Patient Portal offered by [facility] by registering at the following website: [email or web address]. By joining [facility]’s FollowMyHealth portal, you will also be able to view your health information using other applications (apps) compatible with our system.

## 2025-01-06 NOTE — DISCHARGE NOTE NURSING/CASE MANAGEMENT/SOCIAL WORK - NSDCVIVACCINE_GEN_ALL_CORE_FT
influenza, injectable, quadrivalent, preservative free; 10-Nov-2022 15:45; Lizz Pope (PARVEEN); [employer]; [id number] (Exp. Date: 30-Jun-2023); IntraMuscular; Deltoid Right.; 0.5 milliLiter(s); VIS (VIS Published: 06-Aug-2021, VIS Presented: 10-Nov-2022);

## 2025-01-06 NOTE — PROGRESS NOTE ADULT - SUBJECTIVE AND OBJECTIVE BOX
Name of Patient : SARAHY DIAZ  MRN: [id number]  Date of visit: 01-06-25      Subjective: Patient seen and examined. No new events except as noted.   Doing okay   tolerating oral feeding   no nasuea, vomiting     REVIEW OF SYSTEMS:    CONSTITUTIONAL: No weakness, fevers or chills  EYES/ENT: No visual changes;  No vertigo or throat pain   NECK: No pain or stiffness  RESPIRATORY: No cough, wheezing, hemoptysis; No shortness of breath  CARDIOVASCULAR: No chest pain or palpitations  GASTROINTESTINAL: No abdominal or epigastric pain. No nausea, vomiting, or hematemesis; No diarrhea or constipation. No melena or hematochezia.  GENITOURINARY: No dysuria, frequency or hematuria  NEUROLOGICAL: No numbness or weakness  SKIN: No itching, burning, rashes, or lesions   All other review of systems is negative unless indicated above.    MEDICATIONS:  MEDICATIONS  (STANDING):  carvedilol 12.5 milliGRAM(s) Oral every 12 hours  folic acid 1 milliGRAM(s) Oral daily  lactated ringers. 1000 milliLiter(s) (125 mL/Hr) IV Continuous <Continuous>  pantoprazole    Tablet 40 milliGRAM(s) Oral before breakfast  potassium chloride    Tablet ER 40 milliEquivalent(s) Oral every 4 hours  potassium chloride  10 mEq/100 mL IVPB 10 milliEquivalent(s) IV Intermittent every 1 hour  thiamine 100 milliGRAM(s) Oral daily      PHYSICAL EXAM:  T(C): 36.8 (01-06-25 @ 12:57), Max: 36.8 (01-06-25 @ 04:33)  HR: 70 (01-06-25 @ 12:57) (70 - 97)  BP: 135/88 (01-06-25 @ 12:57) (114/73 - 142/88)  RR: 18 (01-06-25 @ 12:57) (18 - 18)  SpO2: 98% (01-06-25 @ 12:57) (97% - 99%)  Wt(kg): --  I&O's Summary    05 Jan 2025 07:01  -  06 Jan 2025 07:00  --------------------------------------------------------  IN: 1770 mL / OUT: 1150 mL / NET: 620 mL    06 Jan 2025 07:01  -  06 Jan 2025 17:04  --------------------------------------------------------  IN: 240 mL / OUT: 0 mL / NET: 240 mL          Appearance: Normal	  HEENT:  PERRLA   Lymphatic: No lymphadenopathy   Cardiovascular: Normal S1 S2, no JVD  Respiratory: normal effort , clear  Gastrointestinal:  Soft, Non-tender  Skin: No rashes,  warm to touch  Psychiatry:  Mood & affect appropriate  Musculuskeletal: No edema    recent labs, Imaging and EKGs personally reviewed   CODE status discussed with the patient in detail    01-05-25 @ 07:01  -  01-06-25 @ 07:00  --------------------------------------------------------  IN: 1770 mL / OUT: 1150 mL / NET: 620 mL    01-06-25 @ 07:01  -  01-06-25 @ 17:04  --------------------------------------------------------  IN: 240 mL / OUT: 0 mL / NET: 240 mL                          9.8    2.72  )-----------( 167      ( 06 Jan 2025 07:23 )             29.6               01-06    141  |  108  |  16  ----------------------------<  104[H]  4.3   |  21[L]  |  1.29    Ca    9.1      06 Jan 2025 07:19    TPro  6.7  /  Alb  3.4  /  TBili  0.6  /  DBili  x   /  AST  49[H]  /  ALT  26  /  AlkPhos  70  01-05                       Urinalysis Basic - ( 06 Jan 2025 07:19 )    Color: x / Appearance: x / SG: x / pH: x  Gluc: 104 mg/dL / Ketone: x  / Bili: x / Urobili: x   Blood: x / Protein: x / Nitrite: x   Leuk Esterase: x / RBC: x / WBC x   Sq Epi: x / Non Sq Epi: x / Bacteria: x

## 2025-01-06 NOTE — PROGRESS NOTE ADULT - REASON FOR ADMISSION
abdominal pain, nausea, vomiting , acute pancreatitis

## 2025-01-06 NOTE — DISCHARGE NOTE NURSING/CASE MANAGEMENT/SOCIAL WORK - FINANCIAL ASSISTANCE
[facility] provides services at a reduced cost to those who are determined to be eligible through [facility]’s financial assistance program. Information regarding [facility]’s financial assistance program can be found by going to [email or web address] or by calling [phone].

## 2025-01-06 NOTE — DISCHARGE NOTE PROVIDER - NSDCCPCAREPLAN_GEN_ALL_CORE_FT
PRINCIPAL DISCHARGE DIAGNOSIS  Diagnosis: Pancreatitis  Assessment and Plan of Treatment: You came in for pancreatitis. We treated you with IV antibiotics and IV fluids. You are now tolerating a diet. Follow up with your PCP      SECONDARY DISCHARGE DIAGNOSES  Diagnosis: Electrolyte imbalance  Assessment and Plan of Treatment: You had low potassium levels in the setting of pancreatitis, this is now resolved    Diagnosis: OFELIA (acute kidney injury)  Assessment and Plan of Treatment: You had increase creatinine level possibly in the setting of dehydration. your renal ultrasound was normal. Continue to stay hydrated

## 2025-01-06 NOTE — PROGRESS NOTE ADULT - SUBJECTIVE AND OBJECTIVE BOX
DATE OF SERVICE: 01-06-25 @ 16:00    Patient is a 64y old  Male who presents with a chief complaint of abdominal pain, nausea, vomiting , acute pancreatitis (06 Jan 2025 11:18)      INTERVAL HISTORY: no acute events noted    REVIEW OF SYSTEMS:  CONSTITUTIONAL: No weakness  EYES/ENT: No visual changes;  No throat pain   NECK: No pain or stiffness  RESPIRATORY: No cough, wheezing; No shortness of breath  CARDIOVASCULAR: No chest pain or palpitations  GASTROINTESTINAL: No abdominal  pain. No nausea, vomiting, or hematemesis  GENITOURINARY: No dysuria, frequency or hematuria  NEUROLOGICAL: No stroke like symptoms  SKIN: No rashes    TELEMETRY Personally reviewed:  	  MEDICATIONS:  carvedilol 12.5 milliGRAM(s) Oral every 12 hours        PHYSICAL EXAM:  T(C): 36.8 (01-06-25 @ 12:57), Max: 36.9 (01-05-25 @ 16:31)  HR: 70 (01-06-25 @ 12:57) (70 - 97)  BP: 135/88 (01-06-25 @ 12:57) (110/73 - 142/88)  RR: 18 (01-06-25 @ 12:57) (18 - 18)  SpO2: 98% (01-06-25 @ 12:57) (97% - 99%)  Wt(kg): --  I&O's Summary    05 Jan 2025 07:01  -  06 Jan 2025 07:00  --------------------------------------------------------  IN: 1770 mL / OUT: 1150 mL / NET: 620 mL    06 Jan 2025 07:01  -  06 Jan 2025 16:00  --------------------------------------------------------  IN: 240 mL / OUT: 0 mL / NET: 240 mL          Appearance: In no distress	  HEENT:    PERRL, EOMI	  Cardiovascular:  S1 S2, No JVD  Respiratory: Lungs clear to auscultation	  Gastrointestinal:  Soft, Non-tender, + BS	  Vascularature:  No edema of LE  Psychiatric: Appropriate affect   Neuro: no acute focal deficits                               9.8    2.72  )-----------( 167      ( 06 Jan 2025 07:23 )             29.6     01-06    141  |  108  |  16  ----------------------------<  104[H]  4.3   |  21[L]  |  1.29    Ca    9.1      06 Jan 2025 07:19    TPro  6.7  /  Alb  3.4  /  TBili  0.6  /  DBili  x   /  AST  49[H]  /  ALT  26  /  AlkPhos  70  01-05        Labs personally reviewed      ASSESSMENT/PLAN: 	      63M w/ PMHx ETOH abuse, hepatic steatosis, pancreatitis, HTN, GERD, GI Bleed presenting with n/v/d and dizziness x 6 days. Patient states that he had acute onset of vomiting, diarrhea, fever, chills approx 6 days ago. He states his vomit was initially dark color, and over the next few days became green. Also states that stool was initially bloody as well as black which also resolved over the course of the next several days. In addition to these complaints patient concerned about dry skin. Patient with ETOH history, endorses drinking "2 1/5ths per week," last drink on 12/31. Denies sick contacts, recent travel, SOB, dysuria, hematuria. CT reveals acute pancreatitis.     Problem/Plan - 1:  ·  Problem: Acute Pancreatitis  - p/w episodes of nausea and vomiting  - Lipase elevated at 629  - CT A/P reveals acute pancreatitis  - Appreciate GI recs      Problem/Plan - 2:  ·  Problem: Dizziness.   ·  Plan:   - Likely associated with limited PO intake and N/V   - would recommend DC LR as diet advances    Problem/Plan - 3:  ·  Problem: HTN (hypertension).   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

## 2025-01-06 NOTE — DISCHARGE NOTE NURSING/CASE MANAGEMENT/SOCIAL WORK - NSDCPEFALRISK_GEN_ALL_CORE
For information on Fall & Injury Prevention, visit: [email or web address] OR  [email or web address] OR  https://www.cdc.gov/steadi/patient.html

## 2025-01-06 NOTE — CONSULT NOTE ADULT - SUPERVISING ATTENDING
Goal Outcome Evaluation:  Plan of Care Reviewed With: patient        Progress: no change     Pt with no complaints of pain during shift. IV IID. Multiple BM's throughout shift. SCDs on. Tele in place; running V-paced. VSS. Safety maintained. Wife at bedside.     Boby

## 2025-01-06 NOTE — PROGRESS NOTE ADULT - NS ATTEND AMEND GEN_ALL_CORE FT
Patient care and plan discussed and reviewed with Advanced Care Provider. Plan as outlined above edited by me to reflect our discussion.   In addition, I participated in    - Ordering, reviewing, and interpreting labs, testing, and imaging.  - Reviewing prior hospitalization and where necessary, outpatient records.  - Counselling and educating patient and/or family regarding interpretation of aforementioned items and plan of care.
Patient care and plan discussed and reviewed with Advanced Care Provider. Plan as outlined above edited by me to reflect our discussion.   In addition, I participated in    - Ordering, reviewing, and interpreting labs, testing, and imaging.  - Reviewing prior hospitalization and where necessary, outpatient records.  - Counselling and educating patient and/or family regarding interpretation of aforementioned items and plan of care.  Fifty one minutes spent on encounter, of which more than fifty percent of the encounter was spent on counseling and/or coordinating care by the attending physician.

## 2025-01-06 NOTE — DISCHARGE NOTE PROVIDER - PROVIDER TOKENS
PROVIDER:[TOKEN:[[id number]],FOLLOWUP:[1 week],ESTABLISHEDPATIENT:[T]],PROVIDER:[TOKEN:[[id number]],FOLLOWUP:[2 weeks],ESTABLISHEDPATIENT:[T]]

## 2025-01-06 NOTE — DISCHARGE NOTE PROVIDER - NSDCFUADDAPPT_GEN_ALL_CORE_FT
APPTS ARE READY TO BE MADE: [X] YES    Best Family or Patient Contact (if needed):    Additional Information about above appointments (if needed):    1: [phone]  2: cardio  3:     Other comments or requests:

## 2025-01-06 NOTE — DISCHARGE NOTE NURSING/CASE MANAGEMENT/SOCIAL WORK - NSDCFUADDAPPT_GEN_ALL_CORE_FT
APPTS ARE READY TO BE MADE: [X] YES    Best Family or Patient Contact (if needed):    Additional Information about above appointments (if needed):    1: [phone]  2: cardio  3:     Other comments or requests:

## 2025-01-06 NOTE — DISCHARGE NOTE PROVIDER - HOSPITAL COURSE
HPI:  Patient is a 62 Y/O Male with PMHx of ETOH abuse, hepatic steatosis, pancreatitis, HTN, GERD, GI Bleed presenting with n/v/d and dizziness x 6 days. Patient states that he had acute onset of vomiting, diarrhea, fever, chills approx 6 days ago. He states his vomit was initially dark color, and over the next few days became green. Also states that stool was initially bloody as well as black which also resolved over the course of the next several days. In addition to these complaints patient concerned about dry skin. Patient with ETOH history, endorses drinking "2 1/5ths per week," last drink on 12/31. Denies sick contacts, recent travel, SOB, dysuria, hematuria. (04 Jan 2025 12:43)    Hospital Course:  62 Y/O Male admitted w acute pancreatitis. Seen by GI and treated with IV fluids, now tolerating diet. While admitted c/f GI bleed, CBC stable, continue oral PPI; no active bleed seen on CT. Found to have OFELIA and received IVF, renal US WNL showed no hydronephrosis. Found to have UTI, Ucx normal growth, received 3 days of zosyn.  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 Entresto and jardiance held iso OFELIA    Important Medication Changes and Reason:    Active or Pending Issues Requiring Follow-up: f/u cardio, PCP    Advanced Directives:   [x] Full code  [ ] DNR  [ ] Hospice    Discharge Diagnoses:  Pancreatitis  GIB  UTI  OFELIA  HFrEF         HPI:  Patient is a 62 Y/O Male with PMHx of ETOH abuse, hepatic steatosis, pancreatitis, HTN, GERD, GI Bleed presenting with n/v/d and dizziness x 6 days. Patient states that he had acute onset of vomiting, diarrhea, fever, chills approx 6 days ago. He states his vomit was initially dark color, and over the next few days became green. Also states that stool was initially bloody as well as black which also resolved over the course of the next several days. In addition to these complaints patient concerned about dry skin. Patient with ETOH history, endorses drinking "2 1/5ths per week," last drink on 12/31. Denies sick contacts, recent travel, SOB, dysuria, hematuria. (04 Jan 2025 12:43)    Hospital Course:  62 Y/O Male admitted w acute pancreatitis. Seen by GI and treated with IV fluids, now tolerating diet. While admitted c/f GI bleed, CBC stable, continue oral PPI; no active bleed seen on CT. Found to have OFELIA and received IVF, renal US WNL showed no hydronephrosis. Found to have UTI, Ucx normal growth, received 3 days of zosyn.  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 Entresto and jardiance held iso OFELIA    Important Medication Changes and Reason: pantoprazole 40mg BID    Active or Pending Issues Requiring Follow-up: f/u cardio, PCP    Advanced Directives:   [x] Full code  [ ] DNR  [ ] Hospice    Discharge Diagnoses:  Pancreatitis  GIB  UTI  OFELIA  HFrEF

## 2025-01-06 NOTE — DISCHARGE NOTE PROVIDER - NSDCMRMEDTOKEN_GEN_ALL_CORE_FT
carvedilol 12.5 mg oral tablet: 1 tab(s) orally every 12 hours  folic acid 1 mg oral tablet: 1 tab(s) orally once a day  meclizine 12.5 mg oral tablet: 1 tab(s) orally 3 times a day AS NEEDED  Multiple Vitamins oral tablet: 1 tab(s) orally once a day  nystatin 100,000 units/mL oral suspension: 5 milliliter(s) orally 2 times a day  pantoprazole 40 mg oral delayed release tablet: 1 tab(s) orally 2 times a day  sacubitril-valsartan 49 mg-51 mg oral tablet: 1 tab(s) orally 2 times a day  thiamine 100 mg oral tablet: 1 tab(s) orally once a day  Vitamin D2 1.25 mg (50,000 intl units) oral capsule: 1 cap(s) orally once a week

## 2025-01-06 NOTE — DISCHARGE NOTE PROVIDER - CARE PROVIDER_API CALL
Venice Baer  Internal Medicine  Samantha WILSON,    Phone: [phone]  Fax: ([phone]  Established Patient  Follow Up Time: 1 week    Pablo Marks  Cardiovascular Disease  [address] [place] [address], [address]  [place] [address]  Phone: [phone]  Fax: [phone]  Established Patient  Follow Up Time: 2 weeks

## 2025-01-06 NOTE — DISCHARGE NOTE NURSING/CASE MANAGEMENT/SOCIAL WORK - MODE OF TRANSPORTATION
Wheelchair/Stroller Preparation Of Recipient Site - Flap Takedown: The eschar and granulation tissue was removed surgically with sharp dissection to facilitate appropriate healing after division and inset of the proximal and distal interpolation flap.

## 2025-01-06 NOTE — PROGRESS NOTE ADULT - ASSESSMENT
Patient is a 64 Y/O Male with PMHx of ETOH abuse, hepatic steatosis, pancreatitis, HTN, GERD, GI Bleed presenting with n/v/d and dizziness x 6 days. Patient states that he had acute onset of vomiting, diarrhea, fever, chills approx 6 days ago. He states his vomit was initially dark color, and over the next few days became green. Also states that stool was initially bloody as well as black which also resolved over the course of the next several days. In addition to these complaints patient concerned about dry skin. Patient with ETOH history, endorses drinking "2 1/5ths per week," last drink on 12/31. Denies sick contacts, recent travel, SOB, dysuria, hematuria.      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 patient care and plan discussed and reviewed with PA. Plan as outlined above edited by me to reflect our discussion. Advanced care planning/advanced directives discussed with patient/family. DNR status including forceful chest compressions to attempt to restart the heart, ventilator support/artificial breathing, electric shock, artificial nutrition, health care proxy, Molst form all discussed with pt. Fifty seven minutes of total time dedicated to this patient visit today including preparing to see the patient (eg. review of tests), obtaining and/or reviewing separately obtained history, obtaining/reviewing vitals, performing a medically appropriate examination and/or evaluation, counseling and educating the patient/family/caregiver, reviewing previous notes and test results, and procedures, communicating with other health professionals (when not separately reported), and documenting clinical information in the electronic health record. 
Patient is a 62 Y/O Male with PMHx of ETOH abuse, hepatic steatosis, pancreatitis, HTN, GERD, GI Bleed presenting with n/v/d and dizziness x 6 days. Patient states that he had acute onset of vomiting, diarrhea, fever, chills approx 6 days ago. He states his vomit was initially dark color, and over the next few days became green. Also states that stool was initially bloody as well as black which also resolved over the course of the next several days. In addition to these complaints patient concerned about dry skin. Patient with ETOH history, endorses drinking "2 1/5ths per week," last drink on 12/31. Denies sick contacts, recent travel, SOB, dysuria, hematuria.      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 patient care and plan discussed and reviewed with PA. Plan as outlined above edited by me to reflect our discussion. Advanced care planning/advanced directives discussed with patient/family. DNR status including forceful chest compressions to attempt to restart the heart, ventilator support/artificial breathing, electric shock, artificial nutrition, health care proxy, Molst form all discussed with pt. Fifty seven minutes of total time dedicated to this patient visit today including preparing to see the patient (eg. review of tests), obtaining and/or reviewing separately obtained history, obtaining/reviewing vitals, performing a medically appropriate examination and/or evaluation, counseling and educating the patient/family/caregiver, reviewing previous notes and test results, and procedures, communicating with other health professionals (when not separately reported), and documenting clinical information in the electronic health record.

## 2025-01-06 NOTE — CONSULT NOTE ADULT - SUBJECTIVE AND OBJECTIVE BOX
**** INCOMPLETE NOTE ****    HPI:  Patient is a 64 Y/O Male with PMHx of ETOH abuse, hepatic steatosis, pancreatitis, HTN, GERD, hx GI Bleed presenting with n/v/d and dizziness x 6 days. Patient states that he had acute onset of vomiting, diarrhea, fever, chills approx 6 days ago. He states his vomit was initially dark color, and over the next few days became green. Also states that stool was initially bloody as well as black which also resolved over the course of the next several days. In addition to these complaints patient concerned about dry skin. Patient with ETOH history, endorses drinking "2 1/5ths per week," last drink on 12/31. Denies sick contacts, recent travel, SOB, dysuria, hematuria. (04 Jan 2025 12:43)      Of note, pt with prior hx GI bleed in august 2023 with esophagitis on EGD; subsequent EGD on 11/03/23 with 4 cm HH with resolution of erosive esophagitis. Normal EndoFLIP.   Last EGD 2/13/24 (indication: dark stool/reported melena): normal esophagus, 3cm HH, normal stomach and duodenum.  Last colonoscopy 11/2022. Normal, non-bleeding internal hemorrhoids.       Pt has not required transfusion support, currently tolerating PO diet      PAST MEDICAL & SURGICAL HISTORY:  HTN (hypertension)    GERD (gastroesophageal reflux disease)    Hiatal hernia    Acute alcoholic pancreatitis    H/O eye surgery  left    S/P foot surgery, left        Allergies  No Known Allergies      MEDICATIONS  (STANDING):  carvedilol 12.5 milliGRAM(s) Oral every 12 hours  folic acid 1 milliGRAM(s) Oral daily  lactated ringers. 1000 milliLiter(s) (125 mL/Hr) IV Continuous <Continuous>  pantoprazole    Tablet 40 milliGRAM(s) Oral before breakfast  piperacillin/tazobactam IVPB.. 3.375 Gram(s) IV Intermittent every 8 hours  potassium chloride    Tablet ER 40 milliEquivalent(s) Oral every 4 hours  potassium chloride  10 mEq/100 mL IVPB 10 milliEquivalent(s) IV Intermittent every 1 hour  thiamine 100 milliGRAM(s) Oral daily    MEDICATIONS  (PRN):      Social History:        Family History   IBD (  ) Yes   (  ) No  GI Malignancy (  )  Yes    (  ) No      Advanced Directives: (    X ) None    (      ) DNR    (     ) DNI    (     ) Health Care Proxy:     Review of Systems:  see HPI- remainder 10 point ROS negative      Vital Signs Last 24 Hrs  T(C): 36.8 (06 Jan 2025 04:33), Max: 36.9 (05 Jan 2025 16:31)  T(F): 98.3 (06 Jan 2025 04:33), Max: 98.5 (05 Jan 2025 16:31)  HR: 83 (06 Jan 2025 04:33) (77 - 89)  BP: 142/88 (06 Jan 2025 04:33) (104/69 - 142/88)  BP(mean): --  RR: 18 (06 Jan 2025 04:33) (18 - 18)  SpO2: 99% (06 Jan 2025 04:33) (97% - 99%)    Parameters below as of 06 Jan 2025 04:33  Patient On (Oxygen Delivery Method): room air        PHYSICAL EXAM:    Constitutional:   Neck:   Respiratory:   Cardiovascular:   Gastrointestinal:   Extremities:   Vascular:   Neurological:   Psychiatric:   Skin:         LABS:                        10.1   2.91  )-----------( 148      ( 05 Jan 2025 07:06 )             30.4     Hemoglobin: 11.3 g/dL (01.04.25 @ 05:40)   Hemoglobin: 10.8 g/dL (02.16.24 @ 07:25)     01-05    140  |  106  |  24[H]  ----------------------------<  99  3.8   |  21[L]  |  1.58[H]    Ca    8.8      05 Jan 2025 06:57    TPro  6.7  /  Alb  3.4  /  TBili  0.6  /  DBili  x   /  AST  49[H]  /  ALT  26  /  AlkPhos  70  01-05    Lipase: 270 U/L (01.05.25 @ 06:57)   Lipase: 629 U/L (01.04.25 @ 05:40)   Triglycerides, Serum: 61    Thyroid Stimulating Hormone, Serum: 1.65 uIU/mL (01.05.25 @ 07:06)     TRANSTHORACIC ECHOCARDIOGRAM REPORT  ________________________________________________________________________________                                      _______       Pt. Name:       SARAHY DIAZ Study Date:    2/13/2024  MRN:            [id number]     YOB: 1960  Accession #:    [id number]      Age:           63 years  Account#:       [id number]   Gender:        M  Heart Rate:                    Height:        69.00 in (175.26 cm)  Rhythm:                        Weight:  200.00 lb (90.72 kg)  Blood Pressure: 118/81 mmHg    BSA/BMI:       2.07 m² / 29.54 kg/m²  ________________________________________________________________________________________  Referring Physician:    [id number] Pablo Marks  Interpreting Physician: Jamie Carrillo M.D.  Primary Sonographer:    Fany SYED  Fellow (Performing):    Connor Torres MD  _______________________________________________________________________________________     CONCLUSIONS:      1. Left ventricular cavity is normal. Left ventricular wall thickness is normal. Left ventricular systolic function is severely decreased with an ejection fraction of 30 % by Green's method of disks. Global left ventricular hypokinesis.   2. There is mild (grade 1) left ventricular diastolic dysfunction, with normal filling pressure.   3. Mildly enlarged right ventricular cavity size, wall thickness, and reduced systolic function. Tricuspid annular plane systolic excursion (TAPSE) is 1.3 cm (normal >=1.7 cm).   4. Normal left and right atrial size.   5. No significant valvular disease.   6. No pericardial effusion seen.   7. Compared to the transthoracic echocardiogram performed on 11/7/2022, there has been a deline to LV/RV systolic function.   8. There is no evidence of a left ventricular thrombus.      Study Date:     02/15/2024   Name:           SARAHY DIAZ   Cath Lab Report      2.    Diagnostic Coronary Angiography     Indications:               Congestive heart failure with  cardiomyopathy    Diagnostic Conclusions:     Mild nonobstructive CAD   Recommendations:     Optimal medical management        RADIOLOGY & ADDITIONAL TESTS:    ACC: [id number] EXAM:  CT ABDOMEN AND PELVIS   ORDERED BY:  SHUBHAM HARRISON   PROCEDURE DATE:  01/04/2025      INTERPRETATION:  CLINICAL INFORMATION: Abdominal pain, evaluate for   pancreatitis    COMPARISON: Same day abdominal ultrasound. CT abdomen pelvis 2/12/2024.    CONTRAST/COMPLICATIONS:  IV Contrast: None  Oral Contrast: None.  No complications reported at time of study.    PROCEDURE:  CT of the Abdomen and Pelvis was performed.  Sagittal and coronal reformats were performed.    FINDINGS:    LOWER CHEST: Within normal limits.    LIVER: Right hepatic lobe 2.0 cm cyst. Redemonstrated nodular hepatic   contour suggestive of cirrhosis. Steatosis.  BILE DUCTS: Normal caliber.  GALLBLADDER: Within normal limits.  SPLEEN: Within normal limits.  PANCREAS: Mild peripancreatic inflammatory changes. No fluid collections.  ADRENALS: Within normal limits.  KIDNEYS/URETERS: Redemonstrated nonobstructing left renal stones, largest   measuring 1 cm. Punctate nonobstructive right renal calculi. No   hydronephrosis of either kidney.    BLADDER: Within normal limits.  REPRODUCTIVE ORGANS: Prostate is enlarged.    BOWEL: No bowel obstruction. Appendix is normal. Small hiatal hernia.  PERITONEUM/RETROPERITONEUM: Within normal limits.  VESSELS: Atherosclerotic calcifications.  LYMPH NODES: No lymphadenopathy.  ABDOMINAL WALL: Fat-containing umbilical hernia.  BONES: Degenerative changes. Bilateral L5 spondylolysis.    IMPRESSION:    Acute pancreatitis.     HPI:  Patient is a 64 Y/O Male with PMHx of ETOH abuse, hepatic steatosis, pancreatitis, HTN, GERD, hx GI Bleed presenting with n/v/d and dizziness x 6 days. Patient states that he had acute onset of vomiting, diarrhea, fever, chills approx 6 days ago. He states his vomit was initially dark color, and over the next few days became green. Also states that stool was initially bloody as well as black which also resolved over the course of the next several days. In addition to these complaints patient concerned about dry skin. Patient with ETOH history, endorses drinking "2 1/5ths per week," last drink on 12/31. Denies sick contacts, recent travel, SOB, dysuria, hematuria. (04 Jan 2025 12:43)      Of note, pt with prior hx GI bleed in august 2023 with esophagitis on EGD; subsequent EGD on 11/03/23 with 4 cm HH with resolution of erosive esophagitis. Normal EndoFLIP.   Last EGD 2/13/24 (indication: dark stool/reported melena): normal esophagus, 3cm HH, normal stomach and duodenum.  Last colonoscopy 11/2022. Normal, non-bleeding internal hemorrhoids.     Pt admits to 6 months of sobriety after 2/2-2/4 admission but then back to ETOH ~5 times/week and +ETOH over holidays prior to this episode of abdominal pain, nausea/vomiting and dark colored emesis. Pt also states he is on pantoprazole BID but states he takes it inconsistently and also not taking PPI on empty stomach  Pt has not required transfusion support, currently tolerating PO diet  No further abdominal pain, nausea or vomiting  Soft brown stool this AM    Reports blood streaks around brown stool and some blood and clear mucus when wiping after BMs - known history of hemorrhoids on last Colonoscopy (2022)    Pt also with known cardiomyopathy EF ~30%, negative cath 2/2024    PAST MEDICAL & SURGICAL HISTORY:  HTN (hypertension)    GERD (gastroesophageal reflux disease)    Hiatal hernia    Acute alcoholic pancreatitis    H/O eye surgery  left    S/P foot surgery, left        Allergies  No Known Allergies      MEDICATIONS  (STANDING):  carvedilol 12.5 milliGRAM(s) Oral every 12 hours  folic acid 1 milliGRAM(s) Oral daily  lactated ringers. 1000 milliLiter(s) (125 mL/Hr) IV Continuous <Continuous>  pantoprazole    Tablet 40 milliGRAM(s) Oral before breakfast  piperacillin/tazobactam IVPB.. 3.375 Gram(s) IV Intermittent every 8 hours  potassium chloride    Tablet ER 40 milliEquivalent(s) Oral every 4 hours  potassium chloride  10 mEq/100 mL IVPB 10 milliEquivalent(s) IV Intermittent every 1 hour  thiamine 100 milliGRAM(s) Oral daily    MEDICATIONS  (PRN):      Social History:  +ETOH  "few drinks/evening" 5 times/week      Family History   IBD (  ) Yes   ( X ) No  GI Malignancy (  )  Yes    (X  ) No      Advanced Directives: (    X ) None    (      ) DNR    (     ) DNI    (     ) Health Care Proxy:     Review of Systems:  see HPI- remainder 10 point ROS negative      Vital Signs Last 24 Hrs  T(C): 36.8 (06 Jan 2025 04:33), Max: 36.9 (05 Jan 2025 16:31)  T(F): 98.3 (06 Jan 2025 04:33), Max: 98.5 (05 Jan 2025 16:31)  HR: 83 (06 Jan 2025 04:33) (77 - 89)  BP: 142/88 (06 Jan 2025 04:33) (104/69 - 142/88)  BP(mean): --  RR: 18 (06 Jan 2025 04:33) (18 - 18)  SpO2: 99% (06 Jan 2025 04:33) (97% - 99%)    Parameters below as of 06 Jan 2025 04:33  Patient On (Oxygen Delivery Method): room air        PHYSICAL EXAM:    Constitutional: non toxic appearing AA male eating breakfast; appears comfortable. NAD  Neck: no JVD  Respiratory: clear bl no inc WOB  Cardiovascular: S1S2 regular  Gastrointestinal: sft obese ND NT no rebound guarding or rigidity  Extremities: no edema  Vascular: warm, +equal pulses  Neurological: AOx3, no asterexis, no focal asymmetry  Psychiatric: calm, cooperative  Skin: warm, no rash        LABS:                        10.1   2.91  )-----------( 148      ( 05 Jan 2025 07:06 )             30.4     Hemoglobin: 11.3 g/dL (01.04.25 @ 05:40)   Hemoglobin: 10.8 g/dL (02.16.24 @ 07:25)     01-05    140  |  106  |  24[H]  ----------------------------<  99  3.8   |  21[L]  |  1.58[H]    Ca    8.8      05 Jan 2025 06:57    TPro  6.7  /  Alb  3.4  /  TBili  0.6  /  DBili  x   /  AST  49[H]  /  ALT  26  /  AlkPhos  70  01-05    Lipase: 270 U/L (01.05.25 @ 06:57)   Lipase: 629 U/L (01.04.25 @ 05:40)   Triglycerides, Serum: 61    Thyroid Stimulating Hormone, Serum: 1.65 uIU/mL (01.05.25 @ 07:06)     TRANSTHORACIC ECHOCARDIOGRAM REPORT  ________________________________________________________________________________                                      _______       Pt. Name:       SARAHY DIAZ Study Date:    2/13/2024  MRN:            [id number]     YOB: 1960  Accession #:    [id number]      Age:           63 years  Account#:       [id number]   Gender:        M  Heart Rate:                    Height:        69.00 in (175.26 cm)  Rhythm:                        Weight:  200.00 lb (90.72 kg)  Blood Pressure: 118/81 mmHg    BSA/BMI:       2.07 m² / 29.54 kg/m²  ________________________________________________________________________________________  Referring Physician:    [id number] Pablo Marks  Interpreting Physician: Jamie Carrillo M.D.  Primary Sonographer:    Fany SYED  Fellow (Performing):    Connor Torres MD  _______________________________________________________________________________________     CONCLUSIONS:      1. Left ventricular cavity is normal. Left ventricular wall thickness is normal. Left ventricular systolic function is severely decreased with an ejection fraction of 30 % by Green's method of disks. Global left ventricular hypokinesis.   2. There is mild (grade 1) left ventricular diastolic dysfunction, with normal filling pressure.   3. Mildly enlarged right ventricular cavity size, wall thickness, and reduced systolic function. Tricuspid annular plane systolic excursion (TAPSE) is 1.3 cm (normal >=1.7 cm).   4. Normal left and right atrial size.   5. No significant valvular disease.   6. No pericardial effusion seen.   7. Compared to the transthoracic echocardiogram performed on 11/7/2022, there has been a deline to LV/RV systolic function.   8. There is no evidence of a left ventricular thrombus.      Study Date:     02/15/2024   Name:           SARAHY DIAZ   Cath Lab Report      2.    Diagnostic Coronary Angiography     Indications:               Congestive heart failure with  cardiomyopathy    Diagnostic Conclusions:     Mild nonobstructive CAD   Recommendations:     Optimal medical management        RADIOLOGY & ADDITIONAL TESTS:    ACC: [id number] EXAM:  CT ABDOMEN AND PELVIS   ORDERED BY:  SHUBHAM HARRISON   PROCEDURE DATE:  01/04/2025      INTERPRETATION:  CLINICAL INFORMATION: Abdominal pain, evaluate for   pancreatitis    COMPARISON: Same day abdominal ultrasound. CT abdomen pelvis 2/12/2024.    CONTRAST/COMPLICATIONS:  IV Contrast: None  Oral Contrast: None.  No complications reported at time of study.    PROCEDURE:  CT of the Abdomen and Pelvis was performed.  Sagittal and coronal reformats were performed.    FINDINGS:    LOWER CHEST: Within normal limits.    LIVER: Right hepatic lobe 2.0 cm cyst. Redemonstrated nodular hepatic   contour suggestive of cirrhosis. Steatosis.  BILE DUCTS: Normal caliber.  GALLBLADDER: Within normal limits.  SPLEEN: Within normal limits.  PANCREAS: Mild peripancreatic inflammatory changes. No fluid collections.  ADRENALS: Within normal limits.  KIDNEYS/URETERS: Redemonstrated nonobstructing left renal stones, largest   measuring 1 cm. Punctate nonobstructive right renal calculi. No   hydronephrosis of either kidney.    BLADDER: Within normal limits.  REPRODUCTIVE ORGANS: Prostate is enlarged.    BOWEL: No bowel obstruction. Appendix is normal. Small hiatal hernia.  PERITONEUM/RETROPERITONEUM: Within normal limits.  VESSELS: Atherosclerotic calcifications.  LYMPH NODES: No lymphadenopathy.  ABDOMINAL WALL: Fat-containing umbilical hernia.  BONES: Degenerative changes. Bilateral L5 spondylolysis.    IMPRESSION:    Acute pancreatitis.

## 2025-01-06 NOTE — CONSULT NOTE ADULT - NS ATTEND AMEND GEN_ALL_CORE FT
Agree with above.  Patient with acute pancreatitis likely due to continued alcohol use disorder.  His triglycerides are normal, and there are no gallstones seen on gallbladder ultrasound.  He is already having improvement in his pain, no pain at this time, and is tolerating a regular diet.  Strongly recommended patient to abstain from any alcohol use, made him aware that his continued use can potentially lead to chronic pancreatitis and associated complications.  As he is tolerating p.o. and pain is now resolved, no objection to discharge on a low-fat diet. Agree with above.  Patient with acute pancreatitis likely due to continued alcohol use disorder. CT A/P images personally reviewed, there is no signs of necrosis or aleshia-pancreatic fluid collections. His triglycerides are normal, and there are no gallstones seen on gallbladder ultrasound.  He is already having improvement in his pain, no pain at this time, and is tolerating a regular diet.  Strongly recommended patient to abstain from any alcohol use, made him aware that his continued use can potentially lead to chronic pancreatitis and associated complications.  As he is tolerating p.o. and pain is now resolved, no objection to discharge on a low-fat diet.

## 2025-03-02 NOTE — PATIENT PROFILE ADULT - STATED REASON FOR ADMISSION
Medication changes made today:  Decrease Midodrine to 2.5 mg twice day for 2 weeks, then decrease to 2.5 mg once a day for 2 weeks, then stop    Sit down upon onset of symptoms to prevent fall or injury  Continue to work on increasing fluid, consider drinking fluids that are rich in electolytes such as Propell or Gatorade  Cut back on caffeine  Be liberal with salt intake   Physical counter maneuvers - leg/calf raises, cross legs, grasp hands       Tests Ordered:  None      Understanding your instructions:  If you feel that things may have been explained in a way that you do not understand or did not receive easy to understand instruction, please contact me at [phone] and I would be more than happy to review your plan of care with you. Your healthcare is important to us and we want to make sure you understand your directions.    Our Electrophysiology Team will continue to collaborate and work very closely together to best meet your needs.    Thank you for choosing us to take care of your electrophysiology needs. It is a pleasure to work with you, and again, please contact us for any questions or concerns anytime.         If you receive a survey via e-mail or mail, please take the time to complete and return as your feedback is very helpful to our practice.    
Renal Failure

## 2025-04-24 ENCOUNTER — EMERGENCY (EMERGENCY)
Facility: HOSPITAL | Age: 65
LOS: 0 days | Discharge: HOME | End: 2025-04-24
Attending: EMERGENCY MEDICINE
Payer: MEDICAID

## 2025-04-24 VITALS
RESPIRATION RATE: 17 BRPM | HEIGHT: 69 IN | TEMPERATURE: 98 F | HEART RATE: 107 BPM | WEIGHT: 235.01 LBS | SYSTOLIC BLOOD PRESSURE: 136 MMHG | DIASTOLIC BLOOD PRESSURE: 97 MMHG | OXYGEN SATURATION: 97 %

## 2025-04-24 VITALS
SYSTOLIC BLOOD PRESSURE: 117 MMHG | TEMPERATURE: 98 F | OXYGEN SATURATION: 96 % | DIASTOLIC BLOOD PRESSURE: 83 MMHG | RESPIRATION RATE: 18 BRPM | HEART RATE: 108 BPM

## 2025-04-24 DIAGNOSIS — R10.12 LEFT UPPER QUADRANT PAIN: ICD-10-CM

## 2025-04-24 DIAGNOSIS — Z98.890 OTHER SPECIFIED POSTPROCEDURAL STATES: Chronic | ICD-10-CM

## 2025-04-24 DIAGNOSIS — R79.89 OTHER SPECIFIED ABNORMAL FINDINGS OF BLOOD CHEMISTRY: ICD-10-CM

## 2025-04-24 DIAGNOSIS — R11.2 NAUSEA WITH VOMITING, UNSPECIFIED: ICD-10-CM
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
## 2025-04-24 DIAGNOSIS — E87.1 HYPO-OSMOLALITY AND HYPONATREMIA: ICD-10-CM

## 2025-04-24 DIAGNOSIS — M25.562 PAIN IN LEFT KNEE: ICD-10-CM

## 2025-04-24 DIAGNOSIS — M13.80 OTHER SPECIFIED ARTHRITIS, UNSPECIFIED SITE: ICD-10-CM

## 2025-04-24 DIAGNOSIS — Z87.19 PERSONAL HISTORY OF OTHER DISEASES OF THE DIGESTIVE SYSTEM: ICD-10-CM
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
## 2025-04-24 DIAGNOSIS — S30.810A ABRASION OF LOWER BACK AND PELVIS, INITIAL ENCOUNTER: ICD-10-CM

## 2025-04-24 DIAGNOSIS — E87.6 HYPOKALEMIA: ICD-10-CM

## 2025-04-24 LAB
ALBUMIN SERPL ELPH-MCNC: 4.1 G/DL — SIGNIFICANT CHANGE UP (ref 3.3–5)
ALP SERPL-CCNC: 110 U/L — SIGNIFICANT CHANGE UP (ref 40–120)
ALT FLD-CCNC: 42 U/L — SIGNIFICANT CHANGE UP (ref 12–78)
ANION GAP SERPL CALC-SCNC: 16 MMOL/L — SIGNIFICANT CHANGE UP (ref 5–17)
AST SERPL-CCNC: 76 U/L — HIGH (ref 15–37)
BILIRUB SERPL-MCNC: 2.1 MG/DL — HIGH (ref 0.2–1.2)
BUN SERPL-MCNC: 32 MG/DL — HIGH (ref 7–23)
CALCIUM SERPL-MCNC: 9.8 MG/DL — SIGNIFICANT CHANGE UP (ref 8.5–10.1)
CHLORIDE SERPL-SCNC: 95 MMOL/L — LOW (ref 96–108)
CO2 SERPL-SCNC: 20 MMOL/L — LOW (ref 22–31)
CREAT SERPL-MCNC: 1.73 MG/DL — HIGH (ref 0.5–1.3)
EGFR: 44 ML/MIN/1.73M2 — LOW
EGFR: 44 ML/MIN/1.73M2 — LOW
GLUCOSE SERPL-MCNC: 127 MG/DL — HIGH (ref 70–99)
HCT VFR BLD CALC: 40.4 % — SIGNIFICANT CHANGE UP (ref 39–50)
HGB BLD-MCNC: 13.7 G/DL — SIGNIFICANT CHANGE UP (ref 13–17)
LIDOCAIN IGE QN: 59 U/L — SIGNIFICANT CHANGE UP (ref 13–75)
MCHC RBC-ENTMCNC: 27.6 PG — SIGNIFICANT CHANGE UP (ref 27–34)
MCHC RBC-ENTMCNC: 33.9 G/DL — SIGNIFICANT CHANGE UP (ref 32–36)
MCV RBC AUTO: 81.3 FL — SIGNIFICANT CHANGE UP (ref 80–100)
NRBC BLD AUTO-RTO: 0 /100 WBCS — SIGNIFICANT CHANGE UP (ref 0–0)
NT-PROBNP SERPL-SCNC: 472 PG/ML — HIGH (ref 0–125)
PLATELET # BLD AUTO: 119 K/UL — LOW (ref 150–400)
POTASSIUM SERPL-MCNC: 2.9 MMOL/L — CRITICAL LOW (ref 3.5–5.3)
POTASSIUM SERPL-SCNC: 2.9 MMOL/L — CRITICAL LOW (ref 3.5–5.3)
PROT SERPL-MCNC: 9.5 GM/DL — HIGH (ref 6–8.3)
RBC # BLD: 4.97 M/UL — SIGNIFICANT CHANGE UP (ref 4.2–5.8)
RBC # FLD: 17.5 % — HIGH (ref 10.3–14.5)
SODIUM SERPL-SCNC: 131 MMOL/L — LOW (ref 135–145)
TROPONIN I, HIGH SENSITIVITY RESULT: 16.3 NG/L — SIGNIFICANT CHANGE UP
WBC # BLD: 7.16 K/UL — SIGNIFICANT CHANGE UP (ref 3.8–10.5)
WBC # FLD AUTO: 7.16 K/UL — SIGNIFICANT CHANGE UP (ref 3.8–10.5)

## 2025-04-24 PROCEDURE — 73562 X-RAY EXAM OF KNEE 3: CPT | Mod: 26,LT

## 2025-04-24 PROCEDURE — 70450 CT HEAD/BRAIN W/O DYE: CPT | Mod: 26

## 2025-04-24 PROCEDURE — 74177 CT ABD & PELVIS W/CONTRAST: CPT | Mod: 26

## 2025-04-24 PROCEDURE — 99285 EMERGENCY DEPT VISIT HI MDM: CPT

## 2025-04-24 PROCEDURE — 71046 X-RAY EXAM CHEST 2 VIEWS: CPT | Mod: 26

## 2025-04-24 RX ORDER — DICLOFENAC SODIUM 10 MG/G
1 GEL TOPICAL
Qty: 1 | Refills: 0
Start: 2025-04-24

## 2025-04-24 RX ORDER — NAPROXEN SODIUM 275 MG
1 TABLET ORAL
Qty: 30 | Refills: 0
Start: 2025-04-24

## 2025-04-24 RX ORDER — METOCLOPRAMIDE HCL 10 MG
1 TABLET ORAL
Qty: 15 | Refills: 0
Start: 2025-04-24 | End: 2025-04-28

## 2025-04-24 RX ORDER — ACETAMINOPHEN 500 MG/5ML
1000 LIQUID (ML) ORAL ONCE
Refills: 0 | Status: COMPLETED | OUTPATIENT
Start: 2025-04-24 | End: 2025-04-24

## 2025-04-24 RX ORDER — ACETAMINOPHEN 500 MG/5ML
2 LIQUID (ML) ORAL
Qty: 100 | Refills: 0
Start: 2025-04-24

## 2025-04-24 RX ORDER — KETOROLAC TROMETHAMINE 30 MG/ML
15 INJECTION, SOLUTION INTRAMUSCULAR; INTRAVENOUS ONCE
Refills: 0 | Status: DISCONTINUED | OUTPATIENT
Start: 2025-04-24 | End: 2025-04-24

## 2025-04-24 RX ORDER — ONDANSETRON HCL/PF 4 MG/2 ML
4 VIAL (ML) INJECTION ONCE
Refills: 0 | Status: COMPLETED | OUTPATIENT
Start: 2025-04-24 | End: 2025-04-24

## 2025-04-24 RX ORDER — OXYCODONE HYDROCHLORIDE 30 MG/1
1 TABLET ORAL
Qty: 15 | Refills: 0
Start: 2025-04-24

## 2025-04-24 RX ORDER — ONDANSETRON HCL/PF 4 MG/2 ML
1 VIAL (ML) INJECTION
Qty: 15 | Refills: 0
Start: 2025-04-24 | End: 2025-04-28

## 2025-04-24 RX ADMIN — Medication 100 MILLIEQUIVALENT(S): at 18:47

## 2025-04-24 RX ADMIN — Medication 40 MILLIGRAM(S): at 16:31

## 2025-04-24 RX ADMIN — Medication 4 MILLIGRAM(S): at 16:31

## 2025-04-24 RX ADMIN — Medication 2 MILLIGRAM(S): at 16:32

## 2025-04-24 RX ADMIN — Medication 100 MILLIEQUIVALENT(S): at 17:45

## 2025-04-24 RX ADMIN — Medication 1000 MILLILITER(S): at 16:32

## 2025-04-24 RX ADMIN — Medication 500 MILLILITER(S): at 16:32

## 2025-04-24 RX ADMIN — Medication 40 MILLIEQUIVALENT(S): at 17:44

## 2025-04-24 RX ADMIN — Medication 400 MILLIGRAM(S): at 16:31

## 2025-04-24 RX ADMIN — Medication 100 MILLIEQUIVALENT(S): at 20:29

## 2025-04-24 NOTE — ED PROVIDER NOTE - OBJECTIVE STATEMENT
Sandrine: CHF (EF 30-50%), pancreatitis, arthritis. 4 days LUQ pain and N/V (NB). Feels dehydrated. Says he fell and hit head a few days ago and had brief convulsion-like activity. Says he drinks ETOH, but "not too much." When he fell, he also aggravated his L knee, for which he has a brace.

## 2025-04-24 NOTE — ED ADULT NURSE NOTE - NSFALLUNIVINTERV_ED_ALL_ED
Bed/Stretcher in lowest position, wheels locked, appropriate side rails in place/Call bell, personal items and telephone in reach/Instruct patient to call for assistance before getting out of bed/chair/stretcher/Non-slip footwear applied when patient is off stretcher/[place] to call system/Physically safe environment - no spills, clutter or unnecessary equipment/Purposeful proactive rounding/Room/bathroom lighting operational, light cord in reach

## 2025-04-24 NOTE — ED PROVIDER NOTE - PROGRESS NOTE DETAILS
Sandrine: low K (repleted) and low Na, high BUN: all suggest vomiting-related dehydration. Sandrine: Gait unremarkable w/ his cane.

## 2025-04-24 NOTE — ED ADULT TRIAGE NOTE - CHIEF COMPLAINT QUOTE
BIBA from home for LUQ abdominal pain with nausea and vomiting x 4 days, b/l knee pain  hx of chf, pancreatitis, htn

## 2025-04-24 NOTE — ED PROVIDER NOTE - PATIENT PORTAL LINK FT
You can access the FollowMyHealth Patient Portal offered by [facility] by registering at the following website: [email or web address]. By joining [employer]’s FollowMyHealth portal, you will also be able to view your health information using other applications (apps) compatible with our system.

## 2025-04-24 NOTE — ED PROVIDER NOTE - PHYSICAL EXAMINATION
Well-appearing, well nourished, awake, alert, oriented to person, place, time/situation and in no apparent distress.    Airway patent. Neck supple. No clinical e/o trauma; no C-spine tenderness.    Eyes without scleral injection. No jaundice.    Strong pulse.    Respirations unlabored.    Abdomen soft, LUQ TTP, no guarding.    MSK: L knee TTP.    Alert and oriented, no gross motor or sensory deficits.    Skin: small scratch to R lower back area.    No SI/HI.

## 2025-04-24 NOTE — ED PROVIDER NOTE - NSFOLLOWUPINSTRUCTIONS_ED_ALL_ED_FT
Take medications for: nausea/vomiting  Reglan  Zofran    Take medications for: knee pain  Tylenol  Oxycodone  Diclofenac gel  Use Naproxen only rarely, as it can upset the stomach and make the kidneys not function well if used too much    Try to stay hydrated: Gatorade or soup are good options. Return if unable to hold down liquid or solid food.

## 2025-04-24 NOTE — ED ADULT NURSE NOTE - OBJECTIVE STATEMENT
As per pt he has been nausea and vomiting x4 days, + chills drinks 5x/week, states 1/5 tequila bottle. Has not been able to keep anything down, pmh htn heart failure

## 2025-04-24 NOTE — ED ADULT NURSE NOTE - AS PAIN REST
The patient has been examined and the H&P has been reviewed:    I concur with the findings and no changes have occurred since H&P was written.    Surgery risks, benefits and alternative options discussed and understood by patient/family.          There are no hospital problems to display for this patient.    
5 (moderate pain)

## 2025-04-24 NOTE — ED PROVIDER NOTE - CLINICAL SUMMARY MEDICAL DECISION MAKING FREE TEXT BOX
Sandrine: DDx: pancreatitis, ACS (LUQ pain, h/o CHF). Check lipase and CT abd. Evaluate for ACS with troponin, EKG, and CXR. Pain/nausea meds. X-ray knee. CT brain: fall w/ head injury w/ possible post-injury brief seizure. Has a Cards and a GI.

## 2025-04-25 LAB — TRIGL SERPL-MCNC: 128 MG/DL — SIGNIFICANT CHANGE UP

## 2025-04-25 RX ORDER — MELATONIN 5 MG
1 TABLET ORAL
Qty: 30 | Refills: 1
Start: 2025-04-25 | End: 2025-06-23

## 2025-09-11 ENCOUNTER — TRANSCRIPTION ENCOUNTER (OUTPATIENT)
Age: 65
End: 2025-09-11

## (undated) DEVICE — SUCTION YANKAUER NO CONTROL VENT

## (undated) DEVICE — SOL INJ NS 0.9% 500ML 2 PORT

## (undated) DEVICE — COLONOSCOPE 2416901: Type: DURABLE MEDICAL EQUIPMENT

## (undated) DEVICE — SENSOR O2 FINGER ADULT

## (undated) DEVICE — POLY TRAP ETRAP

## (undated) DEVICE — SYR ALLIANCE II INFLATION 60ML

## (undated) DEVICE — BIOPSY FORCEP RADIAL JAW 4 STANDARD WITH NEEDLE

## (undated) DEVICE — BALLOON US ENDO

## (undated) DEVICE — FOLEY HOLDER STATLOCK 2 WAY ADULT

## (undated) DEVICE — PACK IV START WITH CHG

## (undated) DEVICE — TUBING SUCTION CONN 6FT STERILE

## (undated) DEVICE — CATH IV SAFE BC 20G X 1.16" (PINK)

## (undated) DEVICE — TUBING SUCTION 20FT

## (undated) DEVICE — BRUSH COLONOSCOPY CYTOLOGY

## (undated) DEVICE — TUBING IV SET GRAVITY 3Y 100" MACRO

## (undated) DEVICE — CATH IV SAFE BC 22G X 1" (BLUE)

## (undated) DEVICE — BITE BLOCK ADULT 20 X 27MM (GREEN)

## (undated) DEVICE — ELCTR GROUNDING PAD ADULT COVIDIEN

## (undated) DEVICE — CLAMP BX HOT RAD JAW 3

## (undated) DEVICE — IRRIGATOR BIO SHIELD

## (undated) DEVICE — FORCEP RADIAL JAW 4 JUMBO 2.8MM 3.2MM 240CM ORANGE DISP

## (undated) DEVICE — SYR LUER LOK 50CC